# Patient Record
Sex: FEMALE | Race: WHITE | Employment: PART TIME
[De-identification: names, ages, dates, MRNs, and addresses within clinical notes are randomized per-mention and may not be internally consistent; named-entity substitution may affect disease eponyms.]

---

## 2016-11-10 LAB
AVERAGE GLUCOSE: NORMAL
HBA1C MFR BLD: 5.2 %

## 2017-02-01 ENCOUNTER — OFFICE VISIT (OUTPATIENT)
Dept: OBGYN | Facility: CLINIC | Age: 47
End: 2017-02-01

## 2017-02-01 VITALS
BODY MASS INDEX: 32.5 KG/M2 | HEIGHT: 64 IN | DIASTOLIC BLOOD PRESSURE: 76 MMHG | SYSTOLIC BLOOD PRESSURE: 128 MMHG | WEIGHT: 190.4 LBS

## 2017-02-01 DIAGNOSIS — N84.1 CERVICAL POLYP: Primary | ICD-10-CM

## 2017-02-01 PROCEDURE — 99214 OFFICE O/P EST MOD 30 MIN: CPT | Performed by: OBSTETRICS & GYNECOLOGY

## 2017-02-20 ENCOUNTER — APPOINTMENT (OUTPATIENT)
Dept: GENERAL RADIOLOGY | Age: 47
End: 2017-02-20
Payer: COMMERCIAL

## 2017-02-20 ENCOUNTER — HOSPITAL ENCOUNTER (EMERGENCY)
Age: 47
Discharge: HOME | End: 2017-02-20
Attending: EMERGENCY MEDICINE
Payer: COMMERCIAL

## 2017-02-20 VITALS
BODY MASS INDEX: 33.29 KG/M2 | RESPIRATION RATE: 12 BRPM | WEIGHT: 195 LBS | TEMPERATURE: 98 F | HEART RATE: 67 BPM | HEIGHT: 64 IN | SYSTOLIC BLOOD PRESSURE: 104 MMHG | DIASTOLIC BLOOD PRESSURE: 64 MMHG | OXYGEN SATURATION: 93 %

## 2017-02-20 DIAGNOSIS — R68.84 JAW PAIN: Primary | ICD-10-CM

## 2017-02-20 LAB
ABSOLUTE EOS #: 0.7 K/UL (ref 0–0.4)
ABSOLUTE LYMPH #: 3.5 K/UL (ref 1–4.8)
ABSOLUTE MONO #: 0.6 K/UL (ref 0.1–1.3)
ANION GAP SERPL CALCULATED.3IONS-SCNC: 15 MMOL/L (ref 9–17)
BASOPHILS # BLD: 1 % (ref 0–2)
BASOPHILS ABSOLUTE: 0.1 K/UL (ref 0–0.2)
BUN BLDV-MCNC: 13 MG/DL (ref 6–20)
BUN/CREAT BLD: ABNORMAL (ref 9–20)
CALCIUM SERPL-MCNC: 8.7 MG/DL (ref 8.6–10.4)
CHLORIDE BLD-SCNC: 101 MMOL/L (ref 98–107)
CO2: 24 MMOL/L (ref 20–31)
CREAT SERPL-MCNC: 0.52 MG/DL (ref 0.5–0.9)
DIFFERENTIAL TYPE: ABNORMAL
DIRECT EXAM: NORMAL
EOSINOPHILS RELATIVE PERCENT: 7 % (ref 0–4)
GFR AFRICAN AMERICAN: >60 ML/MIN
GFR NON-AFRICAN AMERICAN: >60 ML/MIN
GFR SERPL CREATININE-BSD FRML MDRD: ABNORMAL ML/MIN/{1.73_M2}
GFR SERPL CREATININE-BSD FRML MDRD: ABNORMAL ML/MIN/{1.73_M2}
GLUCOSE BLD-MCNC: 163 MG/DL (ref 70–99)
HCG QUALITATIVE: NEGATIVE
HCT VFR BLD CALC: 39.2 % (ref 36–46)
HEMOGLOBIN: 13.5 G/DL (ref 12–16)
INR BLD: 1
LYMPHOCYTES # BLD: 34 % (ref 24–44)
Lab: NORMAL
MCH RBC QN AUTO: 30.9 PG (ref 26–34)
MCHC RBC AUTO-ENTMCNC: 34.6 G/DL (ref 31–37)
MCV RBC AUTO: 89.4 FL (ref 80–100)
MONOCYTES # BLD: 6 % (ref 1–7)
MONONUCLEOSIS SCREEN: NEGATIVE
PDW BLD-RTO: 12.9 % (ref 11.5–14.9)
PLATELET # BLD: 163 K/UL (ref 150–450)
PLATELET ESTIMATE: ABNORMAL
PMV BLD AUTO: 10.7 FL (ref 6–12)
POTASSIUM SERPL-SCNC: 3.8 MMOL/L (ref 3.7–5.3)
PROTHROMBIN TIME: 10.3 SEC (ref 9.7–12)
RBC # BLD: 4.39 M/UL (ref 4–5.2)
RBC # BLD: ABNORMAL 10*6/UL
SEG NEUTROPHILS: 52 % (ref 36–66)
SEGMENTED NEUTROPHILS ABSOLUTE COUNT: 5.4 K/UL (ref 1.3–9.1)
SODIUM BLD-SCNC: 140 MMOL/L (ref 135–144)
SPECIMEN DESCRIPTION: NORMAL
SPECIMEN DESCRIPTION: NORMAL
STATUS: NORMAL
TOTAL CK: 89 U/L (ref 26–192)
TROPONIN INTERP: NORMAL
TROPONIN INTERP: NORMAL
TROPONIN T: <0.03 NG/ML
TROPONIN T: <0.03 NG/ML
WBC # BLD: 10.3 K/UL (ref 3.5–11)
WBC # BLD: ABNORMAL 10*3/UL

## 2017-02-20 PROCEDURE — 87804 INFLUENZA ASSAY W/OPTIC: CPT

## 2017-02-20 PROCEDURE — 6360000002 HC RX W HCPCS: Performed by: EMERGENCY MEDICINE

## 2017-02-20 PROCEDURE — 86308 HETEROPHILE ANTIBODY SCREEN: CPT

## 2017-02-20 PROCEDURE — 96375 TX/PRO/DX INJ NEW DRUG ADDON: CPT

## 2017-02-20 PROCEDURE — 2580000003 HC RX 258: Performed by: EMERGENCY MEDICINE

## 2017-02-20 PROCEDURE — 84703 CHORIONIC GONADOTROPIN ASSAY: CPT

## 2017-02-20 PROCEDURE — 36415 COLL VENOUS BLD VENIPUNCTURE: CPT

## 2017-02-20 PROCEDURE — 71010 XR CHEST PORTABLE: CPT

## 2017-02-20 PROCEDURE — 85610 PROTHROMBIN TIME: CPT

## 2017-02-20 PROCEDURE — 93005 ELECTROCARDIOGRAM TRACING: CPT

## 2017-02-20 PROCEDURE — 80048 BASIC METABOLIC PNL TOTAL CA: CPT

## 2017-02-20 PROCEDURE — 82550 ASSAY OF CK (CPK): CPT

## 2017-02-20 PROCEDURE — 99284 EMERGENCY DEPT VISIT MOD MDM: CPT

## 2017-02-20 PROCEDURE — 96374 THER/PROPH/DIAG INJ IV PUSH: CPT

## 2017-02-20 PROCEDURE — 84484 ASSAY OF TROPONIN QUANT: CPT

## 2017-02-20 PROCEDURE — 85025 COMPLETE CBC W/AUTO DIFF WBC: CPT

## 2017-02-20 RX ORDER — KETOROLAC TROMETHAMINE 30 MG/ML
30 INJECTION, SOLUTION INTRAMUSCULAR; INTRAVENOUS ONCE
Status: COMPLETED | OUTPATIENT
Start: 2017-02-20 | End: 2017-02-20

## 2017-02-20 RX ORDER — 0.9 % SODIUM CHLORIDE 0.9 %
1000 INTRAVENOUS SOLUTION INTRAVENOUS ONCE
Status: COMPLETED | OUTPATIENT
Start: 2017-02-20 | End: 2017-02-20

## 2017-02-20 RX ORDER — CYCLOBENZAPRINE HCL 10 MG
10 TABLET ORAL 3 TIMES DAILY PRN
Qty: 30 TABLET | Refills: 0 | Status: SHIPPED | OUTPATIENT
Start: 2017-02-20 | End: 2017-02-23

## 2017-02-20 RX ORDER — ONDANSETRON 2 MG/ML
4 INJECTION INTRAMUSCULAR; INTRAVENOUS ONCE
Status: COMPLETED | OUTPATIENT
Start: 2017-02-20 | End: 2017-02-20

## 2017-02-20 RX ORDER — ACETAMINOPHEN 325 MG/1
650 TABLET ORAL EVERY 6 HOURS PRN
Qty: 120 TABLET | Refills: 3 | Status: SHIPPED | OUTPATIENT
Start: 2017-02-20 | End: 2017-02-23

## 2017-02-20 RX ADMIN — KETOROLAC TROMETHAMINE 30 MG: 30 INJECTION, SOLUTION INTRAMUSCULAR at 05:02

## 2017-02-20 RX ADMIN — ONDANSETRON 4 MG: 2 INJECTION INTRAMUSCULAR; INTRAVENOUS at 02:51

## 2017-02-20 RX ADMIN — SODIUM CHLORIDE 1000 ML: 9 INJECTION, SOLUTION INTRAVENOUS at 02:51

## 2017-02-20 ASSESSMENT — ENCOUNTER SYMPTOMS
NAUSEA: 0
DIARRHEA: 0
EYE DISCHARGE: 0
RHINORRHEA: 0
COLOR CHANGE: 0
VOMITING: 0
COUGH: 0
EYE REDNESS: 0
ABDOMINAL PAIN: 0
SHORTNESS OF BREATH: 0
SORE THROAT: 0

## 2017-02-20 ASSESSMENT — PAIN DESCRIPTION - LOCATION: LOCATION: JAW;SHOULDER

## 2017-02-20 ASSESSMENT — PAIN DESCRIPTION - PAIN TYPE: TYPE: ACUTE PAIN

## 2017-02-20 ASSESSMENT — PAIN SCALES - GENERAL: PAINLEVEL_OUTOF10: 8

## 2017-02-20 ASSESSMENT — PAIN DESCRIPTION - ORIENTATION: ORIENTATION: LEFT;RIGHT

## 2017-02-22 LAB
AVERAGE GLUCOSE: NORMAL
EKG ATRIAL RATE: 77 BPM
EKG P AXIS: 35 DEGREES
EKG P-R INTERVAL: 142 MS
EKG Q-T INTERVAL: 404 MS
EKG QRS DURATION: 84 MS
EKG QTC CALCULATION (BAZETT): 457 MS
EKG R AXIS: 50 DEGREES
EKG T AXIS: 46 DEGREES
EKG VENTRICULAR RATE: 77 BPM
HBA1C MFR BLD: 5.3 %

## 2017-02-23 ENCOUNTER — OFFICE VISIT (OUTPATIENT)
Dept: OBGYN | Facility: CLINIC | Age: 47
End: 2017-02-23

## 2017-02-23 ENCOUNTER — PROCEDURE VISIT (OUTPATIENT)
Dept: OBGYN | Facility: CLINIC | Age: 47
End: 2017-02-23

## 2017-02-23 ENCOUNTER — HOSPITAL ENCOUNTER (OUTPATIENT)
Age: 47
Setting detail: SPECIMEN
Discharge: HOME | End: 2017-02-23
Payer: COMMERCIAL

## 2017-02-23 VITALS
SYSTOLIC BLOOD PRESSURE: 110 MMHG | WEIGHT: 192.2 LBS | BODY MASS INDEX: 32.81 KG/M2 | DIASTOLIC BLOOD PRESSURE: 60 MMHG | HEIGHT: 64 IN

## 2017-02-23 DIAGNOSIS — N84.1 CERVICAL POLYP: ICD-10-CM

## 2017-02-23 DIAGNOSIS — N84.1 CERVICAL POLYP: Primary | ICD-10-CM

## 2017-02-23 LAB
THYROXINE, FREE: 1.16 NG/DL (ref 0.93–1.7)
TSH SERPL DL<=0.05 MIU/L-ACNC: 3.27 MIU/L (ref 0.3–5)

## 2017-02-23 PROCEDURE — 76830 TRANSVAGINAL US NON-OB: CPT | Performed by: OBSTETRICS & GYNECOLOGY

## 2017-02-23 PROCEDURE — 99213 OFFICE O/P EST LOW 20 MIN: CPT | Performed by: OBSTETRICS & GYNECOLOGY

## 2017-02-23 PROCEDURE — 76856 US EXAM PELVIC COMPLETE: CPT | Performed by: OBSTETRICS & GYNECOLOGY

## 2017-03-15 ENCOUNTER — OFFICE VISIT (OUTPATIENT)
Dept: OBGYN CLINIC | Age: 47
End: 2017-03-15
Payer: COMMERCIAL

## 2017-03-15 ENCOUNTER — HOSPITAL ENCOUNTER (OUTPATIENT)
Age: 47
Setting detail: SPECIMEN
Discharge: HOME | End: 2017-03-15
Payer: COMMERCIAL

## 2017-03-15 VITALS
HEIGHT: 64 IN | SYSTOLIC BLOOD PRESSURE: 130 MMHG | DIASTOLIC BLOOD PRESSURE: 86 MMHG | WEIGHT: 190.8 LBS | BODY MASS INDEX: 32.58 KG/M2

## 2017-03-15 DIAGNOSIS — D25.9 FIBROID OF CERVIX: ICD-10-CM

## 2017-03-15 DIAGNOSIS — Z12.31 ENCOUNTER FOR SCREENING MAMMOGRAM FOR BREAST CANCER: ICD-10-CM

## 2017-03-15 DIAGNOSIS — Z01.419 VISIT FOR GYNECOLOGIC EXAMINATION: Primary | ICD-10-CM

## 2017-03-15 PROCEDURE — 99396 PREV VISIT EST AGE 40-64: CPT | Performed by: OBSTETRICS & GYNECOLOGY

## 2017-03-15 ASSESSMENT — ENCOUNTER SYMPTOMS
COUGH: 0
DIARRHEA: 0
SHORTNESS OF BREATH: 0
ABDOMINAL DISTENTION: 0
ABDOMINAL PAIN: 0
CONSTIPATION: 0
BACK PAIN: 0

## 2017-03-17 LAB — CYTOLOGY REPORT: NORMAL

## 2017-04-17 ENCOUNTER — HOSPITAL ENCOUNTER (OUTPATIENT)
Dept: GENERAL RADIOLOGY | Age: 47
Discharge: HOME | End: 2017-04-17
Payer: COMMERCIAL

## 2017-04-17 ENCOUNTER — HOSPITAL ENCOUNTER (OUTPATIENT)
Age: 47
Discharge: HOME | End: 2017-04-17
Payer: COMMERCIAL

## 2017-04-17 DIAGNOSIS — R52 PAIN: ICD-10-CM

## 2017-04-17 PROCEDURE — 71100 X-RAY EXAM RIBS UNI 2 VIEWS: CPT

## 2017-04-28 ENCOUNTER — HOSPITAL ENCOUNTER (OUTPATIENT)
Age: 47
Discharge: HOME | End: 2017-04-28
Payer: COMMERCIAL

## 2017-04-28 LAB
THYROXINE, FREE: 1.03 NG/DL (ref 0.93–1.7)
TSH SERPL DL<=0.05 MIU/L-ACNC: 2.61 MIU/L (ref 0.3–5)

## 2017-04-28 PROCEDURE — 84439 ASSAY OF FREE THYROXINE: CPT

## 2017-04-28 PROCEDURE — 84443 ASSAY THYROID STIM HORMONE: CPT

## 2017-04-28 PROCEDURE — 36415 COLL VENOUS BLD VENIPUNCTURE: CPT

## 2017-05-02 ENCOUNTER — HOSPITAL ENCOUNTER (OUTPATIENT)
Dept: WOMENS IMAGING | Age: 47
Discharge: HOME | End: 2017-05-02
Payer: COMMERCIAL

## 2017-05-02 PROCEDURE — 77063 BREAST TOMOSYNTHESIS BI: CPT

## 2017-05-03 ENCOUNTER — HOSPITAL ENCOUNTER (OUTPATIENT)
Age: 47
Discharge: HOME | End: 2017-05-03
Payer: COMMERCIAL

## 2017-05-03 LAB
ALBUMIN SERPL-MCNC: 4.2 G/DL (ref 3.5–5.2)
ALBUMIN/GLOBULIN RATIO: ABNORMAL (ref 1–2.5)
ALP BLD-CCNC: 64 U/L (ref 35–104)
ALT SERPL-CCNC: 9 U/L (ref 5–33)
ANION GAP SERPL CALCULATED.3IONS-SCNC: 15 MMOL/L (ref 9–17)
AST SERPL-CCNC: 15 U/L
BILIRUB SERPL-MCNC: 0.58 MG/DL (ref 0.3–1.2)
BUN BLDV-MCNC: 9 MG/DL (ref 6–20)
BUN/CREAT BLD: ABNORMAL (ref 9–20)
CALCIUM SERPL-MCNC: 9.4 MG/DL (ref 8.6–10.4)
CHLORIDE BLD-SCNC: 100 MMOL/L (ref 98–107)
CO2: 21 MMOL/L (ref 20–31)
CREAT SERPL-MCNC: 0.52 MG/DL (ref 0.5–0.9)
GFR AFRICAN AMERICAN: >60 ML/MIN
GFR NON-AFRICAN AMERICAN: >60 ML/MIN
GFR SERPL CREATININE-BSD FRML MDRD: ABNORMAL ML/MIN/{1.73_M2}
GFR SERPL CREATININE-BSD FRML MDRD: ABNORMAL ML/MIN/{1.73_M2}
GLUCOSE BLD-MCNC: 105 MG/DL (ref 70–99)
INSULIN COMMENT: NORMAL
INSULIN REFERENCE RANGE:: NORMAL
INSULIN: 10.4 MU/L
IRON SATURATION: 30 % (ref 20–55)
IRON: 89 UG/DL (ref 37–145)
MAGNESIUM: 2.2 MG/DL (ref 1.6–2.6)
POTASSIUM SERPL-SCNC: 4.5 MMOL/L (ref 3.7–5.3)
SODIUM BLD-SCNC: 136 MMOL/L (ref 135–144)
T3 FREE: 2.72 PG/ML (ref 2.02–4.43)
THYROXINE, FREE: 1.11 NG/DL (ref 0.93–1.7)
TOTAL IRON BINDING CAPACITY: 296 UG/DL (ref 250–450)
TOTAL PROTEIN: 7.3 G/DL (ref 6.4–8.3)
TSH SERPL DL<=0.05 MIU/L-ACNC: 4.23 MIU/L (ref 0.3–5)
UNSATURATED IRON BINDING CAPACITY: 207 UG/DL (ref 112–347)
VITAMIN D 25-HYDROXY: 19.8 NG/ML (ref 30–100)

## 2017-05-03 PROCEDURE — 80053 COMPREHEN METABOLIC PANEL: CPT

## 2017-05-03 PROCEDURE — 83550 IRON BINDING TEST: CPT

## 2017-05-03 PROCEDURE — 84481 FREE ASSAY (FT-3): CPT

## 2017-05-03 PROCEDURE — 84443 ASSAY THYROID STIM HORMONE: CPT

## 2017-05-03 PROCEDURE — 83540 ASSAY OF IRON: CPT

## 2017-05-03 PROCEDURE — 83735 ASSAY OF MAGNESIUM: CPT

## 2017-05-03 PROCEDURE — 84439 ASSAY OF FREE THYROXINE: CPT

## 2017-05-03 PROCEDURE — 82306 VITAMIN D 25 HYDROXY: CPT

## 2017-05-03 PROCEDURE — 83525 ASSAY OF INSULIN: CPT

## 2017-05-03 PROCEDURE — 36415 COLL VENOUS BLD VENIPUNCTURE: CPT

## 2017-05-09 ENCOUNTER — TELEPHONE (OUTPATIENT)
Dept: INFUSION THERAPY | Facility: MEDICAL CENTER | Age: 47
End: 2017-05-09

## 2017-05-09 DIAGNOSIS — E03.9 UNSPECIFIED HYPOTHYROIDISM: ICD-10-CM

## 2017-05-11 ENCOUNTER — OFFICE VISIT (OUTPATIENT)
Dept: FAMILY MEDICINE CLINIC | Age: 47
End: 2017-05-11
Payer: COMMERCIAL

## 2017-05-11 VITALS
SYSTOLIC BLOOD PRESSURE: 110 MMHG | DIASTOLIC BLOOD PRESSURE: 74 MMHG | HEART RATE: 84 BPM | WEIGHT: 193.8 LBS | HEIGHT: 64 IN | BODY MASS INDEX: 33.09 KG/M2

## 2017-05-11 DIAGNOSIS — F41.1 GAD (GENERALIZED ANXIETY DISORDER): ICD-10-CM

## 2017-05-11 DIAGNOSIS — L30.1 DYSHIDROSIS: ICD-10-CM

## 2017-05-11 DIAGNOSIS — K21.9 GASTROESOPHAGEAL REFLUX DISEASE WITHOUT ESOPHAGITIS: ICD-10-CM

## 2017-05-11 DIAGNOSIS — N84.1 CERVICAL POLYP: ICD-10-CM

## 2017-05-11 DIAGNOSIS — E55.9 VITAMIN D DEFICIENCY: ICD-10-CM

## 2017-05-11 DIAGNOSIS — E03.9 HYPOTHYROIDISM, UNSPECIFIED TYPE: Primary | ICD-10-CM

## 2017-05-11 DIAGNOSIS — R53.83 FATIGUE, UNSPECIFIED TYPE: ICD-10-CM

## 2017-05-11 PROCEDURE — 99203 OFFICE O/P NEW LOW 30 MIN: CPT | Performed by: FAMILY MEDICINE

## 2017-05-11 RX ORDER — CITALOPRAM 10 MG/1
30 TABLET ORAL DAILY
Qty: 30 TABLET | Refills: 3
Start: 2017-05-11 | End: 2019-03-13 | Stop reason: SDUPTHER

## 2017-05-11 ASSESSMENT — ENCOUNTER SYMPTOMS
CONSTIPATION: 0
CHEST TIGHTNESS: 0
EYE DISCHARGE: 0
EYE REDNESS: 0
PHOTOPHOBIA: 0
SORE THROAT: 0
ABDOMINAL PAIN: 0
VOICE CHANGE: 0
SINUS PRESSURE: 0
COUGH: 0
NAUSEA: 0
DIARRHEA: 0
COLOR CHANGE: 0
EYE PAIN: 0
VOMITING: 0
BLOOD IN STOOL: 0
RHINORRHEA: 0
BACK PAIN: 0
EYE ITCHING: 0
SHORTNESS OF BREATH: 0
FACIAL SWELLING: 0
ABDOMINAL DISTENTION: 0
WHEEZING: 0

## 2017-05-16 ENCOUNTER — HOSPITAL ENCOUNTER (OUTPATIENT)
Dept: INFUSION THERAPY | Facility: MEDICAL CENTER | Age: 47
Discharge: HOME | End: 2017-05-16
Payer: COMMERCIAL

## 2017-05-16 VITALS
SYSTOLIC BLOOD PRESSURE: 126 MMHG | DIASTOLIC BLOOD PRESSURE: 76 MMHG | TEMPERATURE: 98.3 F | RESPIRATION RATE: 18 BRPM | HEART RATE: 81 BPM

## 2017-05-16 DIAGNOSIS — E03.9 UNSPECIFIED HYPOTHYROIDISM: ICD-10-CM

## 2017-05-16 LAB
CORTISOL COLLECTION INFO: NORMAL
CORTISOL COLLECTION INFO: NORMAL
CORTISOL: 10.6 UG/DL
CORTISOL: 14.1 UG/DL

## 2017-05-16 PROCEDURE — 2580000003 HC RX 258: Performed by: INTERNAL MEDICINE

## 2017-05-16 PROCEDURE — 96374 THER/PROPH/DIAG INJ IV PUSH: CPT

## 2017-05-16 PROCEDURE — 6360000002 HC RX W HCPCS: Performed by: INTERNAL MEDICINE

## 2017-05-16 PROCEDURE — 36415 COLL VENOUS BLD VENIPUNCTURE: CPT

## 2017-05-16 PROCEDURE — 82533 TOTAL CORTISOL: CPT

## 2017-05-16 RX ORDER — SODIUM CHLORIDE 9 MG/ML
INJECTION, SOLUTION INTRAVENOUS CONTINUOUS
Status: DISCONTINUED | OUTPATIENT
Start: 2017-05-16 | End: 2017-05-17 | Stop reason: HOSPADM

## 2017-05-16 RX ADMIN — COSYNTROPIN 1 MCG: 0.25 INJECTION, POWDER, LYOPHILIZED, FOR SOLUTION INTRAMUSCULAR; INTRAVENOUS at 14:43

## 2017-05-16 RX ADMIN — SODIUM CHLORIDE: 9 INJECTION, SOLUTION INTRAVENOUS at 14:35

## 2017-05-16 ASSESSMENT — PAIN SCALES - GENERAL: PAINLEVEL_OUTOF10: 6

## 2017-05-16 ASSESSMENT — PAIN DESCRIPTION - DESCRIPTORS: DESCRIPTORS: SQUEEZING

## 2017-05-16 ASSESSMENT — PAIN DESCRIPTION - LOCATION: LOCATION: GENERALIZED

## 2017-05-16 ASSESSMENT — PAIN DESCRIPTION - PAIN TYPE: TYPE: CHRONIC PAIN

## 2017-05-16 ASSESSMENT — PAIN DESCRIPTION - ONSET: ONSET: ON-GOING

## 2017-05-16 ASSESSMENT — PAIN DESCRIPTION - FREQUENCY: FREQUENCY: INTERMITTENT

## 2017-05-16 NOTE — IP AVS SNAPSHOT
Patient Information     Patient Name ANNE Aguilar 1970         This is your updated medication list to keep with you all times      ASK your doctor about these medications     citalopram 10 MG tablet   Commonly known as:  CELEXA   Take 3 tablets by mouth daily       HYDROcodone-acetaminophen 5-325 MG per tablet   Commonly known as:  NORCO       levothyroxine 50 MCG tablet   Commonly known as:  SYNTHROID       LORazepam 0.5 MG tablet   Commonly known as:  ATIVAN       naproxen 500 MG tablet   Commonly known as:  NAPROSYN       ONE TOUCH ULTRA 2 W/DEVICE Kit       ONE TOUCH ULTRA TEST strip   Generic drug:  glucose blood VI test strips       PEPCID PO       vitamin D3 5000 UNITS Caps   Take 1 capsule by mouth daily       ZYRTEC PO

## 2017-05-16 NOTE — PROGRESS NOTES
Arrives very nervous for Cortrosyn injection via IV line. See mar. Lab paged to floor to draw 30 minutes and 60 minute serum cortisol levels. discharged to home with all belongings. Next rtc is prn.

## 2017-05-16 NOTE — IP AVS SNAPSHOT
After Visit Summary  (Discharge Instructions)    Medication List for Home    Based on the information you provided to us as well as any changes during this visit, the following is your updated medication list.  Compare this with your prescription bottles at home. If you have any questions or concerns, contact your primary care physician's office.              Daily Medication List (This medication list can be shared with any healthcare provider who is helping you manage your medications)      ASK your doctor about these medications if you have questions        Last Dose    Next Dose Due AM NOON PM NIGHT    citalopram 10 MG tablet   Commonly known as:  CELEXA   Take 3 tablets by mouth daily                                         HYDROcodone-acetaminophen 5-325 MG per tablet   Commonly known as:  NORCO                                         levothyroxine 50 MCG tablet   Commonly known as:  SYNTHROID                                         LORazepam 0.5 MG tablet   Commonly known as:  ATIVAN   Take 0.5 mg by mouth 2 times daily                                         naproxen 500 MG tablet   Commonly known as:  NAPROSYN   2 times daily (with meals)                                         ONE TOUCH ULTRA 2 W/DEVICE Kit   TEST 4 TIMES DAILY                                         ONE TOUCH ULTRA TEST strip   Generic drug:  glucose blood VI test strips   USE TO TEST 4 TIMES DAILY                                         PEPCID PO   Take by mouth                                         vitamin D3 5000 UNITS Caps   Take 1 capsule by mouth daily                                         ZYRTEC PO   Take 1 tablet by mouth daily                                                 Allergies as of 5/16/2017        Reactions    Percocet [Oxycodone-acetaminophen] Hives, Itching    Hives everywhere, including roof of mouth and tear duct openings    Tramadol Hcl Hives, Itching Having the same reaction to the tramadol as she does to percocet. Cortisone     Pt states she became very ill the day after cortisone injection in heel. Ibuprofen Hives    Ceclor [Cefaclor] Hives, Rash    Penicillins Hives, Rash      Immunizations as of 5/16/2017     Name Date Dose VIS Date Route    Influenza Vaccine, unspecified formulation 10/24/2016 -- -- --      Last Vitals          Most Recent Value    Temp  98.3 °F (36.8 °C)    Pulse  81    Resp  18    BP  126/76         After Visit Summary    This summary was created for you. Thank you for entrusting your care to us. The following information includes details about your hospital/visit stay along with steps you should take to help with your recovery once you leave the hospital.  In this packet, you will find information about the topics listed below:    · Instructions about your medications including a list of your home medications  · A summary of your hospital visit  · Follow-up appointments once you have left the hospital  · Your care plan at home      You may receive a survey regarding the care you received during your stay. Your input is valuable to us. We encourage you to complete and return your survey in the envelope provided. We hope you will choose us in the future for your healthcare needs. Patient Information     Patient Name Lorelei Bruner 1970      Care Provided at:     Name             [address] [facility]              Your Visit    Here you will find information about your visit, including the reason for your visit. Please take this sheet with you when you visit your doctor or other health care provider in the future. It will help determine the best possible medical care for you at that time. If you have any questions once you leave the hospital, please call the department phone number listed below.         Why you were here     Your primary diagnosis was:  Not on File Visit Information     Date & Time Department Dept. Phone    5/16/2017 Linnea Hurt 48 [phone]       Follow-up Appointments    Below is a list of your follow-up and future appointments. This may not be a complete list as you may have made appointments directly with providers that we are not aware of or your providers may have made some for you. Please call your providers to confirm appointments. It is important to keep your appointments. Please bring your current insurance card, photo ID, co-pay, and all medication bottles to your appointment. If self-pay, payment is expected at the time of service. Future Appointments     6/9/2017 10:30 AM     Appointment with Lizett Caruso MD at [facility] ([phone])   Please arrive 15 minutes prior to appointment, bring photo ID and insurance card. [address]       9/14/2017 2:00 PM     Appointment with Yeimy Raymond at [facility] ([phone])   [address] [place] [id number]       9/14/2017 2:45 PM     Appointment with Dominique Macdonald MD at [facility] ([phone])   Please arrive 15 minutes prior to appointment, bring photo ID and insurance card. Manuel Once You Return Home    This section includes instructions you will need to follow once you leave the hospital.  Your care team will discuss these with you, so you and those caring for you know how to best care for your health needs at home. This section may also include educational information about certain health topics that may be of help to you. Important information for a smoker       SMOKING: QUIT SMOKING. THIS IS THE MOST IMPORTANT ACTION YOU CAN TAKE TO IMPROVE YOUR CURRENT AND FUTURE HEALTH.      Call the [address] [place] [address] at [place] NOW ([phone]) Smoking harms nonsmokers. When nonsmokers are around people who smoke, they absorb nicotine, carbon monoxide, and other ingredients of tobacco smoke. DO NOT SMOKE AROUND CHILDREN     Children exposed to secondhand smoke are at an increased risk of:  Sudden Infant Death Syndrome (SIDS), acute respiratory infections, inflammation of the middle ear, and severe asthma. Over a longer time, it causes heart disease and lung cancer. There is no safe level of exposure to secondhand smoke. [employer] Signup     [employer] allows you to send messages to your doctor, view your test results, renew your prescriptions, schedule appointments, view visit notes, and more. How Do I Sign Up? 1. In your Internet browser, go to [email or web address]  2. Click on the Sign Up Now link in the Sign In box. You will see the New Member Sign Up page. 3. Enter your [employer] Access Code exactly as it appears below. You will not need to use this code after youve completed the sign-up process. If you do not sign up before the expiration date, you must request a new code. [employer] Access Code: [email or web address]  Expires: 6/3/2017  4:51 AM    4. Enter your Social Security Number ([id number]) and Date of Birth (mm/dd/yyyy) as indicated and click Submit. You will be taken to the next sign-up page. 5. Create a [employer] ID. This will be your [employer] login ID and cannot be changed, so think of one that is secure and easy to remember. 6. Create a [employer] password. You can change your password at any time. 7. Enter your Password Reset Question and Answer. This can be used at a later time if you forget your password. 8. Enter your e-mail address. You will receive e-mail notification when new information is available in [address] 9. Click Sign Up. You can now view your medical record.      Additional Information  If you have questions, please contact the physician practice where you receive care. Remember, MyChart is NOT to be used for urgent needs. For medical emergencies, dial 911. For questions regarding your MyChart account call [phone]. If you have a clinical question, please call your doctor's office. View your information online  ? Review your current list of  medications, immunization, and allergies. ? Review your future test results online . ? Review your discharge instructions provided by your caregivers at discharge    Certain functionality such as prescription refills, scheduling appointments or sending messages to your provider are not activated if your provider does not use [email or web address] in his/her office    For questions regarding your MyChart account call [phone]. If you have a clinical question, please call your doctor's office. The information on all pages of the After Visit Summary has been reviewed with me, the patient and/or responsible adult, by my health care provider(s). I had the opportunity to ask questions regarding this information. I understand I should dispose of my armband safely at home to protect my health information. A complete copy of the After Visit Summary has been given to me, the patient and/or responsible adult.            Patient Signature/Responsible Adult:____________________    Clinician Signature:_____________________    Date:_____________________    Time:_____________________

## 2017-06-07 ENCOUNTER — HOSPITAL ENCOUNTER (OUTPATIENT)
Age: 47
Discharge: HOME | End: 2017-06-07
Payer: COMMERCIAL

## 2017-06-07 LAB — POTASSIUM SERPL-SCNC: 4 MMOL/L (ref 3.7–5.3)

## 2017-06-07 PROCEDURE — 84132 ASSAY OF SERUM POTASSIUM: CPT

## 2017-06-07 PROCEDURE — 36415 COLL VENOUS BLD VENIPUNCTURE: CPT

## 2017-06-07 PROCEDURE — 82024 ASSAY OF ACTH: CPT

## 2017-06-08 ENCOUNTER — HOSPITAL ENCOUNTER (OUTPATIENT)
Dept: ULTRASOUND IMAGING | Age: 47
Discharge: HOME | End: 2017-06-08
Payer: COMMERCIAL

## 2017-06-08 DIAGNOSIS — E03.9 HYPOTHYROIDISM, UNSPECIFIED TYPE: ICD-10-CM

## 2017-06-08 PROCEDURE — 76536 US EXAM OF HEAD AND NECK: CPT

## 2017-06-09 ENCOUNTER — OFFICE VISIT (OUTPATIENT)
Dept: FAMILY MEDICINE CLINIC | Age: 47
End: 2017-06-09
Payer: COMMERCIAL

## 2017-06-09 VITALS
SYSTOLIC BLOOD PRESSURE: 112 MMHG | HEART RATE: 78 BPM | OXYGEN SATURATION: 98 % | DIASTOLIC BLOOD PRESSURE: 78 MMHG | WEIGHT: 193 LBS | HEIGHT: 64 IN | BODY MASS INDEX: 32.95 KG/M2

## 2017-06-09 DIAGNOSIS — R06.83 SNORING: Primary | ICD-10-CM

## 2017-06-09 DIAGNOSIS — G47.19 EXCESSIVE DAYTIME SLEEPINESS: ICD-10-CM

## 2017-06-09 DIAGNOSIS — N64.4 BREAST PAIN: ICD-10-CM

## 2017-06-09 LAB — ADRENOCORTICOTROPIC HORMONE: 19 PG/ML (ref 6–58)

## 2017-06-09 PROCEDURE — 99214 OFFICE O/P EST MOD 30 MIN: CPT | Performed by: FAMILY MEDICINE

## 2017-06-09 RX ORDER — LIDOCAINE 50 MG/G
1 PATCH TOPICAL DAILY
Qty: 4 PATCH | Refills: 0 | Status: SHIPPED | OUTPATIENT
Start: 2017-06-09 | End: 2017-07-06

## 2017-06-09 RX ORDER — DEXAMETHASONE 0.5 MG/1
0.25 TABLET ORAL DAILY
Refills: 0 | COMMUNITY
Start: 2017-06-07 | End: 2017-09-21

## 2017-06-09 ASSESSMENT — ENCOUNTER SYMPTOMS
SINUS PRESSURE: 0
SHORTNESS OF BREATH: 0
CHEST TIGHTNESS: 0
WHEEZING: 0
EYE ITCHING: 0
PHOTOPHOBIA: 0
COLOR CHANGE: 0
EYE REDNESS: 0
ABDOMINAL PAIN: 0
COUGH: 0
EYE PAIN: 0
SORE THROAT: 0
RHINORRHEA: 0
CONSTIPATION: 0
FACIAL SWELLING: 0
BLOOD IN STOOL: 0
VOICE CHANGE: 0
ABDOMINAL DISTENTION: 0
DIARRHEA: 0
NAUSEA: 0
EYE DISCHARGE: 0
BACK PAIN: 0
VOMITING: 0

## 2017-06-14 ENCOUNTER — TELEPHONE (OUTPATIENT)
Dept: FAMILY MEDICINE CLINIC | Age: 47
End: 2017-06-14

## 2017-07-03 ENCOUNTER — TELEPHONE (OUTPATIENT)
Dept: FAMILY MEDICINE CLINIC | Age: 47
End: 2017-07-03

## 2017-07-06 ENCOUNTER — OFFICE VISIT (OUTPATIENT)
Dept: OBGYN CLINIC | Age: 47
End: 2017-07-06
Payer: COMMERCIAL

## 2017-07-06 ENCOUNTER — PROCEDURE VISIT (OUTPATIENT)
Dept: OBGYN CLINIC | Age: 47
End: 2017-07-06
Payer: COMMERCIAL

## 2017-07-06 VITALS
WEIGHT: 194.6 LBS | DIASTOLIC BLOOD PRESSURE: 64 MMHG | HEIGHT: 64 IN | BODY MASS INDEX: 33.22 KG/M2 | SYSTOLIC BLOOD PRESSURE: 116 MMHG

## 2017-07-06 DIAGNOSIS — N92.6 IRREGULAR UTERINE BLEEDING: Primary | ICD-10-CM

## 2017-07-06 DIAGNOSIS — D25.9 FIBROID OF CERVIX: ICD-10-CM

## 2017-07-06 PROCEDURE — 99213 OFFICE O/P EST LOW 20 MIN: CPT | Performed by: OBSTETRICS & GYNECOLOGY

## 2017-07-06 PROCEDURE — 76830 TRANSVAGINAL US NON-OB: CPT | Performed by: OBSTETRICS & GYNECOLOGY

## 2017-07-06 PROCEDURE — 76856 US EXAM PELVIC COMPLETE: CPT | Performed by: OBSTETRICS & GYNECOLOGY

## 2017-07-06 RX ORDER — ESCITALOPRAM OXALATE 10 MG/1
TABLET ORAL
COMMUNITY
Start: 2017-06-08 | End: 2017-07-06 | Stop reason: ALTCHOICE

## 2017-07-06 RX ORDER — CHOLECALCIFEROL (VITAMIN D3) 125 MCG
CAPSULE ORAL
COMMUNITY
Start: 2017-05-11 | End: 2017-07-06 | Stop reason: SDUPTHER

## 2017-07-12 ENCOUNTER — HOSPITAL ENCOUNTER (OUTPATIENT)
Dept: PREADMISSION TESTING | Age: 47
Discharge: HOME | End: 2017-07-12
Payer: COMMERCIAL

## 2017-07-12 VITALS
RESPIRATION RATE: 18 BRPM | HEIGHT: 64 IN | HEART RATE: 88 BPM | SYSTOLIC BLOOD PRESSURE: 109 MMHG | OXYGEN SATURATION: 97 % | WEIGHT: 194 LBS | BODY MASS INDEX: 33.12 KG/M2 | DIASTOLIC BLOOD PRESSURE: 79 MMHG | TEMPERATURE: 98.1 F

## 2017-07-12 LAB
GLUCOSE BLD-MCNC: 86 MG/DL (ref 70–99)
HCT VFR BLD CALC: 40.4 % (ref 36–46)
HEMOGLOBIN: 13.7 G/DL (ref 12–16)
MCH RBC QN AUTO: 30.6 PG (ref 26–34)
MCHC RBC AUTO-ENTMCNC: 34 G/DL (ref 31–37)
MCV RBC AUTO: 89.9 FL (ref 80–100)
PDW BLD-RTO: 13.6 % (ref 12.5–15.4)
PLATELET # BLD: ABNORMAL K/UL (ref 140–450)
PMV BLD AUTO: ABNORMAL FL (ref 6–12)
RBC # BLD: 4.49 M/UL (ref 4–5.2)
WBC # BLD: 11.3 K/UL (ref 3.5–11)

## 2017-07-12 PROCEDURE — 85027 COMPLETE CBC AUTOMATED: CPT

## 2017-07-12 PROCEDURE — 36415 COLL VENOUS BLD VENIPUNCTURE: CPT

## 2017-07-12 PROCEDURE — 86850 RBC ANTIBODY SCREEN: CPT

## 2017-07-12 PROCEDURE — 86901 BLOOD TYPING SEROLOGIC RH(D): CPT

## 2017-07-12 PROCEDURE — 82947 ASSAY GLUCOSE BLOOD QUANT: CPT

## 2017-07-12 PROCEDURE — 86900 BLOOD TYPING SEROLOGIC ABO: CPT

## 2017-07-12 RX ORDER — SODIUM CHLORIDE, SODIUM LACTATE, POTASSIUM CHLORIDE, CALCIUM CHLORIDE 600; 310; 30; 20 MG/100ML; MG/100ML; MG/100ML; MG/100ML
1000 INJECTION, SOLUTION INTRAVENOUS CONTINUOUS
Status: CANCELLED | OUTPATIENT
Start: 2017-07-12

## 2017-07-18 ENCOUNTER — HOSPITAL ENCOUNTER (OUTPATIENT)
Age: 47
Setting detail: OUTPATIENT SURGERY
Discharge: HOME | End: 2017-07-18
Attending: OBSTETRICS & GYNECOLOGY | Admitting: OBSTETRICS & GYNECOLOGY
Payer: COMMERCIAL

## 2017-07-18 ENCOUNTER — ANESTHESIA (OUTPATIENT)
Dept: OPERATING ROOM | Age: 47
End: 2017-07-18
Payer: COMMERCIAL

## 2017-07-18 ENCOUNTER — ANESTHESIA EVENT (OUTPATIENT)
Dept: OPERATING ROOM | Age: 47
End: 2017-07-18
Payer: COMMERCIAL

## 2017-07-18 VITALS
TEMPERATURE: 97.7 F | HEIGHT: 64 IN | SYSTOLIC BLOOD PRESSURE: 128 MMHG | WEIGHT: 194.22 LBS | DIASTOLIC BLOOD PRESSURE: 70 MMHG | BODY MASS INDEX: 33.16 KG/M2 | OXYGEN SATURATION: 96 % | RESPIRATION RATE: 17 BRPM | HEART RATE: 78 BPM

## 2017-07-18 VITALS — OXYGEN SATURATION: 100 % | TEMPERATURE: 91.4 F | SYSTOLIC BLOOD PRESSURE: 95 MMHG | DIASTOLIC BLOOD PRESSURE: 63 MMHG

## 2017-07-18 PROBLEM — N93.9 ABNORMAL UTERINE BLEEDING: Status: ACTIVE | Noted: 2017-07-18

## 2017-07-18 LAB
ABO/RH: NORMAL
ANTIBODY SCREEN: NEGATIVE
ARM BAND NUMBER: NORMAL
EXPIRATION DATE: NORMAL
HCG, PREGNANCY URINE (POC): NEGATIVE

## 2017-07-18 PROCEDURE — 3600000004 HC SURGERY LEVEL 4 BASE: Performed by: OBSTETRICS & GYNECOLOGY

## 2017-07-18 PROCEDURE — 2580000003 HC RX 258: Performed by: ANESTHESIOLOGY

## 2017-07-18 PROCEDURE — 2580000003 HC RX 258: Performed by: OBSTETRICS & GYNECOLOGY

## 2017-07-18 PROCEDURE — 3700000001 HC ADD 15 MINUTES (ANESTHESIA): Performed by: OBSTETRICS & GYNECOLOGY

## 2017-07-18 PROCEDURE — 7100000010 HC PHASE II RECOVERY - FIRST 15 MIN: Performed by: OBSTETRICS & GYNECOLOGY

## 2017-07-18 PROCEDURE — 58558 HYSTEROSCOPY BIOPSY: CPT | Performed by: OBSTETRICS & GYNECOLOGY

## 2017-07-18 PROCEDURE — 3700000000 HC ANESTHESIA ATTENDED CARE: Performed by: OBSTETRICS & GYNECOLOGY

## 2017-07-18 PROCEDURE — 2500000003 HC RX 250 WO HCPCS: Performed by: SPECIALIST

## 2017-07-18 PROCEDURE — 7100000000 HC PACU RECOVERY - FIRST 15 MIN: Performed by: OBSTETRICS & GYNECOLOGY

## 2017-07-18 PROCEDURE — 7100000001 HC PACU RECOVERY - ADDTL 15 MIN: Performed by: OBSTETRICS & GYNECOLOGY

## 2017-07-18 PROCEDURE — 2720000010 HC SURG SUPPLY STERILE: Performed by: OBSTETRICS & GYNECOLOGY

## 2017-07-18 PROCEDURE — 6360000002 HC RX W HCPCS: Performed by: SPECIALIST

## 2017-07-18 PROCEDURE — 3600000014 HC SURGERY LEVEL 4 ADDTL 15MIN: Performed by: OBSTETRICS & GYNECOLOGY

## 2017-07-18 PROCEDURE — 84703 CHORIONIC GONADOTROPIN ASSAY: CPT

## 2017-07-18 PROCEDURE — 6360000002 HC RX W HCPCS: Performed by: ANESTHESIOLOGY

## 2017-07-18 PROCEDURE — 88305 TISSUE EXAM BY PATHOLOGIST: CPT

## 2017-07-18 RX ORDER — FENTANYL CITRATE 50 UG/ML
INJECTION, SOLUTION INTRAMUSCULAR; INTRAVENOUS PRN
Status: DISCONTINUED | OUTPATIENT
Start: 2017-07-18 | End: 2017-07-18 | Stop reason: SDUPTHER

## 2017-07-18 RX ORDER — MAGNESIUM HYDROXIDE 1200 MG/15ML
LIQUID ORAL CONTINUOUS PRN
Status: DISCONTINUED | OUTPATIENT
Start: 2017-07-18 | End: 2017-07-18 | Stop reason: HOSPADM

## 2017-07-18 RX ORDER — PROPOFOL 10 MG/ML
INJECTION, EMULSION INTRAVENOUS PRN
Status: DISCONTINUED | OUTPATIENT
Start: 2017-07-18 | End: 2017-07-18 | Stop reason: SDUPTHER

## 2017-07-18 RX ORDER — FENTANYL CITRATE 50 UG/ML
25 INJECTION, SOLUTION INTRAMUSCULAR; INTRAVENOUS EVERY 5 MIN PRN
Status: DISCONTINUED | OUTPATIENT
Start: 2017-07-18 | End: 2017-07-18 | Stop reason: HOSPADM

## 2017-07-18 RX ORDER — SODIUM CHLORIDE 0.9 % (FLUSH) 0.9 %
10 SYRINGE (ML) INJECTION EVERY 12 HOURS SCHEDULED
Status: DISCONTINUED | OUTPATIENT
Start: 2017-07-18 | End: 2017-07-18 | Stop reason: HOSPADM

## 2017-07-18 RX ORDER — ONDANSETRON 2 MG/ML
INJECTION INTRAMUSCULAR; INTRAVENOUS PRN
Status: DISCONTINUED | OUTPATIENT
Start: 2017-07-18 | End: 2017-07-18 | Stop reason: SDUPTHER

## 2017-07-18 RX ORDER — MEPERIDINE HYDROCHLORIDE 50 MG/ML
12.5 INJECTION INTRAMUSCULAR; INTRAVENOUS; SUBCUTANEOUS EVERY 5 MIN PRN
Status: DISCONTINUED | OUTPATIENT
Start: 2017-07-18 | End: 2017-07-18 | Stop reason: HOSPADM

## 2017-07-18 RX ORDER — HYDROCODONE BITARTRATE AND ACETAMINOPHEN 5; 325 MG/1; MG/1
1 TABLET ORAL EVERY 6 HOURS PRN
Qty: 10 TABLET | Refills: 0 | Status: SHIPPED | OUTPATIENT
Start: 2017-07-18 | End: 2017-07-25

## 2017-07-18 RX ORDER — SODIUM CHLORIDE, SODIUM LACTATE, POTASSIUM CHLORIDE, CALCIUM CHLORIDE 600; 310; 30; 20 MG/100ML; MG/100ML; MG/100ML; MG/100ML
1000 INJECTION, SOLUTION INTRAVENOUS CONTINUOUS
Status: DISCONTINUED | OUTPATIENT
Start: 2017-07-18 | End: 2017-07-18 | Stop reason: HOSPADM

## 2017-07-18 RX ORDER — MIDAZOLAM HYDROCHLORIDE 1 MG/ML
1 INJECTION INTRAMUSCULAR; INTRAVENOUS EVERY 10 MIN PRN
Status: DISCONTINUED | OUTPATIENT
Start: 2017-07-18 | End: 2017-07-18 | Stop reason: HOSPADM

## 2017-07-18 RX ORDER — LIDOCAINE HYDROCHLORIDE 10 MG/ML
INJECTION, SOLUTION EPIDURAL; INFILTRATION; INTRACAUDAL; PERINEURAL PRN
Status: DISCONTINUED | OUTPATIENT
Start: 2017-07-18 | End: 2017-07-18 | Stop reason: SDUPTHER

## 2017-07-18 RX ORDER — SODIUM CHLORIDE 0.9 % (FLUSH) 0.9 %
10 SYRINGE (ML) INJECTION PRN
Status: DISCONTINUED | OUTPATIENT
Start: 2017-07-18 | End: 2017-07-18 | Stop reason: HOSPADM

## 2017-07-18 RX ADMIN — FENTANYL CITRATE 25 MCG: 50 INJECTION, SOLUTION INTRAMUSCULAR; INTRAVENOUS at 09:30

## 2017-07-18 RX ADMIN — MIDAZOLAM HYDROCHLORIDE 1 MG: 1 INJECTION, SOLUTION INTRAMUSCULAR; INTRAVENOUS at 07:52

## 2017-07-18 RX ADMIN — PROPOFOL 200 MG: 10 INJECTION, EMULSION INTRAVENOUS at 08:03

## 2017-07-18 RX ADMIN — FENTANYL CITRATE 50 MCG: 50 INJECTION, SOLUTION INTRAMUSCULAR; INTRAVENOUS at 08:03

## 2017-07-18 RX ADMIN — LIDOCAINE HYDROCHLORIDE 50 MG: 10 INJECTION, SOLUTION EPIDURAL; INFILTRATION; INTRACAUDAL; PERINEURAL at 08:03

## 2017-07-18 RX ADMIN — SODIUM CHLORIDE, POTASSIUM CHLORIDE, SODIUM LACTATE AND CALCIUM CHLORIDE 1000 ML: 600; 310; 30; 20 INJECTION, SOLUTION INTRAVENOUS at 06:45

## 2017-07-18 RX ADMIN — FENTANYL CITRATE 25 MCG: 50 INJECTION, SOLUTION INTRAMUSCULAR; INTRAVENOUS at 09:17

## 2017-07-18 RX ADMIN — FENTANYL CITRATE 25 MCG: 50 INJECTION, SOLUTION INTRAMUSCULAR; INTRAVENOUS at 09:35

## 2017-07-18 RX ADMIN — ONDANSETRON 4 MG: 2 INJECTION, SOLUTION INTRAMUSCULAR; INTRAVENOUS at 08:38

## 2017-07-18 ASSESSMENT — PAIN SCALES - GENERAL
PAINLEVEL_OUTOF10: 2
PAINLEVEL_OUTOF10: 3
PAINLEVEL_OUTOF10: 3
PAINLEVEL_OUTOF10: 4
PAINLEVEL_OUTOF10: 3
PAINLEVEL_OUTOF10: 3
PAINLEVEL_OUTOF10: 6

## 2017-07-18 ASSESSMENT — PAIN - FUNCTIONAL ASSESSMENT: PAIN_FUNCTIONAL_ASSESSMENT: 0-10

## 2017-07-19 ENCOUNTER — TELEPHONE (OUTPATIENT)
Dept: OBGYN CLINIC | Age: 47
End: 2017-07-19

## 2017-07-19 LAB — SURGICAL PATHOLOGY REPORT: NORMAL

## 2017-07-26 DIAGNOSIS — G47.19 EXCESSIVE DAYTIME SLEEPINESS: ICD-10-CM

## 2017-07-26 DIAGNOSIS — R06.83 SNORING: ICD-10-CM

## 2017-07-27 ENCOUNTER — TELEPHONE (OUTPATIENT)
Dept: OBGYN CLINIC | Age: 47
End: 2017-07-27

## 2017-08-03 ENCOUNTER — OFFICE VISIT (OUTPATIENT)
Dept: OBGYN CLINIC | Age: 47
End: 2017-08-03
Payer: COMMERCIAL

## 2017-08-03 VITALS
DIASTOLIC BLOOD PRESSURE: 74 MMHG | WEIGHT: 199 LBS | HEIGHT: 64 IN | BODY MASS INDEX: 33.97 KG/M2 | SYSTOLIC BLOOD PRESSURE: 116 MMHG

## 2017-08-03 DIAGNOSIS — Z09 POSTOP CHECK: Primary | ICD-10-CM

## 2017-08-03 PROCEDURE — 99213 OFFICE O/P EST LOW 20 MIN: CPT | Performed by: OBSTETRICS & GYNECOLOGY

## 2017-08-23 ENCOUNTER — HOSPITAL ENCOUNTER (OUTPATIENT)
Age: 47
Discharge: HOME | End: 2017-08-23
Payer: COMMERCIAL

## 2017-08-23 LAB
T3 FREE: 2.67 PG/ML (ref 2.02–4.43)
THYROXINE, FREE: 0.88 NG/DL (ref 0.93–1.7)
TSH SERPL DL<=0.05 MIU/L-ACNC: 2.61 MIU/L (ref 0.3–5)
VITAMIN D 25-HYDROXY: 24.6 NG/ML (ref 30–100)

## 2017-08-23 PROCEDURE — 82306 VITAMIN D 25 HYDROXY: CPT

## 2017-08-23 PROCEDURE — 84439 ASSAY OF FREE THYROXINE: CPT

## 2017-08-23 PROCEDURE — 84443 ASSAY THYROID STIM HORMONE: CPT

## 2017-08-23 PROCEDURE — 36415 COLL VENOUS BLD VENIPUNCTURE: CPT

## 2017-08-23 PROCEDURE — 84481 FREE ASSAY (FT-3): CPT

## 2017-09-21 ENCOUNTER — OFFICE VISIT (OUTPATIENT)
Dept: FAMILY MEDICINE CLINIC | Age: 47
End: 2017-09-21
Payer: COMMERCIAL

## 2017-09-21 VITALS
HEIGHT: 64 IN | BODY MASS INDEX: 33.73 KG/M2 | OXYGEN SATURATION: 98 % | SYSTOLIC BLOOD PRESSURE: 118 MMHG | WEIGHT: 197.6 LBS | HEART RATE: 84 BPM | DIASTOLIC BLOOD PRESSURE: 76 MMHG

## 2017-09-21 DIAGNOSIS — R53.83 FATIGUE, UNSPECIFIED TYPE: Primary | ICD-10-CM

## 2017-09-21 DIAGNOSIS — E03.9 HYPOTHYROIDISM, UNSPECIFIED TYPE: ICD-10-CM

## 2017-09-21 PROCEDURE — 99213 OFFICE O/P EST LOW 20 MIN: CPT | Performed by: FAMILY MEDICINE

## 2017-09-21 RX ORDER — CETIRIZINE HYDROCHLORIDE, PSEUDOEPHEDRINE HYDROCHLORIDE 5; 120 MG/1; MG/1
1 TABLET, FILM COATED, EXTENDED RELEASE ORAL 2 TIMES DAILY
Qty: 60 TABLET | Refills: 1 | Status: SHIPPED | OUTPATIENT
Start: 2017-09-21 | End: 2017-10-11 | Stop reason: ALTCHOICE

## 2017-09-21 RX ORDER — LIOTHYRONINE SODIUM 5 UG/1
5 TABLET ORAL DAILY
COMMUNITY
End: 2017-09-21

## 2017-09-21 ASSESSMENT — ENCOUNTER SYMPTOMS
EYE PAIN: 0
CONSTIPATION: 0
VOMITING: 0
SHORTNESS OF BREATH: 0
ABDOMINAL DISTENTION: 0
BACK PAIN: 0
EYE ITCHING: 0
BLOOD IN STOOL: 0
EYE REDNESS: 0
WHEEZING: 0
ABDOMINAL PAIN: 0
SINUS PRESSURE: 0
VOICE CHANGE: 0
NAUSEA: 0
COLOR CHANGE: 0
SORE THROAT: 0
FACIAL SWELLING: 0
DIARRHEA: 0
CHEST TIGHTNESS: 0
COUGH: 0
RHINORRHEA: 0
EYE DISCHARGE: 0
PHOTOPHOBIA: 0

## 2017-09-21 ASSESSMENT — PATIENT HEALTH QUESTIONNAIRE - PHQ9
SUM OF ALL RESPONSES TO PHQ QUESTIONS 1-9: 0
1. LITTLE INTEREST OR PLEASURE IN DOING THINGS: 0
2. FEELING DOWN, DEPRESSED OR HOPELESS: 0
SUM OF ALL RESPONSES TO PHQ9 QUESTIONS 1 & 2: 0

## 2017-10-11 ENCOUNTER — HOSPITAL ENCOUNTER (OUTPATIENT)
Age: 47
Discharge: HOME | End: 2017-10-11
Payer: COMMERCIAL

## 2017-10-11 ENCOUNTER — OFFICE VISIT (OUTPATIENT)
Dept: FAMILY MEDICINE CLINIC | Age: 47
End: 2017-10-11
Payer: COMMERCIAL

## 2017-10-11 ENCOUNTER — HOSPITAL ENCOUNTER (OUTPATIENT)
Dept: GENERAL RADIOLOGY | Age: 47
Discharge: HOME | End: 2017-10-11
Payer: COMMERCIAL

## 2017-10-11 VITALS
BODY MASS INDEX: 34.11 KG/M2 | HEIGHT: 64 IN | DIASTOLIC BLOOD PRESSURE: 80 MMHG | OXYGEN SATURATION: 98 % | HEART RATE: 99 BPM | SYSTOLIC BLOOD PRESSURE: 124 MMHG | WEIGHT: 199.8 LBS

## 2017-10-11 DIAGNOSIS — K59.00 CONSTIPATION, UNSPECIFIED CONSTIPATION TYPE: Primary | ICD-10-CM

## 2017-10-11 DIAGNOSIS — K59.00 CONSTIPATION, UNSPECIFIED CONSTIPATION TYPE: ICD-10-CM

## 2017-10-11 PROCEDURE — 74000 XR ABDOMEN LIMITED (KUB): CPT

## 2017-10-11 PROCEDURE — 99213 OFFICE O/P EST LOW 20 MIN: CPT | Performed by: FAMILY MEDICINE

## 2017-10-11 ASSESSMENT — ENCOUNTER SYMPTOMS
SINUS PRESSURE: 0
BACK PAIN: 0
EYE ITCHING: 0
EYE DISCHARGE: 0
COLOR CHANGE: 0
CONSTIPATION: 1
ABDOMINAL DISTENTION: 1
SHORTNESS OF BREATH: 0
VOMITING: 0
CHEST TIGHTNESS: 0
BLOOD IN STOOL: 1
SORE THROAT: 0
PHOTOPHOBIA: 0
WHEEZING: 0
DIARRHEA: 0
COUGH: 0
VOICE CHANGE: 0
NAUSEA: 1
EYE REDNESS: 0
RHINORRHEA: 0
FACIAL SWELLING: 0
ABDOMINAL PAIN: 0
EYE PAIN: 0

## 2017-10-11 NOTE — PROGRESS NOTES
Subjective:      Patient ID: Otoniel Dennison is a 40 y.o. female. Visit Information    Have you changed or started any medications since your last visit including any over-the-counter medicines, vitamins, or herbal medicines? no   Are you having any side effects from any of your medications? -  no  Have you stopped taking any of your medications? Is so, why? -  no    Have you seen any other physician or provider since your last visit? No  Have you had any other diagnostic tests since your last visit? No  Have you been seen in the emergency room and/or had an admission to a hospital since we last saw you? No  Have you had your routine dental cleaning in the past 6 months? yes -     Have you activated your [employer] account? If not, what are your barriers?  No:      Patient Care Team:  Wilian Damon MD as PCP - General (Family Medicine)  Cachorro Lechuga DO as Consulting Physician (Obstetrics & Gynecology)    Medical History Review  Past Medical, Family, and Social History reviewed and  contribute to the patient presenting condition    Health Maintenance   Topic Date Due    DTaP/Tdap/Td vaccine (1 - Tdap) 12/03/1990    Pneumococcal med risk (1 of 1 - PPSV23) 12/03/1990    Flu vaccine (1) 09/01/2017    Breast cancer screen  05/02/2018    Lipid screen  02/21/2019    Cervical cancer screen  03/15/2022    HIV screen  Completed       HPI    Review of Systems    Objective:   Physical Exam    Assessment / Plan:

## 2017-10-11 NOTE — PROGRESS NOTES
Subjective:      Patient ID: Martha Sales is a 40 y.o. female. HPI  Here for evaluation of constipation which has been in the [place] 1-2 range. She has been moving her bowels daily but very small amounts and has to strain to the point of bleeding. There has been a lot of increased nausea and decreased appetite with this as well. She has not had any trouble passing gas. She has been taking miralax (double recommended dose) along with suppositories which has not helped. Review of Systems   Constitutional: Negative for activity change, appetite change, chills, diaphoresis, fatigue, fever and unexpected weight change. HENT: Negative for congestion, ear pain, facial swelling, hearing loss, postnasal drip, rhinorrhea, sinus pressure, sore throat and voice change. Eyes: Negative for photophobia, pain, discharge, redness, itching and visual disturbance. Respiratory: Negative for cough, chest tightness, shortness of breath and wheezing. Cardiovascular: Negative for chest pain and palpitations. Gastrointestinal: Positive for abdominal distention, blood in stool, constipation and nausea. Negative for abdominal pain, diarrhea and vomiting. Endocrine: Negative for cold intolerance and heat intolerance. Genitourinary: Negative for decreased urine volume, difficulty urinating, dysuria, flank pain, frequency, hematuria, pelvic pain, urgency, vaginal bleeding and vaginal discharge. Musculoskeletal: Negative for arthralgias, back pain, gait problem, joint swelling, myalgias, neck pain and neck stiffness. Skin: Negative for color change, pallor and rash. Allergic/Immunologic: Negative for environmental allergies and food allergies. Neurological: Negative for dizziness, tremors, seizures, syncope, facial asymmetry, speech difficulty, weakness, numbness and headaches. Psychiatric/Behavioral: Negative for agitation, behavioral problems, dysphoric mood and sleep disturbance.  The patient is not the left side. Skin: Skin is warm and dry. No lesion and no rash noted. She is not diaphoretic. No cyanosis. Nails show no clubbing. Psychiatric: She has a normal mood and affect. Her speech is normal and behavior is normal. Judgment and thought content normal. Cognition and memory are normal.       Assessment:          Plan:      1. Constipation, unspecified constipation type  I would like to get a KUB before we start her on treatment with Mag citrate and suppositories. Once we get her cleaned out   - XR ABDOMEN (KUB) (SINGLE AP VIEW); Future  - magnesium citrate solution;  Take 296 mLs by mouth once for 1 dose  Dispense: 296 mL; Refill: 0

## 2017-10-20 ENCOUNTER — TELEPHONE (OUTPATIENT)
Dept: FAMILY MEDICINE CLINIC | Age: 47
End: 2017-10-20

## 2017-10-23 ENCOUNTER — TELEPHONE (OUTPATIENT)
Dept: FAMILY MEDICINE CLINIC | Age: 47
End: 2017-10-23

## 2017-10-23 DIAGNOSIS — K59.09 CHRONIC CONSTIPATION: Primary | ICD-10-CM

## 2017-10-23 RX ORDER — PANTOPRAZOLE SODIUM 40 MG/1
40 TABLET, DELAYED RELEASE ORAL 2 TIMES DAILY
Qty: 30 TABLET | Refills: 1 | Status: SHIPPED | OUTPATIENT
Start: 2017-10-23 | End: 2018-04-01 | Stop reason: SDUPTHER

## 2017-10-23 NOTE — TELEPHONE ENCOUNTER
dexilant is not on her insurance formulary so I sent in an Rx for BID protonix to replace the dexilant. If she is not moving her bowels regularly after the magnesium citrate then she should start the linzess samples as well.

## 2017-11-06 ENCOUNTER — TELEPHONE (OUTPATIENT)
Dept: FAMILY MEDICINE CLINIC | Age: 47
End: 2017-11-06

## 2017-11-06 NOTE — TELEPHONE ENCOUNTER
The patient is calling stated she has called Dr. Joaquín Roblero office and no one is getting back with her to get schedule for a appointment. The patient want to see if our office can call Dr. Joaquín Roblero and speed the process up. I called Dr. Joaquín Roblero office and got the patient schedule Nov. 16,2017 2:30pm at the [place] location while I was on the phone with the patient. The patient agreed to take the appointment and was giving the office address and phone number just in case she got lost. I spoke with Ankush Bean from DR. Joaquín Roblero office who schedule the appointment with me over the phone. Ankush Bean stated she contact the patient three times and did not get any answer.

## 2017-11-08 ENCOUNTER — TELEPHONE (OUTPATIENT)
Dept: OBGYN CLINIC | Age: 47
End: 2017-11-08

## 2017-11-08 NOTE — TELEPHONE ENCOUNTER
HX of ablation 2012 then [facility]  hysteroscopy with removal of benign endometrial polyp 07/18/17 with Dr Yelitza Meadows . Now bleeding for the first time since her ablation. She states she started bleeding 2-3 days ago and at first it was old brown blood. Then last night it was bright red with clots the size of quarters she changed her pad twice in one hour and then the bleeding slowed and she is changing for comfort and due to clots. Yolanda Bending 2-3 days prior to bleeding starting she had severe pain in \" right ovary\" she rated it 20/10. Denies pain now. Bleeding precautions given . 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 Once again bleeding precautions given.      Please advise [phone]    FYI she states she also has been dealing with constipation x 2-3 weeks

## 2017-11-09 ENCOUNTER — OFFICE VISIT (OUTPATIENT)
Dept: OBGYN CLINIC | Age: 47
End: 2017-11-09
Payer: COMMERCIAL

## 2017-11-09 ENCOUNTER — HOSPITAL ENCOUNTER (OUTPATIENT)
Age: 47
Setting detail: SPECIMEN
Discharge: HOME | End: 2017-11-09
Payer: COMMERCIAL

## 2017-11-09 ENCOUNTER — PROCEDURE VISIT (OUTPATIENT)
Dept: OBGYN CLINIC | Age: 47
End: 2017-11-09
Payer: COMMERCIAL

## 2017-11-09 VITALS
WEIGHT: 201.6 LBS | SYSTOLIC BLOOD PRESSURE: 124 MMHG | HEIGHT: 64 IN | DIASTOLIC BLOOD PRESSURE: 90 MMHG | BODY MASS INDEX: 34.42 KG/M2

## 2017-11-09 DIAGNOSIS — N93.9 ABNORMAL UTERINE BLEEDING (AUB): Primary | ICD-10-CM

## 2017-11-09 DIAGNOSIS — N92.1 MENORRHAGIA WITH IRREGULAR CYCLE: ICD-10-CM

## 2017-11-09 DIAGNOSIS — R10.2 PELVIC PAIN IN FEMALE: ICD-10-CM

## 2017-11-09 DIAGNOSIS — N92.1 MENORRHAGIA WITH IRREGULAR CYCLE: Primary | ICD-10-CM

## 2017-11-09 LAB
ABSOLUTE EOS #: 0.45 K/UL (ref 0–0.44)
ABSOLUTE IMMATURE GRANULOCYTE: 0.08 K/UL (ref 0–0.3)
ABSOLUTE LYMPH #: 2.85 K/UL (ref 1.1–3.7)
ABSOLUTE MONO #: 0.72 K/UL (ref 0.1–1.2)
BASOPHILS # BLD: 1 % (ref 0–2)
BASOPHILS ABSOLUTE: 0.1 K/UL (ref 0–0.2)
DIFFERENTIAL TYPE: ABNORMAL
EOSINOPHILS RELATIVE PERCENT: 5 % (ref 1–4)
HCG QUALITATIVE: NEGATIVE
HCT VFR BLD CALC: 42.3 % (ref 36.3–47.1)
HEMOGLOBIN: 13.6 G/DL (ref 11.9–15.1)
IMMATURE GRANULOCYTES: 1 %
LYMPHOCYTES # BLD: 30 % (ref 24–43)
MCH RBC QN AUTO: 30 PG (ref 25.2–33.5)
MCHC RBC AUTO-ENTMCNC: 32.2 G/DL (ref 28.4–34.8)
MCV RBC AUTO: 93.2 FL (ref 82.6–102.9)
MONOCYTES # BLD: 8 % (ref 3–12)
PDW BLD-RTO: 12.4 % (ref 11.8–14.4)
PLATELET # BLD: 195 K/UL (ref 138–453)
PLATELET ESTIMATE: ABNORMAL
PMV BLD AUTO: 12.3 FL (ref 8.1–13.5)
RBC # BLD: 4.54 M/UL (ref 3.95–5.11)
RBC # BLD: ABNORMAL 10*6/UL
SEG NEUTROPHILS: 55 % (ref 36–65)
SEGMENTED NEUTROPHILS ABSOLUTE COUNT: 5.17 K/UL (ref 1.5–8.1)
WBC # BLD: 9.4 K/UL (ref 3.5–11.3)
WBC # BLD: ABNORMAL 10*3/UL

## 2017-11-09 PROCEDURE — 76830 TRANSVAGINAL US NON-OB: CPT | Performed by: OBSTETRICS & GYNECOLOGY

## 2017-11-09 PROCEDURE — 76856 US EXAM PELVIC COMPLETE: CPT | Performed by: OBSTETRICS & GYNECOLOGY

## 2017-11-09 PROCEDURE — 99213 OFFICE O/P EST LOW 20 MIN: CPT | Performed by: OBSTETRICS & GYNECOLOGY

## 2017-11-09 NOTE — PROGRESS NOTES
Patient is a 40 y.o. [id number]  seen with total face to face time of 15 minutes. More than 50% of this visit was on counseling and education regarding her   1. Menorrhagia with irregular cycle  CBC Auto Differential    hCG, Serum, Qualitative   2. Pelvic pain in female      and her options. She was also counseled on her preventative health maintenance recommendations and follow-up. Blood pressure (!) 124/90, height 5' 4\" (1.626 m), weight 201 lb 9.6 oz (91.4 kg), not currently breastfeeding.   Recent Results (from the past 8736 hour(s))   POC Glucose Fingerstick    Collection Time: 11/10/16  5:47 PM   Result Value Ref Range    POC Glucose 120 (H) 65 - 105 mg/dL   Basic Metabolic Prof    Collection Time: 11/10/16  6:35 PM   Result Value Ref Range    Glucose 118 (H) 70 - 99 mg/dL    BUN 12 6 - 20 mg/dL    CREATININE 0.52 0.50 - 0.90 mg/dL    Bun/Cre Ratio NOT REPORTED 9 - 20    Calcium 9.2 8.6 - 10.4 mg/dL    Sodium 138 135 - 144 mmol/L    Potassium 3.9 3.7 - 5.3 mmol/L    Chloride 101 98 - 107 mmol/L    CO2 23 20 - 31 mmol/L    Anion Gap 14 9 - 17 mmol/L    GFR Non-African American >60 >60 mL/min    GFR African American >60 >60 mL/min    GFR Comment          GFR Staging NOT REPORTED    Magnesium    Collection Time: 11/10/16  6:35 PM   Result Value Ref Range    Magnesium 2.0 1.6 - 2.6 mg/dL   CBC Auto Differential    Collection Time: 11/10/16  6:35 PM   Result Value Ref Range    WBC 8.8 3.5 - 11.0 k/uL    RBC 4.58 4.0 - 5.2 m/uL    Hemoglobin 14.2 12.0 - 16.0 g/dL    Hematocrit 41.8 36 - 46 %    MCV 91.2 80 - 100 fL    MCH 31.0 26 - 34 pg    MCHC 33.9 31 - 37 g/dL    RDW 13.1 11.5 - 14.9 %    Platelets 941 016 - 886 k/uL    MPV 10.1 6.0 - 12.0 fL    Differential Type NOT REPORTED     Seg Neutrophils 55 36 - 66 %    Lymphocytes 31 24 - 44 %    Monocytes 9 (H) 1 - 7 %    Eosinophils % 4 0 - 4 %    Basophils 1 0 - 2 %    Segs Absolute 4.90 1.3 - 9.1 k/uL    Absolute Lymph # 2.70 1.0 - 4.8 k/uL    Absolute Mono # 0.80 0.1 to confirm this result is available upon request.  Specimen will be    Direct Exam        saved in the laboratory for 7 days. Please call [phone] if PCR testing is    Direct Exam  indicated. Status FINAL 02/20/2017    Troponin    Collection Time: 02/20/17  5:02 AM   Result Value Ref Range    Troponin T <0.03 <0.03 ng/mL    Troponin Interp         Hemoglobin A1C    Collection Time: 02/22/17 12:00 AM   Result Value Ref Range    Hemoglobin A1C 5.3 %    AVERAGE GLUCOSE     T4, Free    Collection Time: 02/23/17  2:10 PM   Result Value Ref Range    Thyroxine, Free 1.16 0.93 - 1.70 ng/dL   TSH without Reflex    Collection Time: 02/23/17  2:10 PM   Result Value Ref Range    TSH 3.27 0.30 - 5.00 mIU/L   GYN Cytology    Collection Time: 03/15/17  9:46 AM   Result Value Ref Range    Cytology Report       (NOTE)  [id number]  [facility]  ANATOMIC PATHOLOGY  [address] [place] [address], [place] [address]. [facility], [address]  [phone]  Fax: [phone]  GYNECOLOGIC CYTOLOGY REPORT    Patient Name: David Whaley  MR#: [id number]  Specimen #[id number]  Source:  1: Cervical material, (ThinPrep vial, Imaging-assisted review)    Clinical History  No LMP date given  Z01.419 Routine gyn exam without abnormal findings  High risk HPV DNA testing is requested if the diagnosis is abnormal    INTERPRETATION    Cervical material, (ThinPrep vial, Imaging-assisted review):  Specimen Adequacy:      Satisfactory for evaluation.      - Endocervical/transformation zone component present. Descriptive Diagnosis:      Negative for intraepithelial lesion or malignancy. Reactive cellular changes associated with inflammation.         Cytotechnologist:   Hamilton Kowalski M.D.  **Electronically Signed Out**         [facility]/3/17/2017     T4, Free    Collection Time: 04/28/17 10:50 AM   Result Value Ref Range    Thyroxine, Free 1.03 0.93 - 1.70 ng/dL   TSH without Reflex    Collection Time: 04/28/17 10:50 AM   Result Value Ref Range    TSH 2.61 0.30 - 5.00 mIU/L   Comprehensive Metabolic Panel    Collection Time: 05/03/17  8:12 AM   Result Value Ref Range    Glucose 105 (H) 70 - 99 mg/dL    BUN 9 6 - 20 mg/dL    CREATININE 0.52 0.50 - 0.90 mg/dL    Bun/Cre Ratio NOT REPORTED 9 - 20    Calcium 9.4 8.6 - 10.4 mg/dL    Sodium 136 135 - 144 mmol/L    Potassium 4.5 3.7 - 5.3 mmol/L    Chloride 100 98 - 107 mmol/L    CO2 21 20 - 31 mmol/L    Anion Gap 15 9 - 17 mmol/L    Alkaline Phosphatase 64 35 - 104 U/L    ALT 9 5 - 33 U/L    AST 15 <32 U/L    Total Bilirubin 0.58 0.3 - 1.2 mg/dL    Total Protein 7.3 6.4 - 8.3 g/dL    Alb 4.2 3.5 - 5.2 g/dL    Albumin/Globulin Ratio NOT REPORTED 1.0 - 2.5    GFR Non-African American >60 >60 mL/min    GFR African American >60 >60 mL/min    GFR Comment          GFR Staging NOT REPORTED    Iron and TIBC    Collection Time: 05/03/17  8:12 AM   Result Value Ref Range    Iron 89 37 - 145 ug/dL    TIBC 296 250 - 450 ug/dL    Iron Saturation 30 20 - 55 %    UIBC 207 112 - 347 ug/dL   T3, Free    Collection Time: 05/03/17  8:12 AM   Result Value Ref Range    T3, Free 2.72 2.02 - 4.43 pg/mL   T4, Free    Collection Time: 05/03/17  8:12 AM   Result Value Ref Range    Thyroxine, Free 1.11 0.93 - 1.70 ng/dL   Insulin, total    Collection Time: 05/03/17  8:12 AM   Result Value Ref Range    Insulin Comment hide     Insulin 10.4 mU/L    Insulin Reference Range:         Magnesium    Collection Time: 05/03/17  8:12 AM   Result Value Ref Range    Magnesium 2.2 1.6 - 2.6 mg/dL   TSH without Reflex    Collection Time: 05/03/17  8:12 AM   Result Value Ref Range    TSH 4.23 0.30 - 5.00 mIU/L   Vitamin D 25 Hydroxy    Collection Time: 05/03/17  8:12 AM   Result Value Ref Range    Vit D, 25-Hydroxy 19.8 (L) 30.0 - 100.0 ng/mL   Cortisol Total    Collection Time: 05/16/17  3:19 PM   Result Value Ref Range    Cortisol 14.1 ug/dL    Cortisol Collection Info 30 MINUTES    Cortisol Total    Collection Time: 05/16/17  3:43 PM ENDOMETRIAL CURETTINGS  Operation Performed:  HYSTEROSCOPY WITH MYOSURE    Source of Specimen  1: ENDOMETRIAL CURETTINGS (A)    Gross Description  \"FIOR PAEZ, ENDOMETRIAL CURETTINGS\" Pink-red tissue fragments, 3.2 x  2.9 x 0.3 cm in aggregate. Entirely 1cs. tm      Microscopic Description  Micros copic examination performed. T3, Free    Collection Time: 08/23/17  8:57 AM   Result Value Ref Range    T3, Free 2.67 2.02 - 4.43 pg/mL   T4, Free    Collection Time: 08/23/17  8:57 AM   Result Value Ref Range    Thyroxine, Free 0.88 (L) 0.93 - 1.70 ng/dL   TSH without Reflex    Collection Time: 08/23/17  8:57 AM   Result Value Ref Range    TSH 2.61 0.30 - 5.00 mIU/L   Vitamin D 25 Hydroxy    Collection Time: 08/23/17  8:57 AM   Result Value Ref Range    Vit D, 25-Hydroxy 24.6 (L) 30.0 - 100.0 ng/mL       The patient reports a rather mixed story. She had pain in August when she lifted a lawnmower, but that pain was in her back. This past week, she had breast tenderness, and then starting Monday night, had 3 nights of heavy clots and bleeding with very little bleeding during the day. She started having right lower quadrant pain 3 days before the bleeding. The bleeding seems to be ending now. Ultrasound was fairly unremarkable, except the patient remarked on a lot of pain during the ultrasound exam.  On exam, she has a lot of pain in the right lower quadrant on abdominal exam, but on pelvic exam there is not much pain in that side to push on the inside. I see nothing in the cervix to account for the bleeding. She is due to see the GI doctor about bowel issues. IMP: I am suspicious that she is simply having her 1st period since having an ablation years ago, since she had breast tenderness before. However, Dr. Feliz Barraza did not do a tubal at the time of the ablation, so I will check a pregnancy test.  She has a history of clotting disorders, so I will check a CBC with platelet count.     PLAN: If these tests

## 2017-11-14 ENCOUNTER — TELEPHONE (OUTPATIENT)
Dept: OBGYN CLINIC | Age: 47
End: 2017-11-14

## 2017-11-16 ENCOUNTER — OFFICE VISIT (OUTPATIENT)
Dept: GASTROENTEROLOGY | Age: 47
End: 2017-11-16
Payer: COMMERCIAL

## 2017-11-16 VITALS
OXYGEN SATURATION: 99 % | BODY MASS INDEX: 33.99 KG/M2 | WEIGHT: 199.1 LBS | TEMPERATURE: 98 F | SYSTOLIC BLOOD PRESSURE: 123 MMHG | HEIGHT: 64 IN | DIASTOLIC BLOOD PRESSURE: 81 MMHG | HEART RATE: 86 BPM

## 2017-11-16 DIAGNOSIS — K21.9 GASTROESOPHAGEAL REFLUX DISEASE WITHOUT ESOPHAGITIS: ICD-10-CM

## 2017-11-16 DIAGNOSIS — K62.5 RECTAL BLEEDING: Primary | ICD-10-CM

## 2017-11-16 DIAGNOSIS — K59.00 CONSTIPATION, UNSPECIFIED CONSTIPATION TYPE: ICD-10-CM

## 2017-11-16 DIAGNOSIS — K57.30 DIVERTICULOSIS OF COLON: ICD-10-CM

## 2017-11-16 PROCEDURE — 99244 OFF/OP CNSLTJ NEW/EST MOD 40: CPT | Performed by: INTERNAL MEDICINE

## 2017-11-16 ASSESSMENT — ENCOUNTER SYMPTOMS
WHEEZING: 0
RECTAL PAIN: 1
FACIAL SWELLING: 0
NAUSEA: 0
VOMITING: 0
ABDOMINAL PAIN: 0
STRIDOR: 0
SINUS PAIN: 0
BLOOD IN STOOL: 1
ABDOMINAL DISTENTION: 0
SHORTNESS OF BREATH: 0
VOICE CHANGE: 0
RESPIRATORY NEGATIVE: 1
BACK PAIN: 1
DIARRHEA: 0
TROUBLE SWALLOWING: 1
CONSTIPATION: 1
COUGH: 0
RHINORRHEA: 0
SINUS PRESSURE: 1
ANAL BLEEDING: 1
SORE THROAT: 0

## 2017-11-16 NOTE — PROGRESS NOTES
Respiratory: Negative. Negative for cough, shortness of breath, wheezing and stridor. Cardiovascular: Positive for palpitations. Negative for chest pain and leg swelling. Gastrointestinal: Positive for anal bleeding, blood in stool, constipation and rectal pain. Negative for abdominal distention, abdominal pain, diarrhea, nausea and vomiting. Endocrine: Negative. Negative for polydipsia, polyphagia and polyuria. Genitourinary: Negative. Negative for difficulty urinating, dysuria, frequency, menstrual problem, vaginal bleeding and vaginal discharge. Musculoskeletal: Positive for arthralgias, back pain and neck pain. Negative for gait problem and neck stiffness. Skin: Negative. Allergic/Immunologic: Positive for environmental allergies and food allergies (pecans, walnuts and perch). Negative for immunocompromised state. Neurological: Positive for numbness (head) and headaches. Negative for dizziness, tremors, seizures, syncope, facial asymmetry, speech difficulty, weakness and light-headedness. Hematological: Negative for adenopathy. Bruises/bleeds easily. Psychiatric/Behavioral: Positive for sleep disturbance. The patient is nervous/anxious. Objective:   Physical Exam   Constitutional: She is oriented to person, place, and time. She appears well-developed and well-nourished. HENT:   Head: Normocephalic and atraumatic. No oral lesions   Eyes: Conjunctivae are normal. Pupils are equal, round, and reactive to light. No scleral icterus. Neck: Normal range of motion. Neck supple. No hepatojugular reflux and no JVD present. No tracheal deviation present. No thyromegaly present. Cardiovascular: Normal rate, regular rhythm, normal heart sounds and intact distal pulses. Pulmonary/Chest: Effort normal and breath sounds normal. No respiratory distress. She has no wheezes. She has no rales. Abdominal: Soft. Bowel sounds are normal. She exhibits no distension, no ascites and no mass. There is no hepatomegaly. There is no tenderness. There is no rebound. No hernia. Musculoskeletal: She exhibits no edema or tenderness. No joint swelling   Lymphadenopathy:     She has no cervical adenopathy. Neurological: She is alert and oriented to person, place, and time. No cranial nerve deficit. Skin: Skin is warm. No bruising, no ecchymosis and no rash noted. No erythema. Psychiatric: Thought content normal.   Nursing note and vitals reviewed. Assessment:        1. Rectal bleeding  COLONOSCOPY W/ OR W/O BIOPSY   2. Diverticulosis of colon     3. Constipation, unspecified constipation type     4. Gastroesophageal reflux disease without esophagitis  EGD           Plan:      Patient needs investigations regarding this symptoms. Need to evaluate rectosigmoid pathology. Also given that she has upper GI issues she may need EGD as well. Patient is explained regarding sitz baths. Also advised precautions to avoid constipation and medical management of constipation discussed, brochures given. Basing on the results of these investigations will decide further management. The Endoscopic procedure was explained to the patient in detail  The prep and NPO were explained  All the Risks, Benefits, and Alternatives were explained  Risk of Bleeding, Perforation and Cardio Respiratory risks were explained  her questions were answered  The procedure has been scheduled with the  in the office  Patient was asked to give us a call for any questions  The patient has verbalized understanding and agreement to this plan.

## 2017-11-21 ENCOUNTER — HOSPITAL ENCOUNTER (OUTPATIENT)
Age: 47
Setting detail: OUTPATIENT SURGERY
Discharge: HOME | End: 2017-11-21
Attending: INTERNAL MEDICINE | Admitting: INTERNAL MEDICINE
Payer: COMMERCIAL

## 2017-11-21 ENCOUNTER — ANESTHESIA (OUTPATIENT)
Dept: OPERATING ROOM | Age: 47
End: 2017-11-21
Payer: COMMERCIAL

## 2017-11-21 ENCOUNTER — ANESTHESIA EVENT (OUTPATIENT)
Dept: OPERATING ROOM | Age: 47
End: 2017-11-21
Payer: COMMERCIAL

## 2017-11-21 VITALS
HEIGHT: 64 IN | HEART RATE: 69 BPM | WEIGHT: 199 LBS | SYSTOLIC BLOOD PRESSURE: 124 MMHG | OXYGEN SATURATION: 95 % | TEMPERATURE: 96.8 F | BODY MASS INDEX: 33.97 KG/M2 | RESPIRATION RATE: 16 BRPM | DIASTOLIC BLOOD PRESSURE: 73 MMHG

## 2017-11-21 VITALS — DIASTOLIC BLOOD PRESSURE: 78 MMHG | OXYGEN SATURATION: 98 % | SYSTOLIC BLOOD PRESSURE: 119 MMHG

## 2017-11-21 PROCEDURE — 7100000000 HC PACU RECOVERY - FIRST 15 MIN: Performed by: INTERNAL MEDICINE

## 2017-11-21 PROCEDURE — 3700000000 HC ANESTHESIA ATTENDED CARE: Performed by: INTERNAL MEDICINE

## 2017-11-21 PROCEDURE — 6360000002 HC RX W HCPCS

## 2017-11-21 PROCEDURE — 88305 TISSUE EXAM BY PATHOLOGIST: CPT

## 2017-11-21 PROCEDURE — 3609027000 HC COLONOSCOPY: Performed by: INTERNAL MEDICINE

## 2017-11-21 PROCEDURE — 3609012400 HC EGD TRANSORAL BIOPSY SINGLE/MULTIPLE: Performed by: INTERNAL MEDICINE

## 2017-11-21 PROCEDURE — 7100000030 HC ASPR PHASE II RECOVERY - FIRST 15 MIN: Performed by: INTERNAL MEDICINE

## 2017-11-21 PROCEDURE — 3700000001 HC ADD 15 MINUTES (ANESTHESIA): Performed by: INTERNAL MEDICINE

## 2017-11-21 PROCEDURE — 2500000003 HC RX 250 WO HCPCS

## 2017-11-21 PROCEDURE — 7100000001 HC PACU RECOVERY - ADDTL 15 MIN: Performed by: INTERNAL MEDICINE

## 2017-11-21 PROCEDURE — 7100000031 HC ASPR PHASE II RECOVERY - ADDTL 15 MIN: Performed by: INTERNAL MEDICINE

## 2017-11-21 PROCEDURE — 2580000003 HC RX 258: Performed by: INTERNAL MEDICINE

## 2017-11-21 RX ORDER — DIPHENHYDRAMINE HYDROCHLORIDE 50 MG/ML
12.5 INJECTION INTRAMUSCULAR; INTRAVENOUS
Status: DISCONTINUED | OUTPATIENT
Start: 2017-11-21 | End: 2017-11-21 | Stop reason: HOSPADM

## 2017-11-21 RX ORDER — LABETALOL HYDROCHLORIDE 5 MG/ML
5 INJECTION, SOLUTION INTRAVENOUS EVERY 10 MIN PRN
Status: DISCONTINUED | OUTPATIENT
Start: 2017-11-21 | End: 2017-11-21 | Stop reason: HOSPADM

## 2017-11-21 RX ORDER — PROPOFOL 10 MG/ML
INJECTION, EMULSION INTRAVENOUS PRN
Status: DISCONTINUED | OUTPATIENT
Start: 2017-11-21 | End: 2017-11-21 | Stop reason: SDUPTHER

## 2017-11-21 RX ORDER — HYDROMORPHONE HCL 110MG/55ML
0.5 PATIENT CONTROLLED ANALGESIA SYRINGE INTRAVENOUS EVERY 5 MIN PRN
Status: DISCONTINUED | OUTPATIENT
Start: 2017-11-21 | End: 2017-11-21 | Stop reason: HOSPADM

## 2017-11-21 RX ORDER — MEPERIDINE HYDROCHLORIDE 50 MG/ML
12.5 INJECTION INTRAMUSCULAR; INTRAVENOUS; SUBCUTANEOUS EVERY 5 MIN PRN
Status: DISCONTINUED | OUTPATIENT
Start: 2017-11-21 | End: 2017-11-21 | Stop reason: HOSPADM

## 2017-11-21 RX ORDER — ONDANSETRON 2 MG/ML
4 INJECTION INTRAMUSCULAR; INTRAVENOUS
Status: DISCONTINUED | OUTPATIENT
Start: 2017-11-21 | End: 2017-11-21 | Stop reason: HOSPADM

## 2017-11-21 RX ORDER — MORPHINE SULFATE 10 MG/ML
2 INJECTION, SOLUTION INTRAMUSCULAR; INTRAVENOUS EVERY 5 MIN PRN
Status: DISCONTINUED | OUTPATIENT
Start: 2017-11-21 | End: 2017-11-21 | Stop reason: HOSPADM

## 2017-11-21 RX ORDER — SODIUM CHLORIDE 9 MG/ML
INJECTION, SOLUTION INTRAVENOUS CONTINUOUS
Status: DISCONTINUED | OUTPATIENT
Start: 2017-11-21 | End: 2017-11-21 | Stop reason: HOSPADM

## 2017-11-21 RX ORDER — LIDOCAINE HYDROCHLORIDE 10 MG/ML
INJECTION, SOLUTION INFILTRATION; PERINEURAL PRN
Status: DISCONTINUED | OUTPATIENT
Start: 2017-11-21 | End: 2017-11-21 | Stop reason: SDUPTHER

## 2017-11-21 RX ADMIN — PROPOFOL 20 MG: 10 INJECTION, EMULSION INTRAVENOUS at 08:13

## 2017-11-21 RX ADMIN — PROPOFOL 20 MG: 10 INJECTION, EMULSION INTRAVENOUS at 08:28

## 2017-11-21 RX ADMIN — PROPOFOL 120 MG: 10 INJECTION, EMULSION INTRAVENOUS at 08:09

## 2017-11-21 RX ADMIN — PROPOFOL 20 MG: 10 INJECTION, EMULSION INTRAVENOUS at 08:16

## 2017-11-21 RX ADMIN — PROPOFOL 20 MG: 10 INJECTION, EMULSION INTRAVENOUS at 08:12

## 2017-11-21 RX ADMIN — LIDOCAINE HYDROCHLORIDE 50 MG: 10 INJECTION, SOLUTION EPIDURAL; INFILTRATION; INTRACAUDAL; PERINEURAL at 08:09

## 2017-11-21 RX ADMIN — PROPOFOL 20 MG: 10 INJECTION, EMULSION INTRAVENOUS at 08:22

## 2017-11-21 RX ADMIN — PROPOFOL 20 MG: 10 INJECTION, EMULSION INTRAVENOUS at 08:19

## 2017-11-21 RX ADMIN — SODIUM CHLORIDE: 9 INJECTION, SOLUTION INTRAVENOUS at 07:13

## 2017-11-21 RX ADMIN — PROPOFOL 20 MG: 10 INJECTION, EMULSION INTRAVENOUS at 08:14

## 2017-11-21 RX ADMIN — PROPOFOL 20 MG: 10 INJECTION, EMULSION INTRAVENOUS at 08:26

## 2017-11-21 RX ADMIN — PROPOFOL 30 MG: 10 INJECTION, EMULSION INTRAVENOUS at 08:11

## 2017-11-21 ASSESSMENT — LIFESTYLE VARIABLES: SMOKING_STATUS: 0

## 2017-11-21 ASSESSMENT — ENCOUNTER SYMPTOMS
STRIDOR: 0
SHORTNESS OF BREATH: 0

## 2017-11-21 ASSESSMENT — PAIN SCALES - GENERAL
PAINLEVEL_OUTOF10: 0
PAINLEVEL_OUTOF10: 0

## 2017-11-21 ASSESSMENT — PAIN - FUNCTIONAL ASSESSMENT: PAIN_FUNCTIONAL_ASSESSMENT: 0-10

## 2017-11-21 NOTE — OP NOTE
ESOPHAGOGASTRODUODENOSCOPY   ( EGD )  DATE OF PROCEDURE: 11/21/2017     SURGEON: Era Hairston MD    ASSISTANT: None    PREOPERATIVE DIAGNOSIS: dysphagia and GERD     POSTOPERATIVE DIAGNOSIS: multiple small gastric polyps    OPERATION: Upper GI endoscopy with Biopsy    ANESTHESIA:  MAC    ESTIMATED BLOOD LOSS: None    COMPLICATIONS: None. SPECIMENS:  Was Obtained: gastric and esophagus    HISTORY: The patient is a 40y.o. year old female with history of above preop diagnosis. I recommended esophagogastroduodenoscopy with possible biopsy and I explained the risk, benefits, expected outcome, and alternatives to the procedure. Risks included but are not limited to bleeding, infection, respiratory distress, hypotension, and perforation of the esophagus, stomach, or duodenum. Patient understands and is in agreement. PROCEDURE: The patient was given IV conscious sedation. The patient's SPO2 remained above 90% throughout the procedure. Cetacaine spray given. Patient placed in left lateral position. Olympus  videogastroscope was inserted orally under vision into the esophagus without difficulty and advanced into the stomach then through the pylorus up to the second part of duodenum. Findings:    Retropharyngeal area was grossly normal appearing    Esophagus: normal, no peptic esophagitis, or brar's. No huge hiatal hernia. No signs of EoE. Random biopsies taken to r/o EoE  Or microscopic esophagitis    Stomach:    Fundus and Cardia Examined in Retroflexed View: normal    Body: abnormal: multiple small 2 to 3 mm ,bn looking polyps  Seen and biopsies taken    Antrum: normal    Duodenum:     Descending: normal    Bulb: normal    While withdrawing the scope the above findings were verified and the scope was removed. The patient tolerated the procedure well. Recommendations/Plan:   1. F/U Biopsies  2. F/U In Office as instructed  3.  Discussed with the family    Electronically signed by

## 2017-11-21 NOTE — ANESTHESIA PRE PROCEDURE
List:    Patient Active Problem List   Diagnosis Code    Dermatitis, contact L25.9    Anxiety and depression F41.8    Bilateral carpal tunnel syndrome G56.03    Tenderness of left calf M79.662    Posterior left knee pain M25.562    Cervical polyp N84.1    Fatigue R53.83    Hypothyroidism E03.9    Bilateral low back pain without sciatica M54.5    Left foot pain M79.672    Lumbar degenerative disc disease M51.36    Arthralgia of left hip M25.552    Midline low back pain with sciatica M54.40    Anxiety F41.9    Asthma J45.909    Cancer ([facility]) C80.1    Depression F32.9    GERD (gastroesophageal reflux disease) K21.9    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    Environmental allergies Z91.09    H/O thyroid cyst Z86.39    History of cervical dysplasia Z87.410    History of conization of cervix  Z98.890    History of low transverse  section Z98.891    Vision abnormalities H53.9    Family history of breast cancer Z80.3    Osteoarthritis M19.90    VASECTOMY in Male Partner Z28.80    History of endometrial ablation Z98.890    Pelvic pain in female R10.2    Hypothyroidism E03.9    Abnormal uterine bleeding N93.9    Diverticulosis of colon K57.30    Rectal bleeding K62.5    Constipation K59.00       Past Medical History:        Diagnosis Date    Adrenal insufficiency (HCC)     Anxiety     Asthma     Bleeding after intercourse     Cancer ([facility] [address]     CERVICAL    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     Headache     History of cervical dysplasia     had cone    History of conization of cervix     dysplastic cells    Hypothyroidism, unspecified 3/18/2016    Osteoarthritis     Pain     all over body    Sleep apnea     awaiting test results currently    Vision abnormalities     glasses/ far sighted       Past Surgical History:        Procedure Laterality Date    BONE CYST EXCISION Right     WRIST    CARPAL TUNNEL RELEASE Right 10-6-15    CARPAL TUNNEL RELEASE Left 11/3/15    CERVIX BIOPSY       SECTION, LOW TRANSVERSE      COLONOSCOPY      COLONOSCOPY  2009    diverticulosis, no other gross lesions    DILATION AND CURETTAGE OF UTERUS  2017    HYSTEROSCOPY WITH MYOSURE    DILATION AND CURETTAGE OF UTERUS N/A 2017    HYSTEROSCOPY AND D& C performed by [facility]al [facility]andes [facility] at [facility], COLON, DIAGNOSTIC      UGI    FRACTURE SURGERY Left     THUMB    GYNECOLOGIC CRYOSURGERY      conization    HERNIA REPAIR      with mesh    LIPOMA RESECTION Right     RING FINGER    OTHER SURGICAL HISTORY      VOCAL CORD SURG    THYROID LOBECTOMY Right 2016    Right thyroid lobectomy and isthmusectomy. Continuous monitoring of the recurrent laryngeal nerve using nerve integrity monitor. Frozen section.     TONSILLECTOMY      UPPER GASTROINTESTINAL ENDOSCOPY  2008    with BRAVO, gastric polyp-fundic gland polyp       Social History:    Social History   Substance Use Topics    Smoking status: Former Smoker    Smokeless tobacco: Never Used    Alcohol use No      Comment: recovering alcoholic                                Counseling given: Not Answered      Vital Signs (Current):   Vitals:    17 0644   BP: 114/72   Pulse: 94   Resp: 15   Temp: 98.1 °F (36.7 °C)   SpO2: 96%   Weight: 199 lb (90.3 kg)   Height: 5' 4\" (1.626 m)                                              BP Readings from Last 3 Encounters:   17 114/72   17 123/81   17 (!) 124/90       NPO Status: Time of last liquid consumption:                         Time of last solid consumption:                         Date of last liquid consumption: 17                        Date of last solid food consumption: 11/19/17    BMI:   Wt Readings from Last 3 Encounters:   11/21/17 199 lb (90.3 kg)   11/16/17 199 lb 1.6 oz (90.3 kg)   11/09/17 201 lb 9.6 oz (91.4 kg)     Body mass index is 34.16 kg/m². CBC:   Lab Results   Component Value Date    WBC 9.4 11/09/2017    RBC 4.54 11/09/2017    RBC 4.40 04/05/2012    HGB 13.6 11/09/2017    HCT 42.3 11/09/2017    MCV 93.2 11/09/2017    RDW 12.4 11/09/2017     11/09/2017     04/05/2012     LR, HCG ?    CMP:   Lab Results   Component Value Date     05/03/2017    K 4.0 06/07/2017     05/03/2017    CO2 21 05/03/2017    BUN 9 05/03/2017    CREATININE 0.52 05/03/2017    GFRAA >60 05/03/2017    LABGLOM >60 05/03/2017    GLUCOSE 86 07/12/2017    GLUCOSE 105 04/05/2012    PROT 7.3 05/03/2017    CALCIUM 9.4 05/03/2017    BILITOT 0.58 05/03/2017    ALKPHOS 64 05/03/2017    AST 15 05/03/2017    ALT 9 05/03/2017       POC Tests: No results for input(s): POCGLU, POCNA, POCK, POCCL, POCBUN, POCHEMO, POCHCT in the last 72 hours.     Coags:   Lab Results   Component Value Date    PROTIME 10.3 02/20/2017    INR 1.0 02/20/2017    APTT 32.2 08/26/2011       HCG (If Applicable):   Lab Results   Component Value Date    PREGTESTUR NEGATIVE 09/06/2011    HCG NEGATIVE 07/18/2017    HCGQUANT <1 04/20/2016        ABGs: No results found for: PHART, PO2ART, YAT3BER, KWW0TRX, BEART, D2TBVFJE     Type & Screen (If Applicable):  No results found for: LABABO, [address]    Anesthesia Evaluation  Patient summary reviewed no history of anesthetic complications:   Airway: Mallampati: II  TM distance: >3 FB   Neck ROM: full  Mouth opening: > = 3 FB Dental: normal exam         Pulmonary:normal exam    (+) sleep apnea: on CPAP,  asthma: allergic asthma,     (-) pneumonia, COPD, shortness of breath, recent URI, rhonchi, wheezes, rales, stridor and not a current smoker                           Cardiovascular:Negative CV ROS  Exercise tolerance: good (>4 METS),       (-) pacemaker,

## 2017-11-21 NOTE — H&P
HISTORY and [facility] [address]       NAME:  Roney Smith  MRN: [id number]   YOB: 1970   Date: 11/21/2017   Age: 40 y.o. Gender: female       COMPLAINT AND PRESENT HISTORY:     Anish Espinal Pretty is 40 y.o.,   female, having a EGD, colonoscopy. Pt C/O dysphagia, GERD, hematochezia, nausea, constipation. Pt has a BM every other day, straining, sees bright red blood with BM, increased in frequency. Pt state she is unable to take meds for GERD at correct dose d/t complication with others meds, taking med once daily, no improvement with symptoms. Pt states dysphagia with solid food, pt had vocal cord cyst removed, thyroidectomy, thought dysphagia would go away but never did. Pt had EGD in 2005  Patient denies any other GI symptoms. No vomiting. No diarrhea. No abdominal pains or cramping. No changes in the color, caliber or of the stools. No fever or chills. 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  Headache     History of cervical dysplasia 2000    had cone    History of conization of cervix 2000    dysplastic cells    Hypothyroidism, unspecified 3/18/2016    Osteoarthritis     Pain     all over body    Sleep apnea     awaiting test results currently    Vision abnormalities     glasses/ far sighted       Pt denies any history of Diabetes mellitus type 2, hypertension, stroke, heart disease, COPD, HLD, Seizures, Kidney Disease, Hepatitis, TB, or Substance abuse.     SURGICAL HISTORY       Past Surgical History:   Procedure Laterality Date    BONE CYST EXCISION Right     WRIST  CARPAL TUNNEL RELEASE Right 10-6-15    CARPAL TUNNEL RELEASE Left 11/3/15    CERVIX BIOPSY       SECTION, LOW TRANSVERSE      COLONOSCOPY      COLONOSCOPY  2009    diverticulosis, no other gross lesions    DILATION AND CURETTAGE OF UTERUS  2017    HYSTEROSCOPY WITH MYOSURE    DILATION AND CURETTAGE OF UTERUS N/A 2017    HYSTEROSCOPY AND D& C performed by Slade Correa MD at [facility], COLON, DIAGNOSTIC      UGI    FRACTURE SURGERY Left     THUMB    GYNECOLOGIC CRYOSURGERY      conization    HERNIA REPAIR      with mesh    LIPOMA RESECTION Right     RING FINGER    OTHER SURGICAL HISTORY      VOCAL CORD SURG    THYROID LOBECTOMY Right 2016    Right thyroid lobectomy and isthmusectomy. Continuous monitoring of the recurrent laryngeal nerve using nerve integrity monitor. Frozen section.     TONSILLECTOMY      UPPER GASTROINTESTINAL ENDOSCOPY  2008    with BRAVO, gastric polyp-fundic gland polyp       FAMILY HISTORY       Family History   Problem Relation Age of Onset    Alcohol Abuse Father     Other Father      murder    Diabetes Mother     Other Mother      thyroid    High Blood Pressure Mother     Lupus Sister     Drug Abuse Brother     Breast Cancer Maternal Grandmother      45s    Heart Surgery Maternal Grandmother     Heart Failure Maternal Grandmother     Hypertension Maternal Grandfather     Stroke Maternal Grandfather     Heart Attack Maternal Grandfather     Alcohol Abuse Maternal Grandfather     Diabetes Maternal Grandfather     Cancer Paternal Grandmother      lymphnoid    Heart Failure Paternal Grandfather     Heart Surgery Paternal Grandfather      x2       SOCIAL HISTORY       Social History     Social History    Marital status:      Spouse name: N/A    Number of children: N/A    Years of education: N/A     Social History Main Topics    Smoking status: Former Smoker    Smokeless

## 2017-11-21 NOTE — ANESTHESIA POSTPROCEDURE EVALUATION
POST- ANESTHESIA EVALUATION       Pt Name: Rob Becerra  MRN: [id number]  YOB: 1970  Date of evaluation: 11/21/2017  Time:  12:48 PM      /73   Pulse 69   Temp 96.8 °F (36 °C) (Oral)   Resp 16   Ht 5' 4\" (1.626 m)   Wt 199 lb (90.3 kg)   SpO2 95%   BMI 34.16 kg/m²      Consciousness Level  Awake  Cardiopulmonary Status  Stable  Pain Adequately Treated YES  Nausea / Vomiting  NO  Adequate Hydration  YES  Anesthesia Related Complications NONE      Electronically signed by Zion Hess MD on 11/21/2017 at 12:48 PM       Department of Anesthesiology  Postprocedure Note    Patient: Rob Becerra  MRN: [id number]  YOB: 1970  Date of evaluation: 11/21/2017  Time:  12:47 PM     Procedure Summary     Date:  11/21/17 Room / Location:  [address] [place] [address] Yasmani Veliz 08 / [address]    Anesthesia Start:  0803 Anesthesia Stop:  9220    Procedures:       EGD BIOPSY (N/A Esophagus)      COLONOSCOPY (N/A ) Diagnosis:  (GERD RECTAL BLEED     (PAT PER ANES ON ADMIT))    Surgeon:  Maury Washburn MD Responsible Provider:  Zion Hess MD    Anesthesia Type:  general, MAC ASA Status:  2          Anesthesia Type: general, MAC    Chivo Phase I: Chivo Score: 9    Chivo Phase II: Chivo Score: 10    Last vitals: Reviewed and per EMR flowsheets.        Anesthesia Post Evaluation

## 2017-11-21 NOTE — OP NOTE
COLONOSCOPY    DATE OF PROCEDURE: 11/21/2017    SURGEON: Richard Coleman MD    ASSISTANT: None    PREOPERATIVE DIAGNOSIS: change of bowel habits, severe constipation    POSTOPERATIVE DIAGNOSIS: small hemorrhoids, diverticulosis    OPERATION: Total colonoscopy     ANESTHESIA: MAC    ESTIMATED BLOOD LOSS: None    COMPLICATIONS: None     SPECIMENS:  Was Not Obtained    HISTORY: The patient is a 40y.o. year old female with history of above preop diagnosis. I recommended colonoscopy with possible biopsy or polypectomy and I explained the risk, benefits, expected outcome, and alternatives to the procedure. Risks included but are not limited to bleeding, infection, respiratory distress, hypotension, and perforation of the colon and possibility of missing a lesion. The patient understands and is in agreement. PROCEDURE: The patient was given IV conscious sedation. The patient's SPO2 remained above 90% throughout the procedure. Digital rectal exam was normal.  The colonoscope was inserted through the anus into the rectum and advanced under direct vision to the cecum without difficulty. Terminal ileum was examined for approximately 2 inches. The prep was good. yp  Findings:  Terminal ileum: normal    Cecum/Ascending colon: normal, also examined in retroflexed view    Transverse colon: normal    Descending/Sigmoid colon: normal, has diverticulosis and no stricture seen. No diverticulitis noted. Rectum/Anus: examined in normal and retroflexed positions and was normal, has small hemorrhoids    Withdrawal Time was (minutes): 10          The colon was decompressed and the scope was removed. The patient tolerated the procedure well without complications. Recommendations/Plan:   1. F/U Biopsies  2. F/U In Office as instructed  3. Discussed with the family  4. High fiber diet   5. Precautions to avoid constipation     Next colonoscopy: 10 years.     Electronically signed by Richard Coleman MD  on

## 2017-11-22 LAB — SURGICAL PATHOLOGY REPORT: NORMAL

## 2017-12-01 ENCOUNTER — TELEPHONE (OUTPATIENT)
Dept: GASTROENTEROLOGY | Age: 47
End: 2017-12-01

## 2017-12-05 ENCOUNTER — TELEPHONE (OUTPATIENT)
Dept: OBGYN CLINIC | Age: 47
End: 2017-12-05

## 2017-12-19 DIAGNOSIS — K21.9 GASTROESOPHAGEAL REFLUX DISEASE WITHOUT ESOPHAGITIS: ICD-10-CM

## 2017-12-19 DIAGNOSIS — K62.5 RECTAL BLEEDING: ICD-10-CM

## 2018-02-28 ENCOUNTER — HOSPITAL ENCOUNTER (OUTPATIENT)
Age: 48
Discharge: HOME | End: 2018-02-28
Payer: COMMERCIAL

## 2018-02-28 LAB
T3 FREE: 2.31 PG/ML (ref 2.02–4.43)
THYROXINE, FREE: 1.23 NG/DL (ref 0.93–1.7)
TSH SERPL DL<=0.05 MIU/L-ACNC: 3.78 MIU/L (ref 0.3–5)
VITAMIN D 25-HYDROXY: 24.3 NG/ML (ref 30–100)

## 2018-02-28 PROCEDURE — 84481 FREE ASSAY (FT-3): CPT

## 2018-02-28 PROCEDURE — 36415 COLL VENOUS BLD VENIPUNCTURE: CPT

## 2018-02-28 PROCEDURE — 82306 VITAMIN D 25 HYDROXY: CPT

## 2018-02-28 PROCEDURE — 84443 ASSAY THYROID STIM HORMONE: CPT

## 2018-02-28 PROCEDURE — 84439 ASSAY OF FREE THYROXINE: CPT

## 2018-04-02 RX ORDER — PANTOPRAZOLE SODIUM 40 MG/1
TABLET, DELAYED RELEASE ORAL
Qty: 60 TABLET | Refills: 5 | Status: SHIPPED | OUTPATIENT
Start: 2018-04-02 | End: 2018-11-17 | Stop reason: ALTCHOICE

## 2018-05-21 ENCOUNTER — OFFICE VISIT (OUTPATIENT)
Dept: FAMILY MEDICINE CLINIC | Age: 48
End: 2018-05-21
Payer: COMMERCIAL

## 2018-05-21 VITALS
WEIGHT: 192.4 LBS | HEIGHT: 64 IN | SYSTOLIC BLOOD PRESSURE: 122 MMHG | DIASTOLIC BLOOD PRESSURE: 78 MMHG | BODY MASS INDEX: 32.85 KG/M2 | HEART RATE: 85 BPM | OXYGEN SATURATION: 98 %

## 2018-05-21 DIAGNOSIS — J01.90 ACUTE NON-RECURRENT SINUSITIS, UNSPECIFIED LOCATION: Primary | ICD-10-CM

## 2018-05-21 DIAGNOSIS — H92.01 RIGHT EAR PAIN: ICD-10-CM

## 2018-05-21 PROCEDURE — 99213 OFFICE O/P EST LOW 20 MIN: CPT | Performed by: FAMILY MEDICINE

## 2018-05-21 RX ORDER — LEVOFLOXACIN 750 MG/1
750 TABLET ORAL DAILY
Qty: 7 TABLET | Refills: 0 | Status: SHIPPED | OUTPATIENT
Start: 2018-05-21 | End: 2018-05-28

## 2018-05-21 RX ORDER — PREDNISONE 20 MG/1
TABLET ORAL
Qty: 18 TABLET | Refills: 0 | Status: SHIPPED | OUTPATIENT
Start: 2018-05-21 | End: 2018-05-31

## 2018-05-21 ASSESSMENT — ENCOUNTER SYMPTOMS
FACIAL SWELLING: 0
EYE ITCHING: 0
EYE DISCHARGE: 0
SHORTNESS OF BREATH: 0
BACK PAIN: 0
BLOOD IN STOOL: 0
VOICE CHANGE: 0
SORE THROAT: 1
EYE REDNESS: 0
SINUS PRESSURE: 1
EYE PAIN: 0
COUGH: 1
COLOR CHANGE: 0
CONSTIPATION: 0
CHEST TIGHTNESS: 0
RHINORRHEA: 0
ABDOMINAL PAIN: 0
PHOTOPHOBIA: 0
ABDOMINAL DISTENTION: 0
NAUSEA: 0
DIARRHEA: 0
VOMITING: 0
WHEEZING: 0

## 2018-06-13 ENCOUNTER — HOSPITAL ENCOUNTER (OUTPATIENT)
Age: 48
Setting detail: SPECIMEN
Discharge: HOME | End: 2018-06-13
Payer: COMMERCIAL

## 2018-06-13 ENCOUNTER — PROCEDURE VISIT (OUTPATIENT)
Dept: FAMILY MEDICINE CLINIC | Age: 48
End: 2018-06-13
Payer: COMMERCIAL

## 2018-06-13 VITALS
SYSTOLIC BLOOD PRESSURE: 130 MMHG | HEART RATE: 86 BPM | DIASTOLIC BLOOD PRESSURE: 82 MMHG | WEIGHT: 193.2 LBS | OXYGEN SATURATION: 98 % | BODY MASS INDEX: 33.16 KG/M2

## 2018-06-13 DIAGNOSIS — G89.18 POST-OPERATIVE PAIN: ICD-10-CM

## 2018-06-13 DIAGNOSIS — D48.9 NEOPLASM OF UNCERTAIN BEHAVIOR: Primary | ICD-10-CM

## 2018-06-13 PROCEDURE — 11402 EXC TR-EXT B9+MARG 1.1-2 CM: CPT | Performed by: FAMILY MEDICINE

## 2018-06-13 RX ORDER — ACETAMINOPHEN AND CODEINE PHOSPHATE 300; 30 MG/1; MG/1
TABLET ORAL
Qty: 50 TABLET | Refills: 0 | Status: SHIPPED | OUTPATIENT
Start: 2018-06-13 | End: 2018-06-20

## 2018-06-14 LAB — DERMATOLOGY PATHOLOGY REPORT: NORMAL

## 2018-06-20 ENCOUNTER — OFFICE VISIT (OUTPATIENT)
Dept: FAMILY MEDICINE CLINIC | Age: 48
End: 2018-06-20

## 2018-06-20 VITALS
WEIGHT: 192.2 LBS | DIASTOLIC BLOOD PRESSURE: 78 MMHG | HEART RATE: 80 BPM | OXYGEN SATURATION: 98 % | SYSTOLIC BLOOD PRESSURE: 100 MMHG | BODY MASS INDEX: 32.99 KG/M2

## 2018-06-20 DIAGNOSIS — Z48.02 VISIT FOR SUTURE REMOVAL: Primary | ICD-10-CM

## 2018-08-24 ENCOUNTER — HOSPITAL ENCOUNTER (OUTPATIENT)
Dept: ULTRASOUND IMAGING | Age: 48
Discharge: HOME | End: 2018-08-26
Payer: COMMERCIAL

## 2018-08-24 ENCOUNTER — HOSPITAL ENCOUNTER (OUTPATIENT)
Age: 48
Discharge: HOME | End: 2018-08-24
Payer: COMMERCIAL

## 2018-08-24 DIAGNOSIS — E04.1 NONTOXIC UNINODULAR GOITER: ICD-10-CM

## 2018-08-24 LAB
THYROXINE, FREE: 0.94 NG/DL (ref 0.93–1.7)
TSH SERPL DL<=0.05 MIU/L-ACNC: 3.91 MIU/L (ref 0.3–5)
VITAMIN D 25-HYDROXY: 35.9 NG/ML (ref 30–100)

## 2018-08-24 PROCEDURE — 36415 COLL VENOUS BLD VENIPUNCTURE: CPT

## 2018-08-24 PROCEDURE — 84439 ASSAY OF FREE THYROXINE: CPT

## 2018-08-24 PROCEDURE — 84443 ASSAY THYROID STIM HORMONE: CPT

## 2018-08-24 PROCEDURE — 76536 US EXAM OF HEAD AND NECK: CPT

## 2018-08-24 PROCEDURE — 82306 VITAMIN D 25 HYDROXY: CPT

## 2018-08-28 ENCOUNTER — PATIENT MESSAGE (OUTPATIENT)
Dept: FAMILY MEDICINE CLINIC | Age: 48
End: 2018-08-28

## 2018-08-28 DIAGNOSIS — E27.40 ADRENAL INSUFFICIENCY (HCC): Primary | ICD-10-CM

## 2018-09-14 ENCOUNTER — TELEPHONE (OUTPATIENT)
Dept: FAMILY MEDICINE CLINIC | Age: 48
End: 2018-09-14

## 2018-09-14 NOTE — TELEPHONE ENCOUNTER
She just had some labs with Dr. Christian Santos so the only one that's not up to date is the lipid profile but there's no urgency to that one and it can wait and be combined with Dr. Shelli Montelongo next lab orders and I can give the order to her on Monday.

## 2018-09-17 ENCOUNTER — TELEPHONE (OUTPATIENT)
Dept: FAMILY MEDICINE CLINIC | Age: 48
End: 2018-09-17

## 2018-09-17 ENCOUNTER — OFFICE VISIT (OUTPATIENT)
Dept: FAMILY MEDICINE CLINIC | Age: 48
End: 2018-09-17
Payer: COMMERCIAL

## 2018-09-17 VITALS
WEIGHT: 200 LBS | HEART RATE: 75 BPM | OXYGEN SATURATION: 98 % | DIASTOLIC BLOOD PRESSURE: 80 MMHG | SYSTOLIC BLOOD PRESSURE: 120 MMHG | BODY MASS INDEX: 34.33 KG/M2

## 2018-09-17 DIAGNOSIS — E27.0 HYPERCORTISOLEMIA (HCC): ICD-10-CM

## 2018-09-17 DIAGNOSIS — G47.19 EXCESSIVE DAYTIME SLEEPINESS: Primary | ICD-10-CM

## 2018-09-17 PROCEDURE — 99213 OFFICE O/P EST LOW 20 MIN: CPT | Performed by: FAMILY MEDICINE

## 2018-09-17 ASSESSMENT — ENCOUNTER SYMPTOMS
PHOTOPHOBIA: 0
EYE DISCHARGE: 0
FACIAL SWELLING: 0
SHORTNESS OF BREATH: 0
SINUS PRESSURE: 0
EYE PAIN: 0
COUGH: 0
WHEEZING: 0
VOICE CHANGE: 0
EYE REDNESS: 0
COLOR CHANGE: 0
CHEST TIGHTNESS: 0
DIARRHEA: 0
SORE THROAT: 0
CONSTIPATION: 0
VOMITING: 0
EYE ITCHING: 0
BACK PAIN: 0
NAUSEA: 0
ABDOMINAL PAIN: 0
BLOOD IN STOOL: 0
RHINORRHEA: 0
ABDOMINAL DISTENTION: 0

## 2018-09-17 ASSESSMENT — PATIENT HEALTH QUESTIONNAIRE - PHQ9
SUM OF ALL RESPONSES TO PHQ9 QUESTIONS 1 & 2: 0
1. LITTLE INTEREST OR PLEASURE IN DOING THINGS: 0
SUM OF ALL RESPONSES TO PHQ QUESTIONS 1-9: 0
2. FEELING DOWN, DEPRESSED OR HOPELESS: 0
SUM OF ALL RESPONSES TO PHQ QUESTIONS 1-9: 0

## 2018-09-17 NOTE — PROGRESS NOTES
Subjective:      Patient ID: Trenton Delgado is a 56 y.o. female. HPI  Here for follow up of fatigue and has been increasingly achy and fatigued over the past few months. She has been feeling fatigued to the point that she has been unable to find motivation to do house work some days. This morning she had a fall down the steps and injured her elbow which had only minor injuries but was caused by some vertiginous feeling as she was on the steps. She has been having the vertiginous feeling for several weeks now. Review of Systems   Constitutional: Negative for activity change, appetite change, chills, diaphoresis, fatigue, fever and unexpected weight change. HENT: Negative for congestion, ear pain, facial swelling, hearing loss, postnasal drip, rhinorrhea, sinus pressure, sore throat and voice change. Eyes: Negative for photophobia, pain, discharge, redness, itching and visual disturbance. Respiratory: Negative for cough, chest tightness, shortness of breath and wheezing. Cardiovascular: Negative for chest pain and palpitations. Gastrointestinal: Negative for abdominal distention, abdominal pain, blood in stool, constipation, diarrhea, nausea and vomiting. Endocrine: Negative for cold intolerance and heat intolerance. Genitourinary: Negative for decreased urine volume, difficulty urinating, dysuria, flank pain, frequency, hematuria, pelvic pain, urgency, vaginal bleeding and vaginal discharge. Musculoskeletal: Negative for arthralgias, back pain, gait problem, joint swelling, myalgias, neck pain and neck stiffness. Skin: Negative for color change, pallor and rash. Allergic/Immunologic: Negative for environmental allergies and food allergies. Neurological: Negative for dizziness, tremors, seizures, syncope, facial asymmetry, speech difficulty, weakness, numbness and headaches.    Psychiatric/Behavioral: Negative for agitation, behavioral problems, dysphoric mood and sleep disturbance. The patient is not nervous/anxious and is not hyperactive. Objective:   Physical Exam   Constitutional: She is oriented to person, place, and time. She appears well-developed and well-nourished. She does not appear ill. No distress. HENT:   Head: Normocephalic and atraumatic. Right Ear: External ear normal.   Left Ear: External ear normal.   Nose: Nose normal. No mucosal edema or rhinorrhea. Mouth/Throat: Mucous membranes are normal. No oropharyngeal exudate, posterior oropharyngeal edema or posterior oropharyngeal erythema. Eyes: Pupils are equal, round, and reactive to light. EOM are normal. Right eye exhibits no discharge. Left eye exhibits no discharge. No scleral icterus. Neck: Trachea normal and normal range of motion. Neck supple. No JVD present. Carotid bruit is not present. No tracheal deviation present. No thyromegaly present. Cardiovascular: Normal rate, regular rhythm, S1 normal, S2 normal, normal heart sounds and normal pulses. Exam reveals no gallop, no S3, no S4, no distant heart sounds and no friction rub. No murmur heard. Pulmonary/Chest: No respiratory distress. She has no decreased breath sounds. She has no wheezes. She has no rhonchi. She has no rales. Abdominal: Soft. Normal appearance and bowel sounds are normal. There is no hepatosplenomegaly. There is no tenderness. There is no CVA tenderness. No hernia. Lymphadenopathy:     She has no cervical adenopathy. Right cervical: No superficial cervical adenopathy present. Left cervical: No superficial cervical adenopathy present. Neurological: She is alert and oriented to person, place, and time. She has normal strength. No cranial nerve deficit or sensory deficit. Coordination and gait normal.   Reflex Scores:       Brachioradialis reflexes are 2+ on the right side and 2+ on the left side. Patellar reflexes are 2+ on the right side and 2+ on the left side.        Achilles reflexes are 2+

## 2018-09-17 NOTE — TELEPHONE ENCOUNTER
[address] [phone]. Patient is aware they will call her to get scheduled. Patient states she will check with them and her ins to see if she is using the correct facility. I also gave her [phone] to call sleep center.

## 2018-09-24 ENCOUNTER — PATIENT MESSAGE (OUTPATIENT)
Dept: FAMILY MEDICINE CLINIC | Age: 48
End: 2018-09-24

## 2018-09-24 NOTE — TELEPHONE ENCOUNTER
Called over to '[facility] and had them re-fax over form for infusion that was needed. Will fax over signed forms as soon as the doctor signs off.

## 2018-09-26 PROBLEM — E27.40 ISOLATED CORTICOTROPIN DEFICIENCY (HCC): Status: ACTIVE | Noted: 2018-09-26

## 2018-09-27 ENCOUNTER — HOSPITAL ENCOUNTER (OUTPATIENT)
Dept: INFUSION THERAPY | Facility: MEDICAL CENTER | Age: 48
Discharge: HOME | End: 2018-09-27
Payer: COMMERCIAL

## 2018-09-27 ENCOUNTER — HOSPITAL ENCOUNTER (OUTPATIENT)
Facility: MEDICAL CENTER | Age: 48
Discharge: HOME | End: 2018-09-27
Payer: COMMERCIAL

## 2018-09-27 VITALS
RESPIRATION RATE: 18 BRPM | SYSTOLIC BLOOD PRESSURE: 111 MMHG | TEMPERATURE: 98.3 F | HEART RATE: 80 BPM | DIASTOLIC BLOOD PRESSURE: 71 MMHG

## 2018-09-27 DIAGNOSIS — E27.0 HYPERCORTISOLEMIA (HCC): ICD-10-CM

## 2018-09-27 DIAGNOSIS — E27.40 ISOLATED CORTICOTROPIN DEFICIENCY (HCC): ICD-10-CM

## 2018-09-27 DIAGNOSIS — G47.19 EXCESSIVE DAYTIME SLEEPINESS: ICD-10-CM

## 2018-09-27 LAB
CORTISOL COLLECTION INFO: NORMAL
CORTISOL: 11.9 UG/DL (ref 2.7–18.4)
CORTISOL: 16.9 UG/DL (ref 2.7–18.4)
CORTISOL: 8.1 UG/DL (ref 2.7–18.4)

## 2018-09-27 PROCEDURE — 36415 COLL VENOUS BLD VENIPUNCTURE: CPT

## 2018-09-27 PROCEDURE — 96375 TX/PRO/DX INJ NEW DRUG ADDON: CPT

## 2018-09-27 PROCEDURE — 6360000002 HC RX W HCPCS: Performed by: FAMILY MEDICINE

## 2018-09-27 PROCEDURE — 2580000003 HC RX 258: Performed by: FAMILY MEDICINE

## 2018-09-27 PROCEDURE — 82533 TOTAL CORTISOL: CPT

## 2018-09-27 PROCEDURE — 80299 QUANTITATIVE ASSAY DRUG: CPT

## 2018-09-27 PROCEDURE — 96374 THER/PROPH/DIAG INJ IV PUSH: CPT

## 2018-09-27 RX ORDER — SODIUM CHLORIDE 9 MG/ML
INJECTION, SOLUTION INTRAVENOUS CONTINUOUS
Status: DISCONTINUED | OUTPATIENT
Start: 2018-09-27 | End: 2018-09-28 | Stop reason: HOSPADM

## 2018-09-27 RX ADMIN — SODIUM CHLORIDE: 9 INJECTION, SOLUTION INTRAVENOUS at 09:15

## 2018-09-27 RX ADMIN — COSYNTROPIN 1 MCG: 0.25 INJECTION, POWDER, LYOPHILIZED, FOR SOLUTION INTRAMUSCULAR; INTRAVENOUS at 09:34

## 2018-09-27 ASSESSMENT — PAIN DESCRIPTION - LOCATION: LOCATION: GENERALIZED

## 2018-09-27 ASSESSMENT — PAIN SCALES - GENERAL: PAINLEVEL_OUTOF10: 5

## 2018-09-27 ASSESSMENT — PAIN DESCRIPTION - DESCRIPTORS: DESCRIPTORS: ACHING

## 2018-09-27 ASSESSMENT — PAIN DESCRIPTION - PAIN TYPE: TYPE: CHRONIC PAIN

## 2018-09-27 NOTE — PROGRESS NOTES
Patient arrives per ambulation for cortrosyn injection. Orders reviewed. Patient states, \"I had this test done last year and I did not have any problems with the test.  I have been to the lab for my first lab draw. \"  Cortrosyn 1 mcg IV given without difficulty. Cortisol levels draw per lab 30 and 60 minutes post cortrosyn IVP. Discharged per ambulation. RTC as directed.

## 2018-09-28 ENCOUNTER — PATIENT MESSAGE (OUTPATIENT)
Dept: FAMILY MEDICINE CLINIC | Age: 48
End: 2018-09-28

## 2018-09-28 NOTE — TELEPHONE ENCOUNTER
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

## 2018-10-01 LAB — DEXAMETHASONE: <50 NG/DL

## 2018-10-04 NOTE — TELEPHONE ENCOUNTER
History of low transverse  section     Vision abnormalities     Family history of breast cancer     Osteoarthritis     VASECTOMY in Male Partner     History of endometrial ablation     Pelvic pain in female     Hypothyroidism     Abnormal uterine bleeding     Diverticulosis of colon     Rectal bleeding     Constipation     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

## 2018-10-18 ENCOUNTER — OFFICE VISIT (OUTPATIENT)
Dept: FAMILY MEDICINE CLINIC | Age: 48
End: 2018-10-18
Payer: COMMERCIAL

## 2018-10-18 VITALS
BODY MASS INDEX: 35.5 KG/M2 | WEIGHT: 206.8 LBS | SYSTOLIC BLOOD PRESSURE: 122 MMHG | OXYGEN SATURATION: 98 % | HEART RATE: 88 BPM | DIASTOLIC BLOOD PRESSURE: 80 MMHG

## 2018-10-18 DIAGNOSIS — G47.19 EXCESSIVE DAYTIME SLEEPINESS: ICD-10-CM

## 2018-10-18 DIAGNOSIS — H69.83 DYSFUNCTION OF BOTH EUSTACHIAN TUBES: Primary | ICD-10-CM

## 2018-10-18 DIAGNOSIS — R73.01 IMPAIRED FASTING GLUCOSE: ICD-10-CM

## 2018-10-18 LAB — HBA1C MFR BLD: 5.4 %

## 2018-10-18 PROCEDURE — 99213 OFFICE O/P EST LOW 20 MIN: CPT | Performed by: FAMILY MEDICINE

## 2018-10-18 PROCEDURE — 83036 HEMOGLOBIN GLYCOSYLATED A1C: CPT | Performed by: FAMILY MEDICINE

## 2018-10-18 RX ORDER — LORATADINE AND PSEUDOEPHEDRINE 10; 240 MG/1; MG/1
1 TABLET, EXTENDED RELEASE ORAL DAILY
Qty: 30 TABLET | Refills: 3 | Status: SHIPPED | OUTPATIENT
Start: 2018-10-18 | End: 2019-02-26

## 2018-10-18 ASSESSMENT — ENCOUNTER SYMPTOMS
ABDOMINAL DISTENTION: 0
RHINORRHEA: 1
VOMITING: 0
DIARRHEA: 0
EYE PAIN: 0
ABDOMINAL PAIN: 0
WHEEZING: 0
COUGH: 0
NAUSEA: 0
COLOR CHANGE: 0
BACK PAIN: 0
CHEST TIGHTNESS: 0
FACIAL SWELLING: 0
EYE DISCHARGE: 0
SHORTNESS OF BREATH: 0
VOICE CHANGE: 0
SORE THROAT: 0
CONSTIPATION: 0
SINUS PRESSURE: 1
EYE REDNESS: 0
EYE ITCHING: 0
BLOOD IN STOOL: 0
PHOTOPHOBIA: 0

## 2018-10-18 NOTE — PROGRESS NOTES
Subjective:      Patient ID: Judith Streeter is a 56 y.o. female. Visit Information    Have you changed or started any medications since your last visit including any over-the-counter medicines, vitamins, or herbal medicines? no   Are you having any side effects from any of your medications? -  no  Have you stopped taking any of your medications? Is so, why? -  no    Have you seen any other physician or provider since your last visit? No  Have you had any other diagnostic tests since your last visit? No  Have you been seen in the emergency room and/or had an admission to a hospital since we last saw you? No  Have you had your routine dental cleaning in the past 6 months? yes -     Have you activated your [facility] account? If not, what are your barriers?  No:      Patient Care Team:  Samm Graves MD as PCP - General (Family Medicine)  Damian Reina DO as Consulting Physician (Obstetrics & Gynecology)    Medical History Review  Past Medical, Family, and Social History reviewed and  contribute to the patient presenting condition    Health Maintenance   Topic Date Due    DTaP/Tdap/Td vaccine (1 - Tdap) 12/03/1990    Pneumococcal med risk (1 of 1 - PPSV23) 12/03/1990    Breast cancer screen  05/02/2018    Flu vaccine (1) 09/01/2018    Lipid screen  02/21/2019    TSH testing  08/24/2019    Cervical cancer screen  08/23/2022    Colon cancer screen colonoscopy  11/21/2027    HIV screen  Completed       HPI    Review of Systems    Objective:   Physical Exam    Assessment / Plan:

## 2018-10-18 NOTE — PROGRESS NOTES
She does not appear ill. No distress. HENT:   Head: Normocephalic and atraumatic. Right Ear: External ear normal. Tympanic membrane is erythematous and bulging. Left Ear: External ear normal. Tympanic membrane is erythematous and bulging. Nose: Mucosal edema and rhinorrhea present. Mouth/Throat: Mucous membranes are normal. Posterior oropharyngeal edema present. No oropharyngeal exudate or posterior oropharyngeal erythema. Eyes: Pupils are equal, round, and reactive to light. EOM are normal. Right eye exhibits no discharge. Left eye exhibits no discharge. No scleral icterus. Neck: Trachea normal and normal range of motion. Neck supple. No JVD present. Carotid bruit is not present. No tracheal deviation present. No thyromegaly present. Cardiovascular: Normal rate, regular rhythm, S1 normal, S2 normal, normal heart sounds and normal pulses. Exam reveals no gallop, no S3, no S4, no distant heart sounds and no friction rub. No murmur heard. Pulmonary/Chest: No respiratory distress. She has no decreased breath sounds. She has no wheezes. She has no rhonchi. She has no rales. Abdominal: Soft. Normal appearance and bowel sounds are normal. There is no hepatosplenomegaly. There is no tenderness. There is no CVA tenderness. No hernia. Lymphadenopathy:     She has no cervical adenopathy. Right cervical: No superficial cervical adenopathy present. Left cervical: No superficial cervical adenopathy present. Neurological: She is alert and oriented to person, place, and time. She has normal strength. No cranial nerve deficit or sensory deficit. Coordination and gait normal.   Reflex Scores:       Brachioradialis reflexes are 2+ on the right side and 2+ on the left side. Patellar reflexes are 2+ on the right side and 2+ on the left side. Achilles reflexes are 2+ on the right side and 2+ on the left side. Skin: Skin is warm and dry. No lesion and no rash noted.  She is not diaphoretic. No cyanosis. Nails show no clubbing. Psychiatric: She has a normal mood and affect. Her speech is normal and behavior is normal. Judgment and thought content normal. Cognition and memory are normal.     Vitals:    10/18/18 1056   BP: 122/80   Pulse: 88   SpO2: 98%   Weight: 206 lb 12.8 oz (93.8 kg)         Assessment / Plan:   1. Dysfunction of both eustachian tubes  If there is not improvement with the claritin-D in the next 2 days then I will plan to add in a steroid for a brief burst.   - loratadine-pseudoephedrine (CLARITIN-D 24 HOUR)  MG per extended release tablet; Take 1 tablet by mouth daily  Dispense: 30 tablet; Refill: 3    2.  Impaired fasting glucose  She has been having sugars in the 170-180 range 3 hours post prandial but her A1c today was only 5.4 so I suggested a dietary approach to the control of blood sugars and risk reduction for future diabetes which was discussed with her in detail.   - POCT glycosylated hemoglobin (Hb A1C)

## 2018-11-13 DIAGNOSIS — Z12.39 SCREENING FOR BREAST CANCER: Primary | ICD-10-CM

## 2018-11-17 ENCOUNTER — HOSPITAL ENCOUNTER (EMERGENCY)
Age: 48
Discharge: HOME | End: 2018-11-18
Attending: EMERGENCY MEDICINE
Payer: COMMERCIAL

## 2018-11-17 ENCOUNTER — OFFICE VISIT (OUTPATIENT)
Dept: FAMILY MEDICINE CLINIC | Age: 48
End: 2018-11-17
Payer: COMMERCIAL

## 2018-11-17 ENCOUNTER — HOSPITAL ENCOUNTER (OUTPATIENT)
Age: 48
Setting detail: SPECIMEN
Discharge: HOME | End: 2018-11-17
Payer: COMMERCIAL

## 2018-11-17 ENCOUNTER — APPOINTMENT (OUTPATIENT)
Dept: GENERAL RADIOLOGY | Age: 48
End: 2018-11-17
Payer: COMMERCIAL

## 2018-11-17 VITALS
RESPIRATION RATE: 16 BRPM | OXYGEN SATURATION: 96 % | BODY MASS INDEX: 35.34 KG/M2 | DIASTOLIC BLOOD PRESSURE: 84 MMHG | HEIGHT: 64 IN | TEMPERATURE: 98.3 F | WEIGHT: 207 LBS | SYSTOLIC BLOOD PRESSURE: 119 MMHG | HEART RATE: 74 BPM

## 2018-11-17 DIAGNOSIS — R35.0 URINARY FREQUENCY: ICD-10-CM

## 2018-11-17 DIAGNOSIS — R39.89 SENSATION OF PRESSURE IN BLADDER AREA: ICD-10-CM

## 2018-11-17 DIAGNOSIS — R10.9 FLANK PAIN: Primary | ICD-10-CM

## 2018-11-17 DIAGNOSIS — R07.9 CHEST PAIN, UNSPECIFIED TYPE: Primary | ICD-10-CM

## 2018-11-17 DIAGNOSIS — N39.0 URINARY TRACT INFECTION WITHOUT HEMATURIA, SITE UNSPECIFIED: ICD-10-CM

## 2018-11-17 LAB
BILIRUBIN URINE: NEGATIVE
BILIRUBIN, POC: NEGATIVE
BLOOD URINE, POC: NEGATIVE
CLARITY, POC: CLEAR
COLOR, POC: YELLOW
COLOR: YELLOW
COMMENT UA: NORMAL
EKG ATRIAL RATE: 82 BPM
EKG P AXIS: 71 DEGREES
EKG P-R INTERVAL: 146 MS
EKG Q-T INTERVAL: 374 MS
EKG QRS DURATION: 72 MS
EKG QTC CALCULATION (BAZETT): 436 MS
EKG R AXIS: 55 DEGREES
EKG T AXIS: 54 DEGREES
EKG VENTRICULAR RATE: 82 BPM
GLUCOSE URINE, POC: NEGATIVE
GLUCOSE URINE: NEGATIVE
KETONES, POC: NEGATIVE
KETONES, URINE: NEGATIVE
LEUKOCYTE EST, POC: NEGATIVE
LEUKOCYTE ESTERASE, URINE: NEGATIVE
NITRITE, POC: NEGATIVE
NITRITE, URINE: NEGATIVE
PH UA: 6 (ref 5–8)
PH, POC: 6
PROTEIN UA: NEGATIVE
PROTEIN, POC: NEGATIVE
SPECIFIC GRAVITY UA: 1.01 (ref 1–1.03)
SPECIFIC GRAVITY, POC: 1.01
TURBIDITY: CLEAR
URINE HGB: NEGATIVE
UROBILINOGEN, POC: NORMAL
UROBILINOGEN, URINE: NORMAL

## 2018-11-17 PROCEDURE — 71046 X-RAY EXAM CHEST 2 VIEWS: CPT

## 2018-11-17 PROCEDURE — 93005 ELECTROCARDIOGRAM TRACING: CPT

## 2018-11-17 PROCEDURE — 99285 EMERGENCY DEPT VISIT HI MDM: CPT

## 2018-11-17 PROCEDURE — 36415 COLL VENOUS BLD VENIPUNCTURE: CPT

## 2018-11-17 PROCEDURE — 81002 URINALYSIS NONAUTO W/O SCOPE: CPT | Performed by: INTERNAL MEDICINE

## 2018-11-17 PROCEDURE — 84484 ASSAY OF TROPONIN QUANT: CPT

## 2018-11-17 PROCEDURE — 85025 COMPLETE CBC W/AUTO DIFF WBC: CPT

## 2018-11-17 PROCEDURE — 80053 COMPREHEN METABOLIC PANEL: CPT

## 2018-11-17 PROCEDURE — 99213 OFFICE O/P EST LOW 20 MIN: CPT | Performed by: INTERNAL MEDICINE

## 2018-11-17 PROCEDURE — 96374 THER/PROPH/DIAG INJ IV PUSH: CPT

## 2018-11-17 RX ORDER — KETOROLAC TROMETHAMINE 30 MG/ML
15 INJECTION, SOLUTION INTRAMUSCULAR; INTRAVENOUS ONCE
Status: COMPLETED | OUTPATIENT
Start: 2018-11-17 | End: 2018-11-18

## 2018-11-17 RX ORDER — CITALOPRAM 20 MG/1
TABLET ORAL
COMMUNITY
Start: 2018-11-09 | End: 2018-11-17 | Stop reason: SDUPTHER

## 2018-11-17 RX ORDER — CIPROFLOXACIN 250 MG/1
250 TABLET, FILM COATED ORAL 2 TIMES DAILY
Qty: 6 TABLET | Refills: 0 | Status: SHIPPED | OUTPATIENT
Start: 2018-11-17 | End: 2018-11-20

## 2018-11-17 ASSESSMENT — ENCOUNTER SYMPTOMS
COUGH: 0
SORE THROAT: 1
SHORTNESS OF BREATH: 0

## 2018-11-17 ASSESSMENT — PAIN SCALES - GENERAL: PAINLEVEL_OUTOF10: 6

## 2018-11-17 NOTE — PROGRESS NOTES
Subjective:      Patient ID: Danielle Toscano is a 56 y.o. female. HPI  Urinary frequency and bilateral flank discomfort for 2 weeks. Also bilateral ear discomfort ant ST for 2 days. Review of Systems   HENT: Positive for ear pain and sore throat. Respiratory: Negative for cough and shortness of breath. Cardiovascular: Negative for chest pain. Genitourinary: Positive for flank pain and frequency. Negative for dysuria. Objective:   Physical Exam   Constitutional: No distress. HENT:   Head: Normocephalic and atraumatic. Right Ear: External ear normal.   Left Ear: External ear normal.   Mouth/Throat: Oropharynx is clear and moist. No oropharyngeal exudate. TMs clear   Eyes: Pupils are equal, round, and reactive to light. Conjunctivae and EOM are normal. No scleral icterus. Neck: No tracheal deviation present. Cardiovascular: Normal rate, regular rhythm and normal heart sounds. Exam reveals no gallop and no friction rub. No murmur heard. Pulmonary/Chest: Effort normal and breath sounds normal. No stridor. No respiratory distress. She has no wheezes. She has no rales. Musculoskeletal: She exhibits no edema or tenderness (no CVA tenderness). Neurological: She is alert. No cranial nerve deficit. She exhibits normal muscle tone. Coordination normal.   Skin: No rash noted. She is not diaphoretic. No erythema. Psychiatric: She has a normal mood and affect. Her behavior is normal.   Vitals reviewed. Assessment / Plan:      Diagnosis Orders   1. Flank pain  POCT Urinalysis no Micro   2. Sensation of pressure in bladder area  POCT Urinalysis no Micro    Urinalysis Reflex to Culture   3. Urinary frequency     4.  Urinary tract infection without hematuria, site unspecified  ciprofloxacin (CIPRO) 250 MG tablet     Orders Placed This Encounter   Medications    ciprofloxacin (CIPRO) 250 MG tablet     Sig: Take 1 tablet by mouth 2 times daily for 3 days     Dispense:  6 tablet     Refill:  0 Return for : Follow up with PCP.

## 2018-11-18 VITALS
HEIGHT: 64 IN | OXYGEN SATURATION: 95 % | RESPIRATION RATE: 16 BRPM | DIASTOLIC BLOOD PRESSURE: 68 MMHG | TEMPERATURE: 98.2 F | WEIGHT: 205 LBS | BODY MASS INDEX: 35 KG/M2 | HEART RATE: 74 BPM | SYSTOLIC BLOOD PRESSURE: 109 MMHG

## 2018-11-18 LAB
ABSOLUTE EOS #: 0.4 K/UL (ref 0–0.4)
ABSOLUTE IMMATURE GRANULOCYTE: ABNORMAL K/UL (ref 0–0.3)
ABSOLUTE LYMPH #: 2.7 K/UL (ref 1–4.8)
ABSOLUTE MONO #: 0.6 K/UL (ref 0.1–1.3)
ALBUMIN SERPL-MCNC: 4.3 G/DL (ref 3.5–5.2)
ALBUMIN/GLOBULIN RATIO: ABNORMAL (ref 1–2.5)
ALP BLD-CCNC: 67 U/L (ref 35–104)
ALT SERPL-CCNC: 9 U/L (ref 5–33)
ANION GAP SERPL CALCULATED.3IONS-SCNC: 12 MMOL/L (ref 9–17)
AST SERPL-CCNC: 13 U/L
BASOPHILS # BLD: 1 % (ref 0–2)
BASOPHILS ABSOLUTE: 0.1 K/UL (ref 0–0.2)
BILIRUB SERPL-MCNC: 0.21 MG/DL (ref 0.3–1.2)
BUN BLDV-MCNC: 13 MG/DL (ref 6–20)
BUN/CREAT BLD: ABNORMAL (ref 9–20)
CALCIUM SERPL-MCNC: 9.4 MG/DL (ref 8.6–10.4)
CHLORIDE BLD-SCNC: 101 MMOL/L (ref 98–107)
CO2: 26 MMOL/L (ref 20–31)
CREAT SERPL-MCNC: 0.78 MG/DL (ref 0.5–0.9)
DIFFERENTIAL TYPE: ABNORMAL
EOSINOPHILS RELATIVE PERCENT: 5 % (ref 0–4)
GFR AFRICAN AMERICAN: >60 ML/MIN
GFR NON-AFRICAN AMERICAN: >60 ML/MIN
GFR SERPL CREATININE-BSD FRML MDRD: ABNORMAL ML/MIN/{1.73_M2}
GFR SERPL CREATININE-BSD FRML MDRD: ABNORMAL ML/MIN/{1.73_M2}
GLUCOSE BLD-MCNC: 109 MG/DL (ref 70–99)
HCT VFR BLD CALC: 39.3 % (ref 36–46)
HEMOGLOBIN: 13.3 G/DL (ref 12–16)
IMMATURE GRANULOCYTES: ABNORMAL %
LYMPHOCYTES # BLD: 33 % (ref 24–44)
MCH RBC QN AUTO: 30.4 PG (ref 26–34)
MCHC RBC AUTO-ENTMCNC: 33.9 G/DL (ref 31–37)
MCV RBC AUTO: 89.7 FL (ref 80–100)
MONOCYTES # BLD: 8 % (ref 1–7)
NRBC AUTOMATED: ABNORMAL PER 100 WBC
PDW BLD-RTO: 13 % (ref 11.5–14.9)
PLATELET # BLD: 170 K/UL (ref 150–450)
PLATELET ESTIMATE: ABNORMAL
PMV BLD AUTO: 11 FL (ref 6–12)
POTASSIUM SERPL-SCNC: 3.6 MMOL/L (ref 3.7–5.3)
RBC # BLD: 4.38 M/UL (ref 4–5.2)
RBC # BLD: ABNORMAL 10*6/UL
SEG NEUTROPHILS: 53 % (ref 36–66)
SEGMENTED NEUTROPHILS ABSOLUTE COUNT: 4.4 K/UL (ref 1.3–9.1)
SODIUM BLD-SCNC: 139 MMOL/L (ref 135–144)
TOTAL PROTEIN: 7.1 G/DL (ref 6.4–8.3)
TROPONIN INTERP: NORMAL
TROPONIN INTERP: NORMAL
TROPONIN T: <0.03 NG/ML
TROPONIN T: <0.03 NG/ML
WBC # BLD: 8.2 K/UL (ref 3.5–11)
WBC # BLD: ABNORMAL 10*3/UL

## 2018-11-18 PROCEDURE — 6360000002 HC RX W HCPCS: Performed by: EMERGENCY MEDICINE

## 2018-11-18 PROCEDURE — 36415 COLL VENOUS BLD VENIPUNCTURE: CPT

## 2018-11-18 PROCEDURE — 84484 ASSAY OF TROPONIN QUANT: CPT

## 2018-11-18 RX ADMIN — KETOROLAC TROMETHAMINE 15 MG: 30 INJECTION, SOLUTION INTRAMUSCULAR; INTRAVENOUS at 00:01

## 2018-11-18 ASSESSMENT — HEART SCORE: ECG: 1

## 2018-11-18 ASSESSMENT — PAIN SCALES - GENERAL: PAINLEVEL_OUTOF10: 5

## 2018-11-19 ENCOUNTER — TELEPHONE (OUTPATIENT)
Dept: FAMILY MEDICINE CLINIC | Age: 48
End: 2018-11-19

## 2018-11-20 ASSESSMENT — ENCOUNTER SYMPTOMS
RHINORRHEA: 0
NAUSEA: 0
SHORTNESS OF BREATH: 1
ABDOMINAL PAIN: 0
EYE REDNESS: 0
BACK PAIN: 0
DIARRHEA: 0
COUGH: 0
VOMITING: 0
SORE THROAT: 0
EYE PAIN: 0

## 2018-11-27 DIAGNOSIS — I20.8 ANGINA AT REST (HCC): Primary | ICD-10-CM

## 2018-11-29 ENCOUNTER — PATIENT MESSAGE (OUTPATIENT)
Dept: FAMILY MEDICINE CLINIC | Age: 48
End: 2018-11-29

## 2018-11-29 RX ORDER — NAPROXEN 500 MG/1
TABLET ORAL
Qty: 67 TABLET | Refills: 0 | Status: SHIPPED | OUTPATIENT
Start: 2018-11-29 | End: 2018-12-29 | Stop reason: SDUPTHER

## 2018-12-04 ENCOUNTER — HOSPITAL ENCOUNTER (OUTPATIENT)
Dept: NUCLEAR MEDICINE | Age: 48
Discharge: HOME | End: 2018-12-06
Payer: COMMERCIAL

## 2018-12-04 ENCOUNTER — HOSPITAL ENCOUNTER (OUTPATIENT)
Dept: NON INVASIVE DIAGNOSTICS | Age: 48
Discharge: HOME | End: 2018-12-04
Payer: COMMERCIAL

## 2018-12-04 VITALS
WEIGHT: 205 LBS | SYSTOLIC BLOOD PRESSURE: 143 MMHG | DIASTOLIC BLOOD PRESSURE: 82 MMHG | BODY MASS INDEX: 35.19 KG/M2 | HEART RATE: 84 BPM

## 2018-12-04 DIAGNOSIS — I20.8 ANGINA AT REST (HCC): ICD-10-CM

## 2018-12-04 LAB
LV EF: 65 %
LVEF MODALITY: NORMAL

## 2018-12-04 PROCEDURE — 3430000000 HC RX DIAGNOSTIC RADIOPHARMACEUTICAL: Performed by: FAMILY MEDICINE

## 2018-12-04 PROCEDURE — 93017 CV STRESS TEST TRACING ONLY: CPT

## 2018-12-04 PROCEDURE — A9500 TC99M SESTAMIBI: HCPCS | Performed by: FAMILY MEDICINE

## 2018-12-04 PROCEDURE — 78452 HT MUSCLE IMAGE SPECT MULT: CPT

## 2018-12-04 PROCEDURE — 2580000003 HC RX 258: Performed by: FAMILY MEDICINE

## 2018-12-04 PROCEDURE — 6360000002 HC RX W HCPCS: Performed by: FAMILY MEDICINE

## 2018-12-04 RX ORDER — METOPROLOL TARTRATE 5 MG/5ML
2.5 INJECTION INTRAVENOUS PRN
Status: DISCONTINUED | OUTPATIENT
Start: 2018-12-04 | End: 2018-12-04

## 2018-12-04 RX ORDER — SODIUM CHLORIDE 0.9 % (FLUSH) 0.9 %
10 SYRINGE (ML) INJECTION PRN
Status: DISCONTINUED | OUTPATIENT
Start: 2018-12-04 | End: 2018-12-04

## 2018-12-04 RX ORDER — SODIUM CHLORIDE 0.9 % (FLUSH) 0.9 %
10 SYRINGE (ML) INJECTION 2 TIMES DAILY
Status: DISCONTINUED | OUTPATIENT
Start: 2018-12-04 | End: 2018-12-07 | Stop reason: HOSPADM

## 2018-12-04 RX ORDER — NITROGLYCERIN 0.4 MG/1
0.4 TABLET SUBLINGUAL EVERY 5 MIN PRN
Status: DISCONTINUED | OUTPATIENT
Start: 2018-12-04 | End: 2018-12-04

## 2018-12-04 RX ADMIN — SODIUM CHLORIDE, PRESERVATIVE FREE 10 ML: 5 INJECTION INTRAVENOUS at 08:40

## 2018-12-04 RX ADMIN — TETRAKIS(2-METHOXYISOBUTYLISOCYANIDE)COPPER(I) TETRAFLUOROBORATE 13.6 MILLICURIE: 1 INJECTION, POWDER, LYOPHILIZED, FOR SOLUTION INTRAVENOUS at 08:40

## 2018-12-04 RX ADMIN — SODIUM CHLORIDE, PRESERVATIVE FREE 10 ML: 5 INJECTION INTRAVENOUS at 10:27

## 2018-12-04 RX ADMIN — TETRAKIS(2-METHOXYISOBUTYLISOCYANIDE)COPPER(I) TETRAFLUOROBORATE 36.8 MILLICURIE: 1 INJECTION, POWDER, LYOPHILIZED, FOR SOLUTION INTRAVENOUS at 10:27

## 2018-12-04 RX ADMIN — REGADENOSON 0.4 MG: 0.08 INJECTION, SOLUTION INTRAVENOUS at 10:25

## 2018-12-04 RX ADMIN — Medication 10 ML: at 10:12

## 2018-12-12 ENCOUNTER — HOSPITAL ENCOUNTER (OUTPATIENT)
Dept: WOMENS IMAGING | Age: 48
Discharge: HOME | End: 2018-12-14
Payer: COMMERCIAL

## 2018-12-12 DIAGNOSIS — Z12.39 SCREENING FOR BREAST CANCER: ICD-10-CM

## 2018-12-12 PROCEDURE — 77063 BREAST TOMOSYNTHESIS BI: CPT

## 2018-12-31 RX ORDER — NAPROXEN 500 MG/1
TABLET ORAL
Qty: 67 TABLET | Refills: 0 | Status: SHIPPED | OUTPATIENT
Start: 2018-12-31 | End: 2019-02-03 | Stop reason: SDUPTHER

## 2019-02-04 RX ORDER — NAPROXEN 500 MG/1
TABLET ORAL
Qty: 180 TABLET | Refills: 1 | Status: SHIPPED | OUTPATIENT
Start: 2019-02-04 | End: 2019-07-29 | Stop reason: SDUPTHER

## 2019-02-26 ENCOUNTER — OFFICE VISIT (OUTPATIENT)
Dept: FAMILY MEDICINE CLINIC | Age: 49
End: 2019-02-26
Payer: COMMERCIAL

## 2019-02-26 ENCOUNTER — HOSPITAL ENCOUNTER (OUTPATIENT)
Age: 49
Setting detail: SPECIMEN
Discharge: HOME | End: 2019-02-26
Payer: COMMERCIAL

## 2019-02-26 VITALS
HEART RATE: 100 BPM | BODY MASS INDEX: 36.12 KG/M2 | OXYGEN SATURATION: 98 % | WEIGHT: 210.4 LBS | DIASTOLIC BLOOD PRESSURE: 80 MMHG | SYSTOLIC BLOOD PRESSURE: 120 MMHG

## 2019-02-26 DIAGNOSIS — K21.9 GASTROESOPHAGEAL REFLUX DISEASE WITHOUT ESOPHAGITIS: ICD-10-CM

## 2019-02-26 DIAGNOSIS — G47.19 EXCESSIVE DAYTIME SLEEPINESS: Primary | ICD-10-CM

## 2019-02-26 DIAGNOSIS — R06.00 DYSPNEA, UNSPECIFIED TYPE: ICD-10-CM

## 2019-02-26 DIAGNOSIS — R53.83 FATIGUE, UNSPECIFIED TYPE: ICD-10-CM

## 2019-02-26 DIAGNOSIS — E03.9 HYPOTHYROIDISM, UNSPECIFIED TYPE: ICD-10-CM

## 2019-02-26 DIAGNOSIS — F41.9 ANXIETY: ICD-10-CM

## 2019-02-26 DIAGNOSIS — J30.89 NON-SEASONAL ALLERGIC RHINITIS, UNSPECIFIED TRIGGER: ICD-10-CM

## 2019-02-26 DIAGNOSIS — R06.83 SNORING: ICD-10-CM

## 2019-02-26 DIAGNOSIS — Z23 IMMUNIZATION DUE: ICD-10-CM

## 2019-02-26 LAB — TSH SERPL DL<=0.05 MIU/L-ACNC: 3.84 MIU/L (ref 0.3–5)

## 2019-02-26 PROCEDURE — 96372 THER/PROPH/DIAG INJ SC/IM: CPT | Performed by: FAMILY MEDICINE

## 2019-02-26 PROCEDURE — 99214 OFFICE O/P EST MOD 30 MIN: CPT | Performed by: FAMILY MEDICINE

## 2019-02-26 RX ORDER — OMEPRAZOLE 40 MG/1
40 CAPSULE, DELAYED RELEASE ORAL
Qty: 90 CAPSULE | Refills: 1 | Status: SHIPPED | OUTPATIENT
Start: 2019-02-26 | End: 2019-09-01 | Stop reason: SDUPTHER

## 2019-02-26 RX ORDER — TRIAMCINOLONE ACETONIDE 40 MG/ML
80 INJECTION, SUSPENSION INTRA-ARTICULAR; INTRAMUSCULAR ONCE
Status: COMPLETED | OUTPATIENT
Start: 2019-02-26 | End: 2019-02-26

## 2019-02-26 RX ORDER — LORAZEPAM 0.5 MG/1
0.5 TABLET ORAL EVERY 8 HOURS PRN
Qty: 90 TABLET | Refills: 2 | Status: SHIPPED | OUTPATIENT
Start: 2019-02-26 | End: 2019-03-28

## 2019-02-26 RX ADMIN — TRIAMCINOLONE ACETONIDE 80 MG: 40 INJECTION, SUSPENSION INTRA-ARTICULAR; INTRAMUSCULAR at 14:18

## 2019-02-26 ASSESSMENT — ENCOUNTER SYMPTOMS
DIARRHEA: 0
RHINORRHEA: 0
ABDOMINAL DISTENTION: 0
COUGH: 0
SORE THROAT: 0
VOICE CHANGE: 0
PHOTOPHOBIA: 0
SHORTNESS OF BREATH: 1
CONSTIPATION: 0
EYE REDNESS: 0
ABDOMINAL PAIN: 0
EYE ITCHING: 0
EYE DISCHARGE: 0
BLOOD IN STOOL: 0
NAUSEA: 0
CHEST TIGHTNESS: 0
BACK PAIN: 0
WHEEZING: 1
SINUS PRESSURE: 0
FACIAL SWELLING: 0
VOMITING: 0
EYE PAIN: 0
COLOR CHANGE: 0

## 2019-03-07 ENCOUNTER — OFFICE VISIT (OUTPATIENT)
Dept: FAMILY MEDICINE CLINIC | Age: 49
End: 2019-03-07
Payer: COMMERCIAL

## 2019-03-07 VITALS
HEIGHT: 64 IN | BODY MASS INDEX: 35.58 KG/M2 | HEART RATE: 73 BPM | RESPIRATION RATE: 16 BRPM | TEMPERATURE: 98.1 F | SYSTOLIC BLOOD PRESSURE: 128 MMHG | DIASTOLIC BLOOD PRESSURE: 84 MMHG | WEIGHT: 208.4 LBS | OXYGEN SATURATION: 98 %

## 2019-03-07 DIAGNOSIS — H66.90 ACUTE OTITIS MEDIA, UNSPECIFIED OTITIS MEDIA TYPE: ICD-10-CM

## 2019-03-07 DIAGNOSIS — J02.9 SORE THROAT: ICD-10-CM

## 2019-03-07 DIAGNOSIS — J01.90 ACUTE SINUSITIS, RECURRENCE NOT SPECIFIED, UNSPECIFIED LOCATION: Primary | ICD-10-CM

## 2019-03-07 LAB
INFLUENZA A ANTIBODY: NORMAL
INFLUENZA B ANTIBODY: NORMAL
S PYO AG THROAT QL: NORMAL

## 2019-03-07 PROCEDURE — 87880 STREP A ASSAY W/OPTIC: CPT | Performed by: FAMILY MEDICINE

## 2019-03-07 PROCEDURE — 99213 OFFICE O/P EST LOW 20 MIN: CPT | Performed by: FAMILY MEDICINE

## 2019-03-07 PROCEDURE — 87804 INFLUENZA ASSAY W/OPTIC: CPT | Performed by: FAMILY MEDICINE

## 2019-03-07 RX ORDER — AZITHROMYCIN 250 MG/1
TABLET, FILM COATED ORAL
Qty: 1 PACKET | Refills: 0 | Status: SHIPPED | OUTPATIENT
Start: 2019-03-07 | End: 2019-03-13

## 2019-03-07 ASSESSMENT — ENCOUNTER SYMPTOMS
SINUS PAIN: 1
WHEEZING: 0
COUGH: 1
RHINORRHEA: 1
HOARSE VOICE: 1
BACK PAIN: 0
ABDOMINAL PAIN: 0
SHORTNESS OF BREATH: 1
SINUS PRESSURE: 1
DIARRHEA: 0
CHEST TIGHTNESS: 0
SINUS COMPLAINT: 1
VOMITING: 0
EYES NEGATIVE: 1
NAUSEA: 0
SORE THROAT: 1

## 2019-03-08 DIAGNOSIS — G47.19 EXCESSIVE DAYTIME SLEEPINESS: Primary | ICD-10-CM

## 2019-03-12 ENCOUNTER — PATIENT MESSAGE (OUTPATIENT)
Dept: FAMILY MEDICINE CLINIC | Age: 49
End: 2019-03-12

## 2019-03-13 RX ORDER — CITALOPRAM 10 MG/1
30 TABLET ORAL DAILY
Qty: 90 TABLET | Refills: 3 | Status: SHIPPED | OUTPATIENT
Start: 2019-03-13 | End: 2019-03-15

## 2019-03-13 RX ORDER — DOXYCYCLINE HYCLATE 100 MG
100 TABLET ORAL 2 TIMES DAILY
Qty: 20 TABLET | Refills: 0 | Status: SHIPPED | OUTPATIENT
Start: 2019-03-13 | End: 2019-03-23

## 2019-03-14 ENCOUNTER — TELEPHONE (OUTPATIENT)
Dept: FAMILY MEDICINE CLINIC | Age: 49
End: 2019-03-14

## 2019-03-15 ENCOUNTER — PATIENT MESSAGE (OUTPATIENT)
Dept: FAMILY MEDICINE CLINIC | Age: 49
End: 2019-03-15

## 2019-03-15 RX ORDER — CITALOPRAM 20 MG/1
30 TABLET ORAL DAILY
Qty: 135 TABLET | Refills: 3 | Status: SHIPPED | OUTPATIENT
Start: 2019-03-15 | End: 2019-03-26 | Stop reason: SDUPTHER

## 2019-03-26 ENCOUNTER — OFFICE VISIT (OUTPATIENT)
Dept: FAMILY MEDICINE CLINIC | Age: 49
End: 2019-03-26
Payer: COMMERCIAL

## 2019-03-26 VITALS — DIASTOLIC BLOOD PRESSURE: 88 MMHG | HEIGHT: 64 IN | SYSTOLIC BLOOD PRESSURE: 130 MMHG | BODY MASS INDEX: 35.75 KG/M2

## 2019-03-26 DIAGNOSIS — K29.70 GASTRITIS WITHOUT BLEEDING, UNSPECIFIED CHRONICITY, UNSPECIFIED GASTRITIS TYPE: ICD-10-CM

## 2019-03-26 DIAGNOSIS — F39 MOOD DISORDER (HCC): Primary | ICD-10-CM

## 2019-03-26 DIAGNOSIS — G47.19 EXCESSIVE DAYTIME SLEEPINESS: ICD-10-CM

## 2019-03-26 PROCEDURE — 99213 OFFICE O/P EST LOW 20 MIN: CPT | Performed by: FAMILY MEDICINE

## 2019-03-26 RX ORDER — CITALOPRAM 20 MG/1
30 TABLET ORAL DAILY
Qty: 135 TABLET | Refills: 3 | Status: SHIPPED | OUTPATIENT
Start: 2019-03-26 | End: 2019-06-03 | Stop reason: SDUPTHER

## 2019-03-26 RX ORDER — CITALOPRAM 20 MG/1
30 TABLET ORAL DAILY
Qty: 135 TABLET | Refills: 3 | Status: SHIPPED | OUTPATIENT
Start: 2019-03-26 | End: 2019-03-26 | Stop reason: SDUPTHER

## 2019-03-26 ASSESSMENT — ENCOUNTER SYMPTOMS
RESPIRATORY NEGATIVE: 1
GASTROINTESTINAL NEGATIVE: 1

## 2019-04-10 ENCOUNTER — HOSPITAL ENCOUNTER (OUTPATIENT)
Dept: SLEEP CENTER | Age: 49
Discharge: HOME | End: 2019-04-12
Payer: COMMERCIAL

## 2019-04-10 PROCEDURE — G0399 HOME SLEEP TEST/TYPE 3 PORTA: HCPCS

## 2019-04-25 LAB — STATUS: NORMAL

## 2019-04-25 RX ORDER — LEVOTHYROXINE SODIUM 0.05 MG/1
50 TABLET ORAL DAILY
Qty: 90 TABLET | Refills: 3 | Status: SHIPPED | OUTPATIENT
Start: 2019-04-25 | End: 2019-06-03 | Stop reason: SDUPTHER

## 2019-04-25 NOTE — TELEPHONE ENCOUNTER
Next Visit Date:  No future appointments.     Health Maintenance   Topic Date Due    Pneumococcal 0-64 years Vaccine (1 of 1 - PPSV23) 1977    Lipid screen  2019    DTaP/Tdap/Td vaccine (1 - Tdap) 2019 (Originally 12/3/1990)    Flu vaccine (Season Ended) 2019 (Originally 2019)    Breast cancer screen  2019    TSH testing  2020    Diabetes screen  10/18/2021    Cervical cancer screen  2022    Colon cancer screen colonoscopy  2027    HIV screen  Completed       Hemoglobin A1C (%)   Date Value   10/18/2018 5.4   2017 5.3   11/10/2016 5.2             ( goal A1C is < 7)   No results found for: LABMICR  LDL Cholesterol (mg/dL)   Date Value   2014 120 (H)   2013 103 (H)       (goal LDL is <100)   AST (U/L)   Date Value   2018 13     ALT (U/L)   Date Value   2018 9     BUN (mg/dL)   Date Value   2018 13     BP Readings from Last 3 Encounters:   19 130/88   19 128/84   19 120/80          (goal 120/80)    All Future Testing planned in [facility]  Lab Frequency Next Occurrence               Patient Active Problem List:     Dermatitis, contact     Anxiety and depression     Bilateral carpal tunnel syndrome     Tenderness of left calf     Posterior left knee pain     Cervical polyp     Fatigue     Hypothyroidism     Bilateral low back pain without sciatica     Left foot pain     Lumbar degenerative disc disease     Arthralgia of left hip     Midline low back pain with sciatica     Anxiety     Asthma     Cancer ([address]     Depression     GERD (gastroesophageal reflux disease)     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

## 2019-05-21 ENCOUNTER — HOSPITAL ENCOUNTER (EMERGENCY)
Age: 49
Discharge: HOME | End: 2019-05-21
Attending: EMERGENCY MEDICINE
Payer: COMMERCIAL

## 2019-05-21 ENCOUNTER — APPOINTMENT (OUTPATIENT)
Dept: GENERAL RADIOLOGY | Age: 49
End: 2019-05-21
Payer: COMMERCIAL

## 2019-05-21 VITALS
HEIGHT: 64 IN | HEART RATE: 84 BPM | BODY MASS INDEX: 35.85 KG/M2 | SYSTOLIC BLOOD PRESSURE: 111 MMHG | DIASTOLIC BLOOD PRESSURE: 69 MMHG | RESPIRATION RATE: 15 BRPM | OXYGEN SATURATION: 93 % | TEMPERATURE: 98.2 F | WEIGHT: 210 LBS

## 2019-05-21 DIAGNOSIS — R51.9 NONINTRACTABLE EPISODIC HEADACHE, UNSPECIFIED HEADACHE TYPE: ICD-10-CM

## 2019-05-21 DIAGNOSIS — R07.9 CHEST PAIN, UNSPECIFIED TYPE: Primary | ICD-10-CM

## 2019-05-21 DIAGNOSIS — R53.83 OTHER FATIGUE: ICD-10-CM

## 2019-05-21 DIAGNOSIS — R53.1 GENERALIZED WEAKNESS: ICD-10-CM

## 2019-05-21 LAB
-: ABNORMAL
ABSOLUTE EOS #: 0.3 K/UL (ref 0–0.4)
ABSOLUTE IMMATURE GRANULOCYTE: NORMAL K/UL (ref 0–0.3)
ABSOLUTE LYMPH #: 2.5 K/UL (ref 1–4.8)
ABSOLUTE MONO #: 0.7 K/UL (ref 0.1–1.3)
ALBUMIN SERPL-MCNC: 4.4 G/DL (ref 3.5–5.2)
ALBUMIN/GLOBULIN RATIO: ABNORMAL (ref 1–2.5)
ALP BLD-CCNC: 76 U/L (ref 35–104)
ALT SERPL-CCNC: 12 U/L (ref 5–33)
AMORPHOUS: ABNORMAL
ANION GAP SERPL CALCULATED.3IONS-SCNC: 12 MMOL/L (ref 9–17)
AST SERPL-CCNC: 13 U/L
BACTERIA: ABNORMAL
BASOPHILS # BLD: 1 % (ref 0–2)
BASOPHILS ABSOLUTE: 0.1 K/UL (ref 0–0.2)
BILIRUB SERPL-MCNC: 0.24 MG/DL (ref 0.3–1.2)
BILIRUBIN URINE: NEGATIVE
BUN BLDV-MCNC: 11 MG/DL (ref 6–20)
BUN/CREAT BLD: ABNORMAL (ref 9–20)
CALCIUM SERPL-MCNC: 8.9 MG/DL (ref 8.6–10.4)
CASTS UA: ABNORMAL /LPF
CHLORIDE BLD-SCNC: 103 MMOL/L (ref 98–107)
CO2: 24 MMOL/L (ref 20–31)
COLOR: YELLOW
COMMENT UA: ABNORMAL
CREAT SERPL-MCNC: 0.53 MG/DL (ref 0.5–0.9)
CRYSTALS, UA: ABNORMAL /HPF
D-DIMER QUANTITATIVE: <0.27 MG/L FEU (ref 0–0.59)
DIFFERENTIAL TYPE: NORMAL
EOSINOPHILS RELATIVE PERCENT: 3 % (ref 0–4)
EPITHELIAL CELLS UA: ABNORMAL /HPF
GFR AFRICAN AMERICAN: >60 ML/MIN
GFR NON-AFRICAN AMERICAN: >60 ML/MIN
GFR SERPL CREATININE-BSD FRML MDRD: ABNORMAL ML/MIN/{1.73_M2}
GFR SERPL CREATININE-BSD FRML MDRD: ABNORMAL ML/MIN/{1.73_M2}
GLUCOSE BLD-MCNC: 125 MG/DL (ref 70–99)
GLUCOSE URINE: NEGATIVE
HCT VFR BLD CALC: 41 % (ref 36–46)
HEMOGLOBIN: 13.7 G/DL (ref 12–16)
IMMATURE GRANULOCYTES: NORMAL %
KETONES, URINE: NEGATIVE
LEUKOCYTE ESTERASE, URINE: NEGATIVE
LYMPHOCYTES # BLD: 28 % (ref 24–44)
MAGNESIUM: 2.1 MG/DL (ref 1.6–2.6)
MCH RBC QN AUTO: 30.7 PG (ref 26–34)
MCHC RBC AUTO-ENTMCNC: 33.4 G/DL (ref 31–37)
MCV RBC AUTO: 92 FL (ref 80–100)
MONOCYTES # BLD: 7 % (ref 1–7)
MUCUS: ABNORMAL
NITRITE, URINE: NEGATIVE
NRBC AUTOMATED: NORMAL PER 100 WBC
OTHER OBSERVATIONS UA: ABNORMAL
PDW BLD-RTO: 13.6 % (ref 11.5–14.9)
PH UA: 5 (ref 5–8)
PLATELET # BLD: 193 K/UL (ref 150–450)
PLATELET ESTIMATE: NORMAL
PMV BLD AUTO: 9.4 FL (ref 6–12)
POTASSIUM SERPL-SCNC: 3.9 MMOL/L (ref 3.7–5.3)
PROTEIN UA: NEGATIVE
RBC # BLD: 4.45 M/UL (ref 4–5.2)
RBC # BLD: NORMAL 10*6/UL
RBC UA: ABNORMAL /HPF
RENAL EPITHELIAL, UA: ABNORMAL /HPF
SEG NEUTROPHILS: 61 % (ref 36–66)
SEGMENTED NEUTROPHILS ABSOLUTE COUNT: 5.6 K/UL (ref 1.3–9.1)
SODIUM BLD-SCNC: 139 MMOL/L (ref 135–144)
SPECIFIC GRAVITY UA: 1.01 (ref 1–1.03)
TOTAL PROTEIN: 7.4 G/DL (ref 6.4–8.3)
TRICHOMONAS: ABNORMAL
TROPONIN INTERP: NORMAL
TROPONIN INTERP: NORMAL
TROPONIN T: NORMAL NG/ML
TROPONIN T: NORMAL NG/ML
TROPONIN, HIGH SENSITIVITY: <6 NG/L (ref 0–14)
TROPONIN, HIGH SENSITIVITY: <6 NG/L (ref 0–14)
TSH SERPL DL<=0.05 MIU/L-ACNC: 3.3 MIU/L (ref 0.3–5)
TURBIDITY: CLEAR
URINE HGB: NEGATIVE
UROBILINOGEN, URINE: NORMAL
WBC # BLD: 9.2 K/UL (ref 3.5–11)
WBC # BLD: NORMAL 10*3/UL
WBC UA: ABNORMAL /HPF
YEAST: ABNORMAL

## 2019-05-21 PROCEDURE — 6360000002 HC RX W HCPCS: Performed by: STUDENT IN AN ORGANIZED HEALTH CARE EDUCATION/TRAINING PROGRAM

## 2019-05-21 PROCEDURE — 71046 X-RAY EXAM CHEST 2 VIEWS: CPT

## 2019-05-21 PROCEDURE — 81001 URINALYSIS AUTO W/SCOPE: CPT

## 2019-05-21 PROCEDURE — 93005 ELECTROCARDIOGRAM TRACING: CPT

## 2019-05-21 PROCEDURE — 85025 COMPLETE CBC W/AUTO DIFF WBC: CPT

## 2019-05-21 PROCEDURE — 83735 ASSAY OF MAGNESIUM: CPT

## 2019-05-21 PROCEDURE — 85379 FIBRIN DEGRADATION QUANT: CPT

## 2019-05-21 PROCEDURE — 80053 COMPREHEN METABOLIC PANEL: CPT

## 2019-05-21 PROCEDURE — 99285 EMERGENCY DEPT VISIT HI MDM: CPT

## 2019-05-21 PROCEDURE — 6370000000 HC RX 637 (ALT 250 FOR IP): Performed by: STUDENT IN AN ORGANIZED HEALTH CARE EDUCATION/TRAINING PROGRAM

## 2019-05-21 PROCEDURE — 84443 ASSAY THYROID STIM HORMONE: CPT

## 2019-05-21 PROCEDURE — 36415 COLL VENOUS BLD VENIPUNCTURE: CPT

## 2019-05-21 PROCEDURE — 84484 ASSAY OF TROPONIN QUANT: CPT

## 2019-05-21 PROCEDURE — 96374 THER/PROPH/DIAG INJ IV PUSH: CPT

## 2019-05-21 PROCEDURE — 73030 X-RAY EXAM OF SHOULDER: CPT

## 2019-05-21 RX ORDER — ACETAMINOPHEN 500 MG
1000 TABLET ORAL ONCE
Status: DISCONTINUED | OUTPATIENT
Start: 2019-05-21 | End: 2019-05-21

## 2019-05-21 RX ORDER — LORAZEPAM 2 MG/ML
1 INJECTION INTRAMUSCULAR ONCE
Status: COMPLETED | OUTPATIENT
Start: 2019-05-21 | End: 2019-05-21

## 2019-05-21 RX ORDER — HYDROCODONE BITARTRATE AND ACETAMINOPHEN 5; 325 MG/1; MG/1
1 TABLET ORAL ONCE
Status: COMPLETED | OUTPATIENT
Start: 2019-05-21 | End: 2019-05-21

## 2019-05-21 RX ADMIN — LORAZEPAM 1 MG: 2 INJECTION INTRAMUSCULAR; INTRAVENOUS at 16:13

## 2019-05-21 RX ADMIN — HYDROCODONE BITARTRATE AND ACETAMINOPHEN 1 TABLET: 5; 325 TABLET ORAL at 17:05

## 2019-05-21 ASSESSMENT — ENCOUNTER SYMPTOMS
VOMITING: 0
ABDOMINAL PAIN: 0
DIARRHEA: 0
NAUSEA: 1
SORE THROAT: 0
COUGH: 0
SHORTNESS OF BREATH: 1

## 2019-05-21 ASSESSMENT — PAIN SCALES - GENERAL
PAINLEVEL_OUTOF10: 10
PAINLEVEL_OUTOF10: 5

## 2019-05-21 ASSESSMENT — PAIN DESCRIPTION - PAIN TYPE: TYPE: ACUTE PAIN

## 2019-05-21 ASSESSMENT — PAIN DESCRIPTION - LOCATION: LOCATION: CHEST

## 2019-05-21 ASSESSMENT — PAIN DESCRIPTION - FREQUENCY: FREQUENCY: CONTINUOUS

## 2019-05-21 NOTE — ED PROVIDER NOTES
[address] ED  Emergency Department Encounter  EmergencyMedicine Resident     Pt Name:Lorelei Morales  MRN: [id number]  [place] 1970  Date of evaluation: 19  PCP:  Rick Chavez MD    [address] [place] [address]       Chief Complaint   Patient presents with    Chest Pain    Shortness of Breath       HISTORY OF PRESENT ILLNESS  (Location/Symptom, Timing/Onset, Context/Setting, Quality, Duration, Modifying Factors, Severity.)      Amy C Vincente Olszewski is a 53 y.o. female who presents with chest pain and shortness of breath. Patient reports she developed sharp left upper chest pain with \"shooting pain\" radiating down her left arm and across her chest about an hour prior to arrival while she was at rest.  Nothing has made it better or worse. While driving to the emergency department, she developed associated shortness of breath. She reports she has chronic lightheadedness and nausea; no change in these symptoms along with her chest pain. No diaphoresis. Patient also reports general malaise in the past 2-3 days, with vague symptoms of \"feeling foggy\" and generalized weakness. She also reports daily migraine headaches for the past few weeks, sometimes associated with eye twitching and facial numbness. No other new symptoms; no fevers/chills, URI symptoms, cough, abdominal pain or vomiting, or urinary symptoms, diarrhea or constipation, blood in her stool, or any other symptoms. Patient has a history of anxiety with no recent changes in her medications. She also has a history of hypothyroidism. She reports she is currently being worked up for possible lupus. She denies any history of PE/DVT and denies any exogenous source of estrogen, recent surgery, recent long distance travel.     PAST MEDICAL / SURGICAL / SOCIAL / FAMILY HISTORY      has a past medical history of Adrenal insufficiency ([address] Anxiety, Asthma, Bleeding after intercourse, Cancer ([facility] [address] Delta storage pool disease [facility]), Depression, Diverticulosis of colon, Environmental allergies, Family history of breast cancer, GERD (gastroesophageal reflux disease), H/O thyroid cyst, Headache, History of cervical dysplasia, History of conization of cervix, Hypothyroidism, unspecified, Osteoarthritis, Pain, Sleep apnea, and Vision abnormalities. has a past surgical history that includes bone cyst excision (Right); Tonsillectomy; Endometrial ablation; Cervix biopsy; lipoma resection (Right); other surgical history; Carpal tunnel release (Right, 10-6-15); Carpal tunnel release (Left, 11/3/15);  section, low transverse; Colonoscopy; Gynecologic cryosurgery (); Thyroid lobectomy (Right, 2016); hernia repair; Endoscopy, colon, diagnostic; Dilation and curettage of uterus (N/A, 2017); Upper gastrointestinal endoscopy (2008); Colonoscopy (2009); fracture surgery (Left); pr egd transoral biopsy single/multiple (N/A, 2017); pr colon ca scrn not hi rsk ind (N/A, 2017); Colonoscopy (2017); and Upper gastrointestinal endoscopy (2017).     Social History     Socioeconomic History    Marital status:      Spouse name: Not on file    Number of children: Not on file    Years of education: Not on file    Highest education level: Not on file   Occupational History    Not on file   Social Needs    Financial resource strain: Not on file    Food insecurity:     Worry: Not on file     Inability: Not on file    Transportation needs:     Medical: Not on file     Non-medical: Not on file   Tobacco Use    Smoking status: Former Smoker    Smokeless tobacco: Never Used   Substance and Sexual Activity    Alcohol use: No     Alcohol/week: 0.0 oz     Comment: recovering alcoholic    Drug use: No    Sexual activity: Yes     Partners: Male     Birth control/protection: Other-see comments     Comment:  had vasectomy   Lifestyle    Physical activity:     Days per week: Not on file     Minutes per session: tablet take 1 tablet by mouth three times a day for 10 days then twice a day THEREAFTER 2/4/19   Alejandro Hamilton MD   Cholecalciferol (VITAMIN D3 PO) Take by mouth    Historical Provider, MD   Cetirizine HCl (ZYRTEC PO) Take 10 mg by mouth daily     Historical Provider, MD       REVIEW OF SYSTEMS    (2-9 systems for level 4, 10 or more for level 5)      Review of Systems   Constitutional: Positive for fatigue. Negative for chills and fever. HENT: Negative for congestion and sore throat. Eyes: Negative for visual disturbance. Respiratory: Positive for shortness of breath. Negative for cough. Cardiovascular: Positive for chest pain. Gastrointestinal: Positive for nausea. Negative for abdominal pain, diarrhea and vomiting. Genitourinary: Negative for dysuria, flank pain and hematuria. Musculoskeletal: Negative for arthralgias, gait problem, neck pain and neck stiffness. Skin: Negative for rash and wound. Neurological: Positive for weakness (generalized), light-headedness and headaches. Negative for syncope and numbness. PHYSICAL EXAM   (up to 7 for level 4, 8 or more for level 5)      INITIAL VITALS:   /69   Pulse 84   Temp 98.2 °F (36.8 °C) (Oral)   Resp 15   Ht 5' 4\" (1.626 m)   Wt 210 lb (95.3 kg)   SpO2 93%   BMI 36.05 kg/m²     Physical Exam   Gen. Appearance: anxious-appearing, tearful female patient in no acute distress  HEENT: head atraumatic, normocephalic. Pupils equal and reactive to light. Oropharynx clear and moist.  Neck: Supple, normal range of motion. No lymphadenopathy. Pulmonary: Lungs clear to auscultation bilaterally. Equal air entry right left. Cardiovascular:  Heart sounds normal, no murmurs. Peripheral pulses strong, regular, equal.   Abdomen: Soft, nontender, nondistended. Bowel sounds normal. No palpable masses. Skin: Warm, dry, well perfused  Neurology: GCS 15.   Oriented to person place and time.    -Cranial nerve exam:                        CN III, IV, VI: EOM normal                        CN V: facial sensation normal                        CN VII: normal facial expressions - symmetrical and normal eyebrow raise, eye closure, smile                        CN IX, X: uvula midline, symmetrical palate motion                        CN XI: normal symmetrical shoulder raise                        CN XII: normal tongue movement; no deviation  -Peripheral nerve exam: Normal tone and power in all 4 extremities. No sensory deficits.       DIFFERENTIAL  DIAGNOSIS     PLAN (LABS / IMAGING / EKG):  Orders Placed This Encounter   Procedures    XR CHEST STANDARD (2 VW)    XR SHOULDER LEFT (MIN 2 VIEWS)    CBC Auto Differential    Comprehensive Metabolic Panel    D-Dimer, Quantitative    TSH w/reflex to FT4    Magnesium    Urinalysis with Microscopic    Troponin    Ice to affected area    EKG 12 Lead       MEDICATIONS ORDERED:  Orders Placed This Encounter   Medications    LORazepam (ATIVAN) injection 1 mg    DISCONTD: acetaminophen (TYLENOL) tablet 1,000 mg    HYDROcodone-acetaminophen (NORCO) 5-325 MG per tablet 1 tablet       DDX: Emergent: ACS/NSTEMI/STEMI/angina, arrhythmia, trauma, aortic dissection,  PE, PNA, pneumothroax, esophageal rupture, tamponade, Cocaine use  Nonemergent: pneumonia, pericarditis, GERD, MSK, Endocarditis, anxiety     DIAGNOSTIC RESULTS / EMERGENCY DEPARTMENT COURSE / MDM     LABS:  Results for orders placed or performed during the hospital encounter of 05/21/19   CBC Auto Differential   Result Value Ref Range    WBC 9.2 3.5 - 11.0 k/uL    RBC 4.45 4.0 - 5.2 m/uL    Hemoglobin 13.7 12.0 - 16.0 g/dL    Hematocrit 41.0 36 - 46 %    MCV 92.0 80 - 100 fL    MCH 30.7 26 - 34 pg    MCHC 33.4 31 - 37 g/dL    RDW 13.6 11.5 - 14.9 %    Platelets 638 743 - 235 k/uL    MPV 9.4 6.0 - 12.0 fL    NRBC Automated NOT REPORTED per 100 WBC    Differential Type NOT REPORTED     Seg Neutrophils 61 36 - 66 %    Lymphocytes 28 24 - 44 %    Monocytes 7 1 - 7 %    Eosinophils % 3 0 - 4 %    Basophils 1 0 - 2 %    Immature Granulocytes NOT REPORTED 0 %    Segs Absolute 5.60 1.3 - 9.1 k/uL    Absolute Lymph # 2.50 1.0 - 4.8 k/uL    Absolute Mono # 0.70 0.1 - 1.3 k/uL    Absolute Eos # 0.30 0.0 - 0.4 k/uL    Basophils # 0.10 0.0 - 0.2 k/uL    Absolute Immature Granulocyte NOT REPORTED 0.00 - 0.30 k/uL    WBC Morphology NOT REPORTED     RBC Morphology NOT REPORTED     Platelet Estimate NOT REPORTED    Comprehensive Metabolic Panel   Result Value Ref Range    Glucose 125 (H) 70 - 99 mg/dL    BUN 11 6 - 20 mg/dL    CREATININE 0.53 0.50 - 0.90 mg/dL    Bun/Cre Ratio NOT REPORTED 9 - 20    Calcium 8.9 8.6 - 10.4 mg/dL    Sodium 139 135 - 144 mmol/L    Potassium 3.9 3.7 - 5.3 mmol/L    Chloride 103 98 - 107 mmol/L    CO2 24 20 - 31 mmol/L    Anion Gap 12 9 - 17 mmol/L    Alkaline Phosphatase 76 35 - 104 U/L    ALT 12 5 - 33 U/L    AST 13 <32 U/L    Total Bilirubin 0.24 (L) 0.3 - 1.2 mg/dL    Total Protein 7.4 6.4 - 8.3 g/dL    Alb 4.4 3.5 - 5.2 g/dL    Albumin/Globulin Ratio NOT REPORTED 1.0 - 2.5    GFR Non-African American >60 >60 mL/min    GFR African American >60 >60 mL/min    GFR Comment          GFR Staging NOT REPORTED    D-Dimer, Quantitative   Result Value Ref Range    D-Dimer, Quant <0.27 0.00 - 0.59 mg/L FEU   TSH w/reflex to FT4   Result Value Ref Range    TSH 3.30 0.30 - 5.00 mIU/L   Magnesium   Result Value Ref Range    Magnesium 2.1 1.6 - 2.6 mg/dL   Urinalysis with Microscopic   Result Value Ref Range    Color, UA YELLOW YELLOW    Turbidity UA CLEAR CLEAR    Glucose, Ur NEGATIVE NEGATIVE    Bilirubin Urine NEGATIVE NEGATIVE    Ketones, Urine NEGATIVE NEGATIVE    Specific [place], UA 1.013 1.000 - 1.030    Urine Hgb NEGATIVE NEGATIVE    pH, UA 5.0 5.0 - 8.0    Protein, UA NEGATIVE NEGATIVE    Urobilinogen, Urine Normal Normal    Nitrite, Urine NEGATIVE NEGATIVE    Leukocyte Esterase, Urine NEGATIVE NEGATIVE    Urinalysis Comments NOT REPORTED     - WBC, UA 2 TO 5 /HPF    RBC, UA 0 TO 2 /HPF    Casts UA NOT REPORTED /LPF    Crystals UA NOT REPORTED None /HPF    Epithelial Cells UA 2 TO 5 /HPF    Renal Epithelial, Urine NOT REPORTED 0 /HPF    Bacteria, UA FEW (A) None    Mucus, UA NOT REPORTED None    Trichomonas, UA NOT REPORTED None    Amorphous, UA NOT REPORTED None    Other Observations UA NOT REPORTED NOT REQ. Yeast, UA NOT REPORTED None   Troponin   Result Value Ref Range    Troponin, High Sensitivity <6 0 - 14 ng/L    Troponin T NOT REPORTED <0.03 ng/mL    Troponin Interp NOT REPORTED    Troponin   Result Value Ref Range    Troponin, High Sensitivity <6 0 - 14 ng/L    Troponin T NOT REPORTED <0.03 ng/mL    Troponin Interp NOT REPORTED        IMPRESSION: 53year old patient presents with chest pain, shortness of breath, recent fatigue. Patient is afebrile, hemodynamically stable, and in no acute distress. She appears anxious and is tearful, but is appropriately interactive and cooperative with interview and examination. Normal respiratory effort, lungs clear bilaterally, heart sounds normal, abdomen benign, thorough neurological examination unremarkable. No \"ripping\" or \"tearing\" nature of patient's pain and no radiation of pain to her back, and no physical examination findings to suggest aortic pathology. No infectious symptoms or findings on examination to suggest infectious etiology of patient's symptoms. Possible musculoskeletal etiology, anxiety, less likely ACS however will of course evaluate for this. Low pretest probability for pulmonary embolism; will obtain d-dimer. Plan for CBC, CMP, TSH, troponins, d-dimer, EKG, chest x-ray. Urinalysis. Ativan. Reassess. RADIOLOGY:  XR SHOULDER LEFT (MIN 2 VIEWS) (Final result)   Result time 05/21/19 18:05:50   Final result by Rm Sotomayor MD (05/21/19 18:05:50)                Impression:    No acute osseous abnormality.             Narrative:    EXAMINATION:  3 XRAY VIEWS OF THE LEFT

## 2019-05-22 DIAGNOSIS — R91.1 INCIDENTAL LUNG NODULE, GREATER THAN OR EQUAL TO 8MM: Primary | ICD-10-CM

## 2019-05-23 ENCOUNTER — PATIENT MESSAGE (OUTPATIENT)
Dept: FAMILY MEDICINE CLINIC | Age: 49
End: 2019-05-23

## 2019-05-23 ENCOUNTER — OFFICE VISIT (OUTPATIENT)
Dept: FAMILY MEDICINE CLINIC | Age: 49
End: 2019-05-23
Payer: COMMERCIAL

## 2019-05-23 VITALS
WEIGHT: 215.2 LBS | HEART RATE: 110 BPM | DIASTOLIC BLOOD PRESSURE: 74 MMHG | BODY MASS INDEX: 36.94 KG/M2 | SYSTOLIC BLOOD PRESSURE: 116 MMHG | OXYGEN SATURATION: 98 %

## 2019-05-23 DIAGNOSIS — M54.12 CERVICAL RADICULAR PAIN: Primary | ICD-10-CM

## 2019-05-23 DIAGNOSIS — F43.0 ACUTE STRESS REACTION: ICD-10-CM

## 2019-05-23 PROCEDURE — 99213 OFFICE O/P EST LOW 20 MIN: CPT | Performed by: FAMILY MEDICINE

## 2019-05-23 ASSESSMENT — ENCOUNTER SYMPTOMS
ABDOMINAL DISTENTION: 0
SHORTNESS OF BREATH: 1
COLOR CHANGE: 0
WHEEZING: 0
BLOOD IN STOOL: 0
VOICE CHANGE: 0
NAUSEA: 0
FACIAL SWELLING: 0
EYE PAIN: 0
ABDOMINAL PAIN: 0
VOMITING: 0
SINUS PRESSURE: 0
EYE ITCHING: 0
PHOTOPHOBIA: 0
EYE DISCHARGE: 0
BACK PAIN: 0
DIARRHEA: 0
EYE REDNESS: 0
SORE THROAT: 0
COUGH: 0
CHEST TIGHTNESS: 0
RHINORRHEA: 0
CONSTIPATION: 0

## 2019-05-23 NOTE — PROGRESS NOTES
Subjective:      Patient ID: Chris Holloway is a 53 y.o. female. Visit Information    Have you changed or started any medications since your last visit including any over-the-counter medicines, vitamins, or herbal medicines? no   Are you having any side effects from any of your medications? -  no  Have you stopped taking any of your medications? Is so, why? -  no    Have you seen any other physician or provider since your last visit? No  Have you had any other diagnostic tests since your last visit? No  Have you been seen in the emergency room and/or had an admission to a hospital since we last saw you? No  Have you had your routine dental cleaning in the past 6 months? yes -     Have you activated your [employer] account? If not, what are your barriers?  Yes     Patient Care Team:  Keturah Odom MD as PCP - General (Family Medicine)  Gela Guevara DO as Consulting Physician (Obstetrics & Gynecology)    Medical History Review  Past Medical, Family, and Social History reviewed and  contribute to the patient presenting condition    Health Maintenance   Topic Date Due    Pneumococcal 0-64 years Vaccine (1 of 1 - PPSV23) 12/03/1977    Lipid screen  02/21/2019    DTaP/Tdap/Td vaccine (1 - Tdap) 12/01/2019 (Originally 12/3/1990)    Flu vaccine (Season Ended) 12/01/2019 (Originally 9/1/2019)    Breast cancer screen  12/12/2019    TSH testing  05/21/2020    Diabetes screen  10/18/2021    Cervical cancer screen  08/23/2022    Colon cancer screen colonoscopy  11/21/2027    HIV screen  Completed       HPI    Review of Systems    Objective:   Physical Exam    Assessment / Plan:
hepatosplenomegaly. There is no tenderness. There is no CVA tenderness. No hernia. Musculoskeletal:        Back:    Lymphadenopathy:     She has no cervical adenopathy. Right cervical: No superficial cervical adenopathy present. Left cervical: No superficial cervical adenopathy present. Neurological: She is alert and oriented to person, place, and time. She has normal strength. No cranial nerve deficit or sensory deficit. Coordination and gait normal.   Reflex Scores:       Brachioradialis reflexes are 2+ on the right side and 2+ on the left side. Patellar reflexes are 2+ on the right side and 2+ on the left side. Achilles reflexes are 2+ on the right side and 2+ on the left side. Skin: Skin is warm and dry. No lesion and no rash noted. She is not diaphoretic. No cyanosis. Nails show no clubbing. Psychiatric: She has a normal mood and affect. Her speech is normal and behavior is normal. Judgment and thought content normal. Cognition and memory are normal.       Assessment & Plan:   1. Cervical radicular pain   started gralise 900mg daily with dinner, will start on neck exercises. 2. Acute stress reaction  Recommended that in addition to the gabapentin she think about a short weekend vacation away from her normal routine and stress.

## 2019-05-24 NOTE — TELEPHONE ENCOUNTER
From: Simon Hodge  To:  Tc Kim MD  Sent: 5/23/2019 6:10 PM EDT  Subject: Non-Urgent Medical Question    My face and lips are twitching to a lot along with my left eye lid

## 2019-05-30 LAB
EKG ATRIAL RATE: 89 BPM
EKG P AXIS: 76 DEGREES
EKG P-R INTERVAL: 146 MS
EKG Q-T INTERVAL: 362 MS
EKG QRS DURATION: 84 MS
EKG QTC CALCULATION (BAZETT): 440 MS
EKG R AXIS: 64 DEGREES
EKG T AXIS: 60 DEGREES
EKG VENTRICULAR RATE: 89 BPM

## 2019-05-31 ENCOUNTER — HOSPITAL ENCOUNTER (OUTPATIENT)
Dept: GENERAL RADIOLOGY | Age: 49
Discharge: HOME | End: 2019-06-02
Payer: COMMERCIAL

## 2019-05-31 ENCOUNTER — HOSPITAL ENCOUNTER (OUTPATIENT)
Age: 49
Discharge: HOME | End: 2019-06-02
Payer: COMMERCIAL

## 2019-05-31 DIAGNOSIS — R91.1 INCIDENTAL LUNG NODULE, GREATER THAN OR EQUAL TO 8MM: ICD-10-CM

## 2019-05-31 PROCEDURE — 71046 X-RAY EXAM CHEST 2 VIEWS: CPT

## 2019-05-31 NOTE — TELEPHONE ENCOUNTER
Next Visit Date:  Future Appointments   Date Time Provider Timmy Jc   6/3/2019  3:00 PM MD KIM Rahman Nery [facility]   6/11/2019 10:20 AM Haylee Whitfield  [address] [facility]   Topic Date Due    Pneumococcal 0-64 years Vaccine (1 of 1 - PPSV23) 12/03/1977    Lipid screen  02/21/2019    DTaP/Tdap/Td vaccine (1 - Tdap) 12/01/2019 (Originally 12/3/1990)    Flu vaccine (Season Ended) 12/01/2019 (Originally 9/1/2019)    Breast cancer screen  12/12/2019    TSH testing  05/21/2020    Diabetes screen  10/18/2021    Cervical cancer screen  08/23/2022    Colon cancer screen colonoscopy  11/21/2027    HIV screen  Completed       Hemoglobin A1C (%)   Date Value   10/18/2018 5.4   02/22/2017 5.3   11/10/2016 5.2             ( goal A1C is < 7)   No results found for: LABMICR  LDL Cholesterol (mg/dL)   Date Value   02/21/2014 120 (H)   04/30/2013 103 (H)       (goal LDL is <100)   AST (U/L)   Date Value   05/21/2019 13     ALT (U/L)   Date Value   05/21/2019 12     BUN (mg/dL)   Date Value   05/21/2019 11     BP Readings from Last 3 Encounters:   05/23/19 116/74   05/21/19 111/69   03/26/19 130/88          (goal 120/80)    All Future Testing planned in [facility]  Lab Frequency Next Occurrence               Patient Active Problem List:     Dermatitis, contact     Anxiety and depression     Bilateral carpal tunnel syndrome     Tenderness of left calf     Posterior left knee pain     Cervical polyp     Fatigue     Hypothyroidism     Bilateral low back pain without sciatica     Left foot pain     Lumbar degenerative disc disease     Arthralgia of left hip     Midline low back pain with sciatica     Anxiety     Asthma     Cancer ([address]     Depression     GERD (gastroesophageal reflux disease)     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

## 2019-06-03 RX ORDER — CITALOPRAM 20 MG/1
30 TABLET ORAL DAILY
Qty: 135 TABLET | Refills: 3 | Status: SHIPPED | OUTPATIENT
Start: 2019-06-03 | End: 2019-06-11 | Stop reason: SDUPTHER

## 2019-06-03 RX ORDER — LEVOTHYROXINE SODIUM 0.05 MG/1
50 TABLET ORAL DAILY
Qty: 90 TABLET | Refills: 3 | Status: SHIPPED | OUTPATIENT
Start: 2019-06-03 | End: 2020-09-29 | Stop reason: SDUPTHER

## 2019-06-03 NOTE — TELEPHONE ENCOUNTER
Next Visit Date:  Future Appointments   Date Time Provider Timmy Jc   6/3/2019  3:00 PM Jessica Wilson MD [facility] [address] [facility]   6/11/2019 10:20 AM Jessica Wilson  [address] [facility]   Topic Date Due    Pneumococcal 0-64 years Vaccine (1 of 1 - PPSV23) 12/03/1977    Lipid screen  02/21/2019    DTaP/Tdap/Td vaccine (1 - Tdap) 12/01/2019 (Originally 12/3/1990)    Flu vaccine (Season Ended) 12/01/2019 (Originally 9/1/2019)    Breast cancer screen  12/12/2019    TSH testing  05/21/2020    Diabetes screen  10/18/2021    Cervical cancer screen  08/23/2022    Colon cancer screen colonoscopy  11/21/2027    HIV screen  Completed       Hemoglobin A1C (%)   Date Value   10/18/2018 5.4   02/22/2017 5.3   11/10/2016 5.2             ( goal A1C is < 7)   No results found for: LABMICR  LDL Cholesterol (mg/dL)   Date Value   02/21/2014 120 (H)   04/30/2013 103 (H)       (goal LDL is <100)   AST (U/L)   Date Value   05/21/2019 13     ALT (U/L)   Date Value   05/21/2019 12     BUN (mg/dL)   Date Value   05/21/2019 11     BP Readings from Last 3 Encounters:   05/23/19 116/74   05/21/19 111/69   03/26/19 130/88          (goal 120/80)    All Future Testing planned in [facility]  Lab Frequency Next Occurrence               Patient Active Problem List:     Dermatitis, contact     Anxiety and depression     Bilateral carpal tunnel syndrome     Tenderness of left calf     Posterior left knee pain     Cervical polyp     Fatigue     Hypothyroidism     Bilateral low back pain without sciatica     Left foot pain     Lumbar degenerative disc disease     Arthralgia of left hip     Midline low back pain with sciatica     Anxiety     Asthma     Cancer ([address]     Depression     GERD (gastroesophageal reflux disease)     Delta storage pool disease ([facility]     Environmental allergies     H/O thyroid cyst     History of cervical dysplasia     History of conization of cervix 2000     History of low transverse  section     Vision abnormalities     Family history of breast cancer     Osteoarthritis     VASECTOMY in Male Partner     History of endometrial ablation     Pelvic pain in female     Hypothyroidism     Abnormal uterine bleeding     Diverticulosis of colon     Rectal bleeding     Constipation     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

## 2019-06-06 ENCOUNTER — TELEPHONE (OUTPATIENT)
Dept: FAMILY MEDICINE CLINIC | Age: 49
End: 2019-06-06

## 2019-06-06 RX ORDER — CITALOPRAM 20 MG/1
20 TABLET ORAL DAILY
Qty: 90 TABLET | Refills: 3 | Status: SHIPPED | OUTPATIENT
Start: 2019-06-06 | End: 2019-11-07 | Stop reason: CLARIF

## 2019-06-06 RX ORDER — CITALOPRAM 10 MG/1
10 TABLET ORAL DAILY
Qty: 90 TABLET | Refills: 3 | Status: SHIPPED | OUTPATIENT
Start: 2019-06-06 | End: 2019-11-07 | Stop reason: CLARIF

## 2019-06-06 NOTE — TELEPHONE ENCOUNTER
The cash price for 135 pills at [facility] is under $11 for a 90 day supply with [employer] card. How much are they charging her that they can't run it for 90 pills and cash pay for the additional 45 rather than having two separate copays?

## 2019-06-06 NOTE — TELEPHONE ENCOUNTER
Fifi Cheng from [facility] pharmacist called stating that Celexa is not covered by insurance at 1.5mg, they will need two prescriptions one stating 20mg daily and one 10mg daily. Please advise.

## 2019-06-11 ENCOUNTER — OFFICE VISIT (OUTPATIENT)
Dept: FAMILY MEDICINE CLINIC | Age: 49
End: 2019-06-11
Payer: COMMERCIAL

## 2019-06-11 VITALS
DIASTOLIC BLOOD PRESSURE: 76 MMHG | OXYGEN SATURATION: 97 % | HEART RATE: 97 BPM | WEIGHT: 219 LBS | BODY MASS INDEX: 37.59 KG/M2 | SYSTOLIC BLOOD PRESSURE: 114 MMHG

## 2019-06-11 DIAGNOSIS — M54.12 CERVICAL RADICULITIS: Primary | ICD-10-CM

## 2019-06-11 PROCEDURE — 99213 OFFICE O/P EST LOW 20 MIN: CPT | Performed by: FAMILY MEDICINE

## 2019-06-11 RX ORDER — GABAPENTIN 300 MG/1
CAPSULE ORAL
Qty: 120 CAPSULE | Refills: 5 | Status: SHIPPED | OUTPATIENT
Start: 2019-06-11 | End: 2019-08-16

## 2019-06-11 ASSESSMENT — ENCOUNTER SYMPTOMS
BLOOD IN STOOL: 0
CHEST TIGHTNESS: 0
ABDOMINAL DISTENTION: 0
VOICE CHANGE: 0
WHEEZING: 0
SINUS PRESSURE: 0
PHOTOPHOBIA: 0
COLOR CHANGE: 0
EYE PAIN: 0
COUGH: 0
BACK PAIN: 0
ABDOMINAL PAIN: 0
RHINORRHEA: 0
DIARRHEA: 0
CONSTIPATION: 0
EYE REDNESS: 0
VOMITING: 0
SHORTNESS OF BREATH: 0
EYE DISCHARGE: 0
NAUSEA: 0
EYE ITCHING: 0
SORE THROAT: 0
FACIAL SWELLING: 0

## 2019-06-11 NOTE — PROGRESS NOTES
Subjective:      Patient ID: Cristobal Gibbs is a 53 y.o. female. Visit Information    Have you changed or started any medications since your last visit including any over-the-counter medicines, vitamins, or herbal medicines? no   Are you having any side effects from any of your medications? -  no  Have you stopped taking any of your medications? Is so, why? -  no    Have you seen any other physician or provider since your last visit? No  Have you had any other diagnostic tests since your last visit? No  Have you been seen in the emergency room and/or had an admission to a hospital since we last saw you? No  Have you had your routine dental cleaning in the past 6 months? yes -     Have you activated your [employer] account? If not, what are your barriers?  Yes     Patient Care Team:  Ann Maurer MD as PCP - General (Family Medicine)  Ann Maurer MD as PCP - [facility] Provider  Tika Pratt DO as Consulting Physician (Obstetrics & Gynecology)    Medical History Review  Past Medical, Family, and Social History reviewed and  contribute to the patient presenting condition    Health Maintenance   Topic Date Due    Pneumococcal 0-64 years Vaccine (1 of 1 - PPSV23) 12/03/1977    Lipid screen  02/21/2019    DTaP/Tdap/Td vaccine (1 - Tdap) 12/01/2019 (Originally 12/3/1990)    Flu vaccine (Season Ended) 12/01/2019 (Originally 9/1/2019)    Breast cancer screen  12/12/2019    TSH testing  05/21/2020    Diabetes screen  10/18/2021    Cervical cancer screen  08/23/2022    Colon cancer screen colonoscopy  11/21/2027    HIV screen  Completed       HPI    Review of Systems    Objective:   Physical Exam    Assessment / Plan:

## 2019-06-11 NOTE — PROGRESS NOTES
iKrk Lancaster is a 53 y.o. female who presents todayfor her medical conditions/complaints as noted below. Kirk Lancaster is here today c/oDiscuss Medications      HPI:      HPI    Here for follow up for cervical radicular pain which had been responsive to the Gralise but had some trouble with it being covered and had some trouble with converting it to the regular gabapentin. She has been sleeping better but still has some trouble with word finding and some mood related issues with memory. The regular gabapentin does not seem to work as well as the Gralise in terms of efficacy but also is more sedating. Subjective:   Review of Systems   Constitutional: Negative for activity change, appetite change, chills, diaphoresis, fatigue, fever and unexpected weight change. HENT: Negative for congestion, ear pain, facial swelling, hearing loss, postnasal drip, rhinorrhea, sinus pressure, sore throat and voice change. Eyes: Negative for photophobia, pain, discharge, redness, itching and visual disturbance. Respiratory: Negative for cough, chest tightness, shortness of breath and wheezing. Cardiovascular: Negative for chest pain and palpitations. Gastrointestinal: Negative for abdominal distention, abdominal pain, blood in stool, constipation, diarrhea, nausea and vomiting. Endocrine: Negative for cold intolerance and heat intolerance. Genitourinary: Negative for decreased urine volume, difficulty urinating, dysuria, flank pain, frequency, hematuria, pelvic pain, urgency, vaginal bleeding and vaginal discharge. Musculoskeletal: Negative for arthralgias, back pain, gait problem, joint swelling, myalgias, neck pain and neck stiffness. Skin: Negative for color change, pallor and rash. Allergic/Immunologic: Negative for environmental allergies and food allergies.    Neurological: Negative for dizziness, tremors, seizures, syncope, facial asymmetry, speech difficulty, weakness, numbness and headaches. There is improvement in the neuropathic symptoms in the arms. Psychiatric/Behavioral: Negative for agitation, behavioral problems, dysphoric mood and sleep disturbance. The patient is not nervous/anxious and is not hyperactive. Objective:    /76   Pulse 97   Wt 219 lb (99.3 kg)   SpO2 97%   BMI 37.59 kg/m²     Physical Exam   Constitutional: She is oriented to person, place, and time. She appears well-developed and well-nourished. She does not appear ill. No distress. HENT:   Head: Normocephalic and atraumatic. Right Ear: External ear normal.   Left Ear: External ear normal.   Nose: Nose normal. No mucosal edema or rhinorrhea. Mouth/Throat: Mucous membranes are normal. No oropharyngeal exudate, posterior oropharyngeal edema or posterior oropharyngeal erythema. Eyes: Pupils are equal, round, and reactive to light. EOM are normal. Right eye exhibits no discharge. Left eye exhibits no discharge. No scleral icterus. Neck: Trachea normal and normal range of motion. Neck supple. No JVD present. Carotid bruit is not present. No tracheal deviation present. No thyromegaly present. Cardiovascular: Normal rate, regular rhythm, S1 normal, S2 normal, normal heart sounds and normal pulses. Exam reveals no gallop, no S3, no S4, no distant heart sounds and no friction rub. No murmur heard. Pulmonary/Chest: No respiratory distress. She has no decreased breath sounds. She has no wheezes. She has no rhonchi. She has no rales. Abdominal: Soft. Normal appearance and bowel sounds are normal. There is no hepatosplenomegaly. There is no tenderness. There is no CVA tenderness. No hernia. Lymphadenopathy:     She has no cervical adenopathy. Right cervical: No superficial cervical adenopathy present. Left cervical: No superficial cervical adenopathy present. Neurological: She is alert and oriented to person, place, and time. She has normal strength.  No cranial nerve deficit or sensory deficit. Coordination and gait normal.   Reflex Scores:       Brachioradialis reflexes are 2+ on the right side and 2+ on the left side. Patellar reflexes are 2+ on the right side and 2+ on the left side. Achilles reflexes are 2+ on the right side and 2+ on the left side. Skin: Skin is warm and dry. No lesion and no rash noted. She is not diaphoretic. No cyanosis. Nails show no clubbing. Psychiatric: She has a normal mood and affect. Her speech is normal and behavior is normal. Judgment and thought content normal. Cognition and memory are normal.       Assessment & Plan:     1. Cervical radiculitis  Has been doing well with the gabapentin and has been seeing some lability in her mood but that sounds like it may be due the dosing because she's been taking it once a day instead of TID.

## 2019-07-30 RX ORDER — NAPROXEN 500 MG/1
TABLET ORAL
Qty: 180 TABLET | Refills: 1 | Status: SHIPPED | OUTPATIENT
Start: 2019-07-30 | End: 2019-11-07 | Stop reason: CLARIF

## 2019-08-16 ENCOUNTER — HOSPITAL ENCOUNTER (OUTPATIENT)
Age: 49
Setting detail: SPECIMEN
Discharge: HOME | End: 2019-08-16
Payer: COMMERCIAL

## 2019-08-16 ENCOUNTER — OFFICE VISIT (OUTPATIENT)
Dept: FAMILY MEDICINE CLINIC | Age: 49
End: 2019-08-16
Payer: COMMERCIAL

## 2019-08-16 VITALS
DIASTOLIC BLOOD PRESSURE: 80 MMHG | SYSTOLIC BLOOD PRESSURE: 120 MMHG | OXYGEN SATURATION: 98 % | WEIGHT: 222.2 LBS | BODY MASS INDEX: 38.14 KG/M2 | HEART RATE: 77 BPM

## 2019-08-16 DIAGNOSIS — F43.22 ACUTE ADJUSTMENT DISORDER WITH ANXIETY: Primary | ICD-10-CM

## 2019-08-16 DIAGNOSIS — M13.0 POLYARTHROPATHY: ICD-10-CM

## 2019-08-16 LAB
C-REACTIVE PROTEIN: 4.3 MG/L (ref 0–5)
RHEUMATOID FACTOR: <10 IU/ML
URIC ACID: 6.2 MG/DL (ref 2.4–5.7)

## 2019-08-16 PROCEDURE — 99213 OFFICE O/P EST LOW 20 MIN: CPT | Performed by: FAMILY MEDICINE

## 2019-08-16 RX ORDER — LORAZEPAM 1 MG/1
.5-1 TABLET ORAL 3 TIMES DAILY PRN
Qty: 90 TABLET | Refills: 0 | Status: SHIPPED | OUTPATIENT
Start: 2019-08-16 | End: 2019-12-27 | Stop reason: SDUPTHER

## 2019-08-16 RX ORDER — GABAPENTIN 100 MG/1
CAPSULE ORAL
Qty: 9 CAPSULE | Refills: 0 | Status: SHIPPED | OUTPATIENT
Start: 2019-08-16 | End: 2019-08-23

## 2019-08-16 ASSESSMENT — ENCOUNTER SYMPTOMS: RESPIRATORY NEGATIVE: 1

## 2019-08-19 LAB
ANA REFERENCE RANGE:: ABNORMAL
ANTI DNA DOUBLE STRANDED: 248 IU/ML
ANTI JO-1 IGG: 12 U/ML
ANTI RNP AB: 74 U/ML
ANTI SSA: 65 U/ML
ANTI SSB: 22 U/ML
ANTI-CENTROMERE: 12 U/ML
ANTI-NUCLEAR ANTIBODY (ANA): POSITIVE
ANTI-SCLERODERMA: 43 U/ML
ANTI-SMITH: 26 U/ML
CCP IGG ANTIBODIES: <1.5 U/ML
HISTONE ANTIBODY: 20 U/ML

## 2019-08-23 ENCOUNTER — OFFICE VISIT (OUTPATIENT)
Dept: FAMILY MEDICINE CLINIC | Age: 49
End: 2019-08-23
Payer: COMMERCIAL

## 2019-08-23 VITALS
WEIGHT: 216 LBS | RESPIRATION RATE: 16 BRPM | DIASTOLIC BLOOD PRESSURE: 82 MMHG | SYSTOLIC BLOOD PRESSURE: 118 MMHG | HEART RATE: 62 BPM | BODY MASS INDEX: 37.08 KG/M2 | OXYGEN SATURATION: 99 %

## 2019-08-23 DIAGNOSIS — M32.9 LUPUS (HCC): Primary | ICD-10-CM

## 2019-08-23 DIAGNOSIS — F32.9 REACTIVE DEPRESSION: ICD-10-CM

## 2019-08-23 PROCEDURE — 99213 OFFICE O/P EST LOW 20 MIN: CPT | Performed by: FAMILY MEDICINE

## 2019-08-23 ASSESSMENT — ENCOUNTER SYMPTOMS
SHORTNESS OF BREATH: 0
SINUS PRESSURE: 0
RHINORRHEA: 0
EYE ITCHING: 0
BACK PAIN: 0
EYE REDNESS: 0
FACIAL SWELLING: 0
SORE THROAT: 0
COLOR CHANGE: 0
DIARRHEA: 0
EYE PAIN: 0
VOICE CHANGE: 0
BLOOD IN STOOL: 0
PHOTOPHOBIA: 0
WHEEZING: 0
ABDOMINAL PAIN: 0
ABDOMINAL DISTENTION: 0
COUGH: 0
VOMITING: 0
CHEST TIGHTNESS: 0
EYE DISCHARGE: 0
NAUSEA: 0
CONSTIPATION: 0

## 2019-08-23 NOTE — PROGRESS NOTES
hyperactive. Objective:    /82   Pulse 62   Resp 16   Wt 216 lb (98 kg)   SpO2 99%   BMI 37.08 kg/m²     Physical Exam   Constitutional: She is oriented to person, place, and time. She appears well-developed and well-nourished. She does not appear ill. No distress. HENT:   Head: Normocephalic and atraumatic. Right Ear: External ear normal.   Left Ear: External ear normal.   Nose: Nose normal. No mucosal edema or rhinorrhea. Mouth/Throat: Mucous membranes are normal. No oropharyngeal exudate, posterior oropharyngeal edema or posterior oropharyngeal erythema. Eyes: Pupils are equal, round, and reactive to light. EOM are normal. Right eye exhibits no discharge. Left eye exhibits no discharge. No scleral icterus. Neck: Trachea normal and normal range of motion. Neck supple. No JVD present. Carotid bruit is not present. No tracheal deviation present. No thyromegaly present. Cardiovascular: Normal rate, regular rhythm, S1 normal, S2 normal, normal heart sounds and normal pulses. Exam reveals no gallop, no S3, no S4, no distant heart sounds and no friction rub. No murmur heard. Pulmonary/Chest: No respiratory distress. She has no decreased breath sounds. She has no wheezes. She has no rhonchi. She has no rales. Abdominal: Soft. Normal appearance and bowel sounds are normal. There is no hepatosplenomegaly. There is no tenderness. There is no CVA tenderness. No hernia. Lymphadenopathy:     She has no cervical adenopathy. Right cervical: No superficial cervical adenopathy present. Left cervical: No superficial cervical adenopathy present. Neurological: She is alert and oriented to person, place, and time. She has normal strength. No cranial nerve deficit or sensory deficit. Coordination and gait normal.   Reflex Scores:       Brachioradialis reflexes are 2+ on the right side and 2+ on the left side. Patellar reflexes are 2+ on the right side and 2+ on the left side.

## 2019-08-29 ENCOUNTER — TELEPHONE (OUTPATIENT)
Dept: FAMILY MEDICINE CLINIC | Age: 49
End: 2019-08-29

## 2019-08-29 DIAGNOSIS — M32.9 LUPUS (HCC): Primary | ICD-10-CM

## 2019-09-01 DIAGNOSIS — K21.9 GASTROESOPHAGEAL REFLUX DISEASE WITHOUT ESOPHAGITIS: ICD-10-CM

## 2019-09-03 RX ORDER — OMEPRAZOLE 40 MG/1
CAPSULE, DELAYED RELEASE ORAL
Qty: 90 CAPSULE | Refills: 1 | Status: SHIPPED | OUTPATIENT
Start: 2019-09-03 | End: 2020-08-26

## 2019-09-03 NOTE — TELEPHONE ENCOUNTER
section     Vision abnormalities     Family history of breast cancer     Osteoarthritis     VASECTOMY in Male Partner     History of endometrial ablation     Pelvic pain in female     Hypothyroidism     Abnormal uterine bleeding     Diverticulosis of colon     Rectal bleeding     Constipation     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

## 2019-09-11 ENCOUNTER — OFFICE VISIT (OUTPATIENT)
Dept: BEHAVIORAL/MENTAL HEALTH CLINIC | Age: 49
End: 2019-09-11
Payer: COMMERCIAL

## 2019-09-11 DIAGNOSIS — F34.1 PERSISTENT DEPRESSIVE DISORDER: Primary | ICD-10-CM

## 2019-09-11 DIAGNOSIS — F41.0 GENERALIZED ANXIETY DISORDER WITH PANIC ATTACKS: ICD-10-CM

## 2019-09-11 DIAGNOSIS — F41.1 GENERALIZED ANXIETY DISORDER WITH PANIC ATTACKS: ICD-10-CM

## 2019-09-11 PROCEDURE — 90791 PSYCH DIAGNOSTIC EVALUATION: CPT | Performed by: PSYCHOLOGIST

## 2019-09-11 NOTE — PROGRESS NOTES
she does recall Dr. Lydia Perez trying her on Cymbalta, but this caused an increase in pain and did not help with mood. She stated that Dr. Lydia Perez also started her on Ativan, which she has remained on and continues to help her manage anxiety. She reported that in 2013 or 2014 she experienced a severe panic attack while in the hospital helping a family member get treatment, so it was suggested that she go to the ER of that hospital.  She stated that she told the ER staff that she needed help for a panic attack and that she was experiencing extreme fears about dying, including the fear that she would \"go crazy and jump out of a car. \"  She reported that she tried to explain that she was not going to harm herself only that she was afraid she would die, but that her panic attack was so severe, she had difficulty explaining herself well. She stated that she was involuntarily hospitalized and was held until she was finally able to see a psychiatrist 25 hours after she was admitted. She stated that she was released shortly after the psychiatrist evaluated her. She reported that after being discharged, she was connected with a different psychiatrist who started her on Celexa after she told him that her mom had responded well to this medication. She saw him for about a year. She stated that the Celexa was very effective and she was able to stop taking Ativan for a \"long time\" after starting Celexa. She reported that she is currently back to taking Ativan along with the Celexa. She denied any history of other psychiatric hospitalizations. She also denied any history of suicidal ideation or attempts. In regards to psychotherapy, she reported that she did individual as well as couples therapy with a therapist in Dr. Lydia Perez office. She stated that she also briefly worked with a therapist in the office of the other psychiatrist she saw after being hospitalized.           PSYCHOSOCIAL HISTORY  Current living situation: Pt lives with her  and 2 teenage children in a home she and her  own. She reported that she has been  for 17 years, but they have been together since 2000. They have their 2 teengaged children, a 16yo daughter and 11yo son. Pt also has 2 adult children, a 25yo son and 17yo daughter, from a previous relationship she was in for about 8 years, but they never . 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 She reported that she held a part-time job as a  for [employer] that she held for about 7 months for extra money until the summer of 2018. 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 She reported that her \"passion\" is doing nursing in [address] [place] [address], but that the work is often too physically demanding given her chronic health issues. Support system:  Pt reported that her mother is her greatest source of support. She stated that she also has a best friend who struggles with mental health issues herself and that they try to be very supportive to each other. Episcopalian/Spirituality:   Pt reported that she was raised Jainism, but is no longer practicing. She stated that she considers herself a very spiritual person. She reported that she listens to the music and talks/speeches of Pentecostalism feng, Sharaalene , which she stated are a sources of inspiration and great comfort and support. DRUG AND ALCOHOL CURRENT USE/HISTORY  TOBACCO:  She reports that she has quit smoking.  She has never used smokeless tobacco.  ALCOHOL:  She reports that she does not drink depressive disorder    Generalized anxiety disorder with panic attacks          INTERVENTION  Discussed various factors related to the development and maintenance of  depression and anxiety (including biological, cognitive, behavioral, and environmental factors), Discussed self-care (sleep, nutrition, rewarding activities, social support, exercise), Established rapport, Supportive techniques and Emphasized self-care as important for managing overall health. Also clarified role of [facility] in primary care and discussed potentially referring to mental health provider who can see the pt more regularly. PLAN  1)  Pt will inquire about switching to a PCP who is closer to her home in [place], [place]. She was informed that in addition to [facility], [address] [facility] is also within 5 miles of her home. 2)  Pt will return for a follow-up appointment with the [facility] on 10/2/19 at [address] [place] [address].  At this appointment, we will further discuss continuing with the [facility] versus being referred to a mental health provider who can see her more regularly. INTERACTIVE COMPLEXITY  Is interactive complexity present?   No  Reason:  N/A  Additional Supporting Information:  N/A       Electronically signed by Severiano Splinter, PhD on 9/11/19 at 9:13 AM

## 2019-10-02 ENCOUNTER — OFFICE VISIT (OUTPATIENT)
Dept: BEHAVIORAL/MENTAL HEALTH CLINIC | Age: 49
End: 2019-10-02
Payer: COMMERCIAL

## 2019-10-02 DIAGNOSIS — F41.1 GENERALIZED ANXIETY DISORDER WITH PANIC ATTACKS: ICD-10-CM

## 2019-10-02 DIAGNOSIS — F41.0 GENERALIZED ANXIETY DISORDER WITH PANIC ATTACKS: ICD-10-CM

## 2019-10-02 DIAGNOSIS — F34.1 PERSISTENT DEPRESSIVE DISORDER: Primary | ICD-10-CM

## 2019-10-02 PROCEDURE — 90837 PSYTX W PT 60 MINUTES: CPT | Performed by: PSYCHOLOGIST

## 2019-10-09 ENCOUNTER — OFFICE VISIT (OUTPATIENT)
Dept: BEHAVIORAL/MENTAL HEALTH CLINIC | Age: 49
End: 2019-10-09
Payer: COMMERCIAL

## 2019-10-09 DIAGNOSIS — F41.0 GENERALIZED ANXIETY DISORDER WITH PANIC ATTACKS: ICD-10-CM

## 2019-10-09 DIAGNOSIS — F41.1 GENERALIZED ANXIETY DISORDER WITH PANIC ATTACKS: ICD-10-CM

## 2019-10-09 DIAGNOSIS — F34.1 PERSISTENT DEPRESSIVE DISORDER: Primary | ICD-10-CM

## 2019-10-09 PROCEDURE — 90837 PSYTX W PT 60 MINUTES: CPT | Performed by: PSYCHOLOGIST

## 2019-10-23 NOTE — LETTER
----- Message from Lulu Mitchell sent at 9/19/2017  7:15 AM CDT -----  Contact: Pt  Myla ,daughter, #[phone], needs to know if the pt ever had her insulin level checked.   Called and notified pt and got her scheduled.    Called and notified pts daughter that an insulin level has not been checked. Pts daughter stated that she see an herbalist and has been talking to her about what is going on and thinks this possibly could be contributing to her problems and recommends she get in checked. They would like to see if you can place orders for her insulin level to be checked?    Can do it, but needs to be fasting. Nothing to eat and only water in the am of test for 8 hours prior to testing. Labs ordered   [facility]  [address] [facility] [address]  [address] [facility] [address]  Phone: [phone]  Fax: [phone]    Richard Coleman MD        November 16, 2017     Patient: Salvador Asher   YOB: 1970   Date of Visit: 11/16/2017       To Whom it May Concern:    Tenisha Farah was seen in my clinic on 11/16/2017. She may return to work as scheduled. If you have any questions or concerns, please don't hesitate to call.     Sincerely,         Richard Coleman MD no

## 2019-11-07 ENCOUNTER — HOSPITAL ENCOUNTER (OUTPATIENT)
Age: 49
Setting detail: SPECIMEN
Discharge: HOME | End: 2019-11-07
Payer: COMMERCIAL

## 2019-11-12 LAB — CYTOLOGY REPORT: NORMAL

## 2019-12-13 ENCOUNTER — HOSPITAL ENCOUNTER (EMERGENCY)
Age: 49
Discharge: HOME | End: 2019-12-13
Attending: EMERGENCY MEDICINE
Payer: COMMERCIAL

## 2019-12-13 VITALS
TEMPERATURE: 98.1 F | BODY MASS INDEX: 37.56 KG/M2 | WEIGHT: 220 LBS | HEART RATE: 88 BPM | OXYGEN SATURATION: 97 % | DIASTOLIC BLOOD PRESSURE: 67 MMHG | RESPIRATION RATE: 16 BRPM | SYSTOLIC BLOOD PRESSURE: 107 MMHG | HEIGHT: 64 IN

## 2019-12-13 DIAGNOSIS — H53.8 BLURRED VISION: ICD-10-CM

## 2019-12-13 DIAGNOSIS — R51.9 ACUTE NONINTRACTABLE HEADACHE, UNSPECIFIED HEADACHE TYPE: Primary | ICD-10-CM

## 2019-12-13 LAB
CHP ED QC CHECK: YES
GLUCOSE BLD-MCNC: 94 MG/DL
GLUCOSE BLD-MCNC: 94 MG/DL (ref 65–105)

## 2019-12-13 PROCEDURE — 82947 ASSAY GLUCOSE BLOOD QUANT: CPT

## 2019-12-13 PROCEDURE — 99283 EMERGENCY DEPT VISIT LOW MDM: CPT

## 2019-12-13 ASSESSMENT — ENCOUNTER SYMPTOMS
BLURRED VISION: 1
BACK PAIN: 0
SHORTNESS OF BREATH: 0
GASTROINTESTINAL NEGATIVE: 1
COUGH: 0
ABDOMINAL PAIN: 0
RESPIRATORY NEGATIVE: 1

## 2019-12-13 ASSESSMENT — VISUAL ACUITY
OD: 20/20
OS: 20/20
OU: 20/20

## 2020-01-25 ENCOUNTER — HOSPITAL ENCOUNTER (OUTPATIENT)
Age: 50
Setting detail: SPECIMEN
Discharge: HOME | End: 2020-01-25
Payer: COMMERCIAL

## 2020-07-02 ENCOUNTER — OFFICE VISIT (OUTPATIENT)
Dept: INTERNAL MEDICINE CLINIC | Age: 50
End: 2020-07-02
Payer: COMMERCIAL

## 2020-07-02 VITALS
HEART RATE: 89 BPM | SYSTOLIC BLOOD PRESSURE: 110 MMHG | OXYGEN SATURATION: 98 % | BODY MASS INDEX: 35.17 KG/M2 | HEIGHT: 64 IN | DIASTOLIC BLOOD PRESSURE: 80 MMHG | WEIGHT: 206 LBS | TEMPERATURE: 97.5 F

## 2020-07-02 PROBLEM — M32.8 OTHER FORMS OF SYSTEMIC LUPUS ERYTHEMATOSUS (HCC): Status: ACTIVE | Noted: 2020-07-02

## 2020-07-02 PROBLEM — F41.0 PANIC ATTACKS: Status: ACTIVE | Noted: 2020-07-02

## 2020-07-02 PROCEDURE — 99204 OFFICE O/P NEW MOD 45 MIN: CPT | Performed by: INTERNAL MEDICINE

## 2020-07-02 RX ORDER — CELECOXIB 100 MG/1
100 CAPSULE ORAL 2 TIMES DAILY
COMMUNITY
Start: 2020-04-30 | End: 2020-08-26

## 2020-07-02 RX ORDER — LORAZEPAM 0.5 MG/1
0.5 TABLET ORAL DAILY PRN
COMMUNITY
Start: 2018-04-24 | End: 2021-10-01

## 2020-07-02 RX ORDER — TOFACITINIB 11 MG/1
TABLET, FILM COATED, EXTENDED RELEASE ORAL
COMMUNITY
End: 2020-08-26

## 2020-07-02 RX ORDER — VENLAFAXINE HYDROCHLORIDE 75 MG/1
CAPSULE, EXTENDED RELEASE ORAL
Qty: 90 CAPSULE | Refills: 3 | Status: SHIPPED | OUTPATIENT
Start: 2020-07-02 | End: 2020-09-29 | Stop reason: SDUPTHER

## 2020-07-02 NOTE — PROGRESS NOTES
SUBJECTIVE:  Xavi Shane is a 51 y.o. female who  comes for complaints of   Chief Complaint   Patient presents with    New Patient     Establish care    Health Maintenance     pneumonia, Dtap    Discuss Medications     Effexor         Specialities pt is following with:  Rheumatology- SLE, on Xeljanz, lyrica   Endo-  Thyromegaly, multiple cysts, s/p tatiana-thyroidectomy, pt fell off follow up    Chronic conditions being monitored:  SLE  Following with Dr Fawn Harrison       Hypothyroid  On synthroid 50mcg daily  Lab Results   Component Value Date    TSH 3.30 05/21/2019   Denies any significant weight changes/skin/hair changes/palpitations/GI issues/changes in temp perception. Anxiety/depression  Patient has been on effexor 75mg daily for anxiety/depression for ~1yrs. Doing well on this. Took celexa before, but feels it wasn't working   Denies any suicidal or homicidal ideation. Symptoms well controlled. Would like to continue on current dose of medication. Takes ativan as needed  0.5mg once or twice a month, for panic attacks    GERD  On prilosec 40mg lc  EGD 2017 with dr Izabel Mcmillan   Not on regular meds  Has been controlled many years.      Concerns today:  Needs effexor refill    Alcohol/smoking- no alcohol, quit 30yr ago, smoked only for few months    REVIEW OF SYSTEMS (except Subjective (HPI))    CONSTITUTIONAL: No weight loss, malaise or fevers  HEENT: Negative for frequent or significant headaches, No changes in hearing or vision, no nose bleeds or other nasal problems  NECK: Negative for lumps, goiter, pain and significant neck swelling  RESPIRATORY: Negative for cough, hemoptysis, wheezing, or shortness of breath  CARDIOVASCULAR: Negative for chest pain, leg swelling,or palpitations  GI: No nausea, vomiting, or diarrhea or Constipation  : No history of dysuria, frequency or incontinence, or hematuria  MUSCULOSKELETAL: Negative for joint pain or swelling  SKIN: Negative for lesions, rash, and itching  PSYCH: Negative for sleep disturbance, mood disorder and recent psychosocial stressors  NEURO: No history of headaches, syncope, paralysis, seizures or tremors    ACTIVE PROBLEM LIST  Patient Active Problem List   Diagnosis    Dermatitis, contact    Anxiety and depression    Bilateral carpal tunnel syndrome    Tenderness of left calf    Posterior left knee pain    Cervical polyp    Fatigue    Hypothyroidism    Bilateral low back pain without sciatica    Left foot pain    Lumbar degenerative disc disease    Arthralgia of left hip    Midline low back pain with sciatica    Asthma    [facility] [address]    GERD (gastroesophageal reflux disease)    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[facility]    Environmental allergies    H/O thyroid cyst    History of cervical dysplasia    History of conization of cervix     History of low transverse  section    Vision abnormalities    Family history of breast cancer    Osteoarthritis    VASECTOMY in Male Partner    History of endometrial ablation    Pelvic pain in female    Hypothyroidism    Abnormal uterine bleeding    Diverticulosis of colon    Rectal bleeding    Constipation    Isolated corticotropin deficiency (HCC)    Persistent depressive disorder    Generalized anxiety disorder with panic attacks         PAST MEDICAL HISTORY:    Past Medical History:   Diagnosis Date    Adrenal insufficiency (HCC)     Asthma     Bleeding after intercourse     Cancer ([facility] [address]     CERVICAL    Delta storage pool disease [facility])     Diverticulosis of colon     Environmental allergies     Family history of breast cancer     MGM in her 45s    GERD (gastroesophageal reflux disease)     H/O thyroid cyst     mild hypothyroidism.     Headache     History of cervical dysplasia     had cone    History of conization of cervix     dysplastic cells    Hypothyroidism, unspecified 3/18/2016    Lupus (HCC)     Osteoarthritis     Pain     all over body    Sleep apnea     awaiting test results currently    Vision abnormalities     glasses/ far sighted       PAST SURGICAL HISTORY:    Past Surgical History:   Procedure Laterality Date    BONE CYST EXCISION Right     WRIST    CARPAL TUNNEL RELEASE Right 10-6-15    CARPAL TUNNEL RELEASE Left 11/3/15    CERVIX BIOPSY       SECTION, LOW TRANSVERSE      COLONOSCOPY      COLONOSCOPY  2009    diverticulosis, no other gross lesions    COLONOSCOPY  2017    10 yr recall, small hemorrhoids, diverticulosis    DILATION AND CURETTAGE OF UTERUS N/A 2017    HYSTEROSCOPY AND D& C, [facility], COLON, DIAGNOSTIC      UGI    FRACTURE SURGERY Left     THUMB    GYNECOLOGIC CRYOSURGERY      conization   [address]      with mesh    LIPOMA RESECTION Right     RING FINGER    OTHER SURGICAL HISTORY      VOCAL CORD SURG    AR COLON CA SCRN NOT  [address] IND N/A 2017    COLONOSCOPY performed by Daniele Ng MD at [address] [place] [address] EGD TRANSORAL BIOPSY SINGLE/MULTIPLE N/A 2017    EGD BIOPSY performed by [facility] MD at [facility] Right 2016    Right thyroid lobectomy and isthmusectomy. Continuous monitoring of the recurrent laryngeal nerve using nerve integrity monitor. Frozen section.     TONSILLECTOMY      UPPER GASTROINTESTINAL ENDOSCOPY  2008    with BRAVO, gastric polyp-fundic gland polyp    UPPER GASTROINTESTINAL ENDOSCOPY  2017    multiple small gastric polyps-fundic gland polyps       FAMILY HISTORY:    Family History   Problem Relation Age of Onset    Alcohol Abuse Father     Other Father         murder    Diabetes Mother     Other Mother         thyroid    High Blood Pressure Mother     Lupus Sister     Drug Abuse Brother     Breast Cancer Maternal Grandmother         45s    Heart Surgery Maternal Grandmother     Heart Failure Maternal Grandmother     Hypertension Maternal Grandfather     Stroke Maternal Grandfather     Heart Attack Maternal Grandfather     Alcohol Abuse Maternal Grandfather     Diabetes Maternal Grandfather     Cancer Paternal Grandmother         lymphnoid    Heart Failure Paternal Grandfather     Heart Surgery Paternal Grandfather         x2        SOCIAL HISTORY:    Social History     Socioeconomic History    Marital status:      Spouse name: Not on file    Number of children: Not on file    Years of education: Not on file    Highest education level: Not on file   Occupational History    Not on file   Social Needs    Financial resource strain: Not on file    Food insecurity     Worry: Not on file     Inability: Not on file   [employer] needs     Medical: Not on file     Non-medical: Not on file   Tobacco Use    Smoking status: Former Smoker    Smokeless tobacco: Never Used   Substance and Sexual Activity    Alcohol use: No     Alcohol/week: 0.0 standard drinks     Comment: recovering alcoholic    Drug use: No    Sexual activity: Yes     Partners: Male     Birth control/protection: Other-see comments     Comment:  had vasectomy   Lifestyle    Physical activity     Days per week: Not on file     Minutes per session: Not on file    Stress: Not on file   Relationships    Social connections     Talks on phone: Not on file     Gets together: Not on file     Attends Voodoo service: Not on file     Active member of club or organization: Not on file     Attends meetings of clubs or organizations: Not on file     Relationship status: Not on file    Intimate partner violence     Fear of current or ex partner: Not on file     Emotionally abused: Not on file     Physically abused: Not on file     Forced sexual activity: Not on file   Other Topics Concern    Not on file   Social History Narrative    Not on file       CURRENT MEDICATIONS:  Current Outpatient Medications   Medication and all orders for this visit:    Generalized anxiety disorder with panic attacks  -     venlafaxine (EFFEXOR XR) 75 MG extended release capsule; 1 capsule po daily    Screening for hyperlipidemia  -     Lipid, Fasting; Future    Other systemic lupus erythematosus with other organ involvement (HCC)    Postoperative hypothyroidism  -     TSH;  Future    Panic attacks    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

## 2020-07-08 ENCOUNTER — HOSPITAL ENCOUNTER (OUTPATIENT)
Age: 50
Setting detail: SPECIMEN
Discharge: HOME | End: 2020-07-08
Payer: COMMERCIAL

## 2020-07-08 LAB
CHOLESTEROL, FASTING: 243 MG/DL
CHOLESTEROL/HDL RATIO: 4.9
HDLC SERPL-MCNC: 50 MG/DL
LDL CHOLESTEROL: 164 MG/DL (ref 0–130)
TRIGLYCERIDE, FASTING: 143 MG/DL
TSH SERPL DL<=0.05 MIU/L-ACNC: 4.71 MIU/L (ref 0.3–5)
VLDLC SERPL CALC-MCNC: ABNORMAL MG/DL (ref 1–30)

## 2020-07-09 ENCOUNTER — TELEPHONE (OUTPATIENT)
Dept: INTERNAL MEDICINE CLINIC | Age: 50
End: 2020-07-09

## 2020-07-09 NOTE — TELEPHONE ENCOUNTER
Please inform patient her thyroid level was normal.   Her cholesterol levels are high and I recommend dietary and lifestyle modification at the moment, as mentioned below. We will monitor her next year. Advised 30 mins cardiovascular exercises every day. DASH diet 1500 to 2000 mg of Sodium a day/less than 1 tsp salt for day. 5 serving vegetables a day, 3 to 4 serving fruit a day, low fat dairy products. Whole grain food. 20 g fiber a day  Recommended no refined sugar, low refined starch, healthy oil intake (olive oil), healthy protein (fish) along the lines of the Mediterranean diet.     Rigo Young MD

## 2020-08-24 ENCOUNTER — HOSPITAL ENCOUNTER (EMERGENCY)
Age: 50
Discharge: HOME | End: 2020-08-24
Attending: EMERGENCY MEDICINE
Payer: COMMERCIAL

## 2020-08-24 VITALS
HEART RATE: 79 BPM | WEIGHT: 210 LBS | TEMPERATURE: 98.1 F | SYSTOLIC BLOOD PRESSURE: 145 MMHG | RESPIRATION RATE: 14 BRPM | BODY MASS INDEX: 35.85 KG/M2 | DIASTOLIC BLOOD PRESSURE: 86 MMHG | OXYGEN SATURATION: 99 % | HEIGHT: 64 IN

## 2020-08-24 PROCEDURE — 96374 THER/PROPH/DIAG INJ IV PUSH: CPT

## 2020-08-24 PROCEDURE — 96375 TX/PRO/DX INJ NEW DRUG ADDON: CPT

## 2020-08-24 PROCEDURE — 96361 HYDRATE IV INFUSION ADD-ON: CPT

## 2020-08-24 PROCEDURE — 99284 EMERGENCY DEPT VISIT MOD MDM: CPT

## 2020-08-24 PROCEDURE — 2580000003 HC RX 258: Performed by: EMERGENCY MEDICINE

## 2020-08-24 PROCEDURE — 6360000002 HC RX W HCPCS: Performed by: EMERGENCY MEDICINE

## 2020-08-24 RX ORDER — DEXAMETHASONE SODIUM PHOSPHATE 10 MG/ML
10 INJECTION INTRAMUSCULAR; INTRAVENOUS ONCE
Status: COMPLETED | OUTPATIENT
Start: 2020-08-24 | End: 2020-08-24

## 2020-08-24 RX ORDER — 0.9 % SODIUM CHLORIDE 0.9 %
1000 INTRAVENOUS SOLUTION INTRAVENOUS ONCE
Status: COMPLETED | OUTPATIENT
Start: 2020-08-24 | End: 2020-08-24

## 2020-08-24 RX ORDER — PROCHLORPERAZINE EDISYLATE 5 MG/ML
10 INJECTION INTRAMUSCULAR; INTRAVENOUS ONCE
Status: COMPLETED | OUTPATIENT
Start: 2020-08-24 | End: 2020-08-24

## 2020-08-24 RX ORDER — DIPHENHYDRAMINE HYDROCHLORIDE 50 MG/ML
50 INJECTION INTRAMUSCULAR; INTRAVENOUS ONCE
Status: COMPLETED | OUTPATIENT
Start: 2020-08-24 | End: 2020-08-24

## 2020-08-24 RX ORDER — KETOROLAC TROMETHAMINE 30 MG/ML
30 INJECTION, SOLUTION INTRAMUSCULAR; INTRAVENOUS ONCE
Status: COMPLETED | OUTPATIENT
Start: 2020-08-24 | End: 2020-08-24

## 2020-08-24 RX ADMIN — DEXAMETHASONE SODIUM PHOSPHATE 10 MG: 10 INJECTION INTRAMUSCULAR; INTRAVENOUS at 20:54

## 2020-08-24 RX ADMIN — DIPHENHYDRAMINE HYDROCHLORIDE 50 MG: 50 INJECTION INTRAMUSCULAR; INTRAVENOUS at 20:54

## 2020-08-24 RX ADMIN — PROCHLORPERAZINE EDISYLATE 10 MG: 5 INJECTION INTRAMUSCULAR; INTRAVENOUS at 20:54

## 2020-08-24 RX ADMIN — KETOROLAC TROMETHAMINE 30 MG: 30 INJECTION, SOLUTION INTRAMUSCULAR at 20:54

## 2020-08-24 RX ADMIN — SODIUM CHLORIDE 1000 ML: 9 INJECTION, SOLUTION INTRAVENOUS at 20:54

## 2020-08-24 ASSESSMENT — ENCOUNTER SYMPTOMS
COUGH: 0
DIARRHEA: 0
VOMITING: 0
SHORTNESS OF BREATH: 1
BACK PAIN: 0
ABDOMINAL PAIN: 0

## 2020-08-24 ASSESSMENT — PAIN SCALES - GENERAL
PAINLEVEL_OUTOF10: 10
PAINLEVEL_OUTOF10: 10
PAINLEVEL_OUTOF10: 0

## 2020-08-24 ASSESSMENT — PAIN DESCRIPTION - PROGRESSION: CLINICAL_PROGRESSION: RESOLVED

## 2020-08-24 NOTE — ED TRIAGE NOTES
Mode of arrival (squad #, walk in, police, etc) : walk in        Chief complaint(s): chest pain, SOB, nausea, neck pain, headache        Arrival Note (brief scenario, treatment PTA, etc). : Pt states she has had a headache all day that will not go away. Pt also reports chest pain and SOB. Pt reports neck and back pain as well. Pt states she has rheumatoid arthritis, lupus, and fibromyalgia and isn't sure if this is a flare up or not. Pt is tearful and anxious in triage. C= \"Have you ever felt that you should Cut down on your drinking? \"  No  A= \"Have people Annoyed you by criticizing your drinking? \"  No  G= \"Have you ever felt bad or Guilty about your drinking? \"  No  E= \"Have you ever had a drink as an Eye-opener first thing in the morning to steady your nerves or to help a hangover? \"  No      Deferred []      Reason for deferring: N/A    *If yes to two or more: probable alcohol abuse. *  3

## 2020-08-25 ENCOUNTER — CARE COORDINATION (OUTPATIENT)
Dept: CARE COORDINATION | Age: 50
End: 2020-08-25

## 2020-08-25 NOTE — ED PROVIDER NOTES
EMERGENCY DEPARTMENT ENCOUNTER    Pt Name: Luis Whittaker  MRN: [id number]  [place] 1970  Date of evaluation: 8/24/20  CHIEF COMPLAINT       Chief Complaint   Patient presents with    Chest Pain    Shortness of Breath    Headache    Nausea     HISTORY OF PRESENT ILLNESS   HPI     This is a 44-year-old female with a history of reported lupus, rheumatoid, fibromyalgia history of headaches who comes in today with a headache the patient states that she woke up with this headache she states that it makes her feel nauseous she has not eaten anything today but she has not had any vomiting. Patient states that the headache is all over she states that she feels jolts like electricity through her brain. She states that she is having pain from the top of her head to the bottom of her feet she feels like she might be having a flareup of either her lupus, rheumatoid, or fibromyalgia. Patient states that the last time she saw her rheumatologist was last week. When asked about chest pain and shortness of breath she states always but no new or worsening chest pain or shortness of breath she denies any abdominal pain. She denies any fevers chills cough congestion. No trauma or loss of consciousness. REVIEW OF SYSTEMS     Review of Systems   Constitutional: Negative for fever. HENT: Negative for congestion. Respiratory: Positive for shortness of breath. Negative for cough. Cardiovascular: Positive for chest pain. Gastrointestinal: Negative for abdominal pain, diarrhea and vomiting. Genitourinary: Negative for dysuria. Musculoskeletal: Negative for back pain. Skin: Negative for rash. Neurological: Positive for headaches. All other systems reviewed and are negative.     PASTMEDICAL HISTORY     Past Medical History:   Diagnosis Date    Adrenal insufficiency ([address]     Asthma     Bleeding after intercourse     Cancer ([facility] [address]     CERVICAL    Delta storage pool disease [facility])     Diverticulosis of colon     Environmental allergies     Family history of breast cancer     MGM in her 45s    GERD (gastroesophageal reflux disease)     H/O thyroid cyst     mild hypothyroidism.  Headache     History of cervical dysplasia     had cone    History of conization of cervix     dysplastic cells    Hypothyroidism, unspecified 3/18/2016    Lupus ([address]     Osteoarthritis     Pain     all over body    Sleep apnea     awaiting test results currently    Vision abnormalities     glasses/ far sighted     SURGICAL HISTORY       Past Surgical History:   Procedure Laterality Date    BONE CYST EXCISION Right     WRIST    CARPAL TUNNEL RELEASE Right 10-6-15    CARPAL TUNNEL RELEASE Left 11/3/15    CERVIX BIOPSY       SECTION, LOW TRANSVERSE      COLONOSCOPY      COLONOSCOPY  2009    diverticulosis, no other gross lesions    COLONOSCOPY  2017    10 yr recall, small hemorrhoids, diverticulosis    DILATION AND CURETTAGE OF UTERUS N/A 2017    HYSTEROSCOPY AND D& C, [facility]osure [facility], COLON, DIAGNOSTIC      UGI    FRACTURE SURGERY Left     THUMB    GYNECOLOGIC CRYOSURGERY      conization   [address]      with mesh    LIPOMA RESECTION Right     RING FINGER    OTHER SURGICAL HISTORY      VOCAL CORD SURG    CO COLON CA SCRN NOT  [address] IND N/A 2017    COLONOSCOPY performed by Hallie Underwood MD at [address] [place] [address] EGD TRANSORAL BIOPSY SINGLE/MULTIPLE N/A 2017    EGD BIOPSY [facility] at [facility] 2016    Right thyroid lobectomy and isthmusectomy. Continuous monitoring of the recurrent laryngeal nerve using nerve integrity monitor. Frozen section.     TONSILLECTOMY      UPPER GASTROINTESTINAL ENDOSCOPY  2008    with BRAVO, gastric polyp-fundic gland polyp    UPPER GASTROINTESTINAL ENDOSCOPY  2017    multiple small gastric polyps-fundic gland polyps     CURRENT MEDICATIONS       Previous Medications    CELECOXIB (CELEBREX) 100 MG CAPSULE    Take 100 mg by mouth 2 times daily    CETIRIZINE HCL (ZYRTEC PO)    Take 10 mg by mouth daily     CHOLECALCIFEROL (VITAMIN D3 PO)    Take by mouth    FLUTICASONE (VERAMYST) 27.5 MCG/SPRAY NASAL SPRAY    2 sprays by Nasal route    FOLIC ACID (FOLVITE) 1 MG TABLET        GUAIFENESIN (MUCINEX) 600 MG EXTENDED RELEASE TABLET    Take 600 mg by mouth    LEVOTHYROXINE (SYNTHROID) 50 MCG TABLET    Take 1 tablet by mouth daily    LORAZEPAM (ATIVAN) 0.5 MG TABLET    0.5 mg daily as needed. OMEPRAZOLE (PRILOSEC) 40 MG DELAYED RELEASE CAPSULE    take 1 capsule by mouth every morning BEFORE BREAKFAST    PREGABALIN (LYRICA) 75 MG CAPSULE    take 1 capsule by mouth twice a day    TOFACITINIB CITRATE ER (XELJANZ XR) 11 MG TB24    Take by mouth    VENLAFAXINE (EFFEXOR XR) 75 MG EXTENDED RELEASE CAPSULE    1 capsule po daily     ALLERGIES     is allergic to other; percocet [oxycodone-acetaminophen]; tramadol hcl; azithromycin; cortisone; ibuprofen; ceclor [cefaclor]; and penicillins. FAMILY HISTORY     She indicated that her mother is alive. She indicated that her father is . She indicated that her sister is alive. She indicated that her brother is alive. She indicated that her maternal grandmother is . She indicated that her maternal grandfather is . She indicated that her paternal grandmother is . She indicated that her paternal grandfather is .      SOCIAL HISTORY       Social History     Tobacco Use    Smoking status: Former Smoker    Smokeless tobacco: Never Used   Substance Use Topics    Alcohol use: No     Alcohol/week: 0.0 standard drinks     Comment: recovering alcoholic    Drug use: No     PHYSICAL EXAM     INITIAL VITALS: BP (!) 192/105   Pulse 95   Temp 98.1 °F (36.7 °C) (Oral)   Resp 16   Ht 5' 4\" (1.626 m)   Wt 210 lb (95.3 kg)   SpO2 97% BMI 36.05 kg/m²    Physical Exam  Vitals signs and nursing note reviewed. Constitutional:       Appearance: She is well-developed. Comments: In a dark room in the fetal position   HENT:      Head: Normocephalic and atraumatic. Eyes:      Extraocular Movements: Extraocular movements intact. Conjunctiva/sclera: Conjunctivae normal.      Pupils: Pupils are equal, round, and reactive to light. Comments: No photophobia elicited on examination   Neck:      Musculoskeletal: Neck supple. Comments: No nuchal rigidity  Cardiovascular:      Rate and Rhythm: Normal rate and regular rhythm. Pulses: Normal pulses. Heart sounds: No murmur. No friction rub. Pulmonary:      Effort: Pulmonary effort is normal. No respiratory distress. Breath sounds: Normal breath sounds. No wheezing or rhonchi. Abdominal:      General: There is no distension. Palpations: Abdomen is soft. Tenderness: There is no abdominal tenderness. There is no guarding or rebound. Skin:     General: Skin is warm and dry. Capillary Refill: Capillary refill takes less than 2 seconds. Neurological:      Mental Status: She is alert. Comments: No drift bilaterally, intact finger-to-nose       EMERGENCY DEPARTMENTCOURSE:     Differential diagnosis includes intracranial bleed, meningitis, exacerbation of chronic headaches the patient states that she has a history of headaches it feels similar to her previous headaches she denies any infectious symptoms she is afebrile she has no nuchal rigidity do not believe that she has meningitis she has a normal neurologic exam do not believe that she has an intracranial bleed at this time. She is hypertensive but we will treat her pain and reassess. We will give her a headache cocktail and reassess.   In addition, she is complaining of always chest pain and shortness of breath this is not new or worsening she has a normal EKG I do not believe that this requires

## 2020-08-25 NOTE — CARE COORDINATION
Patient was called to follow up with most recent ER visit. There was no answer. A message was left on voicemail to have patient call back regarding ER visit. Office number given [phone].

## 2020-08-26 ENCOUNTER — HOSPITAL ENCOUNTER (OUTPATIENT)
Dept: GENERAL RADIOLOGY | Age: 50
Discharge: HOME | End: 2020-08-28
Payer: COMMERCIAL

## 2020-08-26 ENCOUNTER — HOSPITAL ENCOUNTER (OUTPATIENT)
Age: 50
Discharge: HOME | End: 2020-08-28
Payer: COMMERCIAL

## 2020-08-26 ENCOUNTER — OFFICE VISIT (OUTPATIENT)
Dept: INTERNAL MEDICINE CLINIC | Age: 50
End: 2020-08-26
Payer: COMMERCIAL

## 2020-08-26 VITALS
SYSTOLIC BLOOD PRESSURE: 138 MMHG | OXYGEN SATURATION: 98 % | BODY MASS INDEX: 36.19 KG/M2 | DIASTOLIC BLOOD PRESSURE: 84 MMHG | TEMPERATURE: 98.1 F | HEIGHT: 64 IN | WEIGHT: 212 LBS | HEART RATE: 104 BPM | RESPIRATION RATE: 20 BRPM

## 2020-08-26 PROCEDURE — 72110 X-RAY EXAM L-2 SPINE 4/>VWS: CPT

## 2020-08-26 PROCEDURE — 99214 OFFICE O/P EST MOD 30 MIN: CPT | Performed by: INTERNAL MEDICINE

## 2020-08-26 RX ORDER — OMEPRAZOLE/SODIUM BICARBONATE 20MG-1.1G
CAPSULE ORAL PRN
COMMUNITY
End: 2021-10-01 | Stop reason: CLARIF

## 2020-08-26 RX ORDER — CYCLOBENZAPRINE HCL 5 MG
5 TABLET ORAL 3 TIMES DAILY PRN
Qty: 30 TABLET | Refills: 0 | Status: SHIPPED | OUTPATIENT
Start: 2020-08-26 | End: 2020-09-05

## 2020-08-26 ASSESSMENT — PATIENT HEALTH QUESTIONNAIRE - PHQ9
SUM OF ALL RESPONSES TO PHQ QUESTIONS 1-9: 2
SUM OF ALL RESPONSES TO PHQ9 QUESTIONS 1 & 2: 2
1. LITTLE INTEREST OR PLEASURE IN DOING THINGS: 1
SUM OF ALL RESPONSES TO PHQ QUESTIONS 1-9: 2
2. FEELING DOWN, DEPRESSED OR HOPELESS: 1

## 2020-08-26 NOTE — CARE COORDINATION
2nd attempt to reach patient. There was no answer. A message was left to have patient call back. Office number left. [phone].

## 2020-08-26 NOTE — PROGRESS NOTES
Visit Information    Have you changed or started any medications since your last visit including any over-the-counter medicines, vitamins, or herbal medicines? no   Are you having any side effects from any of your medications? -  no  Have you stopped taking any of your medications? Is so, why? -  no    Have you seen any other physician or provider since your last visit? No  Have you had any other diagnostic tests since your last visit? Yes - Records Obtained  Have you been seen in the emergency room and/or had an admission to a hospital since we last saw you? Yes - Records Obtained  Have you had your routine dental cleaning in the past 6 months? no    Have you activated your evocatal account? If not, what are your barriers? Yes     Patient Care Team:  Hilton Keenan MD as PCP - General (Internal Medicine)  Hilton Keenan MD as PCP - [facility] Provider  Cathy Maurer DO as Consulting Physician (Obstetrics & Gynecology)    Medical History Review  Past Medical, Family, and Social History reviewed and does not contribute to the patient presenting condition    Health Maintenance   Topic Date Due    Pneumococcal 0-64 years Vaccine (1 of 1 - PPSV23) 12/03/1977    DTaP/Tdap/Td vaccine (1 - Tdap) 12/03/1990    Flu vaccine (1) 09/01/2020    TSH testing  07/08/2021    Cervical cancer screen  11/07/2024    Lipid screen  07/08/2025    Colon cancer screen colonoscopy  11/21/2027    HIV screen  Completed    Hepatitis A vaccine  Aged Out    Hepatitis B vaccine  Aged Out    Hib vaccine  Aged Out    Meningococcal (ACWY) vaccine  Aged Out       SUBJECTIVE:  Radha Izaguirre is a 51 y.o. female patient who  comes for complaints of   Chief Complaint   Patient presents with    Headache      went to ER for zinging on lt side of head     Rash     with burning feeling red areas       Was seen in ER 2 days ago  Headache  Duration- 3 days, improved since ER visit.    Bifrontal headache  Severity- severe, today 5/10  Context-was on xeljanz for 3mth, needed to stop due to dark stool, xeljanz restarted 3weeks ago, took it for 2 weeks, had headaches all through, stopped xeljanz with resolution of headache about 2 wk ago, but this episode came on suddenly. Pt still off xeljanz  Has RA, lupus, fibromyalgia  Associated signs and symptoms-  2 days ago, pt was stressed, had argument with family  Tension, and pain upper neck, back  Feels nauseous, positive for photo and phonophobia  Modifying factors- headaches stopped when she stopped xeljanz. Pt had previously been on naproxen, but was advised to not take it with Chelsea Sherman , she had dark stool. Has not tried naproxen since  Improved with benadryl    Reports ear pain, some cough, feels like sinusitis per patient    Pt on effexor for anxiety and depression  On lyrica for fibromyalgia- was off lyrica for ~5days , resumed yesterday    REVIEW OF SYSTEMS (except Subjective (HPI))  GENERAL: No fevers / chills  RESPIRATORY: Negative for wheezing or shortness of breath  CARDIOVASCULAR: Negative for chest pain or palpitations. 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     Headache     History of cervical dysplasia 2000    had cone    History of conization of cervix 2000    dysplastic cells    Hypothyroidism, unspecified 3/18/2016    Lupus ([facility] [facility]     Osteoarthritis     Pain     all over body    Sleep apnea     awaiting test results currently    Vision abnormalities     glasses/ far sighted       SOCIAL HISTORY:  Social History     Socioeconomic History    Marital status:      Spouse name: Not on file    Number of children: Not on file    Years of education: Not on file    Highest education level: Not on file   Occupational History    Not on file   Social Needs    Financial resource strain: Not on file    Food insecurity     Worry: Not on file     Inability: Not on file    Transportation needs     Medical: Not on file     Non-medical: Not on file   Tobacco Use    Smoking status: Former Smoker    Smokeless tobacco: Never Used   Substance and Sexual Activity    Alcohol use: No     Alcohol/week: 0.0 standard drinks     Comment: recovering alcoholic    Drug use: No    Sexual activity: Yes     Partners: Male     Birth control/protection: Other-see comments     Comment:  had vasectomy   Lifestyle    Physical activity     Days per week: Not on file     Minutes per session: Not on file    Stress: Not on file   Relationships    Social connections     Talks on phone: Not on file     Gets together: Not on file     Attends Buddhism service: Not on file     Active member of club or organization: Not on file     Attends meetings of clubs or organizations: Not on file     Relationship status: Not on file    Intimate partner violence     Fear of current or ex partner: Not on file     Emotionally abused: Not on file     Physically abused: Not on file     Forced sexual activity: Not on file   Other Topics Concern    Not on file   Social History Narrative    Not on file           CURRENT MEDICATIONS:  Current Outpatient Medications   Medication Sig Dispense Refill    Omeprazole-Sodium Bicarbonate (ZEGERID)  MG CAPS Take by mouth      LORazepam (ATIVAN) 0.5 MG tablet 0.5 mg daily as needed.       venlafaxine (EFFEXOR XR) 75 MG extended release capsule 1 capsule po daily 90 capsule 3    pregabalin (LYRICA) 75 MG capsule take 1 capsule by mouth twice a day  0    levothyroxine (SYNTHROID) 50 MCG tablet Take 1 tablet by mouth daily 90 tablet 3    Cholecalciferol (VITAMIN D3 PO) Take by mouth      Cetirizine HCl (ZYRTEC PO) Take 10 mg by mouth daily        No current facility-administered medications for this visit. OBJECTIVE:  Vitals:    08/26/20 1456   BP: 138/84   Pulse: 104   Resp: 20   Temp: 98.1 °F (36.7 °C)   SpO2: 98%     Body mass index is 36.39 kg/m². Focal exam:  No c-spine tenderness  Tender over b/l trapezius on side of neck, more on the right  No sinus tenderness  Ear exam normal, normal TM b/l  TMJ tender and clicking b/l    Redness of skin over neck and arms- chronic and intermittent per patient. General exam (except above):  General appearance - well appearing, alert, in no acute distress  Head - Atraumatic, normocephalic  Eyes - EOMI, no jaundice or pallor  Lungs - Lungs clear to auscultation. No wheezing, rhonchi, rales  Heart - RRR without murmur, gallop, or rubs. No ectopy  Abdomen - Abdomen soft, non-tender. Bowel sounds normal. No masses, organomegaly  Extremities -No significant edema, or skin discoloration. Good capillary refill. Neuro - Pt Alert, awake and oriented x 3. No gross focal neurological deficits    ASSESSMENT AND PLAN (MEDICAL DECISION MAKING):     Lorelei was seen today for headache and rash. Diagnoses and all orders for this visit:    Acute non intractable tension-type headache  -     cyclobenzaprine (FLEXERIL) 5 MG tablet; Take 1 tablet by mouth 3 times daily as needed for Muscle spasms    Anxiety and depression  Comments:  increased family stress  discussed effexor dose- will review in 4 wk    TMJPDS (temporomandibular joint pain dysfunction syndrome)  -     cyclobenzaprine (FLEXERIL) 5 MG tablet;  Take 1 tablet by mouth 3 times daily as needed for Muscle spasms         Follow up in: Roger Saleh MD

## 2020-08-27 LAB
EKG ATRIAL RATE: 99 BPM
EKG P AXIS: 53 DEGREES
EKG P-R INTERVAL: 154 MS
EKG Q-T INTERVAL: 352 MS
EKG QRS DURATION: 74 MS
EKG QTC CALCULATION (BAZETT): 451 MS
EKG R AXIS: 36 DEGREES
EKG T AXIS: 45 DEGREES
EKG VENTRICULAR RATE: 99 BPM

## 2020-09-29 ENCOUNTER — TELEMEDICINE (OUTPATIENT)
Dept: INTERNAL MEDICINE CLINIC | Age: 50
End: 2020-09-29
Payer: COMMERCIAL

## 2020-09-29 ENCOUNTER — HOSPITAL ENCOUNTER (OUTPATIENT)
Facility: CLINIC | Age: 50
Discharge: HOME | End: 2020-10-01
Payer: COMMERCIAL

## 2020-09-29 ENCOUNTER — HOSPITAL ENCOUNTER (OUTPATIENT)
Dept: GENERAL RADIOLOGY | Facility: CLINIC | Age: 50
Discharge: HOME | End: 2020-10-01
Payer: COMMERCIAL

## 2020-09-29 PROCEDURE — 72040 X-RAY EXAM NECK SPINE 2-3 VW: CPT

## 2020-09-29 PROCEDURE — 99213 OFFICE O/P EST LOW 20 MIN: CPT | Performed by: INTERNAL MEDICINE

## 2020-09-29 RX ORDER — VENLAFAXINE HYDROCHLORIDE 75 MG/1
CAPSULE, EXTENDED RELEASE ORAL
Qty: 90 CAPSULE | Refills: 3 | Status: SHIPPED | OUTPATIENT
Start: 2020-09-29 | End: 2020-10-08

## 2020-09-29 RX ORDER — LEVOTHYROXINE SODIUM 0.05 MG/1
50 TABLET ORAL DAILY
Qty: 90 TABLET | Refills: 3 | Status: SHIPPED | OUTPATIENT
Start: 2020-09-29 | End: 2021-09-03

## 2020-09-29 NOTE — PROGRESS NOTES
Visit Information    Have you changed or started any medications since your last visit including any over-the-counter medicines, vitamins, or herbal medicines? no   Are you having any side effects from any of your medications? -  no  Have you stopped taking any of your medications? Is so, why? -  no    Have you seen any other physician or provider since your last visit? No  Have you had any other diagnostic tests since your last visit? Yes - Records Obtained  Have you been seen in the emergency room and/or had an admission to a hospital since we last saw you? No  Have you had your routine dental cleaning in the past 6 months? no    Have you activated your Br[employer] account? If not, what are your barriers?  Yes     Patient Care Team:  Jennifer Soto MD as PCP - General (Internal Medicine)  Jennifer Soto MD as PCP - [facility] Provider  Enriqueta Martinez DO as Consulting Physician (Obstetrics & Gynecology)    Medical History Review  Past Medical, Family, and Social History reviewed and does not contribute to the patient presenting condition    Health Maintenance   Topic Date Due    Statin Therapy  1971    Pneumococcal 0-64 years Vaccine (1 of 1 - PPSV23) 12/03/1977    DTaP/Tdap/Td vaccine (1 - Tdap) 12/03/1990    Flu vaccine (1) 09/01/2020    TSH testing  07/08/2021    Cervical cancer screen  11/07/2024    Lipid screen  07/08/2025    Colon cancer screen colonoscopy  11/21/2027    HIV screen  Completed    Hepatitis A vaccine  Aged Out    Hepatitis B vaccine  Aged Out    Hib vaccine  Aged Out    Meningococcal (ACWY) vaccine  Aged Out     SUBJECTIVE:  Lear Phalen is a 51 y.o. female patient who  comes for complaints of   Chief Complaint   Patient presents with    Follow-up       Neck pain  Was seen in office  8/26, noted to have trapezius tenderness and TMJ tenderness B/L  Treated with Flexeril 5 mg 3 times daily  Here for follow-up today  Duration- few months  Severity-7/10, constt  Context- has SLE and Rheumatoid - on xeljanz, from Rheumatology  Associated signs and symptoms-    On Effexor for anxiety and depression, Lyrica for fibromyalgia  Flexeril did not help  Also tried tizanidine from Dr Ronald Tenorio- no help; kept her awake all night    Reports increased stress at hoem  Does not want to increase dose of effexor    Hypothyroid  Lab Results   Component Value Date    TSH 4.71 07/08/2020         REVIEW OF SYSTEMS (except Subjective (HPI))  GENERAL: No fevers / chills  RESPIRATORY: Negative for cough, wheezing or shortness of breath  CARDIOVASCULAR: Negative for chest pain or palpitations. 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     Headache     History of cervical dysplasia 2000    had cone    History of conization of cervix 2000    dysplastic cells    Hypothyroidism, unspecified 3/18/2016    Lupus ([facility] [address]     Osteoarthritis     Pain     all over body    Sleep apnea     awaiting test results currently    Vision abnormalities     glasses/ far sighted       SOCIAL HISTORY:  Social History     Socioeconomic History    Marital status:      Spouse name: Not on file    Number of children: Not on file    Years of education: Not on file    Highest education level: Not on file   Occupational History    Not on file   Social Needs    Financial resource strain: Not on file    Food insecurity     Worry: Not on file     Inability: Not on file    Transportation needs     Medical: Not on file     Non-medical: Not on file   Tobacco Use    Smoking status: Former Smoker    distress   No significant exanthematous lesions or discoloration noted on facial skin   Mentation and cognition normal  No facial asymmetry  Alert, oriented to time, place and person. ASSESSMENT AND PLAN (MEDICAL DECISION MAKING):   Lorelei was seen today for follow-up. Diagnoses and all orders for this visit:    Neck pain  Comments:  c/w tizanidine  Orders:  -     XR CERVICAL SPINE (2-3 VIEWS); Future    Generalized anxiety disorder with panic attacks  Comments:  stable  Orders:  -     venlafaxine (EFFEXOR XR) 75 MG extended release capsule; 1 capsule po daily    Anxiety and depression    Postoperative hypothyroidism  -     levothyroxine (SYNTHROID) 50 MCG tablet; Take 1 tablet by mouth daily    Mouth ulcers  Comments:  recommend vit B supplement OTC  oragel         Physical therapy in reserve, pt not keen on going just yet. Follow up in: nick Ovalle is a 51 y.o. female being evaluated by a Virtual Visit (video visit) encounter to address concerns as mentioned above. A caregiver was present when appropriate. Due to this being a TeleHealth encounter ([facility] health emergency), evaluation of the following organ systems was limited: Vitals/Constitutional/EENT/Resp/CV/GI//MS/Neuro/Skin/Heme-Lymph-Imm. Pursuant to the emergency declaration under the [address] [place] [address], [address] [place] [address] authority and the [employer] and [employer] Act, this Virtual Visit was conducted with patient's (and/or legal guardian's) consent, to reduce the patient's risk of exposure to COVID-19 and provide necessary medical care. The patient (and/or legal guardian) has also been advised to contact this office for worsening conditions or problems, and seek emergency medical treatment and/or call 911 if deemed necessary.      Patient identification was verified at the start of the visit: Yes    Total time spent for this encounter: Not billed by time    Services were provided through a video synchronous discussion virtually to substitute for in-person clinic visit. Patient and provider were located at their individual homes. --Elizabeth Shaikh MD on 9/29/2020 at 2:21 PM    An electronic signature was used to authenticate this note.     Elizabeth Shaikh MD

## 2020-10-08 ENCOUNTER — OFFICE VISIT (OUTPATIENT)
Dept: INTERNAL MEDICINE CLINIC | Age: 50
End: 2020-10-08
Payer: COMMERCIAL

## 2020-10-08 VITALS
TEMPERATURE: 98.1 F | DIASTOLIC BLOOD PRESSURE: 84 MMHG | SYSTOLIC BLOOD PRESSURE: 128 MMHG | HEIGHT: 64 IN | OXYGEN SATURATION: 95 % | BODY MASS INDEX: 37.22 KG/M2 | WEIGHT: 218 LBS | HEART RATE: 93 BPM

## 2020-10-08 PROCEDURE — 99214 OFFICE O/P EST MOD 30 MIN: CPT | Performed by: INTERNAL MEDICINE

## 2020-10-08 RX ORDER — CITALOPRAM 20 MG/1
20 TABLET ORAL DAILY
Qty: 30 TABLET | Refills: 3 | Status: SHIPPED | OUTPATIENT
Start: 2020-10-08 | End: 2020-10-30 | Stop reason: ALTCHOICE

## 2020-10-08 ASSESSMENT — ENCOUNTER SYMPTOMS
WHEEZING: 0
COUGH: 0
EYE PAIN: 0
DIARRHEA: 0
SHORTNESS OF BREATH: 0
TROUBLE SWALLOWING: 0
COLOR CHANGE: 0
ABDOMINAL DISTENTION: 0
EYE DISCHARGE: 0
BLOOD IN STOOL: 0

## 2020-10-08 NOTE — PROGRESS NOTES
Visit Information    Have you changed or started any medications since your last visit including any over-the-counter medicines, vitamins, or herbal medicines? no   Are you having any side effects from any of your medications? -  no  Have you stopped taking any of your medications? Is so, why? -  no    Have you seen any other physician or provider since your last visit? No  Have you had any other diagnostic tests since your last visit? No  Have you been seen in the emergency room and/or had an admission to a hospital since we last saw you? No  Have you had your routine dental cleaning in the past 6 months? no    Have you activated your [employer] account? If not, what are your barriers?  Yes     Patient Care Team:  Israel Faith MD as PCP - General (Internal Medicine)  Israel Faith MD as PCP - [facility] Provider  Claribel Abarca DO as Consulting Physician (Obstetrics & Gynecology)    Medical History Review  Past Medical, Family, and Social History reviewed and does not contribute to the patient presenting condition    Health Maintenance   Topic Date Due    Pneumococcal 0-64 years Vaccine (1 of 1 - PPSV23) 12/03/1977    DTaP/Tdap/Td vaccine (1 - Tdap) 12/03/1990    Flu vaccine (1) 09/01/2020    TSH testing  07/08/2021    Cervical cancer screen  11/07/2024    Lipid screen  07/08/2025    Colon cancer screen colonoscopy  11/21/2027    HIV screen  Completed    Hepatitis A vaccine  Aged Out    Hepatitis B vaccine  Aged Out    Hib vaccine  Aged Out    Meningococcal (ACWY) vaccine  Aged Out

## 2020-10-08 NOTE — PROGRESS NOTES
[address] [address]  Dept: [phone]  Dept Fax: [phone]    Monserrat Starks is a 51 y.o. female who presents today for her medical conditions/complaintsas noted below. Monserrat Starks is c/o of   Chief Complaint   Patient presents with    Hypertension         HPI:     multipel complaints  Chest pain  aytpical  worseiwth exertion  Non smoker  Hx of lupus  No plerutic element        Hemoglobin A1C (%)   Date Value   10/18/2018 5.4   2017 5.3   11/10/2016 5.2             ( goal A1Cis < 7)   No results found for: LABMICR  LDL Cholesterol (mg/dL)   Date Value   2020 164 (H)   2014 120 (H)   2013 103 (H)       (goal LDL is <100)   AST (U/L)   Date Value   2019 13     ALT (U/L)   Date Value   2019 12     BUN (mg/dL)   Date Value   2019 11     BP Readings from Last 3 Encounters:   10/08/20 128/84   20 138/84   20 (!) 145/86          (goal 120/80)    Past Medical History:   Diagnosis Date    Adrenal insufficiency (HCC)     Asthma     Bleeding after intercourse     Cancer (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     Headache     History of cervical dysplasia     had cone    History of conization of cervix     dysplastic cells    Hypothyroidism, unspecified 3/18/2016    Lupus ([facility] [address]     Osteoarthritis     Pain     all over body    Sleep apnea     awaiting test results currently    Vision abnormalities     glasses/ far sighted      Past Surgical History:   Procedure Laterality Date    BONE CYST EXCISION Right     WRIST    CARPAL TUNNEL RELEASE Right 10-6-15    CARPAL TUNNEL RELEASE Left 11/3/15    CERVIX BIOPSY       SECTION, LOW TRANSVERSE      COLONOSCOPY  COLONOSCOPY  07/24/2009    diverticulosis, no other gross lesions    COLONOSCOPY  11/21/2017    10 yr recall, small hemorrhoids, diverticulosis    DILATION AND CURETTAGE OF UTERUS N/A 7/18/2017    HYSTEROSCOPY AND D& C, myosure performed by Zoe Porter [facility] at [facility] 1, COLON, DIAGNOSTIC      UGI    FRACTURE SURGERY Left     THUMB    GYNECOLOGIC CRYOSURGERY  2000    conization   Cherene Scriver      with mesh    LIPOMA RESECTION Right     RING FINGER    OTHER SURGICAL HISTORY      VOCAL CORD SURG    LA COLON CA SCRN NOT  [address] IND N/A 11/21/2017    COLONOSCOPY performed by Sneha Tinsley MD at [address] [place] [address] EGD TRANSORAL BIOPSY SINGLE/MULTIPLE N/A 11/21/2017    EGD BIOPSY performed by Sneha Tinsley MD at [facility] 08/09/2016    Right thyroid lobectomy and isthmusectomy. Continuous monitoring of the recurrent laryngeal nerve using nerve integrity monitor. Frozen section.     TONSILLECTOMY      UPPER GASTROINTESTINAL ENDOSCOPY  12/12/2008    with BRAVO, gastric polyp-fundic gland polyp    UPPER GASTROINTESTINAL ENDOSCOPY  11/21/2017    multiple small gastric polyps-fundic gland polyps       Family History   Problem Relation Age of Onset    Alcohol Abuse Father     Other Father         murder    Diabetes Mother     Other Mother         thyroid    High Blood Pressure Mother     Lupus Sister     Drug Abuse Brother     Breast Cancer Maternal Grandmother         45s    Heart Surgery Maternal Grandmother     Heart Failure Maternal Grandmother     Hypertension Maternal Grandfather     Stroke Maternal Grandfather     Heart Attack Maternal Grandfather     Alcohol Abuse Maternal Grandfather     Diabetes Maternal Grandfather     Cancer Paternal Grandmother         lymphnoid    Heart Failure Paternal Grandfather     Heart Surgery Paternal Grandfather         x2       Social History     Tobacco Use    Smoking status: HENT: Negative for ear discharge and trouble swallowing. Eyes: Negative for pain and discharge. Respiratory: Negative for cough, shortness of breath and wheezing. Cardiovascular: Positive for chest pain. Negative for palpitations. Gastrointestinal: Negative for abdominal distention, blood in stool and diarrhea. Endocrine: Negative for polydipsia and polyphagia. Genitourinary: Negative for difficulty urinating and frequency. Musculoskeletal: Positive for arthralgias. Negative for gait problem, myalgias and neck pain. Skin: Negative for color change and rash. Allergic/Immunologic: Negative for environmental allergies and food allergies. Neurological: Negative for dizziness and headaches. Hematological: Negative for adenopathy. Does not bruise/bleed easily. Psychiatric/Behavioral: Negative for behavioral problems and sleep disturbance. Objective:     Physical Exam  Constitutional:       Appearance: She is well-developed. She is not diaphoretic. HENT:      Head: Normocephalic and atraumatic. Eyes:      General:         Right eye: No discharge. Left eye: No discharge. Extraocular Movements:      Right eye: Normal extraocular motion. Left eye: Normal extraocular motion. Conjunctiva/sclera: Conjunctivae normal.      Right eye: Right conjunctiva is not injected. Left eye: Left conjunctiva is not injected. Neck:      Musculoskeletal: Normal range of motion and neck supple. No edema or erythema. Thyroid: No thyroid mass or thyromegaly. Vascular: No JVD. Cardiovascular:      Rate and Rhythm: Normal rate and regular rhythm. Heart sounds: No murmur. No friction rub. Pulmonary:      Effort: Pulmonary effort is normal. No tachypnea, bradypnea, accessory muscle usage or respiratory distress. Breath sounds: Normal breath sounds. No wheezing or rales. Abdominal:      General: Bowel sounds are normal. There is no distension.       Palpations: Abdomen is soft. Tenderness: There is no abdominal tenderness. There is no rebound. Musculoskeletal: Normal range of motion. General: No tenderness. Lymphadenopathy:      Head:      Right side of head: No submental or submandibular adenopathy. Left side of head: No submental or submandibular adenopathy. Cervical: No cervical adenopathy. Skin:     General: Skin is warm. Coloration: Skin is not pale. Findings: No bruising, ecchymosis or rash. Neurological:      Mental Status: She is alert and oriented to person, place, and time. Cranial Nerves: No cranial nerve deficit. Sensory: No sensory deficit. Motor: No atrophy or abnormal muscle tone. Coordination: Coordination normal.   Psychiatric:         Mood and Affect: Mood is not anxious. Affect is not angry. Speech: Speech is not slurred. Behavior: Behavior normal. Behavior is not aggressive. Thought Content: Thought content does not include homicidal ideation. Cognition and Memory: Memory is not impaired. /84   Pulse 93   Temp 98.1 °F (36.7 °C)   Ht 5' 4\" (1.626 m)   Wt 218 lb (98.9 kg)   SpO2 95%   BMI 37.42 kg/m²     Assessment:       Diagnosis Orders   1. Postoperative hypothyroidism     2. History of cervical dysplasia     3. Fatigue, unspecified type     4. 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 Other systemic lupus erythematosus with other organ involvement ([address]  NM MYOCARDIAL SPECT REST EXERCISE OR RX   7. Anxiety and depression     8. Chest pain, unspecified type               Plan:      Return in about 1 month (around 11/8/2020).     Orders Placed This Encounter   Procedures    NM MYOCARDIAL SPECT REST EXERCISE OR RX     Standing Status:   Future     Standing Expiration Date:   10/8/2021     Order Specific Question:   Reason for Exam?     Answer:   Chest pain     Order Specific Question: Procedure Type     Answer:   Rx     Order Specific Question:   Reason for exam:     Answer:   chest pain     Orders Placed This Encounter   Medications    citalopram (CELEXA) 20 MG tablet     Sig: Take 1 tablet by mouth daily     Dispense:  30 tablet     Refill:  3    multipel complaints  Chest pain  aytpical  worseiwth exertion  Non smoker  Hx of lupus  No plerutic element  Low suspicion for cad  Will do stress test to rule out     Patient given educational materials - see patient instructions. Discussed use, benefit, and side effects of prescribed medications. All patientquestions answered. Pt voiced understanding. Reviewed health maintenance. Instructedto continue current medications, diet and exercise. Patient agreed with treatmentplan. Follow up as directed.      Electronically signed by Valerie Adamson MD on 10/8/2020 at 3:16 PM

## 2020-10-25 ENCOUNTER — APPOINTMENT (OUTPATIENT)
Dept: CT IMAGING | Age: 50
End: 2020-10-25
Payer: COMMERCIAL

## 2020-10-25 ENCOUNTER — HOSPITAL ENCOUNTER (OUTPATIENT)
Age: 50
Setting detail: OBSERVATION
Discharge: HOME | End: 2020-10-27
Attending: STUDENT IN AN ORGANIZED HEALTH CARE EDUCATION/TRAINING PROGRAM | Admitting: INTERNAL MEDICINE
Payer: COMMERCIAL

## 2020-10-25 PROBLEM — R29.90 STROKE-LIKE SYMPTOMS: Status: ACTIVE | Noted: 2020-10-25

## 2020-10-25 LAB
GFR NON-AFRICAN AMERICAN: >60 ML/MIN
GFR SERPL CREATININE-BSD FRML MDRD: >60 ML/MIN
GFR SERPL CREATININE-BSD FRML MDRD: NORMAL ML/MIN/{1.73_M2}
POC CREATININE: 0.59 MG/DL (ref 0.51–1.19)

## 2020-10-25 PROCEDURE — 70496 CT ANGIOGRAPHY HEAD: CPT

## 2020-10-25 PROCEDURE — 85025 COMPLETE CBC W/AUTO DIFF WBC: CPT

## 2020-10-25 PROCEDURE — 2580000003 HC RX 258: Performed by: STUDENT IN AN ORGANIZED HEALTH CARE EDUCATION/TRAINING PROGRAM

## 2020-10-25 PROCEDURE — G0425 INPT/ED TELECONSULT30: HCPCS | Performed by: PSYCHIATRY & NEUROLOGY

## 2020-10-25 PROCEDURE — 82947 ASSAY GLUCOSE BLOOD QUANT: CPT

## 2020-10-25 PROCEDURE — 82550 ASSAY OF CK (CPK): CPT

## 2020-10-25 PROCEDURE — 36415 COLL VENOUS BLD VENIPUNCTURE: CPT

## 2020-10-25 PROCEDURE — 93005 ELECTROCARDIOGRAM TRACING: CPT | Performed by: STUDENT IN AN ORGANIZED HEALTH CARE EDUCATION/TRAINING PROGRAM

## 2020-10-25 PROCEDURE — 82553 CREATINE MB FRACTION: CPT

## 2020-10-25 PROCEDURE — G0378 HOSPITAL OBSERVATION PER HR: HCPCS

## 2020-10-25 PROCEDURE — 82565 ASSAY OF CREATININE: CPT

## 2020-10-25 PROCEDURE — 83874 ASSAY OF MYOGLOBIN: CPT

## 2020-10-25 PROCEDURE — 70450 CT HEAD/BRAIN W/O DYE: CPT

## 2020-10-25 PROCEDURE — 85730 THROMBOPLASTIN TIME PARTIAL: CPT

## 2020-10-25 PROCEDURE — 99285 EMERGENCY DEPT VISIT HI MDM: CPT

## 2020-10-25 PROCEDURE — 6360000004 HC RX CONTRAST MEDICATION: Performed by: STUDENT IN AN ORGANIZED HEALTH CARE EDUCATION/TRAINING PROGRAM

## 2020-10-25 PROCEDURE — 6370000000 HC RX 637 (ALT 250 FOR IP): Performed by: STUDENT IN AN ORGANIZED HEALTH CARE EDUCATION/TRAINING PROGRAM

## 2020-10-25 PROCEDURE — 85610 PROTHROMBIN TIME: CPT

## 2020-10-25 PROCEDURE — 84484 ASSAY OF TROPONIN QUANT: CPT

## 2020-10-25 PROCEDURE — 80048 BASIC METABOLIC PNL TOTAL CA: CPT

## 2020-10-25 RX ORDER — ASPIRIN 81 MG/1
324 TABLET, CHEWABLE ORAL ONCE
Status: COMPLETED | OUTPATIENT
Start: 2020-10-25 | End: 2020-10-25

## 2020-10-25 RX ORDER — CLOPIDOGREL BISULFATE 75 MG/1
300 TABLET ORAL ONCE
Status: COMPLETED | OUTPATIENT
Start: 2020-10-25 | End: 2020-10-25

## 2020-10-25 RX ORDER — 0.9 % SODIUM CHLORIDE 0.9 %
80 INTRAVENOUS SOLUTION INTRAVENOUS ONCE
Status: COMPLETED | OUTPATIENT
Start: 2020-10-25 | End: 2020-10-25

## 2020-10-25 RX ORDER — SODIUM CHLORIDE 0.9 % (FLUSH) 0.9 %
10 SYRINGE (ML) INJECTION PRN
Status: DISCONTINUED | OUTPATIENT
Start: 2020-10-25 | End: 2020-10-27 | Stop reason: HOSPADM

## 2020-10-25 RX ADMIN — SODIUM CHLORIDE 80 ML: 9 INJECTION, SOLUTION INTRAVENOUS at 21:25

## 2020-10-25 RX ADMIN — CLOPIDOGREL BISULFATE 300 MG: 75 TABLET ORAL at 22:12

## 2020-10-25 RX ADMIN — ASPIRIN 324 MG: 81 TABLET, CHEWABLE ORAL at 22:11

## 2020-10-25 RX ADMIN — IOPAMIDOL 75 ML: 755 INJECTION, SOLUTION INTRAVENOUS at 21:25

## 2020-10-25 RX ADMIN — Medication 10 ML: at 21:25

## 2020-10-25 ASSESSMENT — ENCOUNTER SYMPTOMS
VOMITING: 0
SHORTNESS OF BREATH: 0
ABDOMINAL PAIN: 0
SORE THROAT: 0
COUGH: 0
NAUSEA: 0

## 2020-10-25 ASSESSMENT — PAIN DESCRIPTION - PAIN TYPE: TYPE: ACUTE PAIN

## 2020-10-25 ASSESSMENT — PAIN DESCRIPTION - LOCATION: LOCATION: SHOULDER

## 2020-10-25 ASSESSMENT — PAIN DESCRIPTION - FREQUENCY: FREQUENCY: INTERMITTENT

## 2020-10-25 ASSESSMENT — PAIN DESCRIPTION - ORIENTATION: ORIENTATION: LEFT

## 2020-10-25 ASSESSMENT — PAIN DESCRIPTION - DESCRIPTORS: DESCRIPTORS: SHARP

## 2020-10-25 ASSESSMENT — PAIN SCALES - GENERAL: PAINLEVEL_OUTOF10: 6

## 2020-10-26 ENCOUNTER — APPOINTMENT (OUTPATIENT)
Dept: MRI IMAGING | Age: 50
End: 2020-10-26
Payer: COMMERCIAL

## 2020-10-26 LAB
% CKMB: 2 % (ref 0–3)
ABSOLUTE EOS #: 0.2 K/UL (ref 0–0.4)
ABSOLUTE EOS #: 0.3 K/UL (ref 0–0.4)
ABSOLUTE IMMATURE GRANULOCYTE: ABNORMAL K/UL (ref 0–0.3)
ABSOLUTE IMMATURE GRANULOCYTE: ABNORMAL K/UL (ref 0–0.3)
ABSOLUTE LYMPH #: 2.3 K/UL (ref 1–4.8)
ABSOLUTE LYMPH #: 2.7 K/UL (ref 1–4.8)
ABSOLUTE MONO #: 0.5 K/UL (ref 0.1–1.3)
ABSOLUTE MONO #: 0.8 K/UL (ref 0.1–1.3)
ANION GAP SERPL CALCULATED.3IONS-SCNC: 10 MMOL/L (ref 9–17)
ANION GAP SERPL CALCULATED.3IONS-SCNC: 12 MMOL/L (ref 9–17)
BASOPHILS # BLD: 1 % (ref 0–2)
BASOPHILS # BLD: 1 % (ref 0–2)
BASOPHILS ABSOLUTE: 0.1 K/UL (ref 0–0.2)
BASOPHILS ABSOLUTE: 0.1 K/UL (ref 0–0.2)
BUN BLDV-MCNC: 8 MG/DL (ref 6–20)
BUN BLDV-MCNC: 8 MG/DL (ref 6–20)
BUN/CREAT BLD: ABNORMAL (ref 9–20)
BUN/CREAT BLD: ABNORMAL (ref 9–20)
CALCIUM SERPL-MCNC: 9 MG/DL (ref 8.6–10.4)
CALCIUM SERPL-MCNC: 9.1 MG/DL (ref 8.6–10.4)
CHLORIDE BLD-SCNC: 101 MMOL/L (ref 98–107)
CHLORIDE BLD-SCNC: 103 MMOL/L (ref 98–107)
CHOLESTEROL/HDL RATIO: 6.2
CHOLESTEROL: 222 MG/DL
CK MB: 1.8 NG/ML
CKMB INTERPRETATION: ABNORMAL
CO2: 24 MMOL/L (ref 20–31)
CO2: 26 MMOL/L (ref 20–31)
CREAT SERPL-MCNC: 0.53 MG/DL (ref 0.5–0.9)
CREAT SERPL-MCNC: 0.61 MG/DL (ref 0.5–0.9)
DIFFERENTIAL TYPE: ABNORMAL
DIFFERENTIAL TYPE: ABNORMAL
EKG ATRIAL RATE: 83 BPM
EKG P AXIS: 60 DEGREES
EKG P-R INTERVAL: 158 MS
EKG Q-T INTERVAL: 388 MS
EKG QRS DURATION: 80 MS
EKG QTC CALCULATION (BAZETT): 455 MS
EKG R AXIS: 31 DEGREES
EKG T AXIS: 35 DEGREES
EKG VENTRICULAR RATE: 83 BPM
EOSINOPHILS RELATIVE PERCENT: 3 % (ref 0–4)
EOSINOPHILS RELATIVE PERCENT: 3 % (ref 0–4)
GFR AFRICAN AMERICAN: >60 ML/MIN
GFR AFRICAN AMERICAN: >60 ML/MIN
GFR NON-AFRICAN AMERICAN: >60 ML/MIN
GFR NON-AFRICAN AMERICAN: >60 ML/MIN
GFR SERPL CREATININE-BSD FRML MDRD: ABNORMAL ML/MIN/{1.73_M2}
GLUCOSE BLD-MCNC: 104 MG/DL (ref 65–105)
GLUCOSE BLD-MCNC: 107 MG/DL (ref 70–99)
GLUCOSE BLD-MCNC: 110 MG/DL (ref 70–99)
HCT VFR BLD CALC: 38.1 % (ref 36–46)
HCT VFR BLD CALC: 39.3 % (ref 36–46)
HDLC SERPL-MCNC: 36 MG/DL
HEMOGLOBIN: 13.2 G/DL (ref 12–16)
HEMOGLOBIN: 13.3 G/DL (ref 12–16)
IMMATURE GRANULOCYTES: ABNORMAL %
IMMATURE GRANULOCYTES: ABNORMAL %
INR BLD: 0.9
LDL CHOLESTEROL: 138 MG/DL (ref 0–130)
LV EF: 63 %
LVEF MODALITY: NORMAL
LYMPHOCYTES # BLD: 31 % (ref 24–44)
LYMPHOCYTES # BLD: 33 % (ref 24–44)
MCH RBC QN AUTO: 30.9 PG (ref 26–34)
MCH RBC QN AUTO: 32 PG (ref 26–34)
MCHC RBC AUTO-ENTMCNC: 33.5 G/DL (ref 31–37)
MCHC RBC AUTO-ENTMCNC: 34.9 G/DL (ref 31–37)
MCV RBC AUTO: 91.6 FL (ref 80–100)
MCV RBC AUTO: 92.2 FL (ref 80–100)
MONOCYTES # BLD: 8 % (ref 1–7)
MONOCYTES # BLD: 9 % (ref 1–7)
MYOGLOBIN: 23 NG/ML (ref 25–58)
NRBC AUTOMATED: ABNORMAL PER 100 WBC
NRBC AUTOMATED: ABNORMAL PER 100 WBC
PARTIAL THROMBOPLASTIN TIME: 36.7 SEC (ref 24–36)
PDW BLD-RTO: 13 % (ref 11.5–14.9)
PDW BLD-RTO: 13.2 % (ref 11.5–14.9)
PLATELET # BLD: 203 K/UL (ref 150–450)
PLATELET # BLD: 212 K/UL (ref 150–450)
PLATELET ESTIMATE: ABNORMAL
PLATELET ESTIMATE: ABNORMAL
PMV BLD AUTO: 9.3 FL (ref 6–12)
PMV BLD AUTO: 9.6 FL (ref 6–12)
POTASSIUM SERPL-SCNC: 3.9 MMOL/L (ref 3.7–5.3)
POTASSIUM SERPL-SCNC: 4 MMOL/L (ref 3.7–5.3)
PROTHROMBIN TIME: 11.8 SEC (ref 11.8–14.6)
RBC # BLD: 4.15 M/UL (ref 4–5.2)
RBC # BLD: 4.26 M/UL (ref 4–5.2)
RBC # BLD: ABNORMAL 10*6/UL
RBC # BLD: ABNORMAL 10*6/UL
SEG NEUTROPHILS: 55 % (ref 36–66)
SEG NEUTROPHILS: 56 % (ref 36–66)
SEGMENTED NEUTROPHILS ABSOLUTE COUNT: 3.9 K/UL (ref 1.3–9.1)
SEGMENTED NEUTROPHILS ABSOLUTE COUNT: 4.9 K/UL (ref 1.3–9.1)
SODIUM BLD-SCNC: 137 MMOL/L (ref 135–144)
SODIUM BLD-SCNC: 139 MMOL/L (ref 135–144)
TOTAL CK: 92 U/L (ref 26–192)
TRIGL SERPL-MCNC: 240 MG/DL
TROPONIN INTERP: ABNORMAL
TROPONIN T: ABNORMAL NG/ML
TROPONIN, HIGH SENSITIVITY: <6 NG/L (ref 0–14)
VLDLC SERPL CALC-MCNC: ABNORMAL MG/DL (ref 1–30)
WBC # BLD: 7 K/UL (ref 3.5–11)
WBC # BLD: 8.7 K/UL (ref 3.5–11)
WBC # BLD: ABNORMAL 10*3/UL
WBC # BLD: ABNORMAL 10*3/UL

## 2020-10-26 PROCEDURE — 85025 COMPLETE CBC W/AUTO DIFF WBC: CPT

## 2020-10-26 PROCEDURE — 2580000003 HC RX 258: Performed by: INTERNAL MEDICINE

## 2020-10-26 PROCEDURE — 80061 LIPID PANEL: CPT

## 2020-10-26 PROCEDURE — G0378 HOSPITAL OBSERVATION PER HR: HCPCS

## 2020-10-26 PROCEDURE — 99219 PR INITIAL OBSERVATION CARE/DAY 50 MINUTES: CPT | Performed by: PSYCHIATRY & NEUROLOGY

## 2020-10-26 PROCEDURE — 6370000000 HC RX 637 (ALT 250 FOR IP): Performed by: INTERNAL MEDICINE

## 2020-10-26 PROCEDURE — 36415 COLL VENOUS BLD VENIPUNCTURE: CPT

## 2020-10-26 PROCEDURE — 93010 ELECTROCARDIOGRAM REPORT: CPT | Performed by: INTERNAL MEDICINE

## 2020-10-26 PROCEDURE — 93306 TTE W/DOPPLER COMPLETE: CPT

## 2020-10-26 PROCEDURE — 6370000000 HC RX 637 (ALT 250 FOR IP): Performed by: PSYCHIATRY & NEUROLOGY

## 2020-10-26 PROCEDURE — 6370000000 HC RX 637 (ALT 250 FOR IP): Performed by: NURSE PRACTITIONER

## 2020-10-26 PROCEDURE — 70551 MRI BRAIN STEM W/O DYE: CPT

## 2020-10-26 PROCEDURE — 80048 BASIC METABOLIC PNL TOTAL CA: CPT

## 2020-10-26 PROCEDURE — 99220 PR INITIAL OBSERVATION CARE/DAY 70 MINUTES: CPT | Performed by: INTERNAL MEDICINE

## 2020-10-26 RX ORDER — SODIUM CHLORIDE 0.9 % (FLUSH) 0.9 %
10 SYRINGE (ML) INJECTION PRN
Status: DISCONTINUED | OUTPATIENT
Start: 2020-10-26 | End: 2020-10-27 | Stop reason: HOSPADM

## 2020-10-26 RX ORDER — LORAZEPAM 0.5 MG/1
0.5 TABLET ORAL DAILY PRN
Status: DISCONTINUED | OUTPATIENT
Start: 2020-10-26 | End: 2020-10-27 | Stop reason: HOSPADM

## 2020-10-26 RX ORDER — SODIUM CHLORIDE 0.9 % (FLUSH) 0.9 %
10 SYRINGE (ML) INJECTION EVERY 12 HOURS SCHEDULED
Status: DISCONTINUED | OUTPATIENT
Start: 2020-10-26 | End: 2020-10-27 | Stop reason: HOSPADM

## 2020-10-26 RX ORDER — CLOPIDOGREL BISULFATE 75 MG/1
75 TABLET ORAL DAILY
Status: DISCONTINUED | OUTPATIENT
Start: 2020-10-26 | End: 2020-10-26

## 2020-10-26 RX ORDER — CETIRIZINE HYDROCHLORIDE 10 MG/1
10 TABLET ORAL NIGHTLY
Status: DISCONTINUED | OUTPATIENT
Start: 2020-10-26 | End: 2020-10-27 | Stop reason: HOSPADM

## 2020-10-26 RX ORDER — OMEPRAZOLE/SODIUM BICARBONATE 20MG-1.1G
40 CAPSULE ORAL DAILY
Status: DISCONTINUED | OUTPATIENT
Start: 2020-10-26 | End: 2020-10-26

## 2020-10-26 RX ORDER — ACETAMINOPHEN 325 MG/1
650 TABLET ORAL EVERY 4 HOURS PRN
Status: DISCONTINUED | OUTPATIENT
Start: 2020-10-26 | End: 2020-10-27 | Stop reason: HOSPADM

## 2020-10-26 RX ORDER — ASPIRIN 81 MG/1
81 TABLET ORAL DAILY
Status: DISCONTINUED | OUTPATIENT
Start: 2020-10-26 | End: 2020-10-27 | Stop reason: HOSPADM

## 2020-10-26 RX ORDER — LEVOTHYROXINE SODIUM 0.05 MG/1
50 TABLET ORAL DAILY
Status: DISCONTINUED | OUTPATIENT
Start: 2020-10-26 | End: 2020-10-27 | Stop reason: HOSPADM

## 2020-10-26 RX ORDER — LORAZEPAM 1 MG/1
1 TABLET ORAL ONCE
Status: COMPLETED | OUTPATIENT
Start: 2020-10-26 | End: 2020-10-26

## 2020-10-26 RX ORDER — VITAMIN B COMPLEX
1000 TABLET ORAL DAILY
Status: DISCONTINUED | OUTPATIENT
Start: 2020-10-26 | End: 2020-10-26

## 2020-10-26 RX ORDER — NAPROXEN 500 MG/1
500 TABLET ORAL 2 TIMES DAILY WITH MEALS
Status: DISCONTINUED | OUTPATIENT
Start: 2020-10-26 | End: 2020-10-27 | Stop reason: HOSPADM

## 2020-10-26 RX ORDER — CITALOPRAM 20 MG/1
20 TABLET ORAL DAILY
Status: DISCONTINUED | OUTPATIENT
Start: 2020-10-26 | End: 2020-10-27 | Stop reason: HOSPADM

## 2020-10-26 RX ORDER — PREGABALIN 75 MG/1
75 CAPSULE ORAL 2 TIMES DAILY
Status: DISCONTINUED | OUTPATIENT
Start: 2020-10-26 | End: 2020-10-27 | Stop reason: HOSPADM

## 2020-10-26 RX ORDER — ATORVASTATIN CALCIUM 20 MG/1
20 TABLET, FILM COATED ORAL NIGHTLY
Status: DISCONTINUED | OUTPATIENT
Start: 2020-10-26 | End: 2020-10-27 | Stop reason: HOSPADM

## 2020-10-26 RX ADMIN — PREGABALIN 75 MG: 75 CAPSULE ORAL at 01:56

## 2020-10-26 RX ADMIN — Medication 10 ML: at 22:59

## 2020-10-26 RX ADMIN — PREGABALIN 75 MG: 75 CAPSULE ORAL at 07:59

## 2020-10-26 RX ADMIN — LEVOTHYROXINE SODIUM 50 MCG: 0.05 TABLET ORAL at 07:59

## 2020-10-26 RX ADMIN — ASPIRIN 81 MG: 81 TABLET, COATED ORAL at 12:47

## 2020-10-26 RX ADMIN — ATORVASTATIN CALCIUM 20 MG: 20 TABLET, FILM COATED ORAL at 22:59

## 2020-10-26 RX ADMIN — CITALOPRAM HYDROBROMIDE 20 MG: 20 TABLET ORAL at 07:59

## 2020-10-26 RX ADMIN — LORAZEPAM 2 MG: 1 TABLET ORAL at 19:50

## 2020-10-26 RX ADMIN — Medication 10 ML: at 07:59

## 2020-10-26 RX ADMIN — CETIRIZINE HYDROCHLORIDE 10 MG: 10 TABLET, FILM COATED ORAL at 22:59

## 2020-10-26 RX ADMIN — LORAZEPAM 1 MG: 1 TABLET ORAL at 12:47

## 2020-10-26 RX ADMIN — PREGABALIN 75 MG: 75 CAPSULE ORAL at 22:59

## 2020-10-26 ASSESSMENT — VISUAL ACUITY: OU: 1

## 2020-10-26 ASSESSMENT — ENCOUNTER SYMPTOMS
VOMITING: 0
NAUSEA: 0
SHORTNESS OF BREATH: 0
ABDOMINAL PAIN: 0

## 2020-10-26 ASSESSMENT — PAIN SCALES - GENERAL: PAINLEVEL_OUTOF10: 5

## 2020-10-26 NOTE — PROGRESS NOTES
Pt has MRI Brain without ordered. Brought pt to department for MRI,  Pt was pre-medicated prior to exam.  Explained exam and attempted MRI. During first series, pt began screaming she wanted out. Scan stopped and went to pt. Pt was having panic attack, reassured and comforted pt. Pt stated she could not tolerate MRI exam.  Pt removed from MRI room and taken back to her room. Called pt's nurse, Lorelei and explained what happened.

## 2020-10-26 NOTE — ED NOTES
Pt states she is only having numbness on her lips on the left side at this time.       Josafat Leach RN  10/25/20 3639

## 2020-10-26 NOTE — ED NOTES
Admission Dx: numbness (left side)    Pts Chief Complaints on Arrival: numbness on left side    ADL's - Self-care    Pending Diagnostics:  MRI for the morning    Residence PTA: single story    Special Considerations/Circumstances:  N/A    Vitals: Current vital signs:  /86   Pulse 87   Temp 97.9 °F (36.6 °C) (Oral)   Resp 17   Ht 5' 4\" (1.626 m)   Wt 215 lb (97.5 kg)   SpO2 97%   BMI 36.90 kg/m²                MEWS Score: [id number] [facility], RN  10/25/20 9299

## 2020-10-26 NOTE — H&P
[address]     HISTORY AND PHYSICAL EXAMINATION            Date:   10/26/2020  Patientname:  Nancy Potter  Date of admission:  10/25/2020  8:41 PM  MRN:   [id number]  Account:  [de-identified]  YOB: 1970  PCP:    Brianna Salgado MD  Room:   9766/8044-05  Code Status:    Full Code    CHIEF COMPLAINT     Chief Complaint   Patient presents with    Numbness     left face, left arm, left leg       HISTORY OF PRESENT ILLNESS  (Character, Onset, Location, Duration,  Exacerbating/RelievingFactors, Radiation,   Associated Symptoms, Severity )      The patient is a 51 y.o.  female, with a history of anxiety, ankle discharge fall disease, hypothyroidism, lupus, and fibromyalgia, presented to the emergency department last night with left facial numbness tingling, left arm numbness tingling, and headache. Rates her headache pain 6/10. No visual disturbance, chest pain, nausea or vomiting. Patient states she was not sure if she was having a panic attack or having a stroke. She is former smoker. HPI   1) Location/Symptom-left facial numbness and tingling and left arm numbness and tingling  2) Timing/Onset: Sudden onset at 8 PM last night  3) Context/Setting: While she was driving her daughter home  4) Quality: Improved  5) Duration: Intermittent  6) Modifying Factors: Improved after taking Ativan. No aggravating factors. 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     PAST SURGICAL HISTORY    Patient  has a past surgical history that includes bone cyst excision (Right); Tonsillectomy; Endometrial ablation; Cervix biopsy; lipoma resection (Right); other surgical history; Carpal tunnel release (Right, 10-6-15); Carpal tunnel release (Left, 11/3/15);  section, low transverse; Colonoscopy; Gynecologic cryosurgery (); Thyroid lobectomy (Right, 2016); hernia repair; Endoscopy, colon, diagnostic; Dilation and curettage of uterus (N/A, 2017); Upper gastrointestinal endoscopy (2008); Colonoscopy (2009); fracture surgery (Left); pr egd transoral biopsy single/multiple (N/A, 2017); pr colon ca scrn not hi rsk ind (N/A, 2017); Colonoscopy (2017); and Upper gastrointestinal endoscopy (2017). FAMILY HISTORY    Patient family history includes Alcohol Abuse in her father and maternal grandfather; Breast Cancer in her maternal grandmother; Cancer in her paternal grandmother; Diabetes in her maternal grandfather and mother; Drug Abuse in her brother; Heart Attack in her maternal grandfather; Heart Failure in her maternal grandmother and paternal grandfather; Heart Surgery in her maternal grandmother and paternal grandfather; High Blood Pressure in her mother; Hypertension in her maternal grandfather; Lupus in her sister; Other in her father and mother; Stroke in her maternal grandfather. SOCIAL HISTORY    Patient  reports that she has quit smoking. She has never used smokeless tobacco. She reports that she does not drink alcohol or use drugs. HOME MEDICATIONS        Prior to Admission medications    Medication Sig Start Date End Date Taking?  Authorizing Provider   citalopram (CELEXA) 20 MG tablet Take 1 tablet by mouth daily 10/8/20  Yes Sayda Love MD   levothyroxine (SYNTHROID) 50 MCG tablet Take 1 tablet by mouth daily 20 Yes Giovana Ivy MD   Omeprazole-Sodium Bicarbonate (Brandy Dys)  MG CAPS Take by mouth   Yes Historical Provider, MD supple. Cardiovascular:      Rate and Rhythm: Normal rate and regular rhythm. Pulses: Normal pulses. Heart sounds: Normal heart sounds, S1 normal and S2 normal.   Pulmonary:      Effort: Pulmonary effort is normal.      Breath sounds: Normal breath sounds and air entry. Abdominal:      General: Bowel sounds are normal.      Palpations: Abdomen is soft. Tenderness: There is no abdominal tenderness. Musculoskeletal: Normal range of motion. Skin:     General: Skin is warm and dry. Capillary Refill: Capillary refill takes less than 2 seconds. Neurological:      General: No focal deficit present. Mental Status: She is alert and oriented to person, place, and time. GCS: GCS eye subscore is 4. GCS verbal subscore is 5. GCS motor subscore is 6. Cranial Nerves: Cranial nerves are intact. Sensory: Sensation is intact. Motor: Motor function is intact. Comments: Patient is alert oriented x3. No cranial nerve deficit. Patient exhibits 5 out of 5 equal strength in upper and lower extremities. No decreased sensation. Visual fields intact. Patient does complain of tingling in her upper lip during assessment. Psychiatric:         Attention and Perception: Attention normal.         Mood and Affect: Mood is anxious. Speech: Speech normal.         Behavior: Behavior normal.         Thought Content:  Thought content normal.         Cognition and Memory: Cognition and memory normal.         Judgment: Judgment normal.       DIAGNOSTICS      EKG: (as documented in ED note):  Rhythm: normal sinus   Rate: normal  Axis: normal  Ectopy: none  Conduction: normal  ST Segments: no acute change  T Waves: no acute change  Q Waves: none     Clinical Impression: no acute changes and normal EKG    Labs:  CBC:   Recent Labs     10/25/20  2115 10/26/20  0534   WBC 8.7 7.0   HGB 13.3 13.2    203     BMP:    Recent Labs     10/25/20  2109 10/25/20  2115 10/26/20  0534   NA ORDERING SYSTEM PROVIDED HISTORY: L facial numbness TECHNOLOGIST PROVIDED HISTORY: L facial numbness Is the patient pregnant?->No Reason for Exam: stroke alert, left sided facial numbness Acuity: Acute Type of Exam: Initial FINDINGS: BRAIN/VENTRICLES: There is no acute intracranial hemorrhage, mass effect or midline shift. No abnormal extra-axial fluid collection. The gray-white differentiation is maintained without evidence of an acute infarct. There is no evidence of hydrocephalus. ORBITS: The visualized portion of the orbits demonstrate no acute abnormality. SINUSES: The visualized paranasal sinuses and mastoid air cells demonstrate no acute abnormality. SOFT TISSUES/SKULL:  No acute abnormality of the visualized skull or soft tissues. No acute intracranial abnormality. Cta Head Neck W Contrast    Result Date: 10/25/2020  EXAMINATION: CTA OF THE HEAD AND NECK WITH CONTRAST 10/25/2020 9:22 pm: TECHNIQUE: CTA of the head and neck was performed with the administration of intravenous contrast. Multiplanar reformatted images are provided for review. MIP images are provided for review. Stenosis of the internal carotid arteries measured using NASCET criteria. Dose modulation, iterative reconstruction, and/or weight based adjustment of the mA/kV was utilized to reduce the radiation dose to as low as reasonably achievable. COMPARISON: None. HISTORY: ORDERING SYSTEM PROVIDED HISTORY: L sided facial numbness TECHNOLOGIST PROVIDED HISTORY: L sided facial numbness Reason for Exam: previous stroke alert, left sided numbness Acuity: Acute Type of Exam: Initial FINDINGS: CTA NECK: AORTIC ARCH/ARCH VESSELS: No dissection or arterial injury. No significant stenosis of the brachiocephalic or subclavian arteries. CAROTID ARTERIES: No dissection, arterial injury, or hemodynamically significant stenosis by NASCET criteria.   There is medial deviation of the right internal carotid artery into the retropharyngeal space, a benign entity that can accentuate prevertebral soft tissues on lateral radiographs. VERTEBRAL ARTERIES: No dissection, arterial injury, or significant stenosis. SOFT TISSUES: The lung apices are clear. No cervical or superior mediastinal lymphadenopathy. The larynx and pharynx are unremarkable. No acute abnormality of the salivary and thyroid glands. BONES: No acute osseous abnormality. CTA HEAD: ANTERIOR CIRCULATION: No significant stenosis of the intracranial internal carotid, anterior cerebral, or middle cerebral arteries. No aneurysm. POSTERIOR CIRCULATION: No significant stenosis of the vertebral, basilar, or posterior cerebral arteries. No aneurysm. OTHER: No dural venous sinus thrombosis on this non-dedicated study. BRAIN: No mass effect or midline shift. No extra-axial fluid collection. The gray-white differentiation is maintained. No acute arterial abnormality or hemodynamically significant arterial stenosis in the head or neck. 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 *    Plan: Stroke-Like Symptoms  -325 mg of aspirin and 300 mg of Plavix given in the ED.  -Continue aspirin  -Neurology consult  -CTA head and neck shows no acute abnormality. -MRI brain today  -Ativan for anxiety  -Check lipid panel today        Consultations:     IP CONSULT TO STROKE TEAM  IP CONSULT TO FAMILY MEDICINE  IP CONSULT TO SPIRITUAL SERVICES  IP CONSULT TO NEUROLOGY      STEPHIE Contreras CNP   10/26/2020  10:47 AM    Kalyani Engle [address] [place] [address].    Phone [phone]

## 2020-10-26 NOTE — ED NOTES
Mode of arrival (squad #, walk in, police, etc) : [place] complaint(s): Hot flash, numbness to left side        Arrival Note (brief scenario, treatment PTA, etc). : Pt presented to the ED c/o hot flash, and left sided numbness. Pt states she was driving when she felt a shooting pain start in her left hand that radiates to her left arm and face and radiated down to her left leg. Pt states after the shooting pain, she felt the same area go numb and became hot. Pt states the warmth spread to her suprapubic area, making her have the sensation like she wanted to urinate. Pt states she has a hx of anxiety. Pt denies cough, fever, chills. Pt denies shortness of breath. Pt denies N/V/D.        C= \"Have you ever felt that you should Cut down on your drinking? \"  No  A= \"Have people Annoyed you by criticizing your drinking? \"  No  G= \"Have you ever felt bad or Guilty about your drinking? \"  No  E= \"Have you ever had a drink as an Eye-opener first thing in the morning to steady your nerves or to help a hangover? \"  No      Deferred []      Reason for deferring: N/A    *If yes to two or more: probable alcohol abuse. Gonzaol Robles RN  10/25/20 2056

## 2020-10-26 NOTE — PLAN OF CARE
Problem: Pain:  Goal: Pain level will decrease  Description: Pain level will decrease  Outcome: Ongoing     Problem: Safety:  Goal: Free from accidental physical injury  Description: Free from accidental physical injury  Outcome: Ongoing     Problem: Skin Integrity:  Goal: Skin integrity will stabilize  Description: Skin integrity will stabilize  Outcome: Ongoing     Problem: Discharge Planning:  Goal: Patients continuum of care needs are met  Description: Patients continuum of care needs are met  Outcome: Ongoing

## 2020-10-26 NOTE — ED NOTES
MRI screening completed and sent to CT and one placed on chart.      Cheryl Rondon RN  10/25/20 [id number]

## 2020-10-26 NOTE — PLAN OF CARE
Problem: Pain:  Goal: Pain level will decrease  Description: Pain level will decrease  Outcome: Ongoing  Goal: Control of acute pain  Description: Control of acute pain  Outcome: Ongoing  Goal: Control of chronic pain  Description: Control of chronic pain  Outcome: Ongoing  Goal: Patient's pain/discomfort is manageable  Description: Patient's pain/discomfort is manageable  Outcome: Ongoing     Problem: Safety:  Goal: Free from accidental physical injury  Description: Free from accidental physical injury  Outcome: Ongoing  Goal: Free from intentional harm  Description: Free from intentional harm  Outcome: Ongoing     Problem: Daily Care:  Goal: Daily care needs are met  Description: Daily care needs are met  Outcome: Ongoing     Problem: Skin Integrity:  Goal: Skin integrity will stabilize  Description: Skin integrity will stabilize  Outcome: Ongoing     Problem: Discharge Planning:  Goal: Patients continuum of care needs are met  Description: Patients continuum of care needs are met  Outcome: Ongoing

## 2020-10-26 NOTE — PROGRESS NOTES
Patient arrived to unit via wheelchair. Bed in lowest position, wheels locked and call light within reach. Patient oriented to room.

## 2020-10-26 NOTE — PROGRESS NOTES
Reached Dr. Shaheed Ortega via [facility] message for hem/onc consult regarding [employer] Pooling Deficiency-OK for antiplatelets? Message marked Read at 17:52. Dr. Shaheed Ortega aware of consult.

## 2020-10-26 NOTE — PROGRESS NOTES
Reached Dr. Mario Haynes via [facility] for neuro consult regarding left-sided weakness. Dr. Mario Haynes aware of consult.

## 2020-10-26 NOTE — ED NOTES
Report given to Desi Osuna from [place]. Report method by phone   The following was reviewed with receiving RN:   Current vital signs:  BP (!) 109/55   Pulse 79   Temp 97.9 °F (36.6 °C) (Oral)   Resp 17   Ht 5' 4\" (1.626 m)   Wt 215 lb (97.5 kg)   SpO2 98%   BMI 36.90 kg/m²                MEWS Score: 1     Any medication or safety alerts were reviewed. Any pending diagnostics and notifications were also reviewed, as well as any safety concerns or issues, abnormal labs, abnormal imaging, and abnormal assessment findings. Questions were answered.             Adam Russell RN  10/25/20 2870
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
## 2020-10-26 NOTE — PROGRESS NOTES
by mouth      Cetirizine HCl (ZYRTEC PO) Take 10 mg by mouth daily        Allergies: Pam Garcia is allergic to other; percocet [oxycodone-acetaminophen]; tramadol hcl; azithromycin; cortisone; ibuprofen; ceclor [cefaclor]; and penicillins. 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     Headache     History of cervical dysplasia     had cone    History of conization of cervix     dysplastic cells    Hypothyroidism, unspecified 3/18/2016    Lupus ([facility]     Osteoarthritis     Pain     all over body    Sleep apnea     awaiting test results currently    Vision abnormalities     glasses/ far sighted       Past Surgical History:   Procedure Laterality Date    BONE CYST EXCISION Right     WRIST    CARPAL TUNNEL RELEASE Right 10-6-15    CARPAL TUNNEL RELEASE Left 11/3/15    CERVIX BIOPSY       SECTION, LOW TRANSVERSE      COLONOSCOPY      COLONOSCOPY  2009    diverticulosis, no other gross lesions    COLONOSCOPY  2017    10 yr recall, small hemorrhoids, diverticulosis    DILATION AND CURETTAGE OF UTERUS N/A 2017    HYSTEROSCOPY AND D& C, myosure performed by Kenji Fraire MD at [facility] 1, COLON, DIAGNOSTIC      UGI    FRACTURE SURGERY Left     THUMB    GYNECOLOGIC CRYOSURGERY      conization   [address] [facility]      with mesh    LIPOMA RESECTION Right     RING FINGER    OTHER SURGICAL HISTORY      VOCAL CORD SURG    IA COLON CA SCRN NOT  [address] IND N/A 2017    COLONOSCOPY performed by Troy Lal MD at [address] [place] [address] EGD TRANSORAL BIOPSY SINGLE/MULTIPLE N/A 2017 hemoptysis and sputum   Cardiovascular Negative for orthopnea, claudication and PND   Gastrointestinal Negative for abdominal pain, diarrhea, blood in stool   Musculoskeletal Negative for joint pain, negative for myalgia   Skin Negative for rash or itching   Endo/heme/allergies Negative for polydipsia, environmental allergy   Psychiatric Negative for suicidal ideation. Patient is anxious           Objective:   /73   Pulse 87   Temp 98.4 °F (36.9 °C) (Oral)   Resp 16   Ht 5' 4\" (1.626 m)   Wt 215 lb (97.5 kg)   SpO2 97%   BMI 36.90 kg/m²     Blood pressure range: Systolic (51EWA), GZP:008 , Min:102 , THEE:032   ; Diastolic (68SLP), RNO:52, Min:55, Max:86      Continuous infusions:     ON EXAMINATION:  GENERAL  Appears comfortable and in no distress   HEENT  NC/ AT   NECK  Supple and no bruits heard   Cardiovascular  S1, S2 heard; radial pulse intact   MENTAL STATUS:  Alert, oriented, intact memory, no confusion, normal speech, normal language, no hallucination or delusion   CRANIAL NERVES: II     -       Pupils reactive b/l., Fundus exam: intact venous pulsations;  Visual fields intact to confrontation  III,IV,VI -  EOMs full, no afferent defect, no                      ARNOLD, no ptosis  V     -     Normal facial sensation  VII    -     Normal facial symmetry  VIII   -     Intact hearing  IX,X -     Symmetrical palate  XI    -     Symmetrical shoulder shrug  XII   -     Midline tongue, no atrophy    MOTOR FUNCTION:  Normal bulk, normal tone, normal power;  no involuntary movements, no tremor   SENSORY FUNCTION:  Normal touch, normal pin, normal vibration, normal proprioception   CEREBELLAR FUNCTION:  Intact fine motor control over upper limbs   REFLEX FUNCTION:  Symmetric, no perverted reflex, no Babinski sign   STATION and GAIT  Not tested         Data:    Lab Results:     Lab Results   Component Value Date    CHOL 222 (H) 10/26/2020    LDLCHOLESTEROL 138 (H) 10/26/2020    HDL 36 (L) 10/26/2020    TRIG 240 (H) 10/26/2020    ALT 12 05/21/2019    AST 13 05/21/2019    TSH 4.71 07/08/2020    INR 0.9 10/25/2020    LABA1C 5.4 10/18/2018     Hematology:  Recent Labs     10/25/20  2115 10/26/20  0534   WBC 8.7 7.0   HGB 13.3 13.2   HCT 38.1 39.3    203   INR 0.9  --      Chemistry:  Recent Labs     10/25/20  2109 10/25/20  2115 10/26/20  0534   NA  --  137 139   K  --  3.9 4.0   CL  --  101 103   CO2  --  24 26   GLUCOSE  --  110* 107*   BUN  --  8 8   CREATININE 0.59 0.61 0.53   CALCIUM  --  9.1 9.0     Recent Labs     10/25/20  2115 10/26/20  0534   CHOL  --  222*   HDL  --  36*   LDLCHOLESTEROL  --  138*   CHOLHDLRATIO  --  6.2*   TRIG  --  240*   VLDL  --  NOT REPORTED*   CKTOTAL 92  --    CKMB 1.8  --        No results found for: PHENYTOIN, PHENYTOIN, VALPROATE, CBMZ        Diagnostic data reviewed:  CT head 10/25/2020: No acute intracranial abnormalities. CTA head and neck 10/25/2020: No LVO or critical stenosis. MRI brain: Pending          Impression and Plan: Ms. Monserrat Starks is a 51 y.o. female with   Acute onset of subjective left-sided numbness without any objective deficits; question right thalamic infarction; patient is a claustrophobic; will get MRI under sedation. Continue aspirin 81 mg daily, atorvastatin 20 mg nightly. Comorbid lupus, platelet disorder, mild bleeding disorder has had hematology consultation in the past.   Care plan is discussed with patient and her nurse at bedside. Will follow with you. Thank you for consultation.          Haydee Nieto MD 10/26/2020

## 2020-10-26 NOTE — CARE COORDINATION
CASE MANAGEMENT NOTE:    Admission Date:  10/25/2020 Lavinia Vidal is a 51 y.o.  female    Admitted for : Stroke-like symptoms [R29.90]  Stroke-like symptoms [R29.90]    Met with:  Patient    PCP:  Hitesh                                Insurance:  Erin      Current Residence/ Living Arrangements:  independently at home             Current Services PTA:  No    Is patient agreeable to VNS: No    Freedom of choice provided:  NA    List of [address] provided: NA    VNS chosen:  No    DME:  none    Home Oxygen: No    Nebulizer: No    CPAP/BIPAP: No    Supplier: N/A    Potential Assistance Needed: No    SNF needed: No    Freedom of choice and list provided: NA    Pharmacy:  [facility] on [address]       Does Patient want to use MEDS to BEDS? No    Is patient currently receiving oral anticoagulation therapy? No    Is the Patient an BI [facility] with Readmission Risk Score greater than 14%? No  If yes, pt needs a follow up appointment made within 7 days. Family Members/Caregivers that pt would like involved in their care:    Yes    If yes, list name here:  Spouse Chip    Transportation Provider:  Family             Is patient in Isolation/One on One/Altered Mental Status? No  If yes, skip next question. If no, would they like an I-Pad to  use? No  If yes, call [phone]. Discharge Plan:  10/26/20 - Erin - Patient is from home with spouse. Has no DME's, denies need for VNS, Has hx of Lupus. Plan is to return home with no needs. To have MRI Pilar Bautista and neuro consult today. Will follow . //pf                 Electronically signed by: Cade Florian RN on 10/26/2020 at 1:13 PM

## 2020-10-26 NOTE — ED NOTES
Pt back from CT on monitor with RN, without incident. Pt was able to self transfer from cot to table.       Delmis Reyna RN  10/25/20 1241

## 2020-10-26 NOTE — VIRTUAL HEALTH
Consults  Patient Location:  [address] [place] [address] ED      [facility] Stroke and Vascular Neurology Consult for  Roni Stroke Alert through [address] @ 21:09  10/25/2020 9:22 PM  Pt Name: Pam Garcia  MRN: [id number]  YOB: 1970  Date of evaluation: 10/25/2020  Primary Care Physician: Buddy Saul MD  Reason for Evaluation: Stroke Evaluation with Discussion with Ed or primary team with Telemedicine and stroke evaluation with Review of imaging and labs    Pam Garcia is a 51 y.o. female with medical history of lupus, platelet disorder, headaches and generalized anxiety disorder who presents with acute left sided numbness. The patient was driving in her car at approximately 2000 when she felt a \"shock\" go through her body and then developed left sided numbness. She went home and took an ativan and the symptoms subsided. Patient went to the ED for evaluation. There, a head CT was performed which showed no acute processes. Allergies  is allergic to other; percocet [oxycodone-acetaminophen]; tramadol hcl; azithromycin; cortisone; ibuprofen; ceclor [cefaclor]; and penicillins. Medications  Prior to Admission medications    Medication Sig Start Date End Date Taking? Authorizing Provider   citalopram (CELEXA) 20 MG tablet Take 1 tablet by mouth daily 10/8/20  Yes Marco Rasmussen MD   levothyroxine (SYNTHROID) 50 MCG tablet Take 1 tablet by mouth daily 9/29/20 12/28/20 Yes Buddy Saul MD   Omeprazole-Sodium Bicarbonate (ZEGERID)  MG CAPS Take by mouth   Yes Historical Provider, MD   LORazepam (ATIVAN) 0.5 MG tablet 0.5 mg daily as needed.  4/24/18  Yes Historical Provider, MD   pregabalin (LYRICA) 75 MG capsule take 1 capsule by mouth twice a day 10/10/19  Yes Historical Provider, MD   Cholecalciferol (VITAMIN D3 PO) Take by mouth   Yes Historical Provider, MD   Cetirizine HCl (ZYRTEC PO) Take 10 mg by mouth daily    Yes Historical Provider, MD    Scheduled Meds:  Continuous Infusions:  PRN Meds:.  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   Social History  Social History     Socioeconomic History    Marital status:      Spouse name: Not on file    Number of children: Not on file    Years of education: Not on file    Highest education level: Not on file   Occupational History    Not on file   Social Needs    Financial resource strain: Not on file    Food insecurity     Worry: Not on file     Inability: Not on file    Transportation needs     Medical: Not on file     Non-medical: Not on file   Tobacco Use    Smoking status: Former Smoker    Smokeless tobacco: Never Used   Substance and Sexual Activity    Alcohol use: No     Alcohol/week: 0.0 standard drinks     Comment: recovering alcoholic    Drug use: No    Sexual activity: Yes     Partners: Male     Birth control/protection: Other-see comments     Comment:  had vasectomy   Lifestyle    Physical activity     Days per week: Not on file     Minutes per session: Not on file    Stress: Not on file   Relationships    Social connections     Talks on phone: Not on file     Gets together: Not on file     Attends Shinto service: Not on file     Active member of club or organization: Not on file     Attends meetings of clubs or organizations: Not on file     Relationship status: Not on file    Intimate partner violence     Fear of current or ex partner: Not on file     Emotionally abused: Not on file     Physically abused: Not on file Forced sexual activity: Not on file   Other Topics Concern    Not on file   Social History Narrative    Not on file     Family History      Problem Relation Age of Onset    Alcohol Abuse Father     Other Father         murder    Diabetes Mother     Other Mother         thyroid    High Blood Pressure Mother     Lupus Sister     Drug Abuse Brother     Breast Cancer Maternal Grandmother         45s    Heart Surgery Maternal Grandmother     Heart Failure Maternal Grandmother     Hypertension Maternal Grandfather     Stroke Maternal Grandfather     Heart Attack Maternal Grandfather     Alcohol Abuse Maternal Grandfather     Diabetes Maternal Grandfather     Cancer Paternal Grandmother         lymphnoid    Heart Failure Paternal Grandfather     Heart Surgery Paternal Grandfather         x2       OBJECTIVE  /79   Pulse 93   Temp 97.9 °F (36.6 °C) (Oral)   Resp 16   Ht 5' 4\" (1.626 m)   Wt 215 lb (97.5 kg)   SpO2 97%   BMI 36.90 kg/m²        Pre-Morbid mRS: 0    Imaging:  Images were personally reviewed in pacs including:  CT brain without contrast: No acute processes  CTA imaging: No LVO or critical stenosis    Assessment    Differential DDx:  1. TIA  2. Generalized anxiety      Recommendations:  1. NIH 1  2. Recommend Inpatient Neurology Consult for further assessment and evaluation   3. Patient is not a candidate for TPA due to low NIHSS and resolving symptoms  4. Place in observation for evaluation by neurology team  5. Load aspirin 325 and Plavix 300  6. MRI brain without contrast          Discussed with ED Physician    At least 30 min of Telemedicine and time in conversation directly with ED staff and physician for the patient who is in imminent and life threatening deterioration without further treatment and evaluation. This Virtual Visit was conducted with patient's (and/or legal guardian's) consent, to provide telestroke consultation and necessary medical care.   Time spent examining patient, reviewing the images personally, reviewing the chart, perform high complexity decision making and speaking with the nursing staff regarding recommendations      Darnell Thompson MD   Stroke, Neurocritical Care And/or [facility] Stroke [facility]   Electronically signed 10/25/2020 at 9:22 PM      Provider Location ([facility]):   [address]    This virtual visit was conducted via interactive/real-time audio/video.

## 2020-10-27 VITALS
HEART RATE: 79 BPM | OXYGEN SATURATION: 96 % | BODY MASS INDEX: 37.64 KG/M2 | HEIGHT: 64 IN | TEMPERATURE: 97.9 F | SYSTOLIC BLOOD PRESSURE: 137 MMHG | DIASTOLIC BLOOD PRESSURE: 79 MMHG | RESPIRATION RATE: 16 BRPM | WEIGHT: 220.46 LBS

## 2020-10-27 LAB
ABSOLUTE EOS #: 0.3 K/UL (ref 0–0.4)
ABSOLUTE IMMATURE GRANULOCYTE: ABNORMAL K/UL (ref 0–0.3)
ABSOLUTE LYMPH #: 2.3 K/UL (ref 1–4.8)
ABSOLUTE MONO #: 0.7 K/UL (ref 0.1–1.3)
ANION GAP SERPL CALCULATED.3IONS-SCNC: 10 MMOL/L (ref 9–17)
BASOPHILS # BLD: 1 % (ref 0–2)
BASOPHILS ABSOLUTE: 0.1 K/UL (ref 0–0.2)
BUN BLDV-MCNC: 14 MG/DL (ref 6–20)
BUN/CREAT BLD: ABNORMAL (ref 9–20)
CALCIUM SERPL-MCNC: 9.1 MG/DL (ref 8.6–10.4)
CHLORIDE BLD-SCNC: 102 MMOL/L (ref 98–107)
CO2: 26 MMOL/L (ref 20–31)
CREAT SERPL-MCNC: 0.65 MG/DL (ref 0.5–0.9)
DIFFERENTIAL TYPE: ABNORMAL
EOSINOPHILS RELATIVE PERCENT: 5 % (ref 0–4)
GFR AFRICAN AMERICAN: >60 ML/MIN
GFR NON-AFRICAN AMERICAN: >60 ML/MIN
GFR SERPL CREATININE-BSD FRML MDRD: ABNORMAL ML/MIN/{1.73_M2}
GFR SERPL CREATININE-BSD FRML MDRD: ABNORMAL ML/MIN/{1.73_M2}
GLUCOSE BLD-MCNC: 117 MG/DL (ref 70–99)
HCT VFR BLD CALC: 38.3 % (ref 36–46)
HEMOGLOBIN: 13 G/DL (ref 12–16)
IMMATURE GRANULOCYTES: ABNORMAL %
LYMPHOCYTES # BLD: 34 % (ref 24–44)
MCH RBC QN AUTO: 31.4 PG (ref 26–34)
MCHC RBC AUTO-ENTMCNC: 33.8 G/DL (ref 31–37)
MCV RBC AUTO: 92.8 FL (ref 80–100)
MONOCYTES # BLD: 10 % (ref 1–7)
NRBC AUTOMATED: ABNORMAL PER 100 WBC
PDW BLD-RTO: 13.3 % (ref 11.5–14.9)
PLATELET # BLD: 189 K/UL (ref 150–450)
PLATELET ESTIMATE: ABNORMAL
PMV BLD AUTO: 9.9 FL (ref 6–12)
POTASSIUM SERPL-SCNC: 4.1 MMOL/L (ref 3.7–5.3)
RBC # BLD: 4.13 M/UL (ref 4–5.2)
RBC # BLD: ABNORMAL 10*6/UL
SEG NEUTROPHILS: 50 % (ref 36–66)
SEGMENTED NEUTROPHILS ABSOLUTE COUNT: 3.4 K/UL (ref 1.3–9.1)
SODIUM BLD-SCNC: 138 MMOL/L (ref 135–144)
WBC # BLD: 6.8 K/UL (ref 3.5–11)
WBC # BLD: ABNORMAL 10*3/UL

## 2020-10-27 PROCEDURE — 6370000000 HC RX 637 (ALT 250 FOR IP): Performed by: INTERNAL MEDICINE

## 2020-10-27 PROCEDURE — 6360000002 HC RX W HCPCS: Performed by: INTERNAL MEDICINE

## 2020-10-27 PROCEDURE — 6370000000 HC RX 637 (ALT 250 FOR IP): Performed by: NURSE PRACTITIONER

## 2020-10-27 PROCEDURE — 2580000003 HC RX 258: Performed by: INTERNAL MEDICINE

## 2020-10-27 PROCEDURE — 85025 COMPLETE CBC W/AUTO DIFF WBC: CPT

## 2020-10-27 PROCEDURE — 99217 PR OBSERVATION CARE DISCHARGE MANAGEMENT: CPT | Performed by: INTERNAL MEDICINE

## 2020-10-27 PROCEDURE — 96372 THER/PROPH/DIAG INJ SC/IM: CPT

## 2020-10-27 PROCEDURE — G0378 HOSPITAL OBSERVATION PER HR: HCPCS

## 2020-10-27 PROCEDURE — 80048 BASIC METABOLIC PNL TOTAL CA: CPT

## 2020-10-27 PROCEDURE — 36415 COLL VENOUS BLD VENIPUNCTURE: CPT

## 2020-10-27 PROCEDURE — 99204 OFFICE O/P NEW MOD 45 MIN: CPT | Performed by: INTERNAL MEDICINE

## 2020-10-27 PROCEDURE — 99225 PR SBSQ OBSERVATION CARE/DAY 25 MINUTES: CPT | Performed by: PSYCHIATRY & NEUROLOGY

## 2020-10-27 RX ORDER — NAPROXEN 500 MG/1
500 TABLET ORAL 2 TIMES DAILY WITH MEALS
Qty: 60 TABLET | Refills: 3 | Status: SHIPPED | OUTPATIENT
Start: 2020-10-27 | End: 2021-01-08 | Stop reason: SDUPTHER

## 2020-10-27 RX ORDER — ASPIRIN 81 MG/1
81 TABLET ORAL DAILY
Qty: 30 TABLET | Refills: 3 | Status: SHIPPED | OUTPATIENT
Start: 2020-10-28 | End: 2021-05-27 | Stop reason: ALTCHOICE

## 2020-10-27 RX ORDER — ATORVASTATIN CALCIUM 20 MG/1
20 TABLET, FILM COATED ORAL NIGHTLY
Qty: 30 TABLET | Refills: 3 | Status: SHIPPED | OUTPATIENT
Start: 2020-10-27 | End: 2021-03-01 | Stop reason: SDUPTHER

## 2020-10-27 RX ADMIN — ASPIRIN 81 MG: 81 TABLET, COATED ORAL at 07:59

## 2020-10-27 RX ADMIN — ENOXAPARIN SODIUM 40 MG: 40 INJECTION SUBCUTANEOUS at 07:59

## 2020-10-27 RX ADMIN — LEVOTHYROXINE SODIUM 50 MCG: 0.05 TABLET ORAL at 07:59

## 2020-10-27 RX ADMIN — Medication 10 ML: at 07:58

## 2020-10-27 RX ADMIN — CITALOPRAM HYDROBROMIDE 20 MG: 20 TABLET ORAL at 07:59

## 2020-10-27 RX ADMIN — NAPROXEN 500 MG: 500 TABLET ORAL at 07:59

## 2020-10-27 RX ADMIN — PREGABALIN 75 MG: 75 CAPSULE ORAL at 07:59

## 2020-10-27 ASSESSMENT — PAIN DESCRIPTION - PAIN TYPE: TYPE: CHRONIC PAIN

## 2020-10-27 ASSESSMENT — PAIN SCALES - GENERAL
PAINLEVEL_OUTOF10: 5
PAINLEVEL_OUTOF10: 3

## 2020-10-27 ASSESSMENT — PAIN DESCRIPTION - LOCATION: LOCATION: OTHER (COMMENT)

## 2020-10-27 NOTE — CONSULTS
History:   has a past surgical history that includes bone cyst excision (Right); Tonsillectomy; Endometrial ablation; Cervix biopsy; lipoma resection (Right); other surgical history; Carpal tunnel release (Right, 10-6-15); Carpal tunnel release (Left, 11/3/15);  section, low transverse; Colonoscopy; Gynecologic cryosurgery (); Thyroid lobectomy (Right, 2016); hernia repair; Endoscopy, colon, diagnostic; Dilation and curettage of uterus (N/A, 2017); Upper gastrointestinal endoscopy (2008); Colonoscopy (2009); fracture surgery (Left); pr egd transoral biopsy single/multiple (N/A, 2017); pr colon ca scrn not hi rsk ind (N/A, 2017); Colonoscopy (2017); and Upper gastrointestinal endoscopy (2017). Medications:    Prior to Admission medications    Medication Sig Start Date End Date Taking? Authorizing Provider   citalopram (CELEXA) 20 MG tablet Take 1 tablet by mouth daily 10/8/20  Yes Cris Knox MD   levothyroxine (SYNTHROID) 50 MCG tablet Take 1 tablet by mouth daily 20 Yes Leona Helms MD   Omeprazole-Sodium Bicarbonate (ZEGERID)  MG CAPS Take by mouth   Yes Historical Provider, MD   LORazepam (ATIVAN) 0.5 MG tablet 0.5 mg daily as needed.  18  Yes Historical Provider, MD   pregabalin (LYRICA) 75 MG capsule take 1 capsule by mouth twice a day 10/10/19  Yes Historical Provider, MD   Cholecalciferol (VITAMIN D3 PO) Take by mouth   Yes Historical Provider, MD   Cetirizine HCl (ZYRTEC PO) Take 10 mg by mouth daily    Yes Historical Provider, MD     Current Facility-Administered Medications   Medication Dose Route Frequency Provider Last Rate Last Dose    sodium chloride flush 0.9 % injection 10 mL  10 mL Intravenous 2 times per day Leona Helms MD   10 mL at 10/27/20 0758    sodium chloride flush 0.9 % injection 10 mL  10 mL Intravenous PRN Leona Helms MD        acetaminophen (TYLENOL) tablet 650 mg  650 mg Oral Q4H PRN Israel Faith MD        citalopram (CELEXA) tablet 20 mg  20 mg Oral Daily Israel Faith MD   20 mg at 10/27/20 0759    levothyroxine (SYNTHROID) tablet 50 mcg  50 mcg Oral Daily Israel Faith MD   50 mcg at 10/27/20 0759    LORazepam (ATIVAN) tablet 0.5 mg  0.5 mg Oral Daily PRN Israel Faith MD        pregabalin (LYRICA) capsule 75 mg  75 mg Oral BID Israel Faith MD   75 mg at 10/27/20 0759    cetirizine (ZYRTEC) tablet 10 mg  10 mg Oral Nightly STEPHIE Piper - CNP   10 mg at 10/26/20 2259    aspirin EC tablet 81 mg  81 mg Oral Daily STEPHIE Piper - CNP   81 mg at 10/27/20 0759    atorvastatin (LIPITOR) tablet 20 mg  20 mg Oral Nightly Sis Smith MD   20 mg at 10/26/20 2259    enoxaparin (LOVENOX) injection 40 mg  40 mg Subcutaneous Daily Sis Smith MD   40 mg at 10/27/20 0759    naproxen (NAPROSYN) tablet 500 mg  500 mg Oral BID WC Sis Smith MD   500 mg at 10/27/20 0759    perflutren lipid microspheres (DEFINITY) injection 2.2 mg  2 mL Intravenous ONCE PRN Carlos Enrique Wright MD        sodium chloride flush 0.9 % injection 10 mL  10 mL Intravenous PRN Israel Faith MD   10 mL at 10/25/20 2125       Allergies: Other; Percocet [oxycodone-acetaminophen]; Tramadol hcl; Azithromycin; Cortisone; Ibuprofen; Ceclor [cefaclor]; and Penicillins    Social History:   reports that she has quit smoking. She has never used smokeless tobacco. She reports that she does not drink alcohol or use drugs.      Family History: family history includes Alcohol Abuse in her father and maternal grandfather; Breast Cancer in her maternal grandmother; Cancer in her paternal grandmother; Diabetes in her maternal grandfather and mother; Drug Abuse in her brother; Heart Attack in her maternal grandfather; Heart Failure in her maternal grandmother and paternal grandfather; Heart Surgery in her maternal grandmother and paternal grandfather; High Blood Pressure in her mother; Hypertension in her maternal grandfather; Lupus in her sister; Other in her father and mother; Stroke in her maternal grandfather. REVIEW OF SYSTEMS:    Constitutional: No fever or chills. No night sweats, no weight loss   Eyes: No eye discharge, double vision, or eye pain   HEENT: negative for sore mouth, sore throat, hoarseness and voice change   Respiratory: negative for cough , sputum, dyspnea, wheezing, hemoptysis, chest pain   Cardiovascular: negative for chest pain, dyspnea, palpitations, orthopnea, PND   Gastrointestinal: negative for nausea, vomiting, diarrhea, constipation, abdominal pain, Dysphagia, hematemesis and hematochezia   Genitourinary: negative for frequency, dysuria, nocturia, urinary incontinence, and hematuria   Integument: negative for rash, skin lesions, bruises.    Hematologic/Lymphatic: negative for easy bruising, bleeding, lymphadenopathy, or petechiae   Endocrine: negative for heat or cold intolerance,weight changes, change in bowel habits and hair loss   Musculoskeletal: negative for myalgias, arthralgias, pain, joint swelling,and bone pain   Neurological: negative for headaches, dizziness, seizures, weakness, numbness    PHYSICAL EXAM:      /79   Pulse 79   Temp 97.9 °F (36.6 °C) (Oral)   Resp 16   Ht 5' 4\" (1.626 m)   Wt 220 lb 7.4 oz (100 kg)   SpO2 96%   BMI 37.84 kg/m²    Temp (24hrs), Av.2 °F (36.8 °C), Min:97.9 °F (36.6 °C), Max:98.4 °F (36.9 °C)    General appearance - well appearing, no in pain or distress   Mental status - alert and cooperative   Eyes - pupils equal and reactive, extraocular eye movements intact   Ears - bilateral TM's and external ear canals normal   Mouth - mucous membranes moist, pharynx normal without lesions   Neck - supple, no significant adenopathy   Lymphatics - no palpable lymphadenopathy, no hepatosplenomegaly   Chest - clear to auscultation, no wheezes, rales or rhonchi, symmetric air entry   Heart - normal rate, regular rhythm, normal S1, S2, no insufficiency     RECOMMENDATIONS:  1. I reviewed the laboratory data, imaging studies, diagnosis, prognosis and treatment options with patient  2. I discussed with primary care team as well as neurologist about the care plan  3. Given that she has tolerated naproxen without any complications and also she had multiple surgeries in the past without any significant bleeding, okay to use aspirin as recommended by neurology  4. However she will need close follow-up with neurology as well as hematology as outpatient  5. I would recommend follow-up with hematology DR Sinan Billy in 3 weeks  6. Okay for discharge from hematology standpoint      Discussed with patient and Nurse. Thank you for asking us to see this patient. Luis Enrique De Leon MD  Hematologist/Medical Oncologist    Cell: [phone]      This note is created with the assistance of a speech recognition program.  While intending to generate a document that actually reflects the content of the visit, the document can still have some errors including those of syntax and sound a like substitutions which may escape proof reading. It such instances, actual meaning can be extrapolated by contextual diversion.

## 2020-10-27 NOTE — PROGRESS NOTES
Pt given discharge instructions. Understands. Follow up appointments understands. IV removed. No complications.

## 2020-10-27 NOTE — PROGRESS NOTES
NEUROLOGY INPATIENT PROGRESS NOTE    10/27/2020         Ck Shen is a  51 y.o. female admitted on 10/25/2020 with  Stroke-like symptoms [R29.90]  Stroke-like symptoms [R29.90]  Chart reviewed. Discussed with caregivers. No acute issues overnight. Patient stated that she is started on ASA and doing ok; no bleeding issues. Her left sided numbness resolved. She wants to go home. Briefly, this is a  51 y. o.right handed  female with hx of Lupus, platelet disorder, headaches and generalized anxiety disorder was admitted on 10/25/2020 with acute onset of left-sided numbness. Patient was driving her car and approximately 8 PM ; she felt \"shock\" go through her body and then developed left-sided numbness. She went home and took Ativan and the symptoms subsided. Patient went to ED for evaluation. CT head was negative for acute intracranial abnormalities. Stroke team evaluated the patient. Patient was initiated on aspirin 81 mg daily, atorvastatin 20 mg nightly. Patient stated that she still has ongoing left-sided numbness but denies any associated weakness. Denies headaches, dizziness and vision changes. Denies speech and swallowing difficulties. Patient stated that she had platelet disorder but she has been able to take naproxen well without any difficulties. She was seen by hematologist in the past and she could not recall the name and she was told \"mild bleeding disorder\". No current facility-administered medications on file prior to encounter. Current Outpatient Medications on File Prior to Encounter   Medication Sig Dispense Refill    citalopram (CELEXA) 20 MG tablet Take 1 tablet by mouth daily 30 tablet 3    levothyroxine (SYNTHROID) 50 MCG tablet Take 1 tablet by mouth daily 90 tablet 3    Omeprazole-Sodium Bicarbonate (ZEGERID)  MG CAPS Take by mouth      LORazepam (ATIVAN) 0.5 MG tablet 0.5 mg daily as needed.       pregabalin (LYRICA) 75 MG capsule take 1 capsule by mouth twice a day  0    Cholecalciferol (VITAMIN D3 PO) Take by mouth      Cetirizine HCl (ZYRTEC PO) Take 10 mg by mouth daily        Allergies: Bishop Quintero is allergic to other; percocet [oxycodone-acetaminophen]; tramadol hcl; azithromycin; cortisone; ibuprofen; ceclor [cefaclor]; and penicillins. 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)     Diverticulosis of colon     Environmental allergies     Family history of breast cancer     MGM in her 45s    GERD (gastroesophageal reflux disease)     H/O thyroid cyst     mild hypothyroidism.     Headache     History of cervical dysplasia     had cone    History of conization of cervix     dysplastic cells    Hypothyroidism, unspecified 3/18/2016    Lupus ([facility]     Osteoarthritis     Pain     all over body    Sleep apnea     awaiting test results currently    Vision abnormalities     glasses/ far sighted       Past Surgical History:   Procedure Laterality Date    BONE CYST EXCISION Right     WRIST    CARPAL TUNNEL RELEASE Right 10-6-15    CARPAL TUNNEL RELEASE Left 11/3/15    CERVIX BIOPSY       SECTION, LOW TRANSVERSE      COLONOSCOPY      COLONOSCOPY  2009    diverticulosis, no other gross lesions    COLONOSCOPY  2017    10 yr recall, small hemorrhoids, diverticulosis    DILATION AND CURETTAGE OF UTERUS N/A 2017    HYSTEROSCOPY AND D& C, myosure performed by Zoe Porter MD at [facility] 1, COLON, DIAGNOSTIC      UGI    FRACTURE SURGERY Left     THUMB    GYNECOLOGIC CRYOSURGERY      conization   [address]      with mesh    LIPOMA RESECTION Right     RING FINGER    OTHER SURGICAL HISTORY      VOCAL CORD SURG    ND COLON CA SCRN NOT  [address] IND N/A 2017    COLONOSCOPY performed by Sneha Tinsley MD at [facility] EGD TRANSORAL BIOPSY SINGLE/MULTIPLE N/A 11/21/2017    EGD BIOPSY performed by Yariel Florez MD at [facility] 08/09/2016    Right thyroid lobectomy and isthmusectomy. Continuous monitoring of the recurrent laryngeal nerve using nerve integrity monitor. Frozen section.  TONSILLECTOMY      UPPER GASTROINTESTINAL ENDOSCOPY  12/12/2008    with BRAVO, gastric polyp-fundic gland polyp    UPPER GASTROINTESTINAL ENDOSCOPY  11/21/2017    multiple small gastric polyps-fundic gland polyps     Social History: Kari Marti  reports that she has quit smoking. She has never used smokeless tobacco. She reports that she does not drink alcohol or use drugs.     Family History   Problem Relation Age of Onset    Alcohol Abuse Father     Other Father         murder    Diabetes Mother     Other Mother         thyroid    High Blood Pressure Mother     Lupus Sister     Drug Abuse Brother     Breast Cancer Maternal Grandmother         45s    Heart Surgery Maternal Grandmother     Heart Failure Maternal Grandmother     Hypertension Maternal Grandfather     Stroke Maternal Grandfather     Heart Attack Maternal Grandfather     Alcohol Abuse Maternal Grandfather     Diabetes Maternal Grandfather     Cancer Paternal Grandmother         lymphnoid    Heart Failure Paternal Grandfather     Heart Surgery Paternal Grandfather         x2       Current Medications:     sodium chloride flush  10 mL Intravenous 2 times per day    citalopram  20 mg Oral Daily    levothyroxine  50 mcg Oral Daily    pregabalin  75 mg Oral BID    cetirizine  10 mg Oral Nightly    aspirin  81 mg Oral Daily    atorvastatin  20 mg Oral Nightly    enoxaparin  40 mg Subcutaneous Daily    naproxen  500 mg Oral BID WC     PRN Meds include: sodium chloride flush, acetaminophen, LORazepam, perflutren lipid microspheres, sodium chloride flush    ROS:   Constitutional Negative for fever and chills   HEENT Negative for ear discharge, ear pain, nosebleed   Eyes Negative for photophobia, pain and discharge   Respiratory Negative for hemoptysis and sputum   Cardiovascular Negative for orthopnea, claudication and PND   Gastrointestinal Negative for abdominal pain, diarrhea, blood in stool   Musculoskeletal Negative for joint pain, negative for myalgia   Skin Negative for rash or itching   Endo/heme/allergies Negative for polydipsia, environmental allergy   Psychiatric Negative for suicidal ideation. Patient is anxious           Objective:   /79   Pulse 79   Temp 97.9 °F (36.6 °C) (Oral)   Resp 16   Ht 5' 4\" (1.626 m)   Wt 220 lb 7.4 oz (100 kg)   SpO2 96%   BMI 37.84 kg/m²     Blood pressure range: Systolic (83IPF), LMF:711 , Min:115 , WLP:805   ; Diastolic (98MKI), BZF:48, Min:73, Max:83      Continuous infusions:     ON EXAMINATION:  GENERAL  Appears comfortable and in no distress   HEENT  NC/ AT   NECK  Supple and no bruits heard   Cardiovascular  S1, S2 heard; radial pulse intact   MENTAL STATUS:  Alert, oriented, intact memory, no confusion, normal speech, normal language, no hallucination or delusion   CRANIAL NERVES: II     -       Pupils reactive b/l., Fundus exam: intact venous pulsations;  Visual fields intact to confrontation  III,IV,VI -  EOMs full, no afferent defect, no                      ARNOLD, no ptosis  V     -     Normal facial sensation  VII    -     Normal facial symmetry  VIII   -     Intact hearing  IX,X -     Symmetrical palate  XI    -     Symmetrical shoulder shrug  XII   -     Midline tongue, no atrophy    MOTOR FUNCTION:  Normal bulk, normal tone, normal power;  no involuntary movements, no tremor   SENSORY FUNCTION:  Normal touch, normal pin, normal vibration, normal proprioception   CEREBELLAR FUNCTION:  Intact fine motor control over upper limbs   REFLEX FUNCTION:  Symmetric, no perverted reflex, no Babinski sign   STATION and GAIT  Not tested         Data:    Lab Results: Lab Results   Component Value Date    CHOL 222 (H) 10/26/2020    LDLCHOLESTEROL 138 (H) 10/26/2020    HDL 36 (L) 10/26/2020    TRIG 240 (H) 10/26/2020    ALT 12 05/21/2019    AST 13 05/21/2019    TSH 4.71 07/08/2020    INR 0.9 10/25/2020    LABA1C 5.4 10/18/2018     Hematology:  Recent Labs     10/25/20  2115 10/26/20  0534 10/27/20  0527   WBC 8.7 7.0 6.8   HGB 13.3 13.2 13.0   HCT 38.1 39.3 38.3    203 189   INR 0.9  --   --      Chemistry:  Recent Labs     10/25/20  2115 10/26/20  0534 10/27/20  0527    139 138   K 3.9 4.0 4.1    103 102   CO2 24 26 26   GLUCOSE 110* 107* 117*   BUN 8 8 14   CREATININE 0.61 0.53 0.65   CALCIUM 9.1 9.0 9.1     Recent Labs     10/25/20  2115 10/26/20  0534   CHOL  --  222*   HDL  --  36*   LDLCHOLESTEROL  --  138*   CHOLHDLRATIO  --  6.2*   TRIG  --  240*   VLDL  --  NOT REPORTED*   CKTOTAL 92  --    CKMB 1.8  --        No results found for: PHENYTOIN, PHENYTOIN, VALPROATE, CBMZ        Diagnostic data reviewed:  CT head 10/25/2020: No acute intracranial abnormalities. CTA head and neck 10/25/2020: No LVO or critical stenosis. Echo: Left ventricle is normal in size and wall thickness. Global left ventricular systolic function is normal. EF 60-65   %. No obvious wall motion abnormality seen. Both atria are normal in size. Negative bubble study, with and without Valsalva maneuver, no suggestion of  inter-atrial shunt. MRI brain 10/26/2020[de-identified] No acute infarct, no mass-effect or midline shift. No evidence of bleed. No white matter disease. Impression and Plan: Ms. Sharmaine Stallworth is a 51 y.o. female with   Acute onset of subjective left-sided numbness without any objective deficits; question right thalamic ischemic event;   MRI brain is unrevealing. Negative for demyelinating lesions. Continue aspirin 81 mg daily, atorvastatin 20 mg nightly.   Comorbid lupus, platelet disorder, mild bleeding disorder has had hematology consultation in the past.   Discharge plan in progress. Care plan is discussed with patient. Follow up in neurology clinic in 4-6 weeks. Will follow with you while she is here.           Franci Osborn MD 10/27/2020 11:56 AM

## 2020-10-30 ENCOUNTER — OFFICE VISIT (OUTPATIENT)
Dept: INTERNAL MEDICINE CLINIC | Age: 50
End: 2020-10-30
Payer: COMMERCIAL

## 2020-10-30 VITALS
HEART RATE: 97 BPM | OXYGEN SATURATION: 97 % | SYSTOLIC BLOOD PRESSURE: 136 MMHG | TEMPERATURE: 96 F | WEIGHT: 217 LBS | DIASTOLIC BLOOD PRESSURE: 70 MMHG | BODY MASS INDEX: 37.25 KG/M2

## 2020-10-30 PROCEDURE — 1111F DSCHRG MED/CURRENT MED MERGE: CPT | Performed by: INTERNAL MEDICINE

## 2020-10-30 PROCEDURE — 99214 OFFICE O/P EST MOD 30 MIN: CPT | Performed by: INTERNAL MEDICINE

## 2020-10-30 RX ORDER — CITALOPRAM 10 MG/1
10 TABLET ORAL DAILY
Qty: 30 TABLET | Refills: 3 | Status: SHIPPED | OUTPATIENT
Start: 2020-10-30 | End: 2021-02-18

## 2020-10-30 RX ORDER — CITALOPRAM 20 MG/1
20 TABLET ORAL DAILY
Qty: 30 TABLET | Refills: 3 | Status: SHIPPED | OUTPATIENT
Start: 2020-10-30 | End: 2021-03-01 | Stop reason: SDUPTHER

## 2020-10-30 ASSESSMENT — PATIENT HEALTH QUESTIONNAIRE - PHQ9
SUM OF ALL RESPONSES TO PHQ QUESTIONS 1-9: 2
SUM OF ALL RESPONSES TO PHQ9 QUESTIONS 1 & 2: 2
1. LITTLE INTEREST OR PLEASURE IN DOING THINGS: 1
2. FEELING DOWN, DEPRESSED OR HOPELESS: 1

## 2020-10-30 NOTE — PROGRESS NOTES
Visit Information    Have you changed or started any medications since your last visit including any over-the-counter medicines, vitamins, or herbal medicines? no   Are you having any side effects from any of your medications? -  no  Have you stopped taking any of your medications? Is so, why? -  no    Have you seen any other physician or provider since your last visit? No  Have you had any other diagnostic tests since your last visit? Yes - Records Obtained  Have you been seen in the emergency room and/or had an admission to a hospital since we last saw you? Yes - Records Obtained  Have you had your routine dental cleaning in the past 6 months? no    Have you activated your [employer] account? If not, what are your barriers? Yes     Patient Care Team:  Josephine Dos Santos MD as PCP - General (Internal Medicine)  Josephine Dos Santos MD as PCP - [facility]  Manny Ivy DO as Consulting Physician (Obstetrics & Gynecology)    Medical History Review  Past Medical, Family, and Social History reviewed and does not contribute to the patient presenting condition    Health Maintenance   Topic Date Due    Pneumococcal 0-64 years Vaccine (1 of 1 - PPSV23) 12/03/1977    DTaP/Tdap/Td vaccine (1 - Tdap) 12/03/1990    Flu vaccine (1) 09/01/2020    TSH testing  07/08/2021    Diabetes screen  10/18/2021    Lipid screen  10/26/2021    Cervical cancer screen  11/07/2024    Colon cancer screen colonoscopy  11/21/2027    HIV screen  Completed    Hepatitis A vaccine  Aged Out    Hepatitis B vaccine  Aged Out    Hib vaccine  Aged Out    Meningococcal (ACWY) vaccine  Aged Out      Post-Discharge Transitional Care Management Services or Hospital Follow Up      Lorelei Wright   YOB: 1970    Date of Office Visit:  10/30/2020  Date of Hospital Admission: 10/25/20  Date of Hospital Discharge: 10/27/20  Risk of hospital readmission (high >=14%.  Medium >=10%) :No data recorded    Care management risk score Rising risk (score 2-5) and Complex Care (Scores >=6): 5     Non face to face  following discharge, date last encounter closed (first attempt may have been earlier): *No documented post hospital discharge outreach found in the last 14 days    Call initiated 2 business days of discharge: *No response recorded in the last 14 days    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[address]    Environmental allergies    H/O thyroid cyst    History of cervical dysplasia    History of conization of cervix     History of low transverse  section    Vision abnormalities    Family history of breast cancer    Osteoarthritis    VASECTOMY in Male Partner    History of endometrial ablation    Pelvic pain in female    Hypothyroidism    Abnormal uterine bleeding    Diverticulosis of colon    Rectal bleeding    Constipation    Isolated corticotropin deficiency (HCC)    Persistent depressive disorder    Generalized anxiety disorder with panic attacks    Other forms of systemic lupus erythematosus (HCC)    Panic attacks    Stroke-like symptoms    Numbness       Allergies   Allergen Reactions    Other      Perch - twice had violent vomiting, diarrhea,severe dizziness and nausea    Percocet [Oxycodone-Acetaminophen] Hives and Itching     Hives everywhere, including roof of mouth and tear duct openings    Tramadol Hcl Hives and Itching     hives    Azithromycin      Palpitations.  Cortisone      Pt states she became very ill the day after cortisone injection in heel. Pt. States pills by mouth not a problem    Ibuprofen Hives    Ceclor [Cefaclor] Hives and Rash    Penicillins Hives and Rash       Medications listed as ordered at the time of discharge from hospital   Lorelei Lyon   Home Medication Instructions LILI:    Printed on:10/30/20 1600   Medication Information                      aspirin 81 MG EC tablet  Take 1 tablet by mouth daily             atorvastatin (LIPITOR) 20 MG tablet  Take 1 tablet by mouth nightly             Cetirizine HCl (ZYRTEC PO)  Take 10 mg by mouth daily              Cholecalciferol (VITAMIN D3 PO)  Take by mouth             citalopram (CELEXA) 20 MG tablet  Take 1 tablet by mouth daily             levothyroxine (SYNTHROID) 50 MCG tablet  Take 1 tablet by mouth daily             LORazepam (ATIVAN) 0.5 MG tablet  0.5 mg daily as needed. naproxen (NAPROSYN) 500 MG tablet  Take 1 tablet by mouth 2 times daily (with meals)             Omeprazole-Sodium Bicarbonate (ZEGERID)  MG CAPS  Take by mouth             pregabalin (LYRICA) 75 MG capsule  take 1 capsule by mouth twice a day                   Medications marked \"taking\" at this time  Outpatient Medications Marked as Taking for the 10/30/20 encounter (Office Visit) with Arnulfo Zaidi MD   Medication Sig Dispense Refill    naproxen (NAPROSYN) 500 MG tablet Take 1 tablet by mouth 2 times daily (with meals) 60 tablet 3    aspirin 81 MG EC tablet Take 1 tablet by mouth daily 30 tablet 3    atorvastatin (LIPITOR) 20 MG tablet Take 1 tablet by mouth nightly 30 tablet 3    citalopram (CELEXA) 20 MG tablet Take 1 tablet by mouth daily 30 tablet 3    levothyroxine (SYNTHROID) 50 MCG tablet Take 1 tablet by mouth daily 90 tablet 3    Omeprazole-Sodium Bicarbonate (ZEGERID)  MG CAPS Take by mouth      LORazepam (ATIVAN) 0.5 MG tablet 0.5 mg daily as needed.       pregabalin (LYRICA) 75 MG capsule take 1 capsule by mouth twice a day  0    Cholecalciferol (VITAMIN D3 PO) Take by mouth      Cetirizine HCl (ZYRTEC PO) Take 10 mg by mouth daily           Medications patient taking as of now reconciled against medications ordered at time of hospital discharge: Yes    Chief Complaint   Patient presents with    Follow[facility] from [facility] Medication Check     celexa    Leg Pain     shoots down leg,     Headache     eye, lip, left side of the head       History of Present illness - Follow up of Hospital diagnosis(es):   Principal Problem:    Stroke-like symptoms- shooting pain left side of head to toe  Active Problems:    Delta storage pool disease [facility])    Generalized anxiety disorder with panic attacks    Inpatient course: Discharge summary reviewed- see chart. Pt reports BP was high at presentation: >200  MRI was unremarkable  Was seen by neuro    Has yet to make neuro appt    Interval history/Current status:   No simliar gifty since  Today feels some pain in left lower leg    Denies any shortness of breath or cough  Denies chest pain or palpitations  Denies abdominal pain, diarrhea vomiting  Denies dysuria/frequency      Vitals:    10/30/20 1540   BP: 136/70   Site: Left Upper Arm   Pulse: 97   Temp: 96 °F (35.6 °C)   SpO2: 97%   Weight: 217 lb (98.4 kg)     Body mass index is 37.25 kg/m². Wt Readings from Last 3 Encounters:   10/30/20 217 lb (98.4 kg)   10/27/20 220 lb 7.4 oz (100 kg)   10/08/20 218 lb (98.9 kg)     BP Readings from Last 3 Encounters:   10/30/20 136/70   10/27/20 137/79   10/08/20 128/84        Physical Exam:  General appearance:  alert, cooperative and no distress  Eyes: Anicteric sclera. Pupils are equally round and reactive to light. Extraocular movements are intact.   Lungs:  clear to auscultation bilaterally, normal effort  Heart:  regular rate and rhythm, no murmur  Abdomen:  soft, nontender, nondistended, normal bowel sounds, no masses,   Extremities:  no edema, redness, tenderness in the calves  Skin:  no gross lesions, rashes, induration  Neuro:  Alert, oriented X 3, Gait normal. Non-focal.      Assessment/Plan:    Lorelei was seen today for follow-up from hospital, medication check, leg pain and headache. Diagnoses and all orders for this visit:    Anxiety and depression  Comments:  stopped effexor 75mg dialy on 10/8  switched to celexa 20  pt woudld like to increase to 30    Orders:  -     citalopram (CELEXA) 10 MG tablet; Take 1 tablet by mouth daily With the celexa 20mg tab to make 30mg dialy  -     citalopram (CELEXA) 20 MG tablet;  Take 1 tablet by mouth daily With the celexa 10mg tab to make 30mg dialy    Stroke-like symptoms  Comments:  discharged 10/27  needs to make neuro appt           Medical Decision Making: moderate complexity

## 2020-10-31 NOTE — CONSULTS
NEUROLOGY INPATIENT CONSULT NOTE    10/26/2020         Ck Shen is a  51 y.o. female admitted on 10/25/2020 with  Stroke-like symptoms [R29.90]  Stroke-like symptoms [R29.90]      History is obtained mostly from the patient and the medical record and from the caregivers. Chart is reviewed and patient is examined. Briefly, this is a  51 y. o.right handed  female with hx of Lupus, platelet disorder, headaches and generalized anxiety disorder was admitted on 10/25/2020 with acute onset of left-sided numbness. Patient was driving her car and approximately 8 PM ; she felt \"shock\" go through her body and then developed left-sided numbness. She went home and took Ativan and the symptoms subsided. Patient went to ED for evaluation. CT head was negative for acute intracranial abnormalities. Stroke team evaluated the patient. Patient was initiated on aspirin 81 mg daily, atorvastatin 20 mg nightly. Patient stated that she still has ongoing left-sided numbness but denies any associated weakness. Denies headaches, dizziness and vision changes. Denies speech and swallowing difficulties. Patient stated that she had platelet disorder but she has been able to take naproxen well without any difficulties. She was seen by hematologist in the past and she could not recall the name and she was told \"mild bleeding disorder\". No current facility-administered medications on file prior to encounter. Current Outpatient Medications on File Prior to Encounter   Medication Sig Dispense Refill    citalopram (CELEXA) 20 MG tablet Take 1 tablet by mouth daily 30 tablet 3    levothyroxine (SYNTHROID) 50 MCG tablet Take 1 tablet by mouth daily 90 tablet 3    Omeprazole-Sodium Bicarbonate (ZEGERID)  MG CAPS Take by mouth      LORazepam (ATIVAN) 0.5 MG tablet 0.5 mg daily as needed.       pregabalin (LYRICA) 75 MG capsule take 1 capsule by mouth twice a day  0    Cholecalciferol (VITAMIN D3 PO) Take by mouth      Cetirizine HCl (ZYRTEC PO) Take 10 mg by mouth daily        Allergies: Kari Marti is allergic to other; percocet [oxycodone-acetaminophen]; tramadol hcl; azithromycin; cortisone; ibuprofen; ceclor [cefaclor]; and penicillins. 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     Headache     History of cervical dysplasia     had cone    History of conization of cervix     dysplastic cells    Hypothyroidism, unspecified 3/18/2016    Lupus ([facility] [address]     Osteoarthritis     Pain     all over body    Sleep apnea     awaiting test results currently    Vision abnormalities     glasses/ far sighted       Past Surgical History:   Procedure Laterality Date    BONE CYST EXCISION Right     WRIST    CARPAL TUNNEL RELEASE Right 10-6-15    CARPAL TUNNEL RELEASE Left 11/3/15    CERVIX BIOPSY       SECTION, LOW TRANSVERSE      COLONOSCOPY      COLONOSCOPY  2009    diverticulosis, no other gross lesions    COLONOSCOPY  2017    10 yr recall, small hemorrhoids, diverticulosis    DILATION AND CURETTAGE OF UTERUS N/A 2017    HYSTEROSCOPY AND D& C, myosure performed by Ash Duran MD at [facility], COLON, DIAGNOSTIC      UGI    FRACTURE SURGERY Left     THUMB    GYNECOLOGIC CRYOSURGERY      conization   [address]      with mesh    LIPOMA RESECTION Right     RING FINGER    OTHER SURGICAL HISTORY      VOCAL CORD SURG    OR COLON CA SCRN NOT  [address] [place] [address] IND N/A 2017    COLONOSCOPY performed by Yariel Florez MD at [address] [place] [address] EGD TRANSORAL BIOPSY SINGLE/MULTIPLE N/A 2017 hemoptysis and sputum   Cardiovascular Negative for orthopnea, claudication and PND   Gastrointestinal Negative for abdominal pain, diarrhea, blood in stool   Musculoskeletal Negative for joint pain, negative for myalgia   Skin Negative for rash or itching   Endo/heme/allergies Negative for polydipsia, environmental allergy   Psychiatric Negative for suicidal ideation. Patient is anxious           Objective:   /73   Pulse 87   Temp 98.4 °F (36.9 °C) (Oral)   Resp 16   Ht 5' 4\" (1.626 m)   Wt 215 lb (97.5 kg)   SpO2 97%   BMI 36.90 kg/m²     Blood pressure range: Systolic (65BUA), TZT:442 , Min:102 , UVC:235   ; Diastolic (25PYD), VNU:37, Min:55, Max:86      Continuous infusions:     ON EXAMINATION:  GENERAL  Appears comfortable and in no distress   HEENT  NC/ AT   NECK  Supple and no bruits heard   Cardiovascular  S1, S2 heard; radial pulse intact   MENTAL STATUS:  Alert, oriented, intact memory, no confusion, normal speech, normal language, no hallucination or delusion   CRANIAL NERVES: II     -       Pupils reactive b/l., Fundus exam: intact venous pulsations;  Visual fields intact to confrontation  III,IV,VI -  EOMs full, no afferent defect, no                      ARNOLD, no ptosis  V     -     Normal facial sensation  VII    -     Normal facial symmetry  VIII   -     Intact hearing  IX,X -     Symmetrical palate  XI    -     Symmetrical shoulder shrug  XII   -     Midline tongue, no atrophy    MOTOR FUNCTION:  Normal bulk, normal tone, normal power;  no involuntary movements, no tremor   SENSORY FUNCTION:  Normal touch, normal pin, normal vibration, normal proprioception   CEREBELLAR FUNCTION:  Intact fine motor control over upper limbs   REFLEX FUNCTION:  Symmetric, no perverted reflex, no Babinski sign   STATION and GAIT  Not tested         Data:    Lab Results:     Lab Results   Component Value Date    CHOL 222 (H) 10/26/2020    LDLCHOLESTEROL 138 (H) 10/26/2020    HDL 36 (L) 10/26/2020    TRIG 240 (H) 10/26/2020    ALT 12 05/21/2019    AST 13 05/21/2019    TSH 4.71 07/08/2020    INR 0.9 10/25/2020    LABA1C 5.4 10/18/2018     Hematology:  Recent Labs     10/25/20  2115 10/26/20  0534   WBC 8.7 7.0   HGB 13.3 13.2   HCT 38.1 39.3    203   INR 0.9  --      Chemistry:  Recent Labs     10/25/20  2109 10/25/20  2115 10/26/20  0534   NA  --  137 139   K  --  3.9 4.0   CL  --  101 103   CO2  --  24 26   GLUCOSE  --  110* 107*   BUN  --  8 8   CREATININE 0.59 0.61 0.53   CALCIUM  --  9.1 9.0     Recent Labs     10/25/20  2115 10/26/20  0534   CHOL  --  222*   HDL  --  36*   LDLCHOLESTEROL  --  138*   CHOLHDLRATIO  --  6.2*   TRIG  --  240*   VLDL  --  NOT REPORTED*   CKTOTAL 92  --    CKMB 1.8  --        No results found for: PHENYTOIN, PHENYTOIN, VALPROATE, CBMZ        Diagnostic data reviewed:  CT head 10/25/2020: No acute intracranial abnormalities. CTA head and neck 10/25/2020: No LVO or critical stenosis. MRI brain: Pending          Impression and Plan: Ms. Guido Husain is a 51 y.o. female with   Acute onset of subjective left-sided numbness without any objective deficits; question right thalamic infarction; patient is a claustrophobic; will get MRI under sedation. Continue aspirin 81 mg daily, atorvastatin 20 mg nightly. Comorbid lupus, platelet disorder, mild bleeding disorder has had hematology consultation in the past.   Care plan is discussed with patient and her nurse at bedside. Will follow with you. Thank you for consultation.          Latricia Arthur MD 10/26/2020

## 2020-11-04 NOTE — DISCHARGE SUMMARY
COMPARISON: 07/02/2020 HISTORY: ORDERING SYSTEM PROVIDED HISTORY: L facial numbness TECHNOLOGIST PROVIDED HISTORY: L facial numbness Is the patient pregnant?->No Reason for Exam: stroke alert, left sided facial numbness Acuity: Acute Type of Exam: Initial FINDINGS: BRAIN/VENTRICLES: There is no acute intracranial hemorrhage, mass effect or midline shift. No abnormal extra-axial fluid collection. The gray-white differentiation is maintained without evidence of an acute infarct. There is no evidence of hydrocephalus. ORBITS: The visualized portion of the orbits demonstrate no acute abnormality. SINUSES: The visualized paranasal sinuses and mastoid air cells demonstrate no acute abnormality. SOFT TISSUES/SKULL:  No acute abnormality of the visualized skull or soft tissues. No acute intracranial abnormality. Cta Head Neck W Contrast    Result Date: 10/25/2020  EXAMINATION: CTA OF THE HEAD AND NECK WITH CONTRAST 10/25/2020 9:22 pm: TECHNIQUE: CTA of the head and neck was performed with the administration of intravenous contrast. Multiplanar reformatted images are provided for review. MIP images are provided for review. Stenosis of the internal carotid arteries measured using NASCET criteria. Dose modulation, iterative reconstruction, and/or weight based adjustment of the mA/kV was utilized to reduce the radiation dose to as low as reasonably achievable. COMPARISON: None. HISTORY: ORDERING SYSTEM PROVIDED HISTORY: L sided facial numbness TECHNOLOGIST PROVIDED HISTORY: L sided facial numbness Reason for Exam: previous stroke alert, left sided numbness Acuity: Acute Type of Exam: Initial FINDINGS: CTA NECK: AORTIC ARCH/ARCH VESSELS: No dissection or arterial injury. No significant stenosis of the brachiocephalic or subclavian arteries. CAROTID ARTERIES: No dissection, arterial injury, or hemodynamically significant stenosis by NASCET criteria.   There is medial deviation of the right internal carotid artery into the retropharyngeal space, a benign entity that can accentuate prevertebral soft tissues on lateral radiographs. VERTEBRAL ARTERIES: No dissection, arterial injury, or significant stenosis. SOFT TISSUES: The lung apices are clear. No cervical or superior mediastinal lymphadenopathy. The larynx and pharynx are unremarkable. No acute abnormality of the salivary and thyroid glands. BONES: No acute osseous abnormality. CTA HEAD: ANTERIOR CIRCULATION: No significant stenosis of the intracranial internal carotid, anterior cerebral, or middle cerebral arteries. No aneurysm. POSTERIOR CIRCULATION: No significant stenosis of the vertebral, basilar, or posterior cerebral arteries. No aneurysm. OTHER: No dural venous sinus thrombosis on this non-dedicated study. BRAIN: No mass effect or midline shift. No extra-axial fluid collection. The gray-white differentiation is maintained. No acute arterial abnormality or hemodynamically significant arterial stenosis in the head or neck. Mri Brain Wo Contrast    Result Date: 10/26/2020  EXAMINATION: MRI OF THE BRAIN WITHOUT CONTRAST  10/26/2020 8:55 pm TECHNIQUE: Multiplanar multisequence MRI of the brain was performed without the administration of intravenous contrast. COMPARISON: None. HISTORY: ORDERING SYSTEM PROVIDED HISTORY: left-sided numbness TECHNOLOGIST PROVIDED HISTORY: left-sided numbness Is the patient pregnant?->No Reason for Exam: left side numbness FINDINGS: INTRACRANIAL STRUCTURES/VENTRICLES: There is no acute infarct. No mass effect or midline shift. No evidence of an acute intracranial hemorrhage. The ventricles and sulci are normal in size and configuration. The sellar/suprasellar regions appear unremarkable. The normal signal voids within the major intracranial vessels appear maintained. ORBITS: The visualized portion of the orbits demonstrate no acute abnormality. SINUSES: The visualized paranasal sinuses and mastoid air cells are well aerated. [address] [facility]    Phone:  [phone]   · aspirin 81 MG EC tablet  · atorvastatin 20 MG tablet  · naproxen 500 MG tablet         Time Spent on discharge is  35 mins in patient examination, evaluation, counseling as well as medication reconciliation, prescriptions for required medications, discharge plan and follow up. Electronically signed by   Sis Smith MD  11/3/2020  7:01 PM      Thank you Dr. Israel Faith MD for the opportunity to be involved in this patient's care.

## 2020-11-10 ENCOUNTER — TELEMEDICINE (OUTPATIENT)
Dept: INTERNAL MEDICINE CLINIC | Age: 50
End: 2020-11-10
Payer: COMMERCIAL

## 2020-11-10 PROCEDURE — 99213 OFFICE O/P EST LOW 20 MIN: CPT | Performed by: PHYSICIAN ASSISTANT

## 2020-11-10 ASSESSMENT — ENCOUNTER SYMPTOMS
EYE PAIN: 0
SORE THROAT: 0
VOMITING: 0
WHEEZING: 0
SHORTNESS OF BREATH: 1
TROUBLE SWALLOWING: 0
COLOR CHANGE: 0
EYE REDNESS: 0
CHEST TIGHTNESS: 0
COUGH: 1
VOICE CHANGE: 0
NAUSEA: 0
SINUS PRESSURE: 0
ABDOMINAL PAIN: 0

## 2020-11-10 NOTE — PROGRESS NOTES
Visit Information    Have you changed or started any medications since your last visit including any over-the-counter medicines, vitamins, or herbal medicines? no   Are you having any side effects from any of your medications? -  no  Have you stopped taking any of your medications? Is so, why? -  no    Have you seen any other physician or provider since your last visit? No  Have you had any other diagnostic tests since your last visit? No  Have you been seen in the emergency room and/or had an admission to a hospital since we last saw you? No  Have you had your routine dental cleaning in the past 6 months? no    Have you activated your [employer] account? If not, what are your barriers?  Yes     Patient Care Team:  Lydia Blanco MD as PCP - General (Internal Medicine)  Lydia Blanco MD as PCP - [facility]  Brittnee Ford DO as Consulting Physician (Obstetrics & Gynecology)    Medical History Review  Past Medical, Family, and Social History reviewed and does not contribute to the patient presenting condition    Health Maintenance   Topic Date Due    Pneumococcal 0-64 years Vaccine (1 of 1 - PPSV23) 12/03/1977    DTaP/Tdap/Td vaccine (1 - Tdap) 12/03/1990    Flu vaccine (1) 10/30/2021 (Originally 9/1/2020)    TSH testing  07/08/2021    Diabetes screen  10/18/2021    Lipid screen  10/26/2021    Cervical cancer screen  11/07/2024    Colon cancer screen colonoscopy  11/21/2027    HIV screen  Completed    Hepatitis A vaccine  Aged Out    Hepatitis B vaccine  Aged Out    Hib vaccine  Aged Out    Meningococcal (ACWY) vaccine  Aged Out

## 2020-11-10 NOTE — PATIENT INSTRUCTIONS
with COVID-19, it is still recommended that people sick with COVID-19 limit contact with animals until more information is known about the virus. ; When possible, have another member of your household care for your animals while you are sick. If you are sick with COVID-19, avoid contact with your pet, including petting, snuggling, being kissed or licked, and sharing food. If you must care for your pet or be around animals while you are sick, wash your hands before and after you interact with pets and wear a facemask. See COVID-19 and Animals for more information. Other considerations   The ill person should eat/be fed in their room if possible. Non-disposable  items used should be handled with gloves and washed with hot water or in a . Clean hands after handling used  items.  If possible, dedicate a lined trash can for the ill person. Use gloves when removing garbage bags, handling, and disposing of trash. Wash hands after handling or disposing of trash.  Consider consulting with your local health department about trash disposal guidance if available. Information for Household Members and Caregivers of Someone who is Sick   Call ahead before visiting your doctor   Call ahead: If you have a medical appointment, call the healthcare provider and tell them that you have or may have COVID-19. This will help the healthcare provider's office take steps to keep other people from getting infected or exposed. Wear a facemask if you are sick   ; If you are sick: You should wear a facemask when you are around other people (e.g., sharing a room or vehicle) or pets and before you enter a healthcare provider's office.    ; If you are caring for others: If the person who is sick is not able to wear a facemask (for example, because it causes trouble breathing), then people who live with the person who is sick should not stay in the same room with them, or they should wear a facemask include counters, tabletops, doorknobs, bathroom fixtures, toilets, phones, keyboards, tablets, and bedside tables.  ; Disinfect areas with bodily fluids: Also, clean any surfaces that may have blood, stool, or body fluids on them.   ; Household : Use a household cleaning spray or wipe, according to the label instructions. Labels contain instructions for safe and effective use of the cleaning product including precautions you should take when applying the product, such as wearing gloves and making sure you have good ventilation during use of the product. Monitor your symptoms   Seek medical attention: Seek prompt medical attention if your illness is worsening     (e.g., difficulty breathing).   ; Call your doctor: Before seeking care, call your healthcare provider and tell them that you have, or are being evaluated for, COVID-19.   ; Wear a facemask when sick: Put on a facemask before you enter the facility. These steps will help the healthcare provider's office to keep other people in the office or waiting room from getting infected or exposed. ; Alert health department: Ask your healthcare provider to call the local or state health department. Persons who are placed under active monitoring or facilitated self-monitoring should follow instructions provided by their local health department or occupational health professionals, as appropriate.  ; Call 911 if you have a medical emergency: If you have a medical emergency and need to call 911, notify the dispatch personnel that you have, or are being evaluated for COVID-19. If possible, put on a facemask before emergency medical services arrive.

## 2020-11-10 NOTE — PROGRESS NOTES
[address]  [place] [id number]  Dept: [phone]  Dept Fax: [phone]    Virtual Visit Progress Note  Date of patient's visit: 11/10/2020  Patient's Name:  Sunil Barraza YOB: 1970            Patient Care Team:  Gianluca Dempsey MD as PCP - General (Internal Medicine)  Gianluca Dempsey MD as PCP - [address] [place] [address] [facility]  Harpreet Nuñez DO as Consulting Physician (Obstetrics & Gynecology)    REASON FOR VISIT:  Same day visit    HISTORY OF PRESENT ILLNESS:      Chief Complaint   Patient presents with    Fever    Headache    Shortness of Breath       History was obtained from the patient. Sunil Barraza is a 51 y.o. female here today virtually for above. Patient complains of cough, dyspnea, headache, myalgias, fatigue, fever (tmax 100.1 F). Symptoms started yesterday. Cough is non productive. She denies shortness of breath at rest, feels winded on exertion. No chest pain, sore throat, loss of taste or smell. Recent exposure to Covid-19 virus, daughter tested positive last week. She is taking Tylenol with mild relief of symptoms.     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 [address]    Environmental allergies    H/O thyroid cyst    History of cervical dysplasia    History of conization of cervix     History of low transverse  section    Vision abnormalities    Family history of breast cancer    Osteoarthritis    VASECTOMY in Male Partner    History of endometrial ablation    Pelvic pain in female    Hypothyroidism    Abnormal uterine bleeding    Diverticulosis of colon    Rectal bleeding    Constipation    Isolated corticotropin deficiency (HCC)    Persistent depressive disorder    Generalized anxiety disorder with panic attacks    Other forms of systemic lupus erythematosus (HCC)    Panic attacks    Stroke-like symptoms    Numbness     MEDICATIONS:      Current Outpatient Medications   Medication Sig Dispense Refill    citalopram (CELEXA) 10 MG tablet Take 1 tablet by mouth daily With the celexa 20mg tab to make 30mg dialy 30 tablet 3    citalopram (CELEXA) 20 MG tablet Take 1 tablet by mouth daily With the celexa 10mg tab to make 30mg dialy 30 tablet 3    naproxen (NAPROSYN) 500 MG tablet Take 1 tablet by mouth 2 times daily (with meals) 60 tablet 3    aspirin 81 MG EC tablet Take 1 tablet by mouth daily 30 tablet 3    atorvastatin (LIPITOR) 20 MG tablet Take 1 tablet by mouth nightly 30 tablet 3    levothyroxine (SYNTHROID) 50 MCG tablet Take 1 tablet by mouth daily 90 tablet 3    Omeprazole-Sodium Bicarbonate (ZEGERID)  MG CAPS Take by mouth      LORazepam (ATIVAN) 0.5 MG tablet 0.5 mg daily as needed.  pregabalin (LYRICA) 75 MG capsule take 1 capsule by mouth twice a day  0    Cholecalciferol (VITAMIN D3 PO) Take by mouth      Cetirizine HCl (ZYRTEC PO) Take 10 mg by mouth daily        No current facility-administered medications for this visit. ALLERGIES:      Allergies   Allergen Reactions    Other      Perch - twice had violent vomiting, diarrhea,severe dizziness and nausea    Percocet [Oxycodone-Acetaminophen] Hives and Itching     Hives everywhere, including roof of mouth and tear duct openings    Tramadol Hcl Hives and Itching     hives    Azithromycin      Palpitations.  Cortisone      Pt states she became very ill the day after cortisone injection in heel.  Pt. States pills by mouth not a problem    Ibuprofen Hives    Ceclor [Cefaclor] Hives and Rash    Penicillins Hives and Rash     SOCIAL HISTORY   Reviewed and no change from previous record. Lorelei  reports that she has quit smoking. She has never used smokeless tobacco.    REVIEW OF SYSTEMS:    Review of Systems   Constitutional: Positive for fatigue and fever. Negative for chills and diaphoresis. HENT: Positive for congestion. Negative for drooling, ear discharge, ear pain, hearing loss, postnasal drip, sinus pressure, sore throat, trouble swallowing and voice change. Eyes: Negative for pain and redness. Respiratory: Positive for cough and shortness of breath. Negative for chest tightness and wheezing. Cardiovascular: Negative for chest pain and leg swelling. Gastrointestinal: Negative for abdominal pain, nausea and vomiting. Musculoskeletal: Positive for myalgias. Negative for neck pain and neck stiffness. Skin: Negative for color change and rash. Neurological: Positive for headaches. Negative for dizziness, weakness, light-headedness and numbness. Hematological: Negative for adenopathy. PHYSICAL EXAM:      Patient-Reported Vitals 11/10/2020   Patient-Reported Weight 215   Patient-Reported Height 54   Patient-Reported Systolic 269   Patient-Reported Diastolic 87   Patient-Reported Pulse 86   Patient-Reported Temperature 100.1      Exam was limited due to virtual encounter.     General - alert, mildly ill appearing, non toxic, in no distress  Skin - normal coloration and turgor, no rash  Eyes - sclera appear clear, no conjunctival injection, no discharge  Ears - no pain with manipulation of tragus or auricle, no drainage, no mastoid tenderness, hearing normal   Nose - normal and patent, no erythema, discharge  Mouth - mucous membranes are moist  Neck - supple, no masses appreciated  Chest - respirations are unlabored, no tachypnea or signs or respiratory distress  Abdomen - soft, nontender, nondistended  Neurological - alert, oriented x 3, normal speech  Extremities - no pedal edema    ASSESSMENT AND PLAN:      1. Suspected COVID-19 virus infection  - Symptoms consistent with mild infection, advised to quarantine in home, supportive care, rest, hydration, tylenol as needed pain/fever, monitor symptoms, prompt return to clinic or emergency room for new or worsening symptoms, patient understands/agrees     Steps to help prevent the spread of COVID-19 if you are sick  SOURCE - [email or web address] html     Stay home except to get medical care   ; Stay home: People who are mildly ill with COVID-19 are able to isolate at home during their illness. You should restrict activities outside your home, except for getting medical care.   ; Avoid public areas: Do not go to work, school, or public areas.   ; Avoid public transportation: Avoid using public transportation, ride-sharing, or taxis.  ; Separate yourself from other people and animals in your home   ; Stay away from others: As much as possible, you should stay in a specific room and away from other people in your home. Also, you should use a separate bathroom, if available.   ; Limit contact with pets & animals: You should restrict contact with pets and other animals while you are sick with COVID-19, just like you would around other people. Although there have not been reports of pets or other animals becoming sick with COVID-19, it is still recommended that people sick with COVID-19 limit contact with animals until more information is known about the virus. ; When possible, have another member of your household care for your animals while you are sick. If you are sick with COVID-19, avoid contact with your pet, including petting, snuggling, being kissed or licked, and sharing food. If you must care for your pet or be around animals while you are sick, wash your hands before and after you interact with pets and wear a facemask.  See COVID-19 and Animals for more information. Other considerations   The ill person should eat/be fed in their room if possible. Non-disposable  items used should be handled with gloves and washed with hot water or in a . Clean hands after handling used  items.  If possible, dedicate a lined trash can for the ill person. Use gloves when removing garbage bags, handling, and disposing of trash. Wash hands after handling or disposing of trash.  Consider consulting with your local health department about trash disposal guidance if available. Information for Household Members and Caregivers of Someone who is Sick   Call ahead before visiting your doctor   Call ahead: If you have a medical appointment, call the healthcare provider and tell them that you have or may have COVID-19. This will help the healthcare provider's office take steps to keep other people from getting infected or exposed. Wear a facemask if you are sick   ; If you are sick: You should wear a facemask when you are around other people (e.g., sharing a room or vehicle) or pets and before you enter a healthcare provider's office. ; If you are caring for others: If the person who is sick is not able to wear a facemask (for example, because it causes trouble breathing), then people who live with the person who is sick should not stay in the same room with them, or they should wear a facemask if they enter a room with the person who is sick. Cover your coughs and sneezes   ; Cover: Cover your mouth and nose with a tissue when you cough or sneeze.   ; Dispose: Throw used tissues in a lined trash can.   ; Wash hands: Immediately wash your hands with soap and water for at least 20 seconds or, if soap and water are not available, clean your hands with an alcohol-based hand  that contains at least 60% alcohol. Clean your hands often   ;  Wash hands: Wash your hands often with soap and water for at least 20 seconds, especially after blowing your nose, coughing, or sneezing; going to the bathroom; and before eating or preparing food.   ; Hand : If soap and water are not readily available, use an alcohol-based hand  with at least 60% alcohol, covering all surfaces of your hands and rubbing them together until they feel dry.   ; Soap and water: Soap and water are the best option if hands are visibly dirty.   ; Avoid touching: Avoid touching your eyes, nose, and mouth with unwashed hands. Handwashing Tips   ; Wet your hands with clean, running water (warm or cold), turn off the tap, and apply soap.  ; Lather your hands by rubbing them together with the soap. Lather the backs of your hands, between your fingers, and under your nails. ; Scrub your hands for at least 20 seconds. Need a timer? Hum the [place] from beginning to end twice.  ; Rinse your hands well under clean, running water.  ; Dry your hands using a clean towel or air dry them. Avoid sharing personal household items   ; Do not share: You should not share dishes, drinking glasses, cups, eating utensils, towels, or bedding with other people or pets in your home.   ; Wash thoroughly after use: After using these items, they should be washed thoroughly with soap and water. Clean all high-touch surfaces everyday   ; Clean and disinfect: Practice routine cleaning of high touch surfaces.  ; High touch surfaces include counters, tabletops, doorknobs, bathroom fixtures, toilets, phones, keyboards, tablets, and bedside tables.  ; Disinfect areas with bodily fluids: Also, clean any surfaces that may have blood, stool, or body fluids on them.   ; Household : Use a household cleaning spray or wipe, according to the label instructions.  Labels contain instructions for safe and effective use of the cleaning product including precautions you should take when applying the product, such as wearing gloves and making sure you have good ventilation during use of conducted with patient's (and/or legal guardian's) consent, to reduce the patient's risk of exposure to COVID-19 and provide necessary medical care. The patient (and/or legal guardian) has also been advised to contact this office for worsening conditions or problems, and seek emergency medical treatment and/or call 911 if deemed necessary. Services were provided through a video synchronous discussion virtually to substitute for in-person clinic visit. Patient and provider were located at their individual homes. SAVANNAH U[facility]  11/10/2020, 2:50 PM    Please note that this chart wasgenerated using voice recognition Dragon dictation software. Although every effort was made to ensure the accuracy of this automated transcription, some errors in transcription may have occurred.

## 2020-12-16 ENCOUNTER — TELEPHONE (OUTPATIENT)
Dept: INTERNAL MEDICINE CLINIC | Age: 50
End: 2020-12-16

## 2020-12-16 ENCOUNTER — OFFICE VISIT (OUTPATIENT)
Dept: INTERNAL MEDICINE CLINIC | Age: 50
End: 2020-12-16
Payer: COMMERCIAL

## 2020-12-16 VITALS
SYSTOLIC BLOOD PRESSURE: 124 MMHG | DIASTOLIC BLOOD PRESSURE: 70 MMHG | OXYGEN SATURATION: 98 % | TEMPERATURE: 97.1 F | WEIGHT: 217 LBS | BODY MASS INDEX: 37.05 KG/M2 | HEIGHT: 64 IN | HEART RATE: 88 BPM

## 2020-12-16 DIAGNOSIS — Z80.3 FAMILY HISTORY OF BREAST CANCER: ICD-10-CM

## 2020-12-16 DIAGNOSIS — N64.4 PAIN OF LEFT BREAST: ICD-10-CM

## 2020-12-16 DIAGNOSIS — B35.4 TINEA CORPORIS: ICD-10-CM

## 2020-12-16 DIAGNOSIS — D22.9 BENIGN MOLE: Primary | ICD-10-CM

## 2020-12-16 PROCEDURE — 99213 OFFICE O/P EST LOW 20 MIN: CPT | Performed by: NURSE PRACTITIONER

## 2020-12-16 RX ORDER — NYSTATIN 100000 [USP'U]/G
POWDER TOPICAL
Qty: 60 G | Refills: 3 | Status: SHIPPED | OUTPATIENT
Start: 2020-12-16 | End: 2021-06-30 | Stop reason: SDUPTHER

## 2020-12-16 NOTE — PROGRESS NOTES
Visit Information    Have you changed or started any medications since your last visit including any over-the-counter medicines, vitamins, or herbal medicines? no   Are you having any side effects from any of your medications? -  no  Have you stopped taking any of your medications? Is so, why? -  no    Have you seen any other physician or provider since your last visit? Yes - Records Obtained  Have you had any other diagnostic tests since your last visit? No  Have you been seen in the emergency room and/or had an admission to a hospital since we last saw you? No  Have you had your routine dental cleaning in the past 6 months? yes    Have you activated your [employer] account? If not, what are your barriers?  Yes     Patient Care Team:  Terrie Talbert MD as PCP - General (Internal Medicine)  Terrie Talbert MD as PCP - [facility] Provider  Jh Longo DO as Consulting Physician (Obstetrics & Gynecology)    Medical History Review  Past Medical, Family, and Social History reviewed and does not contribute to the patient presenting condition    Health Maintenance   Topic Date Due    Pneumococcal 0-64 years Vaccine (1 of 1 - PPSV23) 12/03/1977    DTaP/Tdap/Td vaccine (1 - Tdap) 12/03/1990    Flu vaccine (1) 10/30/2021 (Originally 9/1/2020)    TSH testing  07/08/2021    Diabetes screen  10/18/2021    Lipid screen  10/26/2021    Cervical cancer screen  11/07/2024    Colon cancer screen colonoscopy  11/21/2027    Hepatitis C screen  Completed    HIV screen  Completed    Hepatitis A vaccine  Aged Out    Hepatitis B vaccine  Aged Out    Hib vaccine  Aged Out    Meningococcal (ACWY) vaccine  Aged Out         [address] [address]  Dept: [phone]  Dept Fax: [phone]    OfficeProgress/Follow Up Note  Date of patient's visit: 1/5/2021  Patient's Name:  Manny Ayers YOB: 1970            Patient Care Team: Laquita Dumont MD as PCP - General (Internal Medicine)  Laquita Dumont MD as PCP - [facility] Empaneled Provider  Stephen Darby DO as Consulting Physician (Obstetrics & Gynecology)    REASON FOR VISIT:Same day visit    HISTORY OF PRESENT ILLNESS:      Chief Complaint   Patient presents with    Other     pain in left breast       History was obtained from the patient. Abdon Randolph is a 53 y.o. female here today for pain in L axilla. Patient reports pain originates at nevus found in L axilla. Patient denies changes in size, color, shape or evolution of nevus. Pain is characterized as an intermittent ache. Denies aggravating or alleviating factors. Pain radiates from nevus into left breast during palpation. Patient no longer wears underwire bras due to irritation.      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[address]    Environmental allergies    H/O thyroid cyst    History of cervical dysplasia    History of conization of cervix     History of low transverse  section    Vision abnormalities    Family history of breast cancer    Osteoarthritis    VASECTOMY in Male Partner    History of endometrial ablation    Pelvic pain in female    Hypothyroidism    Abnormal uterine bleeding    Diverticulosis of colon    Rectal bleeding    Constipation    Isolated corticotropin deficiency (HCC)    Persistent depressive disorder    Generalized anxiety disorder with panic attacks    Other forms of systemic lupus erythematosus (HCC)    Panic attacks  Stroke-like symptoms    Numbness       MEDICATIONS:      Current Outpatient Medications   Medication Sig Dispense Refill    nystatin (MYCOSTATIN) 554289 UNIT/GM powder Apply 3 times daily. (Patient not taking: Reported on 12/17/2020) 60 g 3    citalopram (CELEXA) 10 MG tablet Take 1 tablet by mouth daily With the celexa 20mg tab to make 30mg dialy 30 tablet 3    citalopram (CELEXA) 20 MG tablet Take 1 tablet by mouth daily With the celexa 10mg tab to make 30mg dialy 30 tablet 3    naproxen (NAPROSYN) 500 MG tablet Take 1 tablet by mouth 2 times daily (with meals) 60 tablet 3    aspirin 81 MG EC tablet Take 1 tablet by mouth daily 30 tablet 3    atorvastatin (LIPITOR) 20 MG tablet Take 1 tablet by mouth nightly 30 tablet 3    levothyroxine (SYNTHROID) 50 MCG tablet Take 1 tablet by mouth daily 90 tablet 3    Omeprazole-Sodium Bicarbonate (ZEGERID)  MG CAPS Take by mouth      LORazepam (ATIVAN) 0.5 MG tablet 0.5 mg daily as needed.  pregabalin (LYRICA) 75 MG capsule take 1 capsule by mouth twice a day  0    Cholecalciferol (VITAMIN D3 PO) Take by mouth      Cetirizine HCl (ZYRTEC PO) Take 10 mg by mouth daily       B Complex Vitamins (VITAMIN B COMPLEX PO) Take by mouth daily       No current facility-administered medications for this visit. ALLERGIES:      Allergies   Allergen Reactions    Other      Perch - twice had violent vomiting, diarrhea,severe dizziness and nausea    Percocet [Oxycodone-Acetaminophen] Hives and Itching     Hives everywhere, including roof of mouth and tear duct openings    Tramadol Hcl Hives and Itching     hives    Azithromycin      Palpitations.  Cortisone      Pt states she became very ill the day after cortisone injection in heel. Pt. States pills by mouth not a problem    Ibuprofen Hives    Ceclor [Cefaclor] Hives and Rash    Penicillins Hives and Rash       SOCIAL HISTORY   Reviewed and no change from previous record. Lorelei  reports that she has quit smoking. She has never used smokeless tobacco.    FAMILY HISTORY:    Reviewed and No change from previous visit    REVIEW OF SYSTEMS:    Review of Systems   Constitutional: Negative for chills, diaphoresis, fatigue, fever and unexpected weight change. HENT: Negative for congestion, postnasal drip, rhinorrhea, sneezing, sore throat and trouble swallowing. Respiratory: Negative for cough, chest tightness, shortness of breath and wheezing. Cardiovascular: Positive for chest pain (L breast/ L axilla pain). Gastrointestinal: Negative for constipation, diarrhea, nausea and vomiting. Endocrine: Negative for polydipsia, polyphagia and polyuria. Genitourinary: Negative for difficulty urinating, dysuria, flank pain, frequency and urgency. Musculoskeletal: Negative for arthralgias. Skin: Positive for rash (abdominal pannus). Negative for color change. Neurological: Negative for dizziness, tremors, syncope, weakness and numbness. Psychiatric/Behavioral: Negative for confusion and hallucinations. PHYSICAL EXAM:      Vitals:    12/16/20 0957   BP: 124/70   Pulse: 88   Temp: 97.1 °F (36.2 °C)   SpO2: 98%   Weight: 217 lb (98.4 kg)   Height: 5' 4\" (1.626 m)       Physical Exam  Vitals signs reviewed. Constitutional:       Appearance: Normal appearance. HENT:      Head: Normocephalic. Cardiovascular:      Rate and Rhythm: Normal rate and regular rhythm. Pulses: Normal pulses. Heart sounds: Normal heart sounds. Pulmonary:      Effort: Pulmonary effort is normal.      Breath sounds: Normal breath sounds. Chest:       Abdominal:      General: Bowel sounds are normal.      Palpations: Abdomen is soft. Musculoskeletal: Normal range of motion. General: No swelling, tenderness or deformity. Skin:     General: Skin is warm and dry. Findings: Rash (erythematous rash noted in abdominal pannus) present.    Neurological: General: No focal deficit present. Mental Status: She is alert and oriented to person, place, and time. Psychiatric:         Mood and Affect: Mood normal.         Behavior: Behavior normal.         Thought Content: Thought content normal.         Judgment: Judgment normal.          ASSESSMENT AND PLAN:      1. Benign mole  - External Referral To Dermatology    2. Pain of left breast  - SARAHY DIGITAL SCREEN W OR WO CAD BILATERAL; Future    3. Family history of breast cancer    - SARAHY DIGITAL SCREEN W OR WO CAD BILATERAL; Future    4. Tinea corporis  - nystatin (MYCOSTATIN) 143403 UNIT/GM powder; Apply 3 times daily. (Patient not taking: Reported on 12/17/2020)  Dispense: 60 g; Refill: 3      FOLLOW UP AND INSTRUCTIONS:   Return in about 2 weeks (around 12/30/2020) for evaluation of rash. Discussed use, benefit, and side effects of prescribed medications. All patient questions answered. Patient voiced understanding. Patient given educational materials - see patient instructions    STEPHIE Luna - CNP   NICOLE VIRGEN [facility]  1/5/2021, 8:27 AM    Please note that this chart wasgenerated using voice recognition Dragon dictation software. Although every effort was made to ensure the accuracy of this automated transcription, some errors in transcription may have occurred.

## 2020-12-17 ENCOUNTER — OFFICE VISIT (OUTPATIENT)
Dept: NEUROLOGY | Age: 50
End: 2020-12-17
Payer: COMMERCIAL

## 2020-12-17 VITALS
BODY MASS INDEX: 37.05 KG/M2 | HEART RATE: 92 BPM | SYSTOLIC BLOOD PRESSURE: 129 MMHG | DIASTOLIC BLOOD PRESSURE: 82 MMHG | TEMPERATURE: 97.2 F | HEIGHT: 64 IN | WEIGHT: 217 LBS

## 2020-12-17 PROCEDURE — 99214 OFFICE O/P EST MOD 30 MIN: CPT | Performed by: NURSE PRACTITIONER

## 2020-12-17 NOTE — PROGRESS NOTES
She is here today reporting that approximately one week ago there was another episode where she was eating and then began having excruciating pain on the left side of her face. This only lasted momentarily but there was residual numbness in face for a couple of days following. She reports that she is currently going through menopause. She has had migraines most of her life but they have been infrequent with manageable pain levels. She reports that she has been having balance issues lately. She is currently followed by rheumatology for management of rheumatoid arthritis and fibromyalgia. Prior testing reviewed:    Lab Results   Component Value Date     CHOL 222 (H) 10/26/2020     LDLCHOLESTEROL 138 (H) 10/26/2020     HDL 36 (L) 10/26/2020     TRIG 240 (H) 10/26/2020     ALT 12 05/21/2019     AST 13 05/21/2019     TSH 4.71 07/08/2020     INR 0.9 10/25/2020     LABA1C 5.4 10/18/2018      Hematology:        Recent Labs     10/25/20  2115 10/26/20  0534 10/27/20  0527   WBC 8.7 7.0 6.8   HGB 13.3 13.2 13.0   HCT 38.1 39.3 38.3    203 189   INR 0.9  --   --       Chemistry:        Recent Labs     10/25/20  2115 10/26/20  0534 10/27/20  0527    139 138   K 3.9 4.0 4.1    103 102   CO2 24 26 26   GLUCOSE 110* 107* 117*   BUN 8 8 14   CREATININE 0.61 0.53 0.65   CALCIUM 9.1 9.0 9.1           Recent Labs     10/25/20  2115 10/26/20  0534   CHOL  --  222*   HDL  --  36*   LDLCHOLESTEROL  --  138*   CHOLHDLRATIO  --  6.2*   TRIG  --  240*   VLDL  --  NOT REPORTED*   CKTOTAL 92  --    CKMB 1.8  --          No results found for: PHENYTOIN, PHENYTOIN, VALPROATE, CBMZ           Diagnostic data reviewed:    CT head 10/25/2020: No acute intracranial abnormalities.     CTA head and neck 10/25/2020: No LVO or critical stenosis.     Echo: Left ventricle is normal in size and wall thickness.  Global left ventricular systolic function is normal. EF 60-65 COLONOSCOPY performed by Fauzia Rand MD at [address] [place] [address] EGD TRANSORAL BIOPSY SINGLE/MULTIPLE N/A 11/21/2017    EGD BIOPSY performed by Fauzia Rand MD at [facility] 08/09/2016    Right thyroid lobectomy and isthmusectomy. Continuous monitoring of the recurrent laryngeal nerve using nerve integrity monitor. Frozen section.  TONSILLECTOMY      UPPER GASTROINTESTINAL ENDOSCOPY  12/12/2008    with BRAVO, gastric polyp-fundic gland polyp    UPPER GASTROINTESTINAL ENDOSCOPY  11/21/2017    multiple small gastric polyps-fundic gland polyps        SOCIAL HISTORY:     Social History     Socioeconomic History    Marital status:      Spouse name: Not on file    Number of children: Not on file    Years of education: Not on file    Highest education level: Not on file   Occupational History    Not on file   Social Needs    Financial resource strain: Not on file    Food insecurity     Worry: Not on file     Inability: Not on file    Transportation needs     Medical: Not on file     Non-medical: Not on file   Tobacco Use    Smoking status: Former Smoker    Smokeless tobacco: Never Used   Substance and Sexual Activity    Alcohol use:  Yes     Alcohol/week: 0.0 standard drinks     Comment: recovering alcoholic    Drug use: No    Sexual activity: Yes     Partners: Male     Birth control/protection: Other-see comments     Comment:  had vasectomy   Lifestyle    Physical activity     Days per week: Not on file     Minutes per session: Not on file    Stress: Not on file   Relationships    Social connections     Talks on phone: Not on file     Gets together: Not on file     Attends Jew service: Not on file     Active member of club or organization: Not on file     Attends meetings of clubs or organizations: Not on file     Relationship status: Not on file    Intimate partner violence     Fear of current or ex partner: Not on file Emotionally abused: Not on file     Physically abused: Not on file     Forced sexual activity: Not on file   Other Topics Concern    Not on file   Social History Narrative    Not on file       CURRENT MEDICATIONS:     Current Outpatient Medications   Medication Sig Dispense Refill    B Complex Vitamins (VITAMIN B COMPLEX PO) Take by mouth daily      citalopram (CELEXA) 10 MG tablet Take 1 tablet by mouth daily With the celexa 20mg tab to make 30mg dialy 30 tablet 3    citalopram (CELEXA) 20 MG tablet Take 1 tablet by mouth daily With the celexa 10mg tab to make 30mg dialy 30 tablet 3    naproxen (NAPROSYN) 500 MG tablet Take 1 tablet by mouth 2 times daily (with meals) 60 tablet 3    aspirin 81 MG EC tablet Take 1 tablet by mouth daily 30 tablet 3    atorvastatin (LIPITOR) 20 MG tablet Take 1 tablet by mouth nightly 30 tablet 3    levothyroxine (SYNTHROID) 50 MCG tablet Take 1 tablet by mouth daily 90 tablet 3    Omeprazole-Sodium Bicarbonate (ZEGERID)  MG CAPS Take by mouth      LORazepam (ATIVAN) 0.5 MG tablet 0.5 mg daily as needed.  pregabalin (LYRICA) 75 MG capsule take 1 capsule by mouth twice a day  0    Cholecalciferol (VITAMIN D3 PO) Take by mouth      Cetirizine HCl (ZYRTEC PO) Take 10 mg by mouth daily       nystatin (MYCOSTATIN) 251281 UNIT/GM powder Apply 3 times daily. (Patient not taking: Reported on 12/17/2020) 60 g 3     No current facility-administered medications for this visit. ALLERGIES:     Allergies   Allergen Reactions    Other      Perch - twice had violent vomiting, diarrhea,severe dizziness and nausea    Percocet [Oxycodone-Acetaminophen] Hives and Itching     Hives everywhere, including roof of mouth and tear duct openings    Tramadol Hcl Hives and Itching     hives    Azithromycin      Palpitations.  Cortisone      Pt states she became very ill the day after cortisone injection in heel.  Pt. States pills by mouth not a problem  Ibuprofen Hives    Ceclor [Cefaclor] Hives and Rash    Penicillins Hives and Rash                                 REVIEW OF SYSTEMS        All items selected indicate a positive finding. Those items not selected are negative. Constitutional [] Weight loss/gain   [x] Fatigue  [] Fever/Chills   HEENT [] Hearing Loss  [] Visual Disturbance  [] Tinnitus  [] Eye pain   Respiratory [] Shortness of Breath  [] Cough  [] Snoring   Cardiovascular [] Chest Pain  [] Palpitations  [] Lightheaded   GI [] Constipation  [] Diarrhea  [] Swallowing change  [] Nausea/vomiting    [] Urinary Frequency  [] Urinary Urgency   Musculoskeletal [] Neck pain  [] Back pain  [] Muscle pain  [] Restless legs   Dermatologic [] Skin changes   Neurologic [] Memory loss/confusion  [] Seizures  [x] Trouble walking or imbalance  [] Dizziness  [] Sleep disturbance  [] Weakness  [x] Numbness  [] Tremors  [] Speech Difficulty  [x] Headaches  [] Light Sensitivity  [] Sound Sensitivity   Endocrinology []Excessive thirst  []Excessive hunger   Psychiatric [] Anxiety/Depression  [] Hallucination   Allergy/immunology []Hives/environmental allergies   Hematologic/lymph [] Abnormal bleeding  [] Abnormal bruising         PHYSICAL EXAMINATION:       Vitals:    12/17/20 0947   BP: 129/82   Pulse: 92   Temp: 97.2 °F (36.2 °C)                                              .                                                                                                     General Appearance:  Alert, cooperative, no signs of distress, appears stated age   Head:  Normocephalic, no signs of trauma   Eyes:  Conjunctiva/corneas clear;  eyelids intact   Ears:  Normal external ear and canals   Nose: Nares normal, mucosa normal, no drainage    Throat: Lips and tongue normal; teeth normal;  gums normal   Neck: Supple, intact flexion, extension and rotation;   trachea midline;  no adenopathy;   thyroid: not enlarged;   no carotid pulse abnormality Back:   Symmetric, no curvature, ROM adequate   Lungs:   Respirations unlabored   Heart:  Regular rate and rhythm           Extremities: Extremities normal, no cyanosis, no edema   Pulses: Symmetric over head and neck   Skin: Skin color, texture normal, no rashes, no lesions                                     NEUROLOGIC EXAMINATION    Neurologic Exam  Mental status    Alert and oriented x 3; intact memory with no confusion, speech or language problems; no hallucinations or delusions  Fund of information appropriate for level of education    Cranial nerves    II - visual fields intact to confrontation bilaterally  III, IV, VI  extra-ocular muscles full: no pupillary defect; no ARNOLD, no nystagmus, no ptosis   V - normal facial sensation                                                               VII - normal facial symmetry                                                             VIII - intact hearing                                                                             IX, X - symmetrical palate                                                                  XI - symmetrical shoulder shrug                                                       XII - tongue midline without atrophy or fasciculation      Motor function  Normal muscle bulk and tone; strength 5/5 on all 4 extremities, no pronator drift      Sensory function Intact to light touch, pinprick, vibration, proprioception on all 4 extremities      Cerebellar Intact fine motor movement. No involuntary movements or tremors. No ataxia or dysmetria on finger to nose or heel to shin testing      Reflex function DTR 2+ on bilateral UE and LE, symmetric. Negative Babinski      Gait                   normal base and arm swing                  ASSESSMENT AND PLAN:           In summary, your patient, Cyn Henderson exhibits the following, with associated plan:    1. Probable Complex Migraines  1.  Continue aspirin 81 mg daily and atorvastatin 20 mg nightly 2. Recommended that patient keep log of these episodes, depending on the frequency we will consider adding prophylactic medication  3. Patient to return for follow up visit in 6 months, she was instructed to contact the office for a sooner appointment if these begin to increase in frequency  2. Lupus, rheumatoid arthritis and Fibromyalgia  1. Continue to follow with rheumatology          Signed: Graciela Suggs CNP      *Please note that portions of this note were completed with a voice recognition program.  Although every effort was made to insure the accuracy of this automated transcription, some errors in transcription may have occurred, occasionally words and are mis-transcribed    Provider Attestation:    *The documentation recorded by the scribe accurately reflects the service I personally performed and the decisions made by myself. Portions of this exam were transcribed by a scribe. I personally performed the history, physical exam, and the medical decision-making and confirm the accuracy of the information in the transcribed note. *      Scribe Attestation:     By signing my name below, I, Estephania Reveles, attest that this documentation has been prepared under the direction and in the presence of Graciela Suggs CNP.

## 2020-12-18 ENCOUNTER — TELEPHONE (OUTPATIENT)
Dept: INTERNAL MEDICINE CLINIC | Age: 50
End: 2020-12-18

## 2020-12-24 ENCOUNTER — HOSPITAL ENCOUNTER (OUTPATIENT)
Dept: WOMENS IMAGING | Age: 50
Discharge: HOME | End: 2020-12-26
Payer: COMMERCIAL

## 2020-12-24 PROCEDURE — G0279 TOMOSYNTHESIS, MAMMO: HCPCS

## 2020-12-24 PROCEDURE — 76642 ULTRASOUND BREAST LIMITED: CPT

## 2021-01-04 RX ORDER — NAPROXEN 500 MG/1
500 TABLET ORAL 2 TIMES DAILY WITH MEALS
Qty: 180 TABLET | Refills: 3 | Status: CANCELLED | OUTPATIENT
Start: 2021-01-04

## 2021-01-04 NOTE — TELEPHONE ENCOUNTER
Medication:Naproxen  Last visit: 12/16/2020  Next visit: Visit date not found  Last refill: 10/27/2020  Pharmacy: [facility]

## 2021-01-05 ASSESSMENT — ENCOUNTER SYMPTOMS
CHEST TIGHTNESS: 0
RHINORRHEA: 0
WHEEZING: 0
VOMITING: 0
NAUSEA: 0
COUGH: 0
DIARRHEA: 0
TROUBLE SWALLOWING: 0
SHORTNESS OF BREATH: 0
SORE THROAT: 0
COLOR CHANGE: 0
CONSTIPATION: 0

## 2021-01-08 RX ORDER — NAPROXEN 500 MG/1
500 TABLET ORAL 2 TIMES DAILY WITH MEALS
Qty: 180 TABLET | Refills: 1 | Status: ON HOLD | OUTPATIENT
Start: 2021-01-08 | End: 2021-03-04 | Stop reason: HOSPADM

## 2021-01-08 NOTE — TELEPHONE ENCOUNTER
Spoke w/ pt regarding external referral to dermatology. Pt states she has not looked into where she is going to go. She is going to call back once she makes a decision w/ providers information.

## 2021-01-08 NOTE — TELEPHONE ENCOUNTER
Medication: naproxyn  Last visit: 12/16/20  Next visit: Visit date not found  Last refill: 10/27/20  Ph[facility]: [facility]    Dr Hosea Meckel patient--she is off

## 2021-01-14 NOTE — TELEPHONE ENCOUNTER
Writer spoke w/ pt and she stated that she couldn't remember why she was referred to derm    Writer informed pt that she was being referred for benign mole. Writer gave pt Dr. Rose Marie Simmons office phone number to call tomorrow to see if they would accept her [address]. Pt to follow up w/ fabianar after she reaches Dr. Rose Marie Simmons office.

## 2021-01-18 NOTE — TELEPHONE ENCOUNTER
Pt called and stated she got an appt w/ Jacklyn Scalse NP @ Dr. Kayce Collado office in [place]. Referral updated and faxed.

## 2021-02-15 DIAGNOSIS — F41.9 ANXIETY AND DEPRESSION: Chronic | ICD-10-CM

## 2021-02-15 DIAGNOSIS — F32.A ANXIETY AND DEPRESSION: Chronic | ICD-10-CM

## 2021-02-18 RX ORDER — CITALOPRAM 10 MG/1
TABLET ORAL
Qty: 30 TABLET | Refills: 3 | Status: SHIPPED | OUTPATIENT
Start: 2021-02-18 | End: 2021-03-01 | Stop reason: SDUPTHER

## 2021-03-01 ENCOUNTER — APPOINTMENT (OUTPATIENT)
Dept: ULTRASOUND IMAGING | Age: 51
DRG: 378 | End: 2021-03-01
Attending: INTERNAL MEDICINE
Payer: COMMERCIAL

## 2021-03-01 ENCOUNTER — HOSPITAL ENCOUNTER (OUTPATIENT)
Age: 51
Setting detail: OBSERVATION
Discharge: HOME | DRG: 378 | End: 2021-03-04
Attending: INTERNAL MEDICINE | Admitting: INTERNAL MEDICINE
Payer: COMMERCIAL

## 2021-03-01 ENCOUNTER — OFFICE VISIT (OUTPATIENT)
Dept: INTERNAL MEDICINE CLINIC | Age: 51
End: 2021-03-01
Payer: COMMERCIAL

## 2021-03-01 VITALS
BODY MASS INDEX: 36.81 KG/M2 | WEIGHT: 215.6 LBS | TEMPERATURE: 97 F | SYSTOLIC BLOOD PRESSURE: 110 MMHG | HEIGHT: 64 IN | DIASTOLIC BLOOD PRESSURE: 72 MMHG

## 2021-03-01 DIAGNOSIS — F32.A ANXIETY AND DEPRESSION: Chronic | ICD-10-CM

## 2021-03-01 DIAGNOSIS — E78.5 HYPERLIPIDEMIA, UNSPECIFIED HYPERLIPIDEMIA TYPE: Primary | ICD-10-CM

## 2021-03-01 DIAGNOSIS — F41.9 ANXIETY AND DEPRESSION: Chronic | ICD-10-CM

## 2021-03-01 PROBLEM — K92.2 GI BLEED: Status: ACTIVE | Noted: 2021-03-01

## 2021-03-01 PROBLEM — R19.7 DIARRHEA: Status: ACTIVE | Noted: 2021-03-01

## 2021-03-01 PROBLEM — G43.909 MIGRAINE: Status: ACTIVE | Noted: 2021-03-01

## 2021-03-01 LAB
ALBUMIN SERPL-MCNC: 4.2 G/DL (ref 3.5–5.2)
ALBUMIN/GLOBULIN RATIO: ABNORMAL (ref 1–2.5)
ALP BLD-CCNC: 84 U/L (ref 35–104)
ALT SERPL-CCNC: 12 U/L (ref 5–33)
ANION GAP SERPL CALCULATED.3IONS-SCNC: 8 MMOL/L (ref 9–17)
AST SERPL-CCNC: 13 U/L
BILIRUB SERPL-MCNC: 0.36 MG/DL (ref 0.3–1.2)
BUN BLDV-MCNC: 12 MG/DL (ref 6–20)
BUN/CREAT BLD: ABNORMAL (ref 9–20)
CALCIUM SERPL-MCNC: 9.2 MG/DL (ref 8.6–10.4)
CHLORIDE BLD-SCNC: 104 MMOL/L (ref 98–107)
CO2: 25 MMOL/L (ref 20–31)
CREAT SERPL-MCNC: 0.47 MG/DL (ref 0.5–0.9)
GFR AFRICAN AMERICAN: >60 ML/MIN
GFR NON-AFRICAN AMERICAN: >60 ML/MIN
GFR SERPL CREATININE-BSD FRML MDRD: ABNORMAL ML/MIN/{1.73_M2}
GFR SERPL CREATININE-BSD FRML MDRD: ABNORMAL ML/MIN/{1.73_M2}
GLUCOSE BLD-MCNC: 104 MG/DL (ref 70–99)
HCT VFR BLD CALC: 34.8 % (ref 36–46)
HCT VFR BLD CALC: 34.9 % (ref 36–46)
HEMOGLOBIN: 11.5 G/DL (ref 12–16)
HEMOGLOBIN: 11.7 G/DL (ref 12–16)
INR BLD: 1
MCH RBC QN AUTO: 30.1 PG (ref 26–34)
MCHC RBC AUTO-ENTMCNC: 33.1 G/DL (ref 31–37)
MCV RBC AUTO: 91 FL (ref 80–100)
NRBC AUTOMATED: ABNORMAL PER 100 WBC
PARTIAL THROMBOPLASTIN TIME: 35.6 SEC (ref 24–36)
PDW BLD-RTO: 14.6 % (ref 11.5–14.9)
PLATELET # BLD: 170 K/UL (ref 150–450)
PMV BLD AUTO: 10.5 FL (ref 6–12)
POTASSIUM SERPL-SCNC: 3.8 MMOL/L (ref 3.7–5.3)
PROTHROMBIN TIME: 13.3 SEC (ref 11.8–14.6)
RBC # BLD: 3.83 M/UL (ref 4–5.2)
SODIUM BLD-SCNC: 137 MMOL/L (ref 135–144)
TOTAL PROTEIN: 6.7 G/DL (ref 6.4–8.3)
WBC # BLD: 6.8 K/UL (ref 3.5–11)

## 2021-03-01 PROCEDURE — G0378 HOSPITAL OBSERVATION PER HR: HCPCS

## 2021-03-01 PROCEDURE — 85014 HEMATOCRIT: CPT

## 2021-03-01 PROCEDURE — 96374 THER/PROPH/DIAG INJ IV PUSH: CPT

## 2021-03-01 PROCEDURE — 81003 URINALYSIS AUTO W/O SCOPE: CPT

## 2021-03-01 PROCEDURE — 36415 COLL VENOUS BLD VENIPUNCTURE: CPT

## 2021-03-01 PROCEDURE — 2580000003 HC RX 258: Performed by: INTERNAL MEDICINE

## 2021-03-01 PROCEDURE — 85027 COMPLETE CBC AUTOMATED: CPT

## 2021-03-01 PROCEDURE — G0379 DIRECT REFER HOSPITAL OBSERV: HCPCS

## 2021-03-01 PROCEDURE — 96375 TX/PRO/DX INJ NEW DRUG ADDON: CPT

## 2021-03-01 PROCEDURE — C9113 INJ PANTOPRAZOLE SODIUM, VIA: HCPCS | Performed by: STUDENT IN AN ORGANIZED HEALTH CARE EDUCATION/TRAINING PROGRAM

## 2021-03-01 PROCEDURE — 2580000003 HC RX 258: Performed by: STUDENT IN AN ORGANIZED HEALTH CARE EDUCATION/TRAINING PROGRAM

## 2021-03-01 PROCEDURE — 6360000002 HC RX W HCPCS: Performed by: STUDENT IN AN ORGANIZED HEALTH CARE EDUCATION/TRAINING PROGRAM

## 2021-03-01 PROCEDURE — 85610 PROTHROMBIN TIME: CPT

## 2021-03-01 PROCEDURE — 6370000000 HC RX 637 (ALT 250 FOR IP): Performed by: STUDENT IN AN ORGANIZED HEALTH CARE EDUCATION/TRAINING PROGRAM

## 2021-03-01 PROCEDURE — 80053 COMPREHEN METABOLIC PANEL: CPT

## 2021-03-01 PROCEDURE — 85730 THROMBOPLASTIN TIME PARTIAL: CPT

## 2021-03-01 PROCEDURE — 85018 HEMOGLOBIN: CPT

## 2021-03-01 PROCEDURE — 99214 OFFICE O/P EST MOD 30 MIN: CPT | Performed by: INTERNAL MEDICINE

## 2021-03-01 PROCEDURE — 99223 1ST HOSP IP/OBS HIGH 75: CPT | Performed by: INTERNAL MEDICINE

## 2021-03-01 PROCEDURE — 76705 ECHO EXAM OF ABDOMEN: CPT

## 2021-03-01 RX ORDER — VITAMIN C
1 TAB ORAL DAILY
Status: DISCONTINUED | OUTPATIENT
Start: 2021-03-01 | End: 2021-03-04 | Stop reason: HOSPADM

## 2021-03-01 RX ORDER — CITALOPRAM 10 MG/1
TABLET ORAL
Qty: 30 TABLET | Refills: 3 | Status: SHIPPED | OUTPATIENT
Start: 2021-03-01 | End: 2021-06-30 | Stop reason: SDUPTHER

## 2021-03-01 RX ORDER — POTASSIUM CHLORIDE 20 MEQ/1
40 TABLET, EXTENDED RELEASE ORAL PRN
Status: DISCONTINUED | OUTPATIENT
Start: 2021-03-01 | End: 2021-03-04 | Stop reason: HOSPADM

## 2021-03-01 RX ORDER — SODIUM CHLORIDE 9 MG/ML
10 INJECTION INTRAVENOUS DAILY
Status: DISCONTINUED | OUTPATIENT
Start: 2021-03-01 | End: 2021-03-03

## 2021-03-01 RX ORDER — FAMOTIDINE 20 MG/1
20 TABLET, FILM COATED ORAL 2 TIMES DAILY
Status: DISCONTINUED | OUTPATIENT
Start: 2021-03-01 | End: 2021-03-01

## 2021-03-01 RX ORDER — LEVOTHYROXINE SODIUM 0.05 MG/1
50 TABLET ORAL DAILY
Status: DISCONTINUED | OUTPATIENT
Start: 2021-03-01 | End: 2021-03-04 | Stop reason: HOSPADM

## 2021-03-01 RX ORDER — CITALOPRAM 20 MG/1
10 TABLET ORAL DAILY
Status: DISCONTINUED | OUTPATIENT
Start: 2021-03-01 | End: 2021-03-04 | Stop reason: HOSPADM

## 2021-03-01 RX ORDER — POTASSIUM CHLORIDE 7.45 MG/ML
10 INJECTION INTRAVENOUS PRN
Status: DISCONTINUED | OUTPATIENT
Start: 2021-03-01 | End: 2021-03-04 | Stop reason: HOSPADM

## 2021-03-01 RX ORDER — OMEPRAZOLE 40 MG/1
40 CAPSULE, DELAYED RELEASE ORAL NIGHTLY
COMMUNITY
Start: 2021-01-13

## 2021-03-01 RX ORDER — PANTOPRAZOLE SODIUM 40 MG/10ML
40 INJECTION, POWDER, LYOPHILIZED, FOR SOLUTION INTRAVENOUS DAILY
Status: DISCONTINUED | OUTPATIENT
Start: 2021-03-01 | End: 2021-03-03

## 2021-03-01 RX ORDER — METRONIDAZOLE 7.5 MG/G
GEL TOPICAL
COMMUNITY
Start: 2021-01-28 | End: 2021-10-01

## 2021-03-01 RX ORDER — SODIUM CHLORIDE 0.9 % (FLUSH) 0.9 %
10 SYRINGE (ML) INJECTION EVERY 12 HOURS SCHEDULED
Status: DISCONTINUED | OUTPATIENT
Start: 2021-03-01 | End: 2021-03-04 | Stop reason: HOSPADM

## 2021-03-01 RX ORDER — ASPIRIN 81 MG/1
81 TABLET ORAL DAILY
Status: DISCONTINUED | OUTPATIENT
Start: 2021-03-01 | End: 2021-03-04 | Stop reason: HOSPADM

## 2021-03-01 RX ORDER — SODIUM CHLORIDE 9 MG/ML
INJECTION, SOLUTION INTRAVENOUS CONTINUOUS
Status: DISCONTINUED | OUTPATIENT
Start: 2021-03-01 | End: 2021-03-04 | Stop reason: HOSPADM

## 2021-03-01 RX ORDER — POLYETHYLENE GLYCOL 3350 17 G/17G
17 POWDER, FOR SOLUTION ORAL DAILY PRN
Status: DISCONTINUED | OUTPATIENT
Start: 2021-03-01 | End: 2021-03-04 | Stop reason: HOSPADM

## 2021-03-01 RX ORDER — ATORVASTATIN CALCIUM 20 MG/1
20 TABLET, FILM COATED ORAL NIGHTLY
Status: DISCONTINUED | OUTPATIENT
Start: 2021-03-01 | End: 2021-03-04 | Stop reason: HOSPADM

## 2021-03-01 RX ORDER — CLOBETASOL PROPIONATE 0.5 MG/G
CREAM TOPICAL
COMMUNITY
Start: 2021-01-28

## 2021-03-01 RX ORDER — ATORVASTATIN CALCIUM 20 MG/1
20 TABLET, FILM COATED ORAL NIGHTLY
Qty: 30 TABLET | Refills: 3 | Status: SHIPPED | OUTPATIENT
Start: 2021-03-01 | End: 2021-03-08 | Stop reason: SDUPTHER

## 2021-03-01 RX ORDER — ONDANSETRON 2 MG/ML
4 INJECTION INTRAMUSCULAR; INTRAVENOUS EVERY 6 HOURS PRN
Status: DISCONTINUED | OUTPATIENT
Start: 2021-03-01 | End: 2021-03-04 | Stop reason: HOSPADM

## 2021-03-01 RX ORDER — CITALOPRAM 20 MG/1
20 TABLET ORAL DAILY
Status: DISCONTINUED | OUTPATIENT
Start: 2021-03-01 | End: 2021-03-04 | Stop reason: HOSPADM

## 2021-03-01 RX ORDER — ACETAMINOPHEN 325 MG/1
650 TABLET ORAL EVERY 6 HOURS PRN
Status: DISCONTINUED | OUTPATIENT
Start: 2021-03-01 | End: 2021-03-04 | Stop reason: HOSPADM

## 2021-03-01 RX ORDER — ACETAMINOPHEN 650 MG/1
650 SUPPOSITORY RECTAL EVERY 6 HOURS PRN
Status: DISCONTINUED | OUTPATIENT
Start: 2021-03-01 | End: 2021-03-04 | Stop reason: HOSPADM

## 2021-03-01 RX ORDER — SODIUM CHLORIDE 0.9 % (FLUSH) 0.9 %
10 SYRINGE (ML) INJECTION PRN
Status: DISCONTINUED | OUTPATIENT
Start: 2021-03-01 | End: 2021-03-04 | Stop reason: HOSPADM

## 2021-03-01 RX ORDER — PROMETHAZINE HYDROCHLORIDE 25 MG/1
12.5 TABLET ORAL EVERY 6 HOURS PRN
Status: DISCONTINUED | OUTPATIENT
Start: 2021-03-01 | End: 2021-03-04 | Stop reason: HOSPADM

## 2021-03-01 RX ORDER — CITALOPRAM 20 MG/1
20 TABLET ORAL DAILY
Qty: 30 TABLET | Refills: 3 | Status: SHIPPED | OUTPATIENT
Start: 2021-03-01 | End: 2021-06-30 | Stop reason: SDUPTHER

## 2021-03-01 RX ORDER — PREGABALIN 75 MG/1
75 CAPSULE ORAL 2 TIMES DAILY
Status: DISCONTINUED | OUTPATIENT
Start: 2021-03-01 | End: 2021-03-04 | Stop reason: HOSPADM

## 2021-03-01 RX ADMIN — PANTOPRAZOLE SODIUM 40 MG: 40 INJECTION, POWDER, FOR SOLUTION INTRAVENOUS at 19:52

## 2021-03-01 RX ADMIN — SODIUM CHLORIDE 10 ML: 9 INJECTION INTRAMUSCULAR; INTRAVENOUS; SUBCUTANEOUS at 19:52

## 2021-03-01 RX ADMIN — ONDANSETRON 4 MG: 2 INJECTION INTRAMUSCULAR; INTRAVENOUS at 23:09

## 2021-03-01 RX ADMIN — SODIUM CHLORIDE: 9 INJECTION, SOLUTION INTRAVENOUS at 22:36

## 2021-03-01 RX ADMIN — PREGABALIN 75 MG: 75 CAPSULE ORAL at 23:01

## 2021-03-01 RX ADMIN — Medication 10 ML: at 23:09

## 2021-03-01 ASSESSMENT — ENCOUNTER SYMPTOMS
CHEST TIGHTNESS: 0
ABDOMINAL PAIN: 1
VOMITING: 0
BACK PAIN: 1
DIARRHEA: 1
CONSTIPATION: 0
WHEEZING: 0
SHORTNESS OF BREATH: 1
NAUSEA: 1
BLOOD IN STOOL: 1
COUGH: 0

## 2021-03-01 ASSESSMENT — PAIN DESCRIPTION - LOCATION: LOCATION: OTHER (COMMENT)

## 2021-03-01 ASSESSMENT — PAIN SCALES - GENERAL: PAINLEVEL_OUTOF10: 6

## 2021-03-01 ASSESSMENT — PAIN DESCRIPTION - ORIENTATION: ORIENTATION: OTHER (COMMENT)

## 2021-03-01 ASSESSMENT — PAIN DESCRIPTION - PAIN TYPE: TYPE: ACUTE PAIN

## 2021-03-01 NOTE — H&P
[address]      [address] [facility]     HISTORY AND PHYSICAL EXAMINATION            Date:   3/1/2021  Patient name:  Lorena Bhakta  Date of admission:  3/1/2021 11:51 AM  MRN:   [id number]  Account:  [de-identified]  YOB: 1970  PCP:    Kd Vidal MD  Room:   2074/2074-01  Code Status:    Prior    Chief Complaint:     Abdominal pain, diarrhea, blood in stool    History Obtained From:     patient    History of Present Illness: The patient is a 53 y.o. Non-/non  female who presents with abdominal pain, diarrhea, blood in stool and she is admitted to the hospital for the management of GI bleed. Patient is a 71-year-old  female with past medical history of fibromyalgia, delta storage pool disease, diverticulosis, lupus, rheumatoid arthritis, complex migraine, and recent hospital admission in October 2020 for stroke-like symptoms with negative workup was directly admitted to the hospital for the work-up of a GI bleed. Patient reports she has had abdominal cramping and diarrhea for the past 5 days. She reports tenderness in LUQ, LLQ, RLQ, and mid-back. She states seeing pink tinged blood on her toilet paper after wiping as well as clots of blood in her toilet. Her stool is a dark brown color. She also has symptoms of dysphagia to solids, nausea, lightheadedness, headache, blurry vision, lower abdominal cramping, epigastric pain, and mid back pain, SOB. Denies sick contacts, traveling, fever, chills, paresthesia, vomiting, chest pain. No hx of alcohol abuse. Patient report taking 500mg naproxen twice daily for years. Patient last endoscopy and colonoscopy was in November 2017. It was positive for diverticulosis in the descending/sigmoid colon. Small hemorrhoids in the rectum/anus.     Vitals stable with /79, HR 72. GI was consulted. CBC, PT/INR, PTT, CMP were ordered. Normal saline infusion 100ml/hr started. Past Medical History:     Past Medical History:   Diagnosis Date    Ad[facility]al insufficiency [facility]     Asthma     Bleeding after intercourse     Cancer ([facility]     CERVICAL    Delta storage pool disease [facility])     Diverticulosis of colon     Environmental allergies     Family history of breast cancer     MGM in her 45s    GERD (gastroesophageal reflux disease)     H/O thyroid cyst     mild hypothyroidism.     Headache     History of cervical dysplasia     had cone    History of conization of cervix     dysplastic cells    Hypothyroidism, unspecified 3/18/2016    Lupus ([facility]     Osteoarthritis     Pain     all over body    Sleep apnea     awaiting test results currently    Vision abnormalities     glasses/ far sighted        Past SurgicalHistory:     Past Surgical History:   Procedure Laterality Date    BONE CYST EXCISION Right     WRIST    CARPAL TUNNEL RELEASE Right 10-6-15    CARPAL TUNNEL RELEASE Left 11/3/15    CERVIX BIOPSY       SECTION, LOW TRANSVERSE      COLONOSCOPY      COLONOSCOPY  2009    diverticulosis, no other gross lesions    COLONOSCOPY  2017    10 yr recall, small hemorrhoids, diverticulosis    DILATION AND CURETTAGE OF UTERUS N/A 2017    HYSTEROSCOPY AND D& C, myosure performed by Ekaterina Gill MD at [facility], COLON, DIAGNOSTIC      UGI    FRACTURE SURGERY Left     THUMB    GYNECOLOGIC CRYOSURGERY      conization   [address]      with mesh    LIPOMA RESECTION Right     RING FINGER    OTHER SURGICAL HISTORY      VOCAL CORD SURG    OK COLON CA SCRN NOT  [address] [place] [address] IND N/A 2017    COLONOSCOPY performed by Candy Avila MD at [address] [place] [address] EGD TRANSORAL BIOPSY SINGLE/MULTIPLE N/A 2017    EGD BIOPSY performed by [facility]andy [facility]ila MD at [facility] 2016 0.5 mg daily as needed. 4/24/18   Historical Provider, MD   pregabalin (LYRICA) 75 MG capsule take 1 capsule by mouth twice a day 10/10/19   Historical Provider, MD   Cholecalciferol (VITAMIN D3 PO) Take by mouth    Historical Provider, MD   Cetirizine HCl (ZYRTEC PO) Take 10 mg by mouth daily     Historical Provider, MD        Allergies: Other, Percocet [oxycodone-acetaminophen], Tramadol hcl, Azithromycin, Cortisone, Ibuprofen, Ceclor [cefaclor], and Penicillins    Social History:     Tobacco:    reports that she has quit smoking. She has never used smokeless tobacco.  Alcohol:      reports current alcohol use. Drug Use:  reports no history of drug use. Family History:     Family History   Problem Relation Age of Onset    Alcohol Abuse Father     Other Father         murder    Diabetes Mother     Other Mother         thyroid    High Blood Pressure Mother     Lupus Sister     Drug Abuse Brother     Breast Cancer Maternal Grandmother         45s    Heart Surgery Maternal Grandmother     Heart Failure Maternal Grandmother     Hypertension Maternal Grandfather     Stroke Maternal Grandfather     Heart Attack Maternal Grandfather     Alcohol Abuse Maternal Grandfather     Diabetes Maternal Grandfather     Cancer Paternal Grandmother         lymphnoid    Heart Failure Paternal Grandfather     Heart Surgery Paternal Grandfather         x2       Review of Systems:     Positive and Negative as described in HPI. Review of Systems   Constitutional: Negative for activity change, appetite change, chills and fever. HENT: Negative for congestion. Respiratory: Positive for shortness of breath. Negative for cough, chest tightness and wheezing. Cardiovascular: Negative for chest pain, palpitations and leg swelling. Gastrointestinal: Positive for abdominal pain, blood in stool, diarrhea and nausea. Negative for constipation and vomiting. Genitourinary: Positive for frequency and urgency. Negative for difficulty urinating. Musculoskeletal: Positive for back pain. Negative for joint swelling. Skin: Negative for rash. Neurological: Positive for light-headedness and headaches. Negative for dizziness and weakness. Psychiatric/Behavioral: Negative for agitation and behavioral problems. Physical Exam:   BP (!) 141/95   Pulse 83   Temp 98.6 °F (37 °C) (Oral)   Resp 18   SpO2 98%   Temp (24hrs), Av.8 °F (36.6 °C), Min:97 °F (36.1 °C), Max:98.6 °F (37 °C)    No results for input(s): POCGLU in the last 72 hours. No intake or output data in the 24 hours ending 21 1327    Physical Exam  Vitals signs reviewed. Constitutional:       General: She is not in acute distress. Appearance: She is obese. HENT:      Head: Normocephalic. Eyes:      Extraocular Movements: Extraocular movements intact. Pupils: Pupils are equal, round, and reactive to light. Cardiovascular:      Rate and Rhythm: Normal rate and regular rhythm. Heart sounds: Normal heart sounds. No murmur. No gallop. Pulmonary:      Effort: Pulmonary effort is normal. No respiratory distress. Breath sounds: Normal breath sounds. No wheezing. Abdominal:      General: Bowel sounds are normal.      Palpations: Abdomen is soft. Tenderness: There is abdominal tenderness. There is no guarding. Musculoskeletal:         General: No swelling. Right lower leg: No edema. Left lower leg: No edema. Skin:     General: Skin is warm and dry. Comments: Scratch marks from pet dogs on bilateral forearms   Neurological:      General: No focal deficit present. Mental Status: She is alert and oriented to person, place, and time.    Psychiatric:         Mood and Affect: Mood normal.         Behavior: Behavior normal.         Investigations:     Laboratory Testing:  Recent Results (from the past 24 hour(s))   CBC    Collection Time: 21  1:04 PM   Result Value Ref Range    WBC 6.8 3.5 - 11.0 k/uL    RBC 3.83 (L) 4.0 - 5.2 m/uL    Hemoglobin 11.5 (L) 12.0 - 16.0 g/dL    Hematocrit 34.9 (L) 36 - 46 %    MCV 91.0 80 - 100 fL    MCH 30.1 26 - 34 pg    MCHC 33.1 31 - 37 g/dL    RDW 14.6 11.5 - 14.9 %    Platelets 269 670 - 087 k/uL    MPV 10.5 6.0 - 12.0 fL    NRBC Automated NOT REPORTED per 100 WBC       Imaging/Diagnostics:  No results found. Assessment :      Primary Problem  GI bleed    Active Hospital Problems    Diagnosis Date Noted    GERD (gastroesophageal reflux disease) [K21.9]      Priority: Medium    Anxiety and depression [F41.9, F32.9] 07/07/2015     Priority: Medium    GI bleed [K92.2] 03/01/2021    Diarrhea [R19.7] 03/01/2021    Diverticulosis of colon [K57.30]     Hypothyroidism [E03.9] 05/09/2017    Delta storage pool disease [facility]) [D69.1]        Plan:     Patient status Admit as inpatient in the  Med/Surge    Probable GI bleed 2/2 to chronic NSAIDs usage  Mildly low Hgb 11.5  Normal: PT 13.3, INR 1.0, PTT 35.6  Endoscopy and colonoscopy 11/2017 shows diverticulosis in the descending/sigmoid colon. Small hemorrhoids in the rectum/anus.   Check Hbg & Hct in 8hr  PRBC transfusion if less than 7  IV PPI  Stop all anticoagulations  Order U/S liver  Order Lipid panel  GI consult    Diarrhea  Start normal saline 100ml/hr  Order CMP    Hypothyroidism  Continue Synthroid 50 mcg tablet daily    Generalized anxiety with a history of panic disorder  Continue citalopram 30 mg daily    Hx of Complex Migraines  Continue aspirin 81 mg daily atorvastatin 20 mg nightly    Fibromyalgia  Continue Lyrica 75 mg twice daily    Diet: NPO  DVT prophylaxis: EPC  GI prophylaxis: IV Protonix  PT/OT/SW     Disposition:     Consultations:   IP CONSULT TO GI    Patient is admitted as inpatient status because of co-morbiditieslisted above, severity of signs and symptoms as outlined, requirement for current medical therapies and most importantly because of direct risk to patient if care not provided in a hospital setting.     Danielle Heard MD  3/1/2021  1:27 PM    Copy sent to Dr. Gustavo Calzada MD

## 2021-03-01 NOTE — PROGRESS NOTES
Subjective:      Patient ID: Manny Ayers is a 53 y.o. female. HPI       HPI   1) Location/Symptom - Blood in stools , when ever she wipes her after passing stools   2) Timing/Onset: 5 days   3) Context/Setting: has H/o Diverticulitis , Brownish Red stools   4) Quality:   5) Duration: continuous   6) Modifying Factors: Associated with Loose stools , 4-5 times /day ,   7) Severity: moderate   Last EGD and Colonoscopy in 2017 , was told that she had Diverticulitis   Has Difficulty in swallowing Food   Feels Dizzy , Fatigue , Tired   Feels that she is Going to pass Out   Take Naprosyn BID   Has H/o Lupus /Fibromyalgia , Platelet Dysfunction   Review of Systems   Constitutional: Positive for fatigue. Negative for activity change, appetite change, chills, diaphoresis and fever. HENT: Negative for congestion, dental problem, drooling and ear discharge. Eyes: Negative for pain, discharge, redness and itching. Respiratory: Negative for apnea, cough, choking, chest tightness and shortness of breath. Cardiovascular: Negative for chest pain and leg swelling. Gastrointestinal: Positive for blood in stool. Negative for abdominal distention, abdominal pain, constipation and diarrhea. Endocrine: Negative for cold intolerance and heat intolerance. Genitourinary: Negative for difficulty urinating, dysuria, enuresis, flank pain and frequency. Musculoskeletal: Negative for arthralgias, back pain, gait problem and joint swelling. Skin: Negative for color change, pallor and rash. Neurological: Positive for dizziness. Negative for facial asymmetry, light-headedness, numbness and headaches. Psychiatric/Behavioral: Negative for agitation, behavioral problems, confusion, decreased concentration and dysphoric mood. Objective:   Physical Exam  Constitutional:       Appearance: She is well-developed. She is obese. She is not diaphoretic. HENT:      Head: Normocephalic and atraumatic.       Mouth/Throat: PM    Please note that this chart was generated using voice recognition Dragon dictation software. Although every effort was made to ensure the accuracy of this automated transcription, some errors in transcription may have occurred. Visit Information    Have you changed or started any medications since your last visit including any over-the-counter medicines, vitamins, or herbal medicines? no   Are you having any side effects from any of your medications? -  no  Have you stopped taking any of your medications? Is so, why? -  no    Have you seen any other physician or provider since your last visit? Yes - Records Requested  Have you had any other diagnostic tests since your last visit? No  Have you been seen in the emergency room and/or had an admission to a hospital since we last saw you? No  Have you had your routine dental cleaning in the past 6 months? yes -     Have you activated your [employer] account? If not, what are your barriers?  Yes     Patient Care Team:  Hazel Mtz MD as PCP - General (Internal Medicine)  Hazel Mtz MD as PCP - [facility]  Clarke Benavidez DO as Consulting Physician (Obstetrics & Gynecology)    Medical History Review  Past Medical, Family, and Social History reviewed and does not contribute to the patient presenting condition    Health Maintenance   Topic Date Due    Pneumococcal 0-64 years Vaccine (1 of 1 - PPSV23) 12/03/1977    DTaP/Tdap/Td vaccine (1 - Tdap) 12/03/1990    Flu vaccine (1) 10/30/2021 (Originally 9/1/2020)    TSH testing  07/08/2021    Diabetes screen  10/18/2021    Lipid screen  10/26/2021    Cervical cancer screen  11/07/2024    Colon cancer screen colonoscopy  11/21/2027    Hepatitis C screen  Completed    HIV screen  Completed    Hepatitis A vaccine  Aged Out    Hepatitis B vaccine  Aged Out    Hib vaccine  Aged Out    Meningococcal (ACWY) vaccine  Aged Out

## 2021-03-01 NOTE — PROGRESS NOTES
I sent a perfect serve message to Ammy Acevedo NP for Dr. Mary Lou Dunn at 13:00 pm on 3/1/21. [address] [place] [address]

## 2021-03-01 NOTE — PROGRESS NOTES
Physical Therapy    DATE: 3/1/2021    NAME: Jordan Rome  MRN: [id number]   : 1970      Patient not seen this date for Physical Therapy due to:    Patient independent with functional mobility with no acute PT needs. Will defer PT evaluation at this time. Please reorder PT if future needs arise.   3/1/21 @ [phone]      Electronically signed by Lefty Galvez PT on 3/1/2021 at 3:30 PM

## 2021-03-02 LAB
ABSOLUTE EOS #: 0.2 K/UL (ref 0–0.4)
ABSOLUTE IMMATURE GRANULOCYTE: ABNORMAL K/UL (ref 0–0.3)
ABSOLUTE LYMPH #: 1.7 K/UL (ref 1–4.8)
ABSOLUTE MONO #: 0.4 K/UL (ref 0.1–1.3)
ANION GAP SERPL CALCULATED.3IONS-SCNC: 7 MMOL/L (ref 9–17)
BASOPHILS # BLD: 1 % (ref 0–2)
BASOPHILS ABSOLUTE: 0.1 K/UL (ref 0–0.2)
BILIRUBIN URINE: NEGATIVE
BUN BLDV-MCNC: 14 MG/DL (ref 6–20)
BUN/CREAT BLD: ABNORMAL (ref 9–20)
CALCIUM SERPL-MCNC: 8.4 MG/DL (ref 8.6–10.4)
CHLORIDE BLD-SCNC: 107 MMOL/L (ref 98–107)
CO2: 25 MMOL/L (ref 20–31)
COLOR: YELLOW
COMMENT UA: NORMAL
CREAT SERPL-MCNC: 0.57 MG/DL (ref 0.5–0.9)
DIFFERENTIAL TYPE: ABNORMAL
EOSINOPHILS RELATIVE PERCENT: 4 % (ref 0–4)
GFR AFRICAN AMERICAN: >60 ML/MIN
GFR NON-AFRICAN AMERICAN: >60 ML/MIN
GFR SERPL CREATININE-BSD FRML MDRD: ABNORMAL ML/MIN/{1.73_M2}
GFR SERPL CREATININE-BSD FRML MDRD: ABNORMAL ML/MIN/{1.73_M2}
GLUCOSE BLD-MCNC: 105 MG/DL (ref 65–105)
GLUCOSE BLD-MCNC: 106 MG/DL (ref 70–99)
GLUCOSE BLD-MCNC: 117 MG/DL (ref 65–105)
GLUCOSE BLD-MCNC: 94 MG/DL (ref 65–105)
GLUCOSE BLD-MCNC: 95 MG/DL (ref 65–105)
GLUCOSE URINE: NEGATIVE
HCT VFR BLD CALC: 32.2 % (ref 36–46)
HEMOGLOBIN: 10.9 G/DL (ref 12–16)
IMMATURE GRANULOCYTES: ABNORMAL %
KETONES, URINE: NEGATIVE
LEUKOCYTE ESTERASE, URINE: NEGATIVE
LIPASE: 36 U/L (ref 13–60)
LYMPHOCYTES # BLD: 33 % (ref 24–44)
MCH RBC QN AUTO: 30.8 PG (ref 26–34)
MCHC RBC AUTO-ENTMCNC: 33.8 G/DL (ref 31–37)
MCV RBC AUTO: 91.2 FL (ref 80–100)
MONOCYTES # BLD: 8 % (ref 1–7)
NITRITE, URINE: NEGATIVE
NRBC AUTOMATED: ABNORMAL PER 100 WBC
PDW BLD-RTO: 14.6 % (ref 11.5–14.9)
PH UA: 5.5 (ref 5–8)
PLATELET # BLD: 152 K/UL (ref 150–450)
PLATELET ESTIMATE: ABNORMAL
PMV BLD AUTO: 10.2 FL (ref 6–12)
POTASSIUM SERPL-SCNC: 3.7 MMOL/L (ref 3.7–5.3)
PROTEIN UA: NEGATIVE
RBC # BLD: 3.52 M/UL (ref 4–5.2)
RBC # BLD: ABNORMAL 10*6/UL
SARS-COV-2, RAPID: NOT DETECTED
SEG NEUTROPHILS: 54 % (ref 36–66)
SEGMENTED NEUTROPHILS ABSOLUTE COUNT: 2.9 K/UL (ref 1.3–9.1)
SODIUM BLD-SCNC: 139 MMOL/L (ref 135–144)
SPECIFIC GRAVITY UA: 1 (ref 1–1.03)
SPECIMEN DESCRIPTION: NORMAL
TURBIDITY: CLEAR
URINE HGB: NEGATIVE
UROBILINOGEN, URINE: NORMAL
WBC # BLD: 5.2 K/UL (ref 3.5–11)
WBC # BLD: ABNORMAL 10*3/UL

## 2021-03-02 PROCEDURE — U0002 COVID-19 LAB TEST NON-CDC: HCPCS

## 2021-03-02 PROCEDURE — 82947 ASSAY GLUCOSE BLOOD QUANT: CPT

## 2021-03-02 PROCEDURE — 2580000003 HC RX 258: Performed by: STUDENT IN AN ORGANIZED HEALTH CARE EDUCATION/TRAINING PROGRAM

## 2021-03-02 PROCEDURE — 6360000002 HC RX W HCPCS: Performed by: STUDENT IN AN ORGANIZED HEALTH CARE EDUCATION/TRAINING PROGRAM

## 2021-03-02 PROCEDURE — 80048 BASIC METABOLIC PNL TOTAL CA: CPT

## 2021-03-02 PROCEDURE — 83690 ASSAY OF LIPASE: CPT

## 2021-03-02 PROCEDURE — G0378 HOSPITAL OBSERVATION PER HR: HCPCS

## 2021-03-02 PROCEDURE — APPNB30 APP NON BILLABLE TIME 0-30 MINS: Performed by: NURSE PRACTITIONER

## 2021-03-02 PROCEDURE — 96376 TX/PRO/DX INJ SAME DRUG ADON: CPT

## 2021-03-02 PROCEDURE — 6370000000 HC RX 637 (ALT 250 FOR IP): Performed by: STUDENT IN AN ORGANIZED HEALTH CARE EDUCATION/TRAINING PROGRAM

## 2021-03-02 PROCEDURE — 36415 COLL VENOUS BLD VENIPUNCTURE: CPT

## 2021-03-02 PROCEDURE — C9113 INJ PANTOPRAZOLE SODIUM, VIA: HCPCS | Performed by: STUDENT IN AN ORGANIZED HEALTH CARE EDUCATION/TRAINING PROGRAM

## 2021-03-02 PROCEDURE — 99222 1ST HOSP IP/OBS MODERATE 55: CPT | Performed by: INTERNAL MEDICINE

## 2021-03-02 PROCEDURE — 85025 COMPLETE CBC W/AUTO DIFF WBC: CPT

## 2021-03-02 PROCEDURE — 99232 SBSQ HOSP IP/OBS MODERATE 35: CPT | Performed by: INTERNAL MEDICINE

## 2021-03-02 RX ADMIN — ATORVASTATIN CALCIUM 20 MG: 20 TABLET, FILM COATED ORAL at 20:46

## 2021-03-02 RX ADMIN — ACETAMINOPHEN 650 MG: 325 TABLET ORAL at 16:02

## 2021-03-02 RX ADMIN — CITALOPRAM HYDROBROMIDE 20 MG: 20 TABLET ORAL at 08:53

## 2021-03-02 RX ADMIN — PROMETHAZINE HYDROCHLORIDE 12.5 MG: 25 TABLET ORAL at 08:52

## 2021-03-02 RX ADMIN — LEVOTHYROXINE SODIUM 50 MCG: 0.05 TABLET ORAL at 08:52

## 2021-03-02 RX ADMIN — PANTOPRAZOLE SODIUM 40 MG: 40 INJECTION, POWDER, FOR SOLUTION INTRAVENOUS at 08:52

## 2021-03-02 RX ADMIN — Medication 1 TABLET: at 20:46

## 2021-03-02 RX ADMIN — PREGABALIN 75 MG: 75 CAPSULE ORAL at 20:46

## 2021-03-02 RX ADMIN — ACETAMINOPHEN 650 MG: 325 TABLET ORAL at 03:28

## 2021-03-02 RX ADMIN — ACETAMINOPHEN 650 MG: 325 TABLET ORAL at 08:52

## 2021-03-02 RX ADMIN — SODIUM CHLORIDE 10 ML: 9 INJECTION INTRAMUSCULAR; INTRAVENOUS; SUBCUTANEOUS at 08:52

## 2021-03-02 RX ADMIN — PREGABALIN 75 MG: 75 CAPSULE ORAL at 08:52

## 2021-03-02 RX ADMIN — CITALOPRAM HYDROBROMIDE 10 MG: 20 TABLET ORAL at 08:52

## 2021-03-02 RX ADMIN — ACETAMINOPHEN 650 MG: 325 TABLET ORAL at 23:02

## 2021-03-02 ASSESSMENT — ENCOUNTER SYMPTOMS
BLOOD IN STOOL: 0
ABDOMINAL PAIN: 1
CONSTIPATION: 0
COUGH: 0
DIARRHEA: 0
NAUSEA: 1
VOMITING: 0
BACK PAIN: 1
WHEEZING: 0
SHORTNESS OF BREATH: 0
CHEST TIGHTNESS: 0

## 2021-03-02 ASSESSMENT — PAIN DESCRIPTION - LOCATION: LOCATION: ABDOMEN

## 2021-03-02 ASSESSMENT — PAIN SCALES - GENERAL
PAINLEVEL_OUTOF10: 0
PAINLEVEL_OUTOF10: 4

## 2021-03-02 NOTE — PLAN OF CARE
PRE CONSULT ROUNDING NOTE  HPI  53year old female with pmh of fibromyalgia, lupus delta storage pool disease, RA cervical cancer gerd who presented to the ED for abdomina pain with diarrhea, hematochezia and dysphagia. She reports upper and lower abdominal cramping that radiates to her upper back with pink tinged stool and blood on the toilet paper for 5 days. Pain is constant, no fevers or chills. She has taken an NSAID bid for years. She has not had recent travel or antibiotics. She has not had further BM since admission. Liver us shows cholelithiasis with steatosis. Since January of this year she has had odynophagia and dysphagia with certain foods and liquids. Her labs are unremarkable except hgb 10.9. She has not had weight loss, melena hematemesis rash. Endoscopy 11/21/17 egd and colonoscopy (pangular) egd showed multiple small polyps in the stomach, colonoscopy showed small hemorrhoids and diverticulosis    Family reports states son has colitis? ? No hx of liver pancreatic stomach or colon cancer  Social quit smoking no etoh or illicit drugs   /48   Pulse 78   Temp 98.3 °F (36.8 °C) (Oral)   Resp 18   Ht 5' 4\" (1.626 m)   Wt 215 lb 9.8 oz (97.8 kg)   SpO2 99%   BMI 37.01 kg/m²     ROS as above meds labs imaging and past medical records were reviewed    Exam  General Appearance: alert and oriented to person, place and time, well-developed and well-nourished, in no acute distress  Skin: warm and dry, no rash or erythema  Head: normocephalic and atraumatic  Eyes: pupils equal, round, and reactive to light, extraocular eye movements intact, conjunctivae normal  ENT: hearing grossly normal bilaterally  Neck: neck supple and non tender without mass, no thyromegaly or thyroid nodules, no cervical lymphadenopathy   Pulmonary/Chest: clear to auscultation bilaterally- no wheezes, rales or rhonchi, normal air movement, no respiratory distress  Cardiovascular: normal rate, regular rhythm, normal S1 and S2, no murmurs, rubs, clicks or gallops, distal pulses intact, no carotid bruits  Abdomen: soft, obese mid lower abd is tender with palpation, non-distended, normal bowel sounds, no masses or organomegaly no ascites  Extremities: no cyanosis, clubbing or edema  Musculoskeletal: normal range of motion, no joint swelling, deformity or tenderness  Neurologic: no cranial nerve deficit and muscle strength normal    Assessment  Abdominal pain with diarrhea  Reported hematochezia  anemia  Dysphagia  Cholelithiasis  Hepatic steatosis    Plan  Will discuss case with md, will need egd to eval the dysphagia and abdominal pain, may need abd ct to eval the pain and possibility of colitis. Stool studies and cdiff testing is ordered  Stop nsaid  Pain mgt per primary service  Formal gi consult to follow  . Alannah Leal, APRN - CNP

## 2021-03-02 NOTE — PROGRESS NOTES
Attending Physician Statement  I have discussed the care of [place] with the resident team. I have examined the patient myself and taken ros and hpi , including pertinent history and exam findings,  with the resident. I have reviewed the key elements of all parts of the encounter with the resident. I agree with the assessment, plan and orders as documented by the resident. Principal Problem:    GI bleed  Active Problems:    GERD (gastroesophageal reflux disease)    Delta storage pool disease [facility])    Hypothyroidism    Diverticulosis of colon    Generalized anxiety disorder with panic attacks    Diarrhea    Migraine    Abdominal pain    Dysphagia    Anemia  Resolved Problems:    * No resolved hospital problems. *    Rectal bleed, dark stool. Hemodynamically stable. C/o dysphagia, upper GI discomfort, concerning for upper GI bleed, in the setting of chronic use of NSAIDs. We will start IV PPI, patient is n.p.o., will consult GI patient will likely need EGD. Some upper abdominal discomfort-check lipase, right upper quadrant ultrasound. Data storage pool disease-platelet count stable. 3/2  Hemoglobin slightly downtrending to 10.5, no further rectal bleed. Patient has been n.p.o. since yesterday. Upper GI discomfort has improved, no vomiting. Await GI recommendations. Full note to follow.       Electronically signed by Jolynn Quiros MD

## 2021-03-02 NOTE — PROGRESS NOTES
[facility]   OCCUPATIONAL THERAPY MISSED TREATMENT NOTE   INPATIENT   Date: 3/2/21  Patient Name: Pita Tipton       Room: 8573/7917-41  MRN: [id number]   Account #: [de-identified]    : 1970  (53 y.o.)  Gender: female     REASON FOR MISSED TREATMENT:  Pt reports that she is IND with self-care and mobility. Denies need for therapy at this time.   -   OT being discontinued at this time.  Patient functioning at Premorbid Level  No further needs  [address] [place] [address] Avenue

## 2021-03-02 NOTE — CONSULTS
INITIAL CONSULTATION     HISTORY OF PRESENT ILLNESS: Pita Tipton is a 53 y.o. female admitted to the hospital on 3/1/2021 for rectal bleeding. She reports 5 days of diarrhea followed by bright red blood per rectum. She reports diffuse abdominal cramping with radiation to the back. Several loose bowel movements per day. No fever or chills. No sick contacts. No skin rash or joint swelling. No prior issues with diarrhea. She has had issues with constipation in the past.  She underwent EGD and colonoscopy in 2017 for evaluation of that. She was found to have diverticulosis. She reports dysphagia. Intermittent this started around 2 months ago. Sensation of food getting stuck in the esophagus with pain. She reports family history of colitis-son. She is ex-smoker. She reports no alcohol or drugs. 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     Headache     History of cervical dysplasia     had cone    History of conization of cervix     dysplastic cells    Hypothyroidism, unspecified 3/18/2016    Lupus ([facility]     Osteoarthritis     Pain     all over body    Sleep apnea     awaiting test results currently    Vision abnormalities     glasses/ far sighted        Past Surgical History:   Procedure Laterality Date    BONE CYST EXCISION Right     WRIST    CARPAL TUNNEL RELEASE Right 10-6-15    CARPAL TUNNEL RELEASE Left 11/3/15    CERVIX BIOPSY       SECTION, LOW TRANSVERSE      COLONOSCOPY      COLONOSCOPY  2009    diverticulosis, no other gross lesions    COLONOSCOPY  2017    10 yr recall, small hemorrhoids, diverticulosis    DILATION AND CURETTAGE OF UTERUS N/A 7/18/2017    HYSTEROSCOPY AND D& C, myosure performed by Matthew Nugent MD at [facility], COLON, DIAGNOSTIC      UGI    FRACTURE SURGERY Left     THUMB    GYNECOLOGIC CRYOSURGERY  2000    conization   [address]      with mesh    LIPOMA RESECTION Right     RING FINGER    OTHER SURGICAL HISTORY      VOCAL CORD SURG    NM COLON CA SCRN NOT  [address] IND N/A 11/21/2017    COLONOSCOPY performed by Victorino Teran MD at [address] EGD TRANSORAL BIOPSY SINGLE/MULTIPLE N/A 11/21/2017    EGD BIOPSY performed by Victorino Teran MD at [facility] 08/09/2016    Right thyroid lobectomy and isthmusectomy. Continuous monitoring of the recurrent laryngeal nerve using nerve integrity monitor. Frozen section.     TONSILLECTOMY      UPPER GASTROINTESTINAL ENDOSCOPY  12/12/2008    with BRAVO, gastric polyp-fundic gland polyp    UPPER GASTROINTESTINAL ENDOSCOPY  11/21/2017    multiple small gastric polyps-fundic gland polyps          CURRENT MEDICATIONS:       Current Facility-Administered Medications:     0.9 % sodium chloride infusion, , Intravenous, Continuous, Delmis Landaverde MD, Last Rate: 100 mL/hr at 03/01/21 2236, New Bag at 03/01/21 2236    [Held by provider] aspirin EC tablet 81 mg, 81 mg, Oral, Daily, Elizabeth Villaseñor MD    atorvastatin (LIPITOR) tablet 20 mg, 20 mg, Oral, Nightly, Elizabeth Villaseñor MD    vitamin B and C (TOTAL B-C) 1 tablet, 1 tablet, Oral, Daily, Elizabeth Villaseñor MD    citalopram (CELEXA) tablet 10 mg, 10 mg, Oral, Daily, Elizabeth Villaseñor MD, 10 mg at 03/02/21 9246    citalopram (CELEXA) tablet 20 mg, 20 mg, Oral, Daily, Elizabeth Villaseñor MD, 20 mg at 03/02/21 0853    pregabalin (LYRICA) capsule 75 mg, 75 mg, Oral, BID, Elizabeth Villaseñor MD, 75 mg at 03/02/21 2920    levothyroxine (SYNTHROID) tablet 50 mcg, 50 mcg, Oral, Daily, Elizabeth Villaseñor MD, 50 mcg at 03/02/21 0852    sodium chloride flush 0.9 % injection 10 mL, 10 mL, Intravenous, 2 times per day, Sharmaine Maza MD, 10 mL at 03/01/21 2309    sodium chloride flush 0.9 % injection 10 mL, 10 mL, Intravenous, PRN, Sharmaine Maza MD    promethazine (PHENERGAN) tablet 12.5 mg, 12.5 mg, Oral, Q6H PRN, 12.5 mg at 03/02/21 0852 **OR** ondansetron (ZOFRAN) injection 4 mg, 4 mg, Intravenous, Q6H PRN, Sharmaine Maza MD, 4 mg at 03/01/21 2309    polyethylene glycol (GLYCOLAX) packet 17 g, 17 g, Oral, Daily PRN, Sharmaine Maza MD    acetaminophen (TYLENOL) tablet 650 mg, 650 mg, Oral, Q6H PRN, 650 mg at 03/02/21 0852 **OR** acetaminophen (TYLENOL) suppository 650 mg, 650 mg, Rectal, Q6H PRN, Sharmaine Maza MD    potassium chloride (KLOR-CON M) extended release tablet 40 mEq, 40 mEq, Oral, PRN **OR** potassium bicarb-citric acid (EFFER-K) effervescent tablet 40 mEq, 40 mEq, Oral, PRN **OR** potassium chloride 10 mEq/100 mL IVPB (Peripheral Line), 10 mEq, Intravenous, PRN, Sharmaine Maza MD    magnesium sulfate 2,000 mg in dextrose 5 % 100 mL IVPB, 2,000 mg, Intravenous, PRN, Sharmaine Maza MD    pantoprazole (PROTONIX) injection 40 mg, 40 mg, Intravenous, Daily, 40 mg at 03/02/21 0852 **AND** sodium chloride (PF) 0.9 % injection 10 mL, 10 mL, Intravenous, Daily, Dilan Gilbert MD, 10 mL at 03/02/21 2258       ALLERGIES:   Allergies   Allergen Reactions    Other      Perch - twice had violent vomiting, diarrhea,severe dizziness and nausea    Percocet [Oxycodone-Acetaminophen] Hives and Itching     Hives everywhere, including roof of mouth and tear duct openings    Tramadol Hcl Hives and Itching     hives    Azithromycin      Palpitations.  Ibuprofen Hives    Ceclor [Cefaclor] Hives and Rash    Penicillins Hives and Rash          FAMILY HISTORY: The patient's family history was reviewed.        SOCIAL HISTORY:   Social History     Socioeconomic History    Marital status:      Spouse name: Not on file    Number of children: Not on file    Years of education: Not on file    Highest education level: Not on file   Occupational History    Not on file   Social Needs    Financial resource strain: Not on file    Food insecurity     Worry: Not on file     Inability: Not on file    Transportation needs     Medical: Not on file     Non-medical: Not on file   Tobacco Use    Smoking status: Former Smoker    Smokeless tobacco: Never Used   Substance and Sexual Activity    Alcohol use: Yes     Alcohol/week: 0.0 standard drinks     Comment: recovering alcoholic    Drug use: No    Sexual activity: Yes     Partners: Male     Birth control/protection: Other-see comments     Comment:  had vasectomy   Lifestyle    Physical activity     Days per week: Not on file     Minutes per session: Not on file    Stress: Not on file   Relationships    Social connections     Talks on phone: Not on file     Gets together: Not on file     Attends Judaism service: Not on file     Active member of club or organization: Not on file     Attends meetings of clubs or organizations: Not on file     Relationship status: Not on file    Intimate partner violence     Fear of current or ex partner: Not on file     Emotionally abused: Not on file     Physically abused: Not on file     Forced sexual activity: Not on file   Other Topics Concern    Not on file   Social History Narrative    Not on file         REVIEW OF SYSTEMS: A 14-point review of systems was obtained and pertinent positives andnegatives were enumerated above in the history of present illness. All other reviewed systems / symptoms were negative. Review of Systems      PHYSICAL EXAMINATION: Vital signs reviewed per the nursing documentation. /67   Pulse 68 Comment: 68  Temp 98.4 °F (36.9 °C) (Oral)   Resp 18   Ht 5' 4\" (1.626 m)   Wt 215 lb 9.8 oz (97.8 kg)   SpO2 95%   BMI 37.01 kg/m²    [unfilled]   Body mass index is 37.01 kg/m². General:  A O x 3 in NAD   Psych: . Normal affect. Mentation normal   HEENT: PERRLA. Clear conjunctivae and sclerae. Moist oral mucosae, no lesions orulcers. The neck is supple, without lymphadenopathy or jugular venous distension. No masses. Normal thyroid. Cardiovascular: S1 S2 RRR no rubs or murmurs. Pulmonary: clear BL. No accessory muscle usage. Abd Exam: Soft, NT ND, no hepato or spleno megaly, +BS, no ascites. No groin masses or lymphadenopathy. Extremities: No edema. Skin: Warm skin. No skin rash. No spider nevi palmar erythema naildystrophy. Joint: No joint swelling or deformity. Neurological: intact sensory. DTR+. No asterixis     LABORATORY DATA: Reviewed   Lab Results   Component Value Date    WBC 5.2 03/02/2021    HGB 10.9 (L) 03/02/2021    HCT 32.2 (L) 03/02/2021    MCV 91.2 03/02/2021     03/02/2021     03/02/2021    K 3.7 03/02/2021     03/02/2021    CO2 25 03/02/2021    BUN 14 03/02/2021    CREATININE 0.57 03/02/2021    LABPROT 7.5 04/05/2012    LABALBU 4.2 03/01/2021    BILITOT 0.36 03/01/2021    ALKPHOS 84 03/01/2021    AST 13 03/01/2021    ALT 12 03/01/2021    INR 1.0 03/01/2021      Lab Results   Component Value Date    RBC 3.52 (L) 03/02/2021    HGB 10.9 (L) 03/02/2021    MCV 91.2 03/02/2021    MCH 30.8 03/02/2021    MCHC 33.8 03/02/2021    RDW 14.6 03/02/2021    MPV 10.2 03/02/2021    BASOPCT 1 03/02/2021    LYMPHSABS 1.70 03/02/2021    MONOSABS 0.40 03/02/2021    NEUTROABS 2.90 03/02/2021    EOSABS 0.20 03/02/2021    BASOSABS 0.10 03/02/2021          DIAGNOSTIC TESTING:   Us Liver    Result Date: 3/1/2021  EXAMINATION: RIGHT UPPER QUADRANT ULTRASOUND 3/1/2021 5:04 pm COMPARISON: None. HISTORY: ORDERING SYSTEM PROVIDED HISTORY: RUQ pain TECHNOLOGIST PROVIDED HISTORY: RUQ pain Reason for Exam: ruq pain, diarrhea, blood in stool Acuity: Acute Type of Exam: Initial FINDINGS: LIVER:  Diffuse mild increased echogenicity of the liver with slight heterogeneity indicating underlying steatosis.   No focal hepatic lesion is appreciated. Limited Doppler evaluation of the portal vein demonstrates hepatopetal flow, which is normal. BILIARY SYSTEM:  Cholelithiasis without mucosal thickening or pericholecystic fluid. No sonographic Mckeon sign. Common bile duct is within normal limits measuring 4 mm. PANCREAS:  The pancreas is not visualized. OTHER: No evidence of right upper quadrant ascites. 1. Cholelithiasis with no evidence of acute cholecystitis. 2. Hepatic steatosis with no focal lesion. IMPRESSION: Ms. Anil Lopez is a 53 y.o. female with diarrhea. Rectal bleeding. Very likely self-limiting viral infectious process. No further diarrhea or bleeding since admission last night. We will monitor. May need to consider repeat colonoscopy on outpatient basis in 3 to 4 weeks after resolution of acute infectious process. She reports dysphagia. We will plan for EGD with possible dilation. In case of bleeding during the procedure we may need to give her platelets with hematology consultation. She reports no prior bleeding with  and other procedures. Thank you for allowing me to participate in the care of Ms. Anil Lopez. For any further questions please do not hesitate to contact me.        MD Elida Neff

## 2021-03-02 NOTE — PROGRESS NOTES
[employer] message sent to Adrian CannonrSTARLA, to clarify if pt can have sips of water with meds. Awaiting response.

## 2021-03-02 NOTE — FLOWSHEET NOTE
03/02/21 6432   Encounter Summary   Services provided to: Patient not available  (patient on the telephone)

## 2021-03-02 NOTE — PROGRESS NOTES
[address] [employer]    PROGRESS NOTE             3/2/2021    2:19 PM    Name:   Zach Banks  MRN:     [id number]     Acct:      [de-identified]   Room:   2074/2074-01   Day:  0  Admit Date:  3/1/2021 11:51 AM    PCP:  Nafisa Figueroa MD  Code Status:  Full Code    Subjective:     C/C: Hematochezia     Interval History Status:     Patient was seen and evaluated by bedside. No acute events overnight. Patient reports her abdominal pain has improved. The bloating in her abdomen has decreased. She has been passing gas and urinating. No episodes of diarrhea while in hospital.  Denies chest pain, shortness of breath, lightheadedness. She had a headache overnight and was given Tylenol. Pain is well controlled. GI was consulted and will observe the patient overnight. She is on a clear liquid diet. GI will reevaluate tomorrow. Brief History:     Patient is a 71-year-old  female with past medical history of fibromyalgia, delta storage pool disease, diverticulosis, lupus, rheumatoid arthritis, complex migraine, and recent hospital admission in October 2020 for stroke-like symptoms with negative workup was directly admitted to the hospital for the work-up of a GI bleed. Patient reports she has had abdominal cramping and diarrhea for the past 5 days. She reports tenderness in LUQ, LLQ, RLQ, and mid-back. She states seeing pink tinged blood on her toilet paper after wiping as well as clots of blood in her toilet. Her stool is a dark brown color. She also has symptoms of dysphagia to solids, nausea, lightheadedness, headache, blurry vision, lower abdominal cramping, epigastric pain, and mid back pain, SOB. Denies sick contacts, traveling, fever, chills, paresthesia, vomiting, chest pain. No hx of alcohol abuse.     Patient report taking 500mg naproxen twice daily for years. Patient last endoscopy and colonoscopy was in November 2017.  It was positive for diverticulosis in the descending/sigmoid colon. Small hemorrhoids in the rectum/anus.     Vitals stable with /79, HR 72. GI was consulted. CBC, PT/INR, PTT, CMP were ordered. Normal saline infusion 100ml/hr started. Review of Systems:     Review of Systems   Constitutional: Negative for activity change, appetite change, chills and fever. HENT: Negative for congestion. Respiratory: Negative for cough, chest tightness, shortness of breath and wheezing. Cardiovascular: Negative for chest pain, palpitations and leg swelling. Gastrointestinal: Positive for abdominal pain and nausea. Negative for blood in stool, constipation, diarrhea and vomiting. Genitourinary: Positive for frequency and urgency. Negative for difficulty urinating. Musculoskeletal: Positive for back pain. Negative for joint swelling. Skin: Negative for rash. Neurological: Positive for headaches. Negative for dizziness, weakness and light-headedness. Psychiatric/Behavioral: Negative for agitation and behavioral problems. Medications: Allergies: Allergies   Allergen Reactions    Other      Perch - twice had violent vomiting, diarrhea,severe dizziness and nausea    Percocet [Oxycodone-Acetaminophen] Hives and Itching     Hives everywhere, including roof of mouth and tear duct openings    Tramadol Hcl Hives and Itching     hives    Azithromycin      Palpitations.      Ibuprofen Hives    Ceclor [Cefaclor] Hives and Rash    Penicillins Hives and Rash       Current Meds:   Scheduled Meds:    [Held by provider] aspirin  81 mg Oral Daily    atorvastatin  20 mg Oral Nightly    vitamin B and C  1 tablet Oral Daily    citalopram  10 mg Oral Daily    citalopram  20 mg Oral Daily    pregabalin  75 mg Oral BID    levothyroxine  50 mcg Oral Daily    sodium chloride flush  10 mL Intravenous 2 times per day    pantoprazole  40 mg Intravenous Daily    And    sodium chloride (PF)  10 mL Intravenous Daily     Continuous Infusions:    sodium chloride 100 mL/hr at 21 2236     PRN Meds: sodium chloride flush, promethazine **OR** ondansetron, polyethylene glycol, acetaminophen **OR** acetaminophen, potassium chloride **OR** potassium alternative oral replacement **OR** potassium chloride, magnesium sulfate    Data:     Past Medical History:   has a past medical history of Adrenal insufficiency ([facility] Asthma, Bleeding after intercourse, Cancer ([facility] 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 Social History:   reports that she has quit smoking. She has never used smokeless tobacco. She reports current alcohol use. She reports that she does not use drugs.      Family History:   Family History   Problem Relation Age of Onset    Alcohol Abuse Father     Other Father         murder    Diabetes Mother     Other Mother         thyroid    High Blood Pressure Mother     Lupus Sister     Drug Abuse Brother     Breast Cancer Maternal Grandmother         45s    Heart Surgery Maternal Grandmother     Heart Failure Maternal Grandmother     Hypertension Maternal Grandfather     Stroke Maternal Grandfather     Heart Attack Maternal Grandfather     Alcohol Abuse Maternal Grandfather     Diabetes Maternal Grandfather     Cancer Paternal Grandmother         lymphnoid    Heart Failure Paternal Grandfather     Heart Surgery Paternal Grandfather         x2       Vitals:  /67   Pulse 68 Comment: 68  Temp 98.4 °F (36.9 °C) (Oral)   Resp 18   Ht 5' 4\" (1.626 m)   Wt 215 lb 9.8 oz (97.8 kg)   SpO2 95%   BMI 37.01 kg/m²   Temp (24hrs), Av.1 °F (36.7 °C), Min:97.7 °F (36.5 °C), Max:98.4 °F (36.9 sonographic Mckeon sign. Common bile duct is within normal limits measuring 4 mm. PANCREAS:  The pancreas is not visualized. OTHER: No evidence of right upper quadrant ascites. 1. Cholelithiasis with no evidence of acute cholecystitis. 2. Hepatic steatosis with no focal lesion. Physical Examination:        Physical Exam  Vitals signs reviewed. Constitutional:       General: She is not in acute distress. Appearance: She is obese. HENT:      Head: Normocephalic. Eyes:      Extraocular Movements: Extraocular movements intact. Pupils: Pupils are equal, round, and reactive to light. Cardiovascular:      Rate and Rhythm: Normal rate and regular rhythm. Heart sounds: Normal heart sounds. No murmur. No gallop. Pulmonary:      Effort: Pulmonary effort is normal. No respiratory distress. Breath sounds: Normal breath sounds. No wheezing. Abdominal:      General: Bowel sounds are normal.      Palpations: Abdomen is soft. Tenderness: There is abdominal tenderness. There is no guarding. Musculoskeletal:         General: No swelling. Right lower leg: No edema. Left lower leg: No edema. Skin:     General: Skin is warm and dry. Comments: Scratch marks from pet dogs on bilateral forearms   Neurological:      General: No focal deficit present. Mental Status: She is alert and oriented to person, place, and time.    Psychiatric:         Mood and Affect: Mood normal.         Behavior: Behavior normal.         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 Small hemorrhoids in the rectum/anus. Recheck Hbg & Hct show 11.7, 10.9  PRBC transfusion if less than 7  IV PPI  Stop all anticoagulations  U/S liver shows cholelithiasis, no acute cholecystitis. Hepatic steatosis with no focal lesion. Lipase 36, wnl  GI consult, observe patient overnight. Reevaluate patient tomorrow.  Placed on clear liquid diet.     Diarrhea  On normal saline 100ml/hr  No episodes of diarrhea while in hospital     Hypothyroidism  Continue Synthroid 50 mcg tablet daily     Generalized anxiety with a history of panic disorder  Continue citalopram 30 mg daily     Hx of Complex Migraines  Aspirin 81 mg daily, held  Continue atorvastatin 20 mg nightly     Fibromyalgia  Continue Lyrica 75 mg twice daily     Diet: Clear liquid diet  DVT prophylaxis: EPC  GI prophylaxis: IV Protonix  PT/OT/SW     Disposition:     Marylin Valle MD  3/2/2021  2:19 PM

## 2021-03-02 NOTE — FLOWSHEET NOTE
03/02/21 1507   Encounter Summary   Services provided to: Patient   Referral/Consult From: Rounding   Complexity of Encounter Low   Length of Encounter 15 minutes   Spiritual/Yazidism   Type Spiritual support   Assessment Sleeping   Intervention Prayer   Outcome Did not respond

## 2021-03-02 NOTE — CARE COORDINATION
CASE MANAGEMENT NOTE:    Admission Date:  3/1/2021 Pita Tipton is a 53 y.o.  female    Admitted for : Lower gastrointestinal bleed [K92.2]  GI bleed [K92.2]    Met with:  Patient    PCP:  Dr. Lulu Jacob:  [address]       Current Residence/ Living Arrangements:  independently at home with spouse and drives            Current Services PTA:  No    Is patient agreeable to VNS: No    Freedom of choice provided:  Yes    List of [address] provided: No    VNS chosen:  NA    DME:  none    Home Oxygen: No    Nebulizer: No    CPAP/BIPAP: No    Supplier: N/A    Potential Assistance Needed: No    SNF needed: No    Freedom of choice and list provided: NA    Pharmacy:  [address] [place] [address] on [facility]       Does Patient want to use MEDS to BEDS? No    Is patient currently receiving oral anticoagulation therapy? No    Is the Patient an [facility] with Readmission Risk Score greater than 14%? No  If yes, pt needs a follow up appointment made within 7 days. Family Members/Caregivers that pt would like involved in their care:    Yes    If yes, list name here:  Karla Macdonald    Transportation Provider:  Patient and Family             Discharge Plan:  Home without needs. Hgb 10.9 today. NPO, GI consult.                  Electronically signed by: Chelo Padilla RN on 3/2/2021 at 9:30 AM

## 2021-03-03 ENCOUNTER — ANESTHESIA EVENT (OUTPATIENT)
Dept: ENDOSCOPY | Age: 51
DRG: 378 | End: 2021-03-03
Payer: COMMERCIAL

## 2021-03-03 ENCOUNTER — ANESTHESIA (OUTPATIENT)
Dept: ENDOSCOPY | Age: 51
DRG: 378 | End: 2021-03-03
Payer: COMMERCIAL

## 2021-03-03 VITALS — OXYGEN SATURATION: 96 % | DIASTOLIC BLOOD PRESSURE: 69 MMHG | SYSTOLIC BLOOD PRESSURE: 123 MMHG

## 2021-03-03 PROBLEM — K92.1 MELENA: Status: ACTIVE | Noted: 2021-03-03

## 2021-03-03 LAB
ABSOLUTE EOS #: 0.2 K/UL (ref 0–0.4)
ABSOLUTE IMMATURE GRANULOCYTE: ABNORMAL K/UL (ref 0–0.3)
ABSOLUTE LYMPH #: 1.7 K/UL (ref 1–4.8)
ABSOLUTE MONO #: 0.3 K/UL (ref 0.1–1.3)
BASOPHILS # BLD: 1 % (ref 0–2)
BASOPHILS ABSOLUTE: 0 K/UL (ref 0–0.2)
DIFFERENTIAL TYPE: ABNORMAL
EOSINOPHILS RELATIVE PERCENT: 3 % (ref 0–4)
GLUCOSE BLD-MCNC: 151 MG/DL (ref 65–105)
GLUCOSE BLD-MCNC: 98 MG/DL (ref 65–105)
GLUCOSE BLD-MCNC: 99 MG/DL (ref 65–105)
GLUCOSE BLD-MCNC: 99 MG/DL (ref 65–105)
HCT VFR BLD CALC: 32.5 % (ref 36–46)
HEMOGLOBIN: 10.6 G/DL (ref 12–16)
IMMATURE GRANULOCYTES: ABNORMAL %
LYMPHOCYTES # BLD: 32 % (ref 24–44)
MCH RBC QN AUTO: 30.5 PG (ref 26–34)
MCHC RBC AUTO-ENTMCNC: 32.6 G/DL (ref 31–37)
MCV RBC AUTO: 93.5 FL (ref 80–100)
MONOCYTES # BLD: 6 % (ref 1–7)
NRBC AUTOMATED: ABNORMAL PER 100 WBC
PDW BLD-RTO: 14.9 % (ref 11.5–14.9)
PLATELET # BLD: 170 K/UL (ref 150–450)
PLATELET ESTIMATE: ABNORMAL
PMV BLD AUTO: 10.3 FL (ref 6–12)
RBC # BLD: 3.47 M/UL (ref 4–5.2)
RBC # BLD: ABNORMAL 10*6/UL
SEG NEUTROPHILS: 58 % (ref 36–66)
SEGMENTED NEUTROPHILS ABSOLUTE COUNT: 3.1 K/UL (ref 1.3–9.1)
WBC # BLD: 5.3 K/UL (ref 3.5–11)
WBC # BLD: ABNORMAL 10*3/UL

## 2021-03-03 PROCEDURE — 3700000000 HC ANESTHESIA ATTENDED CARE: Performed by: INTERNAL MEDICINE

## 2021-03-03 PROCEDURE — 2500000003 HC RX 250 WO HCPCS: Performed by: NURSE ANESTHETIST, CERTIFIED REGISTERED

## 2021-03-03 PROCEDURE — 7100000001 HC PACU RECOVERY - ADDTL 15 MIN: Performed by: INTERNAL MEDICINE

## 2021-03-03 PROCEDURE — 99232 SBSQ HOSP IP/OBS MODERATE 35: CPT | Performed by: INTERNAL MEDICINE

## 2021-03-03 PROCEDURE — 88305 TISSUE EXAM BY PATHOLOGIST: CPT

## 2021-03-03 PROCEDURE — 6360000002 HC RX W HCPCS: Performed by: STUDENT IN AN ORGANIZED HEALTH CARE EDUCATION/TRAINING PROGRAM

## 2021-03-03 PROCEDURE — 87077 CULTURE AEROBIC IDENTIFY: CPT

## 2021-03-03 PROCEDURE — 2709999900 HC NON-CHARGEABLE SUPPLY: Performed by: INTERNAL MEDICINE

## 2021-03-03 PROCEDURE — 7100000000 HC PACU RECOVERY - FIRST 15 MIN: Performed by: INTERNAL MEDICINE

## 2021-03-03 PROCEDURE — 6370000000 HC RX 637 (ALT 250 FOR IP): Performed by: INTERNAL MEDICINE

## 2021-03-03 PROCEDURE — 2580000003 HC RX 258: Performed by: INTERNAL MEDICINE

## 2021-03-03 PROCEDURE — 43239 EGD BIOPSY SINGLE/MULTIPLE: CPT | Performed by: INTERNAL MEDICINE

## 2021-03-03 PROCEDURE — 6370000000 HC RX 637 (ALT 250 FOR IP): Performed by: STUDENT IN AN ORGANIZED HEALTH CARE EDUCATION/TRAINING PROGRAM

## 2021-03-03 PROCEDURE — 36415 COLL VENOUS BLD VENIPUNCTURE: CPT

## 2021-03-03 PROCEDURE — 43248 EGD GUIDE WIRE INSERTION: CPT | Performed by: INTERNAL MEDICINE

## 2021-03-03 PROCEDURE — 96376 TX/PRO/DX INJ SAME DRUG ADON: CPT

## 2021-03-03 PROCEDURE — 85025 COMPLETE CBC W/AUTO DIFF WBC: CPT

## 2021-03-03 PROCEDURE — 1200000000 HC SEMI PRIVATE

## 2021-03-03 PROCEDURE — C9113 INJ PANTOPRAZOLE SODIUM, VIA: HCPCS | Performed by: INTERNAL MEDICINE

## 2021-03-03 PROCEDURE — 0DB68ZX EXCISION OF STOMACH, VIA NATURAL OR ARTIFICIAL OPENING ENDOSCOPIC, DIAGNOSTIC: ICD-10-PCS | Performed by: INTERNAL MEDICINE

## 2021-03-03 PROCEDURE — 82947 ASSAY GLUCOSE BLOOD QUANT: CPT

## 2021-03-03 PROCEDURE — 6360000002 HC RX W HCPCS: Performed by: NURSE ANESTHETIST, CERTIFIED REGISTERED

## 2021-03-03 PROCEDURE — 3700000001 HC ADD 15 MINUTES (ANESTHESIA): Performed by: INTERNAL MEDICINE

## 2021-03-03 PROCEDURE — C1769 GUIDE WIRE: HCPCS | Performed by: INTERNAL MEDICINE

## 2021-03-03 PROCEDURE — 3609012400 HC EGD TRANSORAL BIOPSY SINGLE/MULTIPLE: Performed by: INTERNAL MEDICINE

## 2021-03-03 PROCEDURE — 6360000002 HC RX W HCPCS: Performed by: INTERNAL MEDICINE

## 2021-03-03 PROCEDURE — 0D758ZZ DILATION OF ESOPHAGUS, VIA NATURAL OR ARTIFICIAL OPENING ENDOSCOPIC: ICD-10-PCS | Performed by: INTERNAL MEDICINE

## 2021-03-03 PROCEDURE — G0378 HOSPITAL OBSERVATION PER HR: HCPCS

## 2021-03-03 RX ORDER — PROPOFOL 10 MG/ML
INJECTION, EMULSION INTRAVENOUS PRN
Status: DISCONTINUED | OUTPATIENT
Start: 2021-03-03 | End: 2021-03-03 | Stop reason: SDUPTHER

## 2021-03-03 RX ORDER — PANTOPRAZOLE SODIUM 40 MG/10ML
40 INJECTION, POWDER, LYOPHILIZED, FOR SOLUTION INTRAVENOUS 2 TIMES DAILY
Status: DISCONTINUED | OUTPATIENT
Start: 2021-03-03 | End: 2021-03-03

## 2021-03-03 RX ORDER — MIDAZOLAM HYDROCHLORIDE 1 MG/ML
INJECTION INTRAMUSCULAR; INTRAVENOUS PRN
Status: DISCONTINUED | OUTPATIENT
Start: 2021-03-03 | End: 2021-03-03 | Stop reason: SDUPTHER

## 2021-03-03 RX ORDER — SODIUM CHLORIDE 9 MG/ML
10 INJECTION INTRAVENOUS 2 TIMES DAILY
Status: DISCONTINUED | OUTPATIENT
Start: 2021-03-03 | End: 2021-03-03

## 2021-03-03 RX ORDER — SODIUM CHLORIDE 9 MG/ML
10 INJECTION INTRAVENOUS 2 TIMES DAILY
Status: DISCONTINUED | OUTPATIENT
Start: 2021-03-03 | End: 2021-03-04 | Stop reason: HOSPADM

## 2021-03-03 RX ORDER — PANTOPRAZOLE SODIUM 40 MG/10ML
40 INJECTION, POWDER, LYOPHILIZED, FOR SOLUTION INTRAVENOUS 2 TIMES DAILY
Status: DISCONTINUED | OUTPATIENT
Start: 2021-03-03 | End: 2021-03-04 | Stop reason: HOSPADM

## 2021-03-03 RX ORDER — LIDOCAINE HYDROCHLORIDE 20 MG/ML
INJECTION, SOLUTION EPIDURAL; INFILTRATION; INTRACAUDAL; PERINEURAL PRN
Status: DISCONTINUED | OUTPATIENT
Start: 2021-03-03 | End: 2021-03-03 | Stop reason: SDUPTHER

## 2021-03-03 RX ORDER — PANTOPRAZOLE SODIUM 40 MG/1
40 TABLET, DELAYED RELEASE ORAL
Status: DISCONTINUED | OUTPATIENT
Start: 2021-03-04 | End: 2021-03-04 | Stop reason: HOSPADM

## 2021-03-03 RX ADMIN — PROPOFOL 400 MG: 10 INJECTION, EMULSION INTRAVENOUS at 12:56

## 2021-03-03 RX ADMIN — MIDAZOLAM 2 MG: 1 INJECTION INTRAMUSCULAR; INTRAVENOUS at 12:48

## 2021-03-03 RX ADMIN — PANTOPRAZOLE SODIUM 40 MG: 40 INJECTION, POWDER, FOR SOLUTION INTRAVENOUS at 20:10

## 2021-03-03 RX ADMIN — ONDANSETRON 4 MG: 2 INJECTION INTRAMUSCULAR; INTRAVENOUS at 08:15

## 2021-03-03 RX ADMIN — ATORVASTATIN CALCIUM 20 MG: 20 TABLET, FILM COATED ORAL at 20:10

## 2021-03-03 RX ADMIN — PREGABALIN 75 MG: 75 CAPSULE ORAL at 20:10

## 2021-03-03 RX ADMIN — SODIUM CHLORIDE, PRESERVATIVE FREE 10 ML: 5 INJECTION INTRAVENOUS at 20:11

## 2021-03-03 RX ADMIN — SODIUM CHLORIDE: 9 INJECTION, SOLUTION INTRAVENOUS at 12:16

## 2021-03-03 RX ADMIN — ACETAMINOPHEN 650 MG: 325 TABLET ORAL at 07:43

## 2021-03-03 RX ADMIN — ACETAMINOPHEN 650 MG: 325 TABLET ORAL at 20:10

## 2021-03-03 RX ADMIN — LIDOCAINE HYDROCHLORIDE 100 MG: 20 INJECTION, SOLUTION EPIDURAL; INFILTRATION; INTRACAUDAL at 12:56

## 2021-03-03 ASSESSMENT — PULMONARY FUNCTION TESTS
PIF_VALUE: 1

## 2021-03-03 ASSESSMENT — PAIN SCALES - GENERAL
PAINLEVEL_OUTOF10: 0
PAINLEVEL_OUTOF10: 7
PAINLEVEL_OUTOF10: 0
PAINLEVEL_OUTOF10: 0
PAINLEVEL_OUTOF10: 2

## 2021-03-03 ASSESSMENT — ENCOUNTER SYMPTOMS: STRIDOR: 0

## 2021-03-03 NOTE — PROGRESS NOTES
Writer spoke with Dr. Valorie Ennis regarding pt's drop in HGB and black stools. MD to place order for HGB recheck, pt to remain here overnight for observation. Clinical lead Eneida Cid notified.

## 2021-03-03 NOTE — PROGRESS NOTES
[place] [id number] Internal Medicine    Progress Note     3/3/2021    11:55 AM    Name:   Juany Burleson  MRN:     [id number]     Acct:      [de-identified]   Room:   [facility]/207401   Day:  0  Admit Date:  3/1/2021 11:51 AM    PCP:   Vipin Mullen MD  Code Status:  Full Code    Subjective:     C/C: No chief complaint on file. 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 *      Interval History Status: worsened. Overnight diarrhea continues with dark tarry stool noted by the staff patient feels dizzy hemoglobin dropped from 13-10.9     Significant last 24 hr data reviewed ;   Vitals:    03/02/21 1215 03/02/21 1815 03/03/21 0000 03/03/21 0715   BP: 114/67 114/84  (!) 107/57   Pulse: 68 70  72   Resp: 18 16  18   Temp: 98.4 °F (36.9 °C) 97.9 °F (36.6 °C)  98.2 °F (36.8 °C)   TempSrc: Oral Oral  Oral   SpO2: 95% 97%  96%   Weight:   228 lb 2.8 oz (103.5 kg)    Height:          Recent Results (from the past 24 hour(s))   POC Glucose Fingerstick    Collection Time: 03/02/21  4:07 PM   Result Value Ref Range    POC Glucose 117 (H) 65 - 105 mg/dL   COVID-19, Rapid    Collection Time: 03/02/21  6:00 PM    Specimen: Nasopharyngeal Swab   Result Value Ref Range    Specimen Description . NASOPHARYNGEAL SWAB     SARS-CoV-2, Rapid Not Detected Not Detected   POC Glucose Fingerstick    Collection Time: 03/02/21  7:59 PM   Result Value Ref Range    POC Glucose 105 65 - 105 mg/dL   POC Glucose Fingerstick    Collection Time: 03/03/21  7:35 AM   Result Value Ref Range    POC Glucose 99 65 - 105 mg/dL   POC Glucose Fingerstick    Collection Time: 03/03/21 11:03 AM   Result Value Ref Range    POC Glucose 98 65 - 105 mg/dL     Recent Labs     03/02/21  1055 03/02/21  1607 03/02/21  1959 03/03/21  0735 03/03/21  1103   POCGLU 95 117* 105 99 98        Us Liver    Result Date: 3/1/2021  EXAMINATION: RIGHT UPPER QUADRANT ULTRASOUND 3/1/2021 5:04 pm COMPARISON: None. HISTORY: ORDERING SYSTEM PROVIDED HISTORY: RUQ pain TECHNOLOGIST PROVIDED HISTORY: RUQ pain Reason for Exam: ruq pain, diarrhea, blood in stool Acuity: Acute Type of Exam: Initial FINDINGS: LIVER:  Diffuse mild increased echogenicity of the liver with slight heterogeneity indicating underlying steatosis. No focal hepatic lesion is appreciated. Limited Doppler evaluation of the portal vein demonstrates hepatopetal flow, which is normal. BILIARY SYSTEM:  Cholelithiasis without mucosal thickening or pericholecystic fluid. No sonographic Mckeon sign. Common bile duct is within normal limits measuring 4 mm. PANCREAS:  The pancreas is not visualized. OTHER: No evidence of right upper quadrant ascites. 1. Cholelithiasis with no evidence of acute cholecystitis. 2. Hepatic steatosis with no focal lesion. HPI:   See history in H and P      Review of Systems:     Constitutional:  negative for chills, fevers, sweats  Respiratory:  negative for cough, dyspnea on exertion, hemoptysis, shortness of breath, wheezing  Cardiovascular:  negative for chest pain, chest pressure/discomfort, lower extremity edema, palpitations  Gastrointestinal: Positive for diarrhea with fresh blood dark stool noted by the nursing staff neurological:  negative for dizziness, headache  Data:     Past Medical History:  no change     Social History:  no change    Family History: @no change    Vitals:      I/O (24Hr):     Intake/Output Summary (Last 24 hours) at 3/3/2021 1155  Last data filed at 3/3/2021 8621  Gross per 24 hour   Intake 2906 ml   Output 3450 ml   Net -544 ml       Labs:    URINE ANALYSIS: No results found for: LABURIN     CBC:  Lab Results   Component Value Date    WBC 5.2 2021    HGB 10.9 2021     2021     2012        BMP:    Lab Results   Component Value Date     2021    K 3.7 2021     2021    CO2 25 2021    BUN 14 2021    CREATININE 0.57 2021    GLUCOSE 106 2021    GLUCOSE 105 2012      LIVER PROFILE:  Lab Results   Component Value Date    ALT 12 2021    AST 13 2021    PROT 6.7 2021    BILITOT 0.36 2021    LABALBU 4.2 2021    LABALBU 4.6 2012               Radiology:  Medications:      Allergies:      Current Meds:   Scheduled Meds:    [Held by provider] aspirin  81 mg Oral Daily    atorvastatin  20 mg Oral Nightly    vitamin B and C  1 tablet Oral Daily    citalopram  10 mg Oral Daily    citalopram  20 mg Oral Daily    pregabalin  75 mg Oral BID    levothyroxine  50 mcg Oral Daily    sodium chloride flush  10 mL Intravenous 2 times per day    pantoprazole  40 mg Intravenous Daily    And    sodium chloride (PF)  10 mL Intravenous Daily     Continuous Infusions:    sodium chloride 100 mL/hr at 216     PRN Meds: sodium chloride flush, promethazine **OR** ondansetron, polyethylene glycol, acetaminophen **OR** acetaminophen, potassium chloride **OR** potassium alternative oral replacement **OR** potassium chloride, magnesium sulfate      Physical Examination:        BP (!) 107/57   Pulse 72   Temp 98.2 °F (36.8 °C) (Oral)   Resp 18   Ht 5' 4\" (1.626 m)   Wt 228 lb 2.8 oz (103.5 kg)   SpO2 96%   BMI 39.17 kg/m²   Temp (24hrs), Av.2 °F (36.8 °C), Min:97.9 °F (36.6 °C), Max:98.4 °F (36.9 °C)    Recent Labs     21  1607 21  1959 21  0735 21  1103   POCGLU 117* 105 99 98       Intake/Output Summary (Last 24 hours) at 3/3/2021 1155  Last data filed at 3/3/2021 0727  Gross per 24 hour   Intake 2906 ml   Output 3450 ml   Net -544 ml     Obesity  General Appearance:  alert, well appearing, and in no acute distress  Mental status: oriented to person, place, and time with normal affect  Head:  normocephalic, atraumatic. Eye: no icterus, redness, pupils equal and reactive, extraocular eye movements intact, conjunctiva clear  Ear: normal external ear, no discharge, hearing intact  Nose:  no drainage noted  Mouth: mucous membranes moist  Neck: supple, no carotid bruits, thyroid not palpable  Lungs: Bilateral equal air entry, clear to ausculation, no wheezing, rales or rhonchi, normal effort  Cardiovascular: normal rate, regular rhythm, no murmur, gallop, rub. Abdomen: Soft, nontender, nondistended, normal bowel sounds, no hepatomegaly or splenomegaly  Neurologic: There are no new focal motor or sensory deficits, normal muscle tone and bulk, no abnormal sensation, normal speech, cranial nerves II through XII grossly intact  Skin: No gross lesions, rashes, bruising or bleeding on exposed skin area  Extremities:  peripheral pulses palpable, no pedal edema or calf pain with palpation  Psych: Normal mood    Assessment:        Primary Problem  GI bleed    Active Hospital Problems    Diagnosis Date Noted    GERD (gastroesophageal reflux disease) [K21.9]      Priority: Medium    Abdominal pain [R10.9]     Dysphagia [R13.10]     Anemia [D64.9]     GI bleed [K92.2] 03/01/2021    Diarrhea [R19.7] 03/01/2021    Migraine [G43.909] 03/01/2021    Generalized anxiety disorder with panic attacks [F41.1, F41.0] 09/11/2019    Diverticulosis of colon [K57.30]     Hypothyroidism [E03.9] 05/09/2017    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 Diarrhea with dark tarry stool  2. Hemoglobin dropped from 13-10.9  3. History of rheumatoid arthritis and fibromyalgia on chronic naproxen twice a day  4.  Rule out peptic ulcer disease upper endoscopy planned n.p.o. IV Protonix    Twan Kaplan MD  3/3/2021  11:55 AM

## 2021-03-03 NOTE — PLAN OF CARE
Problem: Pain:  Goal: Pain level will decrease  Description: Pain level will decrease  3/3/2021 1739 by Shahid Fox RN  Outcome: Ongoing  3/3/2021 1436 by Bobo Melgoza RN  Outcome: Met This Shift  3/3/2021 0425 by Cale Garcia RN  Outcome: Ongoing  Goal: Control of acute pain  Description: Control of acute pain  3/3/2021 1739 by Shahid Fox RN  Outcome: Ongoing  3/3/2021 1436 by Bobo Melgoza RN  Outcome: Met This Shift  3/3/2021 0425 by Cale Garcia RN  Outcome: Ongoing  Goal: Control of chronic pain  Description: Control of chronic pain  3/3/2021 1739 by Shahid Fox RN  Outcome: Ongoing  3/3/2021 1436 by Bobo Melgoza RN  Outcome: Met This Shift  3/3/2021 0425 by Cale Garcia RN  Outcome: Ongoing     Problem: Falls - Risk of:  Goal: Will remain free from falls  Description: Will remain free from falls  3/3/2021 1739 by Shahid Fox RN  Outcome: Ongoing  3/3/2021 1436 by Bobo Melgoza RN  Outcome: Met This Shift  3/3/2021 0425 by Cale Garcia RN  Outcome: Ongoing  Note: Pt remains free from falls this shift. Bed in lowest position with wheels locked and 2/4 siderails up. Nonskid socks on. Call light and bedside table within reach. Hourly rounding to ensure pt safety. Will continue to monitor.    Goal: Absence of physical injury  Description: Absence of physical injury  3/3/2021 1739 by Shahid Fox RN  Outcome: Ongoing  3/3/2021 1436 by Bobo Melgoza RN  Outcome: Met This Shift  3/3/2021 0425 by Cale Garcia RN  Outcome: Ongoing     Problem: Nausea/Vomiting:  Goal: Absence of nausea/vomiting  Description: Absence of nausea/vomiting  3/3/2021 1739 by Shahid Fox RN  Outcome: Ongoing  3/3/2021 1436 by Bobo Melgoza RN  Outcome: Met This Shift  3/3/2021 0425 by Cale Garcia RN  Outcome: Ongoing  Goal: Able to drink  Description: Able to drink  3/3/2021 1739 by Shahid Fox RN  Outcome: Ongoing  3/3/2021 1436 by Bobo Melgoza RN  Outcome: Met This Shift  3/3/2021 0425 by Alyx Lara Deepthi Louie RN  Outcome: Ongoing  Goal: Able to eat  Description: Able to eat  3/3/2021 1739 by Nroa Ramirez RN  Outcome: Ongoing  3/3/2021 1436 by Maxi Kidd RN  Outcome: Met This Shift  3/3/2021 0425 by Lossie Goodell, RN  Outcome: Ongoing  Goal: Ability to achieve adequate nutritional intake will improve  Description: Ability to achieve adequate nutritional intake will improve  3/3/2021 1739 by Nora Ramirez RN  Outcome: Ongoing  3/3/2021 1436 by Maxi Kidd RN  Outcome: Met This Shift  3/3/2021 0425 by Lossie Goodell, RN  Outcome: Ongoing     Problem: Nutrition  Goal: Optimal nutrition therapy  Description: Nutrition Problem #1: Predicted inadequate energy intake  Intervention: Food and/or Nutrient Delivery: Start Oral Diet  Nutritional Goals: po intake greater than 50%     3/3/2021 1739 by Nora Ramirez RN  Outcome: Ongoing  3/3/2021 1436 by Maxi Kidd RN  Outcome: Met This Shift

## 2021-03-03 NOTE — PROGRESS NOTES
Bedside reporting done, per David RN's  IV intact, no redness noted  HOB up  @  50 degrees  Pt. Has no c/o of pain, or SOB at this time  Pt. Verbalized understanding of being NPO after midnight tonight. IV cont.  To infuse per volumetric pump

## 2021-03-03 NOTE — ANESTHESIA POSTPROCEDURE EVALUATION
POST- ANESTHESIA EVALUATION       Pt Name: Zach Banks  MRN: [id number]  YOB: 1970  Date of evaluation: 3/3/2021  Time:  1:22 PM      /75   Pulse 74   Temp 97.5 °F (36.4 °C) (Infrared)   Resp 18   Ht 5' 4\" (1.626 m)   Wt 228 lb (103.4 kg)   SpO2 99%   BMI 39.14 kg/m²      Consciousness Level  Awake  Cardiopulmonary Status  Stable  Pain Adequately Treated YES  Nausea / Vomiting  NO  Adequate Hydration  YES  Anesthesia Related Complications NONE      Electronically signed by Kailey Ramirez MD on 3/3/2021 at 1:22 PM       Department of Anesthesiology  Postprocedure Note    Patient: Zach Banks  MRN: [id number]  YOB: 1970  Date of evaluation: 3/3/2021  Time:  1:22 PM     Procedure Summary     Date: 03/03/21 Room / Location: [facility] 04 / [facility]    Anesthesia Start: 8028 Anesthesia Stop: 9649    Procedure: EGD BIOPSY & SAVARY DILATION (N/A Esophagus) Diagnosis: (DIFFICULTY SWALLOWING)    Surgeons: Jack Elaine MD Responsible Provider: Kailey Ramirez MD    Anesthesia Type: MAC ASA Status: 2          Anesthesia Type: MAC    Chivo Phase I:      Chivo Phase II:      Last vitals: Reviewed and per EMR flowsheets.        Anesthesia Post Evaluation

## 2021-03-03 NOTE — PLAN OF CARE
Problem: Pain:  Goal: Pain level will decrease  Description: Pain level will decrease  3/3/2021 1436 by Sidra Harvey RN  Outcome: Met This Shift  3/3/2021 0425 by Manfred Geller RN  Outcome: Ongoing  Goal: Control of acute pain  Description: Control of acute pain  3/3/2021 1436 by Sidra Harvey RN  Outcome: Met This Shift  3/3/2021 0425 by Manfred Geller RN  Outcome: Ongoing  Goal: Control of chronic pain  Description: Control of chronic pain  3/3/2021 1436 by Sidra Harvey RN  Outcome: Met This Shift  3/3/2021 0425 by Manfred Gellre RN  Outcome: Ongoing     Problem: Falls - Risk of:  Goal: Will remain free from falls  Description: Will remain free from falls  3/3/2021 1436 by Sidra Harvey RN  Outcome: Met This Shift  3/3/2021 0425 by Manfred Geller RN  Outcome: Ongoing  Note: Pt remains free from falls this shift. Bed in lowest position with wheels locked and 2/4 siderails up. Nonskid socks on. Call light and bedside table within reach. Hourly rounding to ensure pt safety. Will continue to monitor.    Goal: Absence of physical injury  Description: Absence of physical injury  3/3/2021 1436 by Sidra Harvey RN  Outcome: Met This Shift  3/3/2021 0425 by Manfred Geller RN  Outcome: Ongoing

## 2021-03-03 NOTE — PLAN OF CARE
Problem: Pain:  Goal: Pain level will decrease  3/3/2021 0425 by Joi Diaz RN  Outcome: Ongoing  3/2/2021 1507 by Mariposa King RN  Outcome: Ongoing  Goal: Control of acute pain  3/3/2021 0425 by Joi Diaz RN  Outcome: Ongoing  3/2/2021 1507 by Mariposa King RN  Outcome: Ongoing  Goal: Control of chronic pain  Outcome: Ongoing     Problem: Falls - Risk of:  Goal: Will remain free from falls  3/3/2021 0425 by Joi Diaz RN  Outcome: Ongoing  Note: Pt remains free from falls this shift. Bed in lowest position with wheels locked and 2/4 siderails up. Nonskid socks on. Call light and bedside table within reach. Hourly rounding to ensure pt safety. Will continue to monitor.    3/2/2021 1507 by Mariposa King RN  Outcome: Ongoing  Goal: Absence of physical injury  3/3/2021 0425 by Joi Diaz RN  Outcome: Ongoing  3/2/2021 1507 by Mariposa King RN  Outcome: Ongoing     Problem: Nausea/Vomiting:  Goal: Absence of nausea/vomiting  Outcome: Ongoing  Goal: Able to drink  Outcome: Ongoing  Goal: Able to eat  Outcome: Ongoing  Goal: Ability to achieve adequate nutritional intake will improve  Outcome: Ongoing

## 2021-03-03 NOTE — OP NOTE
bowel was inspected through the bulb, sweep, and second portion of the duodenum. The endoscopic exam showed no pathology. RECOMMENDATIONS:   1) Transfer back to the floor for further management as per primary care team.   2) Follow up on pathology report. 3) Advance diet as tolerated. 4) Okay to discharge home from GI standpoint. Outpatient follow-up.          Sina Artis

## 2021-03-03 NOTE — PROGRESS NOTES
Transfer to private    Patient is a 42-year-old female admitted on 3/1/2021 for rectal bleeding, dysphagia and diarrhea. Diarrhea most likely infectious resolved. Per GI patient will be going for EGD with possible dilation today.     [facility] [place]  [facility]

## 2021-03-03 NOTE — CARE COORDINATION
ONGOING DISCHARGE PLAN:    Spoke with patient regarding discharge plan yesterday and patient confirmed that plan is home without needs. VNS has been declined. Pt to have EGD today. Will continue to follow for additional discharge needs.     Electronically signed by Heriberto Willard RN on 3/3/2021 at 11:26 AM

## 2021-03-03 NOTE — PROGRESS NOTES
Comprehensive Nutrition Assessment    Type and Reason for Visit:  Initial, Positive Nutrition Screen(nausea/vomiting/diarrhea)    Nutrition Recommendations/Plan: Recommend General diet as tolerated   Will add Ensure High Protein once daily when diet is initiated. Nutrition Assessment:  Pt admitted due to GI Bleed. Plan is for EGD later today. Pt states she has noticed she has difficulty swallowing chicken and bread so she avoids these foods. Review of wt history shows not wt loss. Pt was able to consuming more than 75 of first meal provided. Malnutrition Assessment:  Malnutrition Status: At risk for malnutrition (Comment)    Context:  Acute Illness     Findings of the 6 clinical characteristics of malnutrition:  Energy Intake:  Mild decrease in energy intake (Comment)  Weight Loss:  No significant weight loss     Body Fat Loss:  No significant body fat loss     Muscle Mass Loss:  No significant muscle mass loss    Fluid Accumulation:  No significant fluid accumulation     Strength:  Not Performed    Estimated Daily Nutrient Needs:  Energy (kcal): [place] x 1.2= 2000 kcal; Weight Used for Energy Requirements:  Current     Protein (g):  1.4g/kg= 75 g; Weight Used for Protein Requirements:  Ideal          Nutrition Related Findings:  no edema, Labs (3/3) POC Glu 98-99.  Meds: Reviewed, BM 3/3, H/O Lupus, Cervical Ca, Delta [place] Pool Disease      Wounds:  None       Current Nutrition Therapies:    Diet NPO Time Specified Exceptions are: Sips of Water with Meds    Anthropometric Measures:  · Height: 5' 4\" (162.6 cm)  · Current Body Weight: 228 lb (103.4 kg)   · Admission Body Weight: 228 lb (103.4 kg)    · Usual Body Weight: 220 lb (99.8 kg)(10/20)     · Ideal Body Weight: 120 lbs;   · BMI: 39.1  · BMI Categories: Obese Class 2 (BMI 35.0 -39.9)       Nutrition Diagnosis:   · Predicted inadequate energy intake related to (poor appetite, N/V, diarrhea) as evidenced by poor intake prior to admission    Nutrition Interventions:   Food and/or Nutrient Delivery:  Start Oral Diet  Nutrition Education/Counseling:  No recommendation at this time   Coordination of Nutrition Care:  Continue to monitor while inpatient    Goals:  po intake greater than 50%       Nutrition Monitoring and Evaluation:   Food/Nutrient Intake Outcomes:  Diet Advancement/Tolerance  Physical Signs/Symptoms Outcomes:  Biochemical Data, GI Status, Skin, Weight, Fluid Status or Edema     Discharge Planning:     Too soon to determine     Electronically signed by Santo Holly RD, LD on 3/3/21 at 1:26 PM EST    Contact: [phone]

## 2021-03-03 NOTE — PLAN OF CARE
Nutrition Problem #1: Predicted inadequate energy intake  Intervention: Food and/or Nutrient Delivery: Start Oral Diet  Nutritional Goals: po intake greater than 50%

## 2021-03-03 NOTE — ANESTHESIA PRE PROCEDURE
Reported on 12/17/2020 12/16/20   Albin Phalen, APRN - CNP   Omeprazole-Sodium Bicarbonate (ZEGERID)  MG CAPS Take by mouth    Historical Provider, MD   LORazepam (ATIVAN) 0.5 MG tablet 0.5 mg daily as needed.  4/24/18   Historical Provider, MD       Current medications:    Current Facility-Administered Medications   Medication Dose Route Frequency Provider Last Rate Last Admin    0.9 % sodium chloride infusion   Intravenous Continuous Jocy Jasmine  mL/hr at 03/01/21 2236 New Bag at 03/01/21 2236    [Held by provider] aspirin EC tablet 81 mg  81 mg Oral Daily Saurabh Hogue MD        atorvastatin (LIPITOR) tablet 20 mg  20 mg Oral Nightly Saurabh Hogue MD   20 mg at 03/02/21 2046    vitamin B and C (TOTAL B-C) 1 tablet  1 tablet Oral Daily Saurabh Hogue MD   1 tablet at 03/02/21 2046    citalopram (CELEXA) tablet 10 mg  10 mg Oral Daily Saurabh Hogue MD   10 mg at 03/02/21 4778    citalopram (CELEXA) tablet 20 mg  20 mg Oral Daily Saurabh Hogue MD   20 mg at 03/02/21 0853    pregabalin (LYRICA) capsule 75 mg  75 mg Oral BID Saurabh Hogue MD   75 mg at 03/02/21 2046    levothyroxine (SYNTHROID) tablet 50 mcg  50 mcg Oral Daily Saurabh Hogue MD   50 mcg at 03/02/21 8051    sodium chloride flush 0.9 % injection 10 mL  10 mL Intravenous 2 times per day Saurabh Hogue MD   10 mL at 03/01/21 2309    sodium chloride flush 0.9 % injection 10 mL  10 mL Intravenous PRN Saurabh Hogue MD        promethazine (PHENERGAN) tablet 12.5 mg  12.5 mg Oral Q6H PRN Saurabh Hogue MD   12.5 mg at 03/02/21 3945    Or    ondansetron (ZOFRAN) injection 4 mg  4 mg Intravenous Q6H PRN Saurabh Hogue MD   4 mg at 03/03/21 0815    polyethylene glycol (GLYCOLAX) packet 17 g  17 g Oral Daily PRN Saurabh Hogue MD        acetaminophen (TYLENOL) tablet 650 mg  650 mg Oral Q6H PRN Saurabh Hogue MD   650 mg at 03/03/21 0743    Or    acetaminophen (TYLENOL) suppository 650 mg  650 mg Rectal Q6H PRN Saurabh Hogue MD        potassium chloride (KLOR-CON M) extended release tablet 40 mEq  40 mEq Oral PRN Ziyad Nascimento MD        Or    potassium bicarb-citric acid (EFFER-K) effervescent tablet 40 mEq  40 mEq Oral PRN Ziyad Nascimento MD        Or    potassium chloride 10 mEq/100 mL IVPB (Peripheral Line)  10 mEq Intravenous PRN Ziyad Nascimento MD        magnesium sulfate 2,000 mg in dextrose 5 % 100 mL IVPB  2,000 mg Intravenous PRN Ziyad Nascimento MD        pantoprazole (PROTONIX) injection 40 mg  40 mg Intravenous Daily Beck Gordon MD   40 mg at 21 8216    And    sodium chloride (PF) 0.9 % injection 10 mL  10 mL Intravenous Daily Beck Gordon MD   10 mL at 21 9629       Allergies: Allergies   Allergen Reactions    Other      Perch - twice had violent vomiting, diarrhea,severe dizziness and nausea    Percocet [Oxycodone-Acetaminophen] Hives and Itching     Hives everywhere, including roof of mouth and tear duct openings    Tramadol Hcl Hives and Itching     hives    Azithromycin      Palpitations.      Ibuprofen Hives    Ceclor [Cefaclor] Hives and Rash    Penicillins Hives and Rash       Problem List:    Patient Active Problem List   Diagnosis Code    Dermatitis, contact L25.9    Anxiety and depression F41.9, F32.9    Bilateral carpal tunnel syndrome G56.03    Tenderness of left calf M79.662    Posterior left knee pain M25.562    Cervical polyp N84.1    Fatigue R53.83    Hypothyroidism E03.9    Bilateral low back pain without sciatica M54.5    Left foot pain M79.672    Lumbar degenerative disc disease M51.36    Arthralgia of left hip M25.552    Midline low back pain with sciatica M54.40    Asthma J45.909    GERD (gastroesophageal reflux disease) K21.9    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    Environmental allergies Z91.09    H/O thyroid cyst Z86.39    History of cervical dysplasia Z87.410    History of conization of cervix 2000 Z98.890    History of low transverse  section Z98.891    Vision abnormalities H53.9    Family history of breast cancer Z80.3    Osteoarthritis M19.90    VASECTOMY in Male Partner Z28.80    History of endometrial ablation Z98.890    Pelvic pain in female R10.2    Hypothyroidism E03.9    Abnormal uterine bleeding N93.9    Diverticulosis of colon K57.30    Rectal bleeding K62.5    Constipation K59.00    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    Persistent depressive disorder F34.1    Generalized anxiety disorder with panic attacks F41.1, F41.0    Other forms of systemic lupus erythematosus (HCC) M32.8    Panic attacks F41.0    Stroke-like symptoms R29.90    Numbness R20.0    GI bleed K92.2    Diarrhea R19.7    Migraine G43.909    Abdominal pain R10.9    Dysphagia R13.10    Anemia D64.9       Past Medical History:        Diagnosis Date    Adrenal insufficiency (HCC)     Asthma     Bleeding after intercourse     Cancer ([facility]     CERVICAL    Delta storage pool disease [facility])     Diverticulosis of colon     Environmental allergies     Family history of breast cancer     MGM in her 45s    GERD (gastroesophageal reflux disease)     H/O thyroid cyst     mild hypothyroidism.     Headache     History of cervical dysplasia 2000    had cone    History of conization of cervix     dysplastic cells    Hypothyroidism, unspecified 3/18/2016    Lupus ([facility]     Osteoarthritis     Pain     all over body    Sleep apnea     awaiting test results currently    Vision abnormalities     glasses/ far sighted       Past Surgical History:        Procedure Laterality Date    BONE CYST EXCISION Right     WRIST    CARPAL TUNNEL RELEASE Right 10-6-15    CARPAL TUNNEL RELEASE Left 11/3/15    CERVIX BIOPSY       SECTION, LOW TRANSVERSE      COLONOSCOPY      COLONOSCOPY  2009    diverticulosis, no other gross lesions    COLONOSCOPY  2017    10 yr recall, small hemorrhoids, diverticulosis    DILATION AND CURETTAGE OF UTERUS N/A 2017    HYSTEROSCOPY AND D& C, myosure performed by Ariel Duran MD at [facility] 1, COLON, DIAGNOSTIC      UGI    FRACTURE SURGERY Left     THUMB    GYNECOLOGIC CRYOSURGERY  2000    conization   [address]      with mesh    LIPOMA RESECTION Right     RING FINGER    OTHER SURGICAL HISTORY      VOCAL CORD SURG    VT COLON CA SCRN NOT  [address] IND N/A 11/21/2017    COLONOSCOPY performed by Filiberto Olivera MD at [address] [place] [address] EGD TRANSORAL BIOPSY SINGLE/MULTIPLE N/A 11/21/2017    EGD BIOPSY performed by Filiberto Olivera MD at [facility] 08/09/2016    Right thyroid lobectomy and isthmusectomy. Continuous monitoring of the recurrent laryngeal nerve using nerve integrity monitor. Frozen section.  TONSILLECTOMY      UPPER GASTROINTESTINAL ENDOSCOPY  12/12/2008    with BRAVO, gastric polyp-fundic gland polyp    UPPER GASTROINTESTINAL ENDOSCOPY  11/21/2017    multiple small gastric polyps-fundic gland polyps       Social History:    Social History     Tobacco Use    Smoking status: Former Smoker    Smokeless tobacco: Never Used   Substance Use Topics    Alcohol use:  Yes     Alcohol/week: 0.0 standard drinks     Comment: recovering alcoholic                                Counseling given: Not Answered      Vital Signs (Current):   Vitals:    03/02/21 1215 03/02/21 1815 03/03/21 0000 03/03/21 0715   BP: 114/67 114/84  (!) 107/57   Pulse: 68 70  72   Resp: 18 16  18   Temp: 98.4 °F (36.9 °C) 97.9 °F (36.6 °C)  98.2 °F (36.8 °C)   TempSrc: Oral Oral  Oral   SpO2: 95% 97%  96%   Weight:   228 lb 2.8 oz (103.5 kg)    Height:                                                  BP Readings from Last 3 Encounters:   03/03/21 (!) 107/57   03/01/21 110/72   12/17/20 129/82       NPO Status:                                                                                 BMI:   Wt Readings from Last 3 Encounters:   03/03/21 228 lb 2.8 oz (103.5 kg)   03/01/21 215 lb 9.6 oz (97.8 kg) 12/17/20 217 lb (98.4 kg)     Body mass index is 39.17 kg/m².     CBC:   Lab Results   Component Value Date    WBC 5.2 03/02/2021    RBC 3.52 03/02/2021    RBC 4.40 04/05/2012    HGB 10.9 03/02/2021    HCT 32.2 03/02/2021    MCV 91.2 03/02/2021    RDW 14.6 03/02/2021     03/02/2021     04/05/2012       CMP:   Lab Results   Component Value Date     03/02/2021    K 3.7 03/02/2021     03/02/2021    CO2 25 03/02/2021    BUN 14 03/02/2021    CREATININE 0.57 03/02/2021    GFRAA >60 03/02/2021    LABGLOM >60 03/02/2021    GLUCOSE 106 03/02/2021    GLUCOSE 105 04/05/2012    PROT 6.7 03/01/2021    CALCIUM 8.4 03/02/2021    BILITOT 0.36 03/01/2021    ALKPHOS 84 03/01/2021    AST 13 03/01/2021    ALT 12 03/01/2021       POC Tests:   Recent Labs     03/03/21  1103   POCGLU 98       Coags:   Lab Results   Component Value Date    PROTIME 13.3 03/01/2021    INR 1.0 03/01/2021    APTT 35.6 03/01/2021       HCG (If Applicable):   Lab Results   Component Value Date    PREGTESTUR NEGATIVE 09/06/2011    HCG NEGATIVE 07/18/2017    HCGQUANT <1 04/20/2016        ABGs: No results found for: PHART, PO2ART, YWV6ONI, COO8JBO, BEART, T4ZYUXBA     Type & Screen (If Applicable):  No results found for: LABABO, LABRH    Drug/Infectious Status (If Applicable):  Lab Results   Component Value Date    HEPCAB NONREACTIVE 08/18/2015       COVID-19 Screening (If Applicable):   Lab Results   Component Value Date    COVID19 Not Detected 03/02/2021         Anesthesia Evaluation  Patient summary reviewed and Nursing notes reviewed  Airway: Mallampati: II  TM distance: >3 FB   Neck ROM: full  Mouth opening: > = 3 FB Dental:          Pulmonary: breath sounds clear to auscultation  (+) sleep apnea:  asthma:     (-) rhonchi, wheezes, rales and stridor                           Cardiovascular:        (-) murmur, weak pulses,  friction rub, systolic click, carotid bruit,  JVD and peripheral edema    ECG reviewed  Rhythm: regular  Rate: normal  Echocardiogram reviewed               ROS comment: Global left ventricular systolic function is normal. Estimated LV EF 60-65  %. Neuro/Psych:   (+) neuromuscular disease:, headaches:, psychiatric history:            GI/Hepatic/Renal:   (+) GERD:,           Endo/Other:    (+) hypothyroidism: arthritis:., .                 Abdominal:           Vascular:                                      Anesthesia Plan      MAC     ASA 2       Induction: intravenous. Anesthetic plan and risks discussed with patient. Plan discussed with CRNA.                   Teresa Lewis MD   3/3/2021

## 2021-03-04 ENCOUNTER — TELEPHONE (OUTPATIENT)
Dept: INTERNAL MEDICINE CLINIC | Age: 51
End: 2021-03-04

## 2021-03-04 VITALS
OXYGEN SATURATION: 98 % | DIASTOLIC BLOOD PRESSURE: 78 MMHG | HEART RATE: 70 BPM | RESPIRATION RATE: 18 BRPM | HEIGHT: 64 IN | BODY MASS INDEX: 38.93 KG/M2 | WEIGHT: 228 LBS | SYSTOLIC BLOOD PRESSURE: 129 MMHG | TEMPERATURE: 98.6 F

## 2021-03-04 LAB
GLUCOSE BLD-MCNC: 128 MG/DL (ref 65–105)
GLUCOSE BLD-MCNC: 89 MG/DL (ref 65–105)
HCT VFR BLD CALC: 32.3 % (ref 36–46)
HEMOGLOBIN: 10.6 G/DL (ref 12–16)
SURGICAL PATHOLOGY REPORT: NORMAL

## 2021-03-04 PROCEDURE — 2580000003 HC RX 258: Performed by: INTERNAL MEDICINE

## 2021-03-04 PROCEDURE — 85014 HEMATOCRIT: CPT

## 2021-03-04 PROCEDURE — 85018 HEMOGLOBIN: CPT

## 2021-03-04 PROCEDURE — 87506 IADNA-DNA/RNA PROBE TQ 6-11: CPT

## 2021-03-04 PROCEDURE — 99239 HOSP IP/OBS DSCHRG MGMT >30: CPT | Performed by: INTERNAL MEDICINE

## 2021-03-04 PROCEDURE — G0378 HOSPITAL OBSERVATION PER HR: HCPCS

## 2021-03-04 PROCEDURE — 6360000002 HC RX W HCPCS: Performed by: INTERNAL MEDICINE

## 2021-03-04 PROCEDURE — 6370000000 HC RX 637 (ALT 250 FOR IP): Performed by: INTERNAL MEDICINE

## 2021-03-04 PROCEDURE — 36415 COLL VENOUS BLD VENIPUNCTURE: CPT

## 2021-03-04 PROCEDURE — C9113 INJ PANTOPRAZOLE SODIUM, VIA: HCPCS | Performed by: INTERNAL MEDICINE

## 2021-03-04 PROCEDURE — 82947 ASSAY GLUCOSE BLOOD QUANT: CPT

## 2021-03-04 RX ADMIN — SODIUM CHLORIDE, PRESERVATIVE FREE 10 ML: 5 INJECTION INTRAVENOUS at 08:08

## 2021-03-04 RX ADMIN — PANTOPRAZOLE SODIUM 40 MG: 40 INJECTION, POWDER, FOR SOLUTION INTRAVENOUS at 08:08

## 2021-03-04 RX ADMIN — LEVOTHYROXINE SODIUM 50 MCG: 0.05 TABLET ORAL at 08:07

## 2021-03-04 RX ADMIN — PREGABALIN 75 MG: 75 CAPSULE ORAL at 08:06

## 2021-03-04 RX ADMIN — SODIUM CHLORIDE, PRESERVATIVE FREE 10 ML: 5 INJECTION INTRAVENOUS at 08:09

## 2021-03-04 RX ADMIN — Medication 10 ML: at 08:09

## 2021-03-04 RX ADMIN — Medication 1 TABLET: at 08:13

## 2021-03-04 RX ADMIN — CITALOPRAM HYDROBROMIDE 10 MG: 20 TABLET ORAL at 08:07

## 2021-03-04 RX ADMIN — ACETAMINOPHEN 650 MG: 325 TABLET ORAL at 05:24

## 2021-03-04 RX ADMIN — CITALOPRAM HYDROBROMIDE 20 MG: 20 TABLET ORAL at 08:13

## 2021-03-04 ASSESSMENT — PAIN SCALES - GENERAL
PAINLEVEL_OUTOF10: 4
PAINLEVEL_OUTOF10: 0
PAINLEVEL_OUTOF10: 4

## 2021-03-04 NOTE — DISCHARGE INSTR - DIET

## 2021-03-04 NOTE — TELEPHONE ENCOUNTER
[facility] Transitions Initial Follow Up Call    Outreach made within 2 business days of discharge: Yes    Patient: Shikha Jean-Baptiste Patient : 1970   MRN: [id number]  Reason for Admission: There are no discharge diagnoses documented for the most recent discharge. Discharge Date: 3/4/21       Spoke with: Pt      Discharge department/facility: [facility] Interactive Patient Contact:  Was patient able to fill all prescriptions: Yes  Was patient instructed to bring all medications to the follow-up visit: Yes  Is patient taking all medications as directed in the discharge summary?  Yes  Does patient understand their discharge instructions: Yes  Does patient have questions or concerns that need addressed prior to 7-14 day follow up office visit: no    Scheduled appointment with PCP within 7-14 days    Follow Up  Future Appointments   Date Time Provider Timmy Jc   2021  9:20 STEPHIE Alanis - CNP Neuro Spec [place], [place]

## 2021-03-04 NOTE — CARE COORDINATION
ONGOING DISCHARGE PLAN:    Spoke with patient regarding discharge plan and patient confirms that plan is still home without needs. Will be discharged home today. Hgb 10.6 today. Will continue to follow for additional discharge needs.     Electronically signed by Amaury Gutierrez RN on 3/4/2021 at 1:20 PM

## 2021-03-04 NOTE — PLAN OF CARE
Problem: Pain:  Goal: Pain level will decrease  3/4/2021 0517 by Soni Vallejo RN  Outcome: Ongoing  3/3/2021 1739 by Favio Lennon RN  Outcome: Ongoing  Goal: Control of acute pain  3/4/2021 0517 by Soni Vallejo RN  Outcome: Ongoing  3/3/2021 1739 by Favio Lennon RN  Outcome: Ongoing  Goal: Control of chronic pain  3/4/2021 0517 by Soni Vallejo RN  Outcome: Ongoing  3/3/2021 1739 by Favio Lennon RN  Outcome: Ongoing     Problem: Falls - Risk of:  Goal: Will remain free from falls  3/4/2021 0517 by Soni Vallejo RN  Outcome: Ongoing  Note: Pt remains free from falls this shift. Bed in lowest position with wheels locked and 2/4 siderails up. Nonskid socks on. Call light and bedside table within reach. Will continue to monitor. 3/3/2021 1739 by Favio Lennon RN  Outcome: Ongoing  Goal: Absence of physical injury  3/4/2021 0517 by Soni Vallejo RN  Outcome: Ongoing  3/3/2021 1739 by Favio Lennon RN  Outcome: Ongoing     Problem: Nausea/Vomiting:  Goal: Absence of nausea/vomiting  3/4/2021 0517 by Soni Vallejo RN  Outcome: Ongoing  Note: Pt has no c/o nausea this shift. Will continue to monitor.    3/3/2021 1739 by Favio Lennon RN  Outcome: Ongoing  Goal: Able to drink  3/4/2021 0517 by Soni Vallejo RN  Outcome: Ongoing  3/3/2021 1739 by Favio Lennon RN  Outcome: Ongoing  Goal: Able to eat  3/4/2021 0517 by Soni Vallejo RN  Outcome: Ongoing  3/3/2021 1739 by Favio Lennon RN  Outcome: Ongoing  Goal: Ability to achieve adequate nutritional intake will improve  3/4/2021 0517 by Soni Vallejo RN  Outcome: Ongoing  3/3/2021 1739 by Favio Lennon RN  Outcome: Ongoing     Problem: Nutrition  Goal: Optimal nutrition therapy  3/4/2021 0517 by Sharlon Vallejo, RN  Outcome: Ongoing  3/3/2021 1739 by Favio Lennon RN  Outcome: Ongoing

## 2021-03-04 NOTE — PLAN OF CARE
Problem: Pain:  Description: Pain management should include both nonpharmacologic and pharmacologic interventions. Goal: Pain level will decrease  Description: Pain level will decrease  3/4/2021 1236 by Soraida Kaur RN  Outcome: Completed  3/4/2021 0517 by Aneudy Parks RN  Outcome: Ongoing  Goal: Control of acute pain  Description: Control of acute pain  3/4/2021 1236 by Soraida Kaur RN  Outcome: Completed  3/4/2021 0517 by Aneudy Parks RN  Outcome: Ongoing  Goal: Control of chronic pain  Description: Control of chronic pain  3/4/2021 1236 by Soraida Karu RN  Outcome: Completed  3/4/2021 0517 by Aneudy Parks RN  Outcome: Ongoing     Problem: Falls - Risk of:  Goal: Will remain free from falls  Description: Will remain free from falls  3/4/2021 1236 by Soraida Kaur RN  Outcome: Completed  3/4/2021 0517 by Aneudy Parks RN  Outcome: Ongoing  Note: Pt remains free from falls this shift. Bed in lowest position with wheels locked and 2/4 siderails up. Nonskid socks on. Call light and bedside table within reach. Will continue to monitor. Goal: Absence of physical injury  Description: Absence of physical injury  3/4/2021 1236 by Soraida Kaur RN  Outcome: Completed  3/4/2021 0517 by Aneudy Parks RN  Outcome: Ongoing     Problem: Nausea/Vomiting:  Goal: Absence of nausea/vomiting  Description: Absence of nausea/vomiting  3/4/2021 1236 by Soraida Kaur RN  Outcome: Completed  3/4/2021 0517 by Aneudy Parks RN  Outcome: Ongoing  Note: Pt has no c/o nausea this shift. Will continue to monitor.    Goal: Able to drink  Description: Able to drink  3/4/2021 1236 by Soraida Kaur RN  Outcome: Completed  3/4/2021 0517 by Aneudy Parks RN  Outcome: Ongoing  Goal: Able to eat  Description: Able to eat  3/4/2021 1236 by Soraida Kaur RN  Outcome: Completed  3/4/2021 0517 by Aneudy Parks RN  Outcome: Ongoing  Goal: Ability to achieve adequate nutritional intake will improve  Description: Ability to achieve adequate nutritional intake will improve  3/4/2021 1236 by Мария Anderson RN  Outcome: Completed  3/4/2021 0517 by Mervat Cartagena RN  Outcome: Ongoing     Problem: Nutrition  Goal: Optimal nutrition therapy  Description: Nutrition Problem #1: Predicted inadequate energy intake  Intervention: Food and/or Nutrient Delivery: Start Oral Diet  Nutritional Goals: po intake greater than 50%     3/4/2021 1236 by Мария Anderson RN  Outcome: Completed  3/4/2021 0517 by Mervat Cartagena RN  Outcome: Ongoing

## 2021-03-04 NOTE — DISCHARGE SUMMARY
[place] [address] Internal Medicine    Discharge Summary     Patient ID: Lorena Bhakta  :  1970   MRN: [id number]     ACCOUNT:  [de-identified]   Patient's PCP: Kd Vidal MD  Admit Date: 3/1/2021   Discharge Date: 3/4/2021    Length of Stay: 1  Code Status:  Full Code  Admitting Physician: Carola Toledo MD  Discharge Physician: Saranya Borden MD     Active Discharge Diagnoses:     Primary Problem  GI bleed      [facility] Problems    Diagnosis Date Noted    GERD (gastroesophageal reflux disease) [K21.9]      Priority: Medium    Melena [K92.1] 2021    Abdominal pain [R10.9]     Dysphagia [R13.10]     Anemia [D64.9]     GI bleed [K92.2] 2021    Diarrhea [R19.7] 2021    Migraine [G43.909] 2021    Generalized anxiety disorder with panic attacks [F41.1, F41.0] 2019    Diverticulosis of colon [K57.30]     Hypothyroidism [E03.9] 2017    Delta storage pool disease [facility]) [D69.1]        Admission Condition:  fair     Discharged Condition: fair    Hospital Stay:     Hospital Course:  Lorena Bhakta is a 53 y.o. female who was admitted for the management of GI bleed , presented to ER with No chief complaint on file.  -year-old lady with a history of migraine history of hypothyroidism delta storage pool disorder history omeprazole for gastritis and GERD   Has been taking NSAIDs   admitted with some loose stool fresh blood baseline hemoglobin 8 12-13 came at 10.9  FOBT positive hemoglobin remained stable in-house patient had an upper endoscopy upper endoscopy negative biopsy taken for H. pylori results pending  Diarrhea resolved no blood in the stool plan is for outpatient GI eval with the biopsy and possible colonoscopy  She had a colonoscopy in the past but did not remember the reason  She is also follow-up with PCP within 2 weeks        Significant therapeutic interventions:     Significant Diagnostic Studies:   Labs / Micro:        ,     Radiology:    Us Liver    Result Date: 3/1/2021  EXAMINATION: RIGHT UPPER QUADRANT ULTRASOUND 3/1/2021 5:04 pm COMPARISON: None. HISTORY: ORDERING SYSTEM PROVIDED HISTORY: RUQ pain TECHNOLOGIST PROVIDED HISTORY: RUQ pain Reason for Exam: ruq pain, diarrhea, blood in stool Acuity: Acute Type of Exam: Initial FINDINGS: LIVER:  Diffuse mild increased echogenicity of the liver with slight heterogeneity indicating underlying steatosis. No focal hepatic lesion is appreciated. Limited Doppler evaluation of the portal vein demonstrates hepatopetal flow, which is normal. BILIARY SYSTEM:  Cholelithiasis without mucosal thickening or pericholecystic fluid. No sonographic Mckeon sign. Common bile duct is within normal limits measuring 4 mm. PANCREAS:  The pancreas is not visualized. OTHER: No evidence of right upper quadrant ascites. 1. Cholelithiasis with no evidence of acute cholecystitis. 2. Hepatic steatosis with no focal lesion. Consultations:    Consults:     Final Specialist Recommendations/Findings:   IP CONSULT TO GI  IP CONSULT TO SOCIAL WORK      The patient was seen and examined on day of discharge and this discharge summary is in conjunction with any daily progress note from day of discharge.     Discharge plan:     Disposition: Home    Physician Follow Up:     Kiana Kowalski MD  [address] [place] [address]  [phone]    Schedule an appointment as soon as possible for a visit in 2 weeks  post-hospital visit    Clau Garcia MD  [address] [place] [address]  [phone]    Schedule an appointment as soon as possible for a visit in 2 weeks  bx results       Requiring Further Evaluation/Follow Up POST HOSPITALIZATION/Incidental Findings:    Diet: low fat      Activity: As tolerated    Instructions to Patient:     Discharge Medications:      Medication List      CONTINUE taking these medications    aspirin 81 MG EC tablet  Take 1 tablet by mouth daily

## 2021-03-05 ENCOUNTER — TELEPHONE (OUTPATIENT)
Dept: INTERNAL MEDICINE CLINIC | Age: 51
End: 2021-03-05

## 2021-03-05 LAB
CAMPYLOBACTER PCR: NORMAL
E COLI ENTEROTOXIGENIC PCR: NORMAL
PLESIOMONAS SHIGELLOIDES PCR: NORMAL
SALMONELLA PCR: NORMAL
SHIGATOXIN GENE PCR: NORMAL
SHIGELLA SP PCR: NORMAL
SPECIMEN DESCRIPTION: NORMAL
VIBRIO PCR: NORMAL
YERSINIA ENTEROCOLITICA PCR: NORMAL

## 2021-03-05 NOTE — TELEPHONE ENCOUNTER
Pts nurse  Sadiq from [address] called and left his information just incase the office needs to get in contact with him for any reason on behalf of the pt. Contact: [phone] ext.  [phone]

## 2021-03-07 ASSESSMENT — ENCOUNTER SYMPTOMS
CONSTIPATION: 0
EYE DISCHARGE: 0
CHOKING: 0
COUGH: 0
CHEST TIGHTNESS: 0
ABDOMINAL PAIN: 0
ABDOMINAL DISTENTION: 0
EYE ITCHING: 0
BACK PAIN: 0
APNEA: 0
SHORTNESS OF BREATH: 0
DIARRHEA: 0
EYE REDNESS: 0
BLOOD IN STOOL: 1
EYE PAIN: 0
COLOR CHANGE: 0

## 2021-03-08 DIAGNOSIS — E78.5 HYPERLIPIDEMIA, UNSPECIFIED HYPERLIPIDEMIA TYPE: ICD-10-CM

## 2021-03-08 RX ORDER — ATORVASTATIN CALCIUM 20 MG/1
20 TABLET, FILM COATED ORAL NIGHTLY
Qty: 90 TABLET | Refills: 1 | Status: SHIPPED | OUTPATIENT
Start: 2021-03-08 | End: 2021-06-01 | Stop reason: ALTCHOICE

## 2021-03-09 ENCOUNTER — OFFICE VISIT (OUTPATIENT)
Dept: GASTROENTEROLOGY | Age: 51
End: 2021-03-09
Payer: COMMERCIAL

## 2021-03-09 ENCOUNTER — HOSPITAL ENCOUNTER (OUTPATIENT)
Age: 51
Setting detail: OUTPATIENT SURGERY
End: 2021-03-09
Attending: INTERNAL MEDICINE | Admitting: INTERNAL MEDICINE
Payer: COMMERCIAL

## 2021-03-09 ENCOUNTER — HOSPITAL ENCOUNTER (OUTPATIENT)
Age: 51
Discharge: HOME | End: 2021-03-09
Payer: COMMERCIAL

## 2021-03-09 VITALS
WEIGHT: 228 LBS | SYSTOLIC BLOOD PRESSURE: 112 MMHG | HEART RATE: 90 BPM | BODY MASS INDEX: 39.14 KG/M2 | DIASTOLIC BLOOD PRESSURE: 81 MMHG

## 2021-03-09 DIAGNOSIS — R19.5 OCCULT BLOOD IN STOOLS: Primary | ICD-10-CM

## 2021-03-09 DIAGNOSIS — R19.5 OCCULT BLOOD IN STOOLS: ICD-10-CM

## 2021-03-09 LAB
ABSOLUTE RETIC #: 0.11 M/UL (ref 0.02–0.1)
FOLATE: >20 NG/ML
IMMATURE RETIC FRACT: ABNORMAL %
IRON SATURATION: 14 % (ref 20–55)
IRON: 41 UG/DL (ref 37–145)
RETIC %: 3.3 % (ref 0.5–2)
RETIC HEMOGLOBIN: ABNORMAL PG (ref 28.2–35.7)
TOTAL IRON BINDING CAPACITY: 300 UG/DL (ref 250–450)
UNSATURATED IRON BINDING CAPACITY: 259 UG/DL (ref 112–347)
VITAMIN B-12: 529 PG/ML (ref 232–1245)

## 2021-03-09 PROCEDURE — 99215 OFFICE O/P EST HI 40 MIN: CPT | Performed by: INTERNAL MEDICINE

## 2021-03-09 PROCEDURE — 83550 IRON BINDING TEST: CPT

## 2021-03-09 PROCEDURE — 83516 IMMUNOASSAY NONANTIBODY: CPT

## 2021-03-09 PROCEDURE — 83540 ASSAY OF IRON: CPT

## 2021-03-09 PROCEDURE — 36415 COLL VENOUS BLD VENIPUNCTURE: CPT

## 2021-03-09 PROCEDURE — 82746 ASSAY OF FOLIC ACID SERUM: CPT

## 2021-03-09 PROCEDURE — 82607 VITAMIN B-12: CPT

## 2021-03-09 PROCEDURE — 85045 AUTOMATED RETICULOCYTE COUNT: CPT

## 2021-03-09 RX ORDER — BISACODYL 5 MG
TABLET, DELAYED RELEASE (ENTERIC COATED) ORAL
Qty: 2 TABLET | Refills: 0 | Status: SHIPPED | OUTPATIENT
Start: 2021-03-09 | End: 2021-06-02

## 2021-03-09 RX ORDER — POLYETHYLENE GLYCOL 3350 17 G/17G
POWDER, FOR SOLUTION ORAL
Qty: 238 G | Refills: 0 | Status: SHIPPED | OUTPATIENT
Start: 2021-03-09 | End: 2021-06-02

## 2021-03-09 ASSESSMENT — ENCOUNTER SYMPTOMS
ALLERGIC/IMMUNOLOGIC NEGATIVE: 1
WHEEZING: 0
TROUBLE SWALLOWING: 1
DIARRHEA: 1
RECTAL PAIN: 0
VOMITING: 0
NAUSEA: 0
BLOOD IN STOOL: 0
CHOKING: 0
VOICE CHANGE: 0
ABDOMINAL DISTENTION: 1
COUGH: 1
BACK PAIN: 0
ABDOMINAL PAIN: 1
SINUS PRESSURE: 0
ANAL BLEEDING: 1
SORE THROAT: 0
CONSTIPATION: 0

## 2021-03-09 NOTE — PROGRESS NOTES
GI OFFICE FOLLOW UP    INTERVAL HISTORY:   No referring provider defined for this encounter. Chief Complaint   Patient presents with    Follow-up     EGD done 03/03/2021 She states 3 days ago still diarrhea with the black coffe ground look, yesterday a little better formed  and today better formed and still black but she can not get the black wiped off her bottom.  Dysphagia     Still can't eat, hurts to swallow and then hits her stomach and painful. No diagnosis found. HISTORY OF PRESENT ILLNESS: Ms.Amy Anna Marie Abdullahi is a 53 y.o. female with a past history remarkable for    Patient seen with a history of anemia, recent hospitalization with the diarrhea, history of GERD, dysphagia. When she was in the hospital about a week ago she had a EGD done and at that time also had a Savary dilation up to 15 mm size. EGD did reveal tortuous esophagus. Not clear whether she had esophageal stricture or mucosal stretch marks following the dilatation. During this visit she does not complain much about swallowing issues. States that she has a heartburns. Recently she had dark liquid stools. When she was in the hospital that is about a week ago, she was admitted with diarrhea. At that time it was felt that patient may need a colonoscopy 3 to 4 weeks following discharge. Following discharge patient continued to have abdominal cramps and bowel habit change. No obvious hematochezia except that she has dark liquid stools. Her recent ultrasound of the liver did reveal hepatic steatosis with no focal lesions. Also was found to have cholelithiasis. On further discussion patient does not have symptoms of gallbladder disease. She has storage pool disease. However no history of bleeding diathesis. She was seen by me in 2017 and at that time she had a EGD done and was nonspecific. Colonoscopy in 2016 did not reveal colitis. Apparently her son has colitis. She has good appetite. No weight loss. Past Medical,Family, and Social History reviewed and does contribute to the patient presenting condition. Patient also known to have anemia. Patient's PMH/PSH,SH,PSYCH Hx, MEDs, ALLERGIES, and ROS were all reviewed and updated in the appropriate sections. 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     Headache     History of cervical dysplasia     had cone    History of conization of cervix     dysplastic cells    Hypothyroidism, unspecified 3/18/2016    Lupus ([facility]     Osteoarthritis     Pain     all over body    Sleep apnea     awaiting test results currently    Vision abnormalities     glasses/ far sighted       Past Surgical History:   Procedure Laterality Date    BONE CYST EXCISION Right     WRIST    CARPAL TUNNEL RELEASE Right 10-6-15    CARPAL TUNNEL RELEASE Left 11/3/15    CERVIX BIOPSY       SECTION, LOW TRANSVERSE      COLONOSCOPY      COLONOSCOPY  2009    diverticulosis, no other gross lesions    COLONOSCOPY  2017    10 yr recall, small hemorrhoids, diverticulosis    DILATION AND CURETTAGE OF UTERUS N/A 2017    HYSTEROSCOPY AND D& C, myosure performed by Sarah [facility]ier MD at [facility], COLON, DIAGNOSTIC      UGI    FRACTURE SURGERY Left     THUMB   [address]    conization    HERNIA REPAIR      with mesh    LIPOMA RESECTION Right     RING FINGER    OTHER SURGICAL HISTORY      VOCAL CORD SURG    ND COLON CA SCRN NOT  [address] IND N/A 11/21/2017    COLONOSCOPY performed by Naeem Lam MD at [facility] EGD TRANSORAL BIOPSY SINGLE/MULTIPLE N/A 11/21/2017    EGD BIOPSY performed by Naeem Lam MD at [facility] 08/09/2016    Right thyroid lobectomy and isthmusectomy. Continuous monitoring of the recurrent laryngeal nerve using nerve integrity monitor. Frozen section.  TONSILLECTOMY      UPPER GASTROINTESTINAL ENDOSCOPY  12/12/2008    with BRAVO, gastric polyp-fundic gland polyp    UPPER GASTROINTESTINAL ENDOSCOPY  11/21/2017    multiple small gastric polyps-fundic gland polyps    UPPER GASTROINTESTINAL ENDOSCOPY N/A 3/3/2021    EGD BIOPSY & SAVARY DILATION performed by Reynaldo Ardon MD at [address]:    Current Outpatient Medications:     atorvastatin (LIPITOR) 20 MG tablet, Take 1 tablet by mouth nightly, Disp: 90 tablet, Rfl: 1    clobetasol (TEMOVATE) 0.05 % cream, apply to rash twice a day for up to 2 weeks ON and 1 week off, Disp: , Rfl:     metroNIDAZOLE (METROGEL) 0.75 % gel, apply to face twice a day, Disp: , Rfl:     omeprazole (PRILOSEC) 40 MG delayed release capsule, , Disp: , Rfl:     Calcium Carb-Cholecalciferol (CALCIUM/VITAMIN D PO), Take by mouth, Disp: , Rfl:     citalopram (CELEXA) 10 MG tablet, take 1 tablet by mouth once daily with 20 MG TABLET, Disp: 30 tablet, Rfl: 3    citalopram (CELEXA) 20 MG tablet, Take 1 tablet by mouth daily With the celexa 10mg tab to make 30mg dialy, Disp: 30 tablet, Rfl: 3    B Complex Vitamins (VITAMIN B COMPLEX PO), Take by mouth daily, Disp: , Rfl:     nystatin (MYCOSTATIN) 864709 UNIT/GM powder, Apply 3 times daily.  (Patient not taking: Reported on 12/17/2020), Disp: 60 g, Rfl: 3    aspirin 81 MG EC tablet, Take 1 tablet by mouth daily, Disp: 30 tablet, Rfl: 3    levothyroxine (SYNTHROID) 50 MCG tablet, Take 1 tablet by mouth daily, Disp: 90 tablet, Rfl: 3   Omeprazole-Sodium Bicarbonate (ZEGERID)  MG CAPS, Take by mouth, Disp: , Rfl:     LORazepam (ATIVAN) 0.5 MG tablet, 0.5 mg daily as needed. , Disp: , Rfl:     pregabalin (LYRICA) 75 MG capsule, take 1 capsule by mouth twice a day, Disp: , Rfl: 0    Cholecalciferol (VITAMIN D3 PO), Take by mouth, Disp: , Rfl:     Cetirizine HCl (ZYRTEC PO), Take 10 mg by mouth daily , Disp: , Rfl:     ALLERGIES:   Allergies   Allergen Reactions    Other      Perch - twice had violent vomiting, diarrhea,severe dizziness and nausea    Percocet [Oxycodone-Acetaminophen] Hives and Itching     Hives everywhere, including roof of mouth and tear duct openings    Tramadol Hcl Hives and Itching     hives    Azithromycin      Palpitations.      Ibuprofen Hives    Ceclor [Cefaclor] Hives and Rash    Penicillins Hives and Rash       FAMILY HISTORY:       Problem Relation Age of Onset    Alcohol Abuse Father     Other Father         murder    Diabetes Mother     Other Mother         thyroid    High Blood Pressure Mother     Lupus Sister     Drug Abuse Brother     Breast Cancer Maternal Grandmother         45s    Heart Surgery Maternal Grandmother     Heart Failure Maternal Grandmother     Hypertension Maternal Grandfather     Stroke Maternal Grandfather     Heart Attack Maternal Grandfather     Alcohol Abuse Maternal Grandfather     Diabetes Maternal Grandfather     Cancer Paternal Grandmother         lymphnoid    Heart Failure Paternal Grandfather     Heart Surgery Paternal Grandfather         x2         SOCIAL HISTORY:   Social History     Socioeconomic History    Marital status:      Spouse name: Not on file    Number of children: Not on file    Years of education: Not on file    Highest education level: Not on file   Occupational History    Not on file   Social Needs    Financial resource strain: Not on file    Food insecurity     Worry: Not on file     Inability: Not on file   [employer] needs     Medical: Not on file     Non-medical: Not on file   Tobacco Use    Smoking status: Former Smoker    Smokeless tobacco: Never Used   Substance and Sexual Activity    Alcohol use: Yes     Alcohol/week: 0.0 standard drinks     Comment: recovering alcoholic    Drug use: No    Sexual activity: Yes     Partners: Male     Birth control/protection: Other-see comments     Comment:  had vasectomy   Lifestyle    Physical activity     Days per week: Not on file     Minutes per session: Not on file    Stress: Not on file   Relationships    Social connections     Talks on phone: Not on file     Gets together: Not on file     Attends [employer] service: Not on file     Active member of club or organization: Not on file     Attends meetings of clubs or organizations: Not on file     Relationship status: Not on file    Intimate partner violence     Fear of current or ex partner: Not on file     Emotionally abused: Not on file     Physically abused: Not on file     Forced sexual activity: Not on file   Other Topics Concern    Not on file   Social History Narrative    Not on file         REVIEW OF SYSTEMS:         Review of Systems   Constitutional: Positive for appetite change and fatigue. Negative for unexpected weight change. HENT: Positive for trouble swallowing. Negative for dental problem, postnasal drip, sinus pressure, sore throat and voice change. Eyes: Positive for visual disturbance. Respiratory: Positive for cough. Negative for choking and wheezing. Cardiovascular: Negative for chest pain, palpitations and leg swelling. Gastrointestinal: Positive for abdominal distention, abdominal pain, anal bleeding and diarrhea. Negative for blood in stool, constipation, nausea, rectal pain and vomiting. Endocrine: Negative. Genitourinary: Negative for difficulty urinating. Musculoskeletal: Negative. Negative for arthralgias, back pain, gait problem and myalgias.    Allergic/Immunologic: Negative. Negative for environmental allergies and food allergies. Neurological: Positive for dizziness, light-headedness and numbness. Negative for weakness and headaches. Hematological: Does not bruise/bleed easily. Psychiatric/Behavioral: Positive for sleep disturbance. The patient is nervous/anxious. PHYSICAL EXAMINATION:     Vital signs reviewed per the nursing documentation. There were no vitals taken for this visit. There is no height or weight on file to calculate BMI. Physical Exam  Vitals signs and nursing note reviewed. Constitutional:       Appearance: She is well-developed. HENT:      Head: Normocephalic and atraumatic. Eyes:      General: No scleral icterus. Conjunctiva/sclera: Conjunctivae normal.      Pupils: Pupils are equal, round, and reactive to light. Neck:      Musculoskeletal: Normal range of motion and neck supple. Thyroid: No thyromegaly. Vascular: No hepatojugular reflux or JVD. Trachea: No tracheal deviation. Cardiovascular:      Rate and Rhythm: Normal rate and regular rhythm. Heart sounds: Normal heart sounds. Pulmonary:      Effort: Pulmonary effort is normal. No respiratory distress. Breath sounds: Normal breath sounds. No wheezing or rales. Abdominal:      General: Bowel sounds are normal. There is no distension. Palpations: Abdomen is soft. There is no hepatomegaly or mass. Tenderness: There is no abdominal tenderness. There is no rebound. Hernia: No hernia is present. Genitourinary:     Rectum: Guaiac result positive. Musculoskeletal:         General: No tenderness. Comments: No joint swelling   Lymphadenopathy:      Cervical: No cervical adenopathy. Skin:     General: Skin is warm. Findings: No bruising, ecchymosis, erythema or rash. Neurological:      Mental Status: She is alert and oriented to person, place, and time. Cranial Nerves: No cranial nerve deficit.    Psychiatric: Thought Content: Thought content normal.           LABORATORY DATA: Reviewed  Lab Results   Component Value Date    WBC 5.3 03/03/2021    HGB 10.6 (L) 03/04/2021    HCT 32.3 (L) 03/04/2021    MCV 93.5 03/03/2021     03/03/2021     03/02/2021    K 3.7 03/02/2021     03/02/2021    CO2 25 03/02/2021    BUN 14 03/02/2021    CREATININE 0.57 03/02/2021    LABPROT 7.5 04/05/2012    LABALBU 4.2 03/01/2021    BILITOT 0.36 03/01/2021    ALKPHOS 84 03/01/2021    AST 13 03/01/2021    ALT 12 03/01/2021    INR 1.0 03/01/2021         Lab Results   Component Value Date    RBC 3.47 (L) 03/03/2021    HGB 10.6 (L) 03/04/2021    MCV 93.5 03/03/2021    MCH 30.5 03/03/2021    MCHC 32.6 03/03/2021    RDW 14.9 03/03/2021    MPV 10.3 03/03/2021    BASOPCT 1 03/03/2021    LYMPHSABS 1.70 03/03/2021    MONOSABS 0.30 03/03/2021    NEUTROABS 3.10 03/03/2021    EOSABS 0.20 03/03/2021    BASOSABS 0.00 03/03/2021         DIAGNOSTIC TESTING:     Us Liver    Result Date: 3/1/2021  EXAMINATION: RIGHT UPPER QUADRANT ULTRASOUND 3/1/2021 5:04 pm COMPARISON: None. HISTORY: ORDERING SYSTEM PROVIDED HISTORY: RUQ pain TECHNOLOGIST PROVIDED HISTORY: RUQ pain Reason for Exam: ruq pain, diarrhea, blood in stool Acuity: Acute Type of Exam: Initial FINDINGS: LIVER:  Diffuse mild increased echogenicity of the liver with slight heterogeneity indicating underlying steatosis. No focal hepatic lesion is appreciated. Limited Doppler evaluation of the portal vein demonstrates hepatopetal flow, which is normal. BILIARY SYSTEM:  Cholelithiasis without mucosal thickening or pericholecystic fluid. No sonographic Mckeon sign. Common bile duct is within normal limits measuring 4 mm. PANCREAS:  The pancreas is not visualized. OTHER: No evidence of right upper quadrant ascites. 1. Cholelithiasis with no evidence of acute cholecystitis. 2. Hepatic steatosis with no focal lesion. Assessment  No diagnosis found.     Plan  During this visit stool is positive for blood. Need to evaluate colonic pathology. She has a family history of colitis. Also known to have delta storage pool disease and this need to be followed closely. She also appears to have some IBS-like symptoms. Discussed with the patient regarding this and management. Patient is reassured. Patient has anemia. She needs labs regarding this. Has fatty liver disease. Need further work-up regarding this down the line. Above discussed with the patient. Discussed with the patient regarding colonoscopy procedure, risks and benefits, adequate preparation. Patient understood and verbalized the consent. Thank you for allowing me to participate in the care of Ms. Jatinder Bolaños. For any further questions please do not hesitate to contact me. I have reviewed and agree with the ROS entered by the MA/LPN. Note is dictated utilizing voice recognition software. Unfortunately this leads to occasional typographical errors.  Please contact our office if you have any questions        Otilio Early MD,FACP, [facility]  Board Certified in Gastroenterology and [address] [place] [address] Gastroenterology  Office #: [phone]

## 2021-03-10 ENCOUNTER — OFFICE VISIT (OUTPATIENT)
Dept: INTERNAL MEDICINE CLINIC | Age: 51
End: 2021-03-10
Payer: COMMERCIAL

## 2021-03-10 VITALS
SYSTOLIC BLOOD PRESSURE: 122 MMHG | DIASTOLIC BLOOD PRESSURE: 76 MMHG | HEART RATE: 76 BPM | WEIGHT: 216 LBS | TEMPERATURE: 97.6 F | BODY MASS INDEX: 36.88 KG/M2 | RESPIRATION RATE: 16 BRPM | HEIGHT: 64 IN

## 2021-03-10 DIAGNOSIS — Z00.00 HEALTHCARE MAINTENANCE: Primary | ICD-10-CM

## 2021-03-10 DIAGNOSIS — D69.1 DELTA STORAGE POOL DISEASE (HCC): ICD-10-CM

## 2021-03-10 DIAGNOSIS — E27.40 ISOLATED CORTICOTROPIN DEFICIENCY (HCC): ICD-10-CM

## 2021-03-10 DIAGNOSIS — K21.9 GASTROESOPHAGEAL REFLUX DISEASE WITHOUT ESOPHAGITIS: ICD-10-CM

## 2021-03-10 LAB — TISSUE TRANSGLUTAMINASE IGA: 0.2 U/ML

## 2021-03-10 PROCEDURE — 1111F DSCHRG MED/CURRENT MED MERGE: CPT | Performed by: INTERNAL MEDICINE

## 2021-03-10 PROCEDURE — 99496 TRANSJ CARE MGMT HIGH F2F 7D: CPT | Performed by: INTERNAL MEDICINE

## 2021-03-10 ASSESSMENT — PATIENT HEALTH QUESTIONNAIRE - PHQ9
4. FEELING TIRED OR HAVING LITTLE ENERGY: 3
8. MOVING OR SPEAKING SO SLOWLY THAT OTHER PEOPLE COULD HAVE NOTICED. OR THE OPPOSITE, BEING SO FIGETY OR RESTLESS THAT YOU HAVE BEEN MOVING AROUND A LOT MORE THAN USUAL: 0
1. LITTLE INTEREST OR PLEASURE IN DOING THINGS: 2
3. TROUBLE FALLING OR STAYING ASLEEP: 2
SUM OF ALL RESPONSES TO PHQ QUESTIONS 1-9: 16
SUM OF ALL RESPONSES TO PHQ QUESTIONS 1-9: 16
6. FEELING BAD ABOUT YOURSELF - OR THAT YOU ARE A FAILURE OR HAVE LET YOURSELF OR YOUR FAMILY DOWN: 3
7. TROUBLE CONCENTRATING ON THINGS, SUCH AS READING THE NEWSPAPER OR WATCHING TELEVISION: 3
SUM OF ALL RESPONSES TO PHQ9 QUESTIONS 1 & 2: 5
10. IF YOU CHECKED OFF ANY PROBLEMS, HOW DIFFICULT HAVE THESE PROBLEMS MADE IT FOR YOU TO DO YOUR WORK, TAKE CARE OF THINGS AT HOME, OR GET ALONG WITH OTHER PEOPLE: 2

## 2021-03-10 NOTE — PROGRESS NOTES
Visit Information    Have you changed or started any medications since your last visit including any over-the-counter medicines, vitamins, or herbal medicines? no   Are you having any side effects from any of your medications? -  no  Have you stopped taking any of your medications? Is so, why? -  no    Have you seen any other physician or provider since your last visit? Yes - Records Obtained  Have you had any other diagnostic tests since your last visit? Yes - Records Obtained  Have you been seen in the emergency room and/or had an admission to a hospital since we last saw you? Yes - Records Obtained  Have you had your routine dental cleaning in the past 6 months? no    Have you activated your [employer] account? If not, what are your barriers? Yes     Patient Care Team:  Jolynn Quiros MD as PCP - General (Internal Medicine)  Jolynn Quiros MD as PCP - [facility] , DO as Consulting Physician (Obstetrics & Gynecology)    Medical History Review  Past Medical, Family, and Social History reviewed and does contribute to the patient presenting condition    Health Maintenance   Topic Date Due    Pneumococcal 0-64 years Vaccine (1 of 1 - PPSV23) Never done    DTaP/Tdap/Td vaccine (1 - Tdap) Never done    Flu vaccine (1) 10/30/2021 (Originally 9/1/2020)    TSH testing  07/08/2021    Diabetes screen  10/18/2021    Lipid screen  10/26/2021    Cervical cancer screen  11/07/2024    Colon cancer screen colonoscopy  11/21/2027    Hepatitis C screen  Completed    HIV screen  Completed    Hepatitis A vaccine  Aged Out    Hepatitis B vaccine  Aged Out    Hib vaccine  Aged Out    Meningococcal (ACWY) vaccine  Aged Out     Chief Complaint   Patient presents with    Follow-Up from Hospital     Pt is here following up after hospitalization. Pt was diagnosed with reflux and esophagitis. Pt also scheduled for colonoscopy on friday.  Pt is much better and denies any other concerns at this time.      Post-Discharge Transitional Care Management Services or Hospital Follow Up      Lorelei Mcdaniel   YOB: 1970    Date of Office Visit:  3/10/2021  Date of Hospital Admission: 3/1/21  Date of Hospital Discharge: 3/4/21  Risk of hospital readmission (high >=14%.  Medium >=10%) :Readmission Risk Score: 15      Care management risk score Rising risk (score 2-5) and Complex Care (Scores >=6): 5     Non face to face  following discharge, date last encounter closed (first attempt may have been earlier): 3/4/2021  3:31 PM    Call initiated 2 business days of discharge: Yes    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[address]    Environmental allergies    H/O thyroid cyst    History of cervical dysplasia    History of conization of cervix     History of low transverse  section    Vision abnormalities    Family history of breast cancer    Osteoarthritis    VASECTOMY in Male Partner    History of endometrial ablation    Pelvic pain in female    Hypothyroidism    Abnormal uterine bleeding    Diverticulosis of colon    Rectal bleeding    Constipation    Isolated corticotropin deficiency (HCC)    Persistent depressive disorder    Generalized anxiety disorder with panic attacks    Other forms of systemic lupus erythematosus (HCC)    Panic attacks    Stroke-like symptoms    Numbness    GI bleed    Diarrhea    Migraine    Abdominal pain    Dysphagia    Anemia    Melena       Allergies   Allergen Reactions    Other      Perch - twice had violent vomiting, diarrhea,severe dizziness and nausea    Percocet [Oxycodone-Acetaminophen] Hives and Itching     Hives everywhere, including roof of mouth and tear duct openings    Tramadol Hcl Hives and Itching     hives    Azithromycin      Palpitations.  Ibuprofen Hives    Ceclor [Cefaclor] Hives and Rash    Penicillins Hives and Rash       [facility] the [facility]   Home Medication Instructions LILI:    Printed on:03/10/21 8310   Medication Information                      aspirin 81 MG EC tablet  Take 1 tablet by mouth daily             atorvastatin (LIPITOR) 20 MG tablet  Take 1 tablet by mouth nightly             B Complex Vitamins (VITAMIN B COMPLEX PO)  Take by mouth daily             bisacodyl (BISACODYL) 5 MG EC tablet  Follow instructions provided given by the physician's office. Calcium Carb-Cholecalciferol (CALCIUM/VITAMIN D PO)  Take by mouth             Cetirizine HCl (ZYRTEC PO)  Take 10 mg by mouth daily              Cholecalciferol (VITAMIN D3 PO)  Take by mouth             citalopram (CELEXA) 10 MG tablet  take 1 tablet by mouth once daily with 20 MG TABLET             citalopram (CELEXA) 20 MG tablet  Take 1 tablet by mouth daily With the celexa 10mg tab to make 30mg dialy             clobetasol (TEMOVATE) 0.05 % cream  apply to rash twice a day for up to 2 weeks ON and 1 week off             levothyroxine (SYNTHROID) 50 MCG tablet  Take 1 tablet by mouth daily             LORazepam (ATIVAN) 0.5 MG tablet  0.5 mg daily as needed. metroNIDAZOLE (METROGEL) 0.75 % gel  apply to face twice a day             nystatin (MYCOSTATIN) 215610 UNIT/GM powder  Apply 3 times daily.              omeprazole (PRILOSEC) 40 MG delayed release capsule               Omeprazole-Sodium Bicarbonate (ZEGERID)  MG CAPS  Take by mouth             polyethylene glycol (GLYCOLAX) 17 GM/SCOOP powder  Follow instructions provided to you from physician's office. pregabalin (LYRICA) 75 MG capsule  take 1 capsule by mouth twice a day                   Medications marked \"taking\" at this time  Outpatient Medications Marked as Taking for the 3/10/21 encounter (Office Visit) with Kandace Roca MD   Medication Sig Dispense Refill    bisacodyl (BISACODYL) 5 MG EC tablet Follow instructions provided given by the physician's office. 2 tablet 0    atorvastatin (LIPITOR) 20 MG tablet Take 1 tablet by mouth nightly 90 tablet 1    clobetasol (TEMOVATE) 0.05 % cream apply to rash twice a day for up to 2 weeks ON and 1 week off      metroNIDAZOLE (METROGEL) 0.75 % gel apply to face twice a day      omeprazole (PRILOSEC) 40 MG delayed release capsule       Calcium Carb-Cholecalciferol (CALCIUM/VITAMIN D PO) Take by mouth      citalopram (CELEXA) 10 MG tablet take 1 tablet by mouth once daily with 20 MG TABLET 30 tablet 3    citalopram (CELEXA) 20 MG tablet Take 1 tablet by mouth daily With the celexa 10mg tab to make 30mg dialy 30 tablet 3    B Complex Vitamins (VITAMIN B COMPLEX PO) Take by mouth daily      nystatin (MYCOSTATIN) 408527 UNIT/GM powder Apply 3 times daily. 60 g 3    aspirin 81 MG EC tablet Take 1 tablet by mouth daily 30 tablet 3    Omeprazole-Sodium Bicarbonate (ZEGERID)  MG CAPS Take by mouth      LORazepam (ATIVAN) 0.5 MG tablet 0.5 mg daily as needed.  pregabalin (LYRICA) 75 MG capsule take 1 capsule by mouth twice a day  0    Cholecalciferol (VITAMIN D3 PO) Take by mouth      Cetirizine HCl (ZYRTEC PO) Take 10 mg by mouth daily           Medications patient taking as of now reconciled against medications ordered at time of hospital discharge: Yes    Chief Complaint   Patient presents with    Follow-Up from Hospital     Pt is here following up after hospitalization. Pt was diagnosed with reflux and esophagitis. Pt also scheduled for colonoscopy on friday.  Pt is much better and denies any other concerns at this time. History of Present illness - Follow up of Hospital diagnosis(es): GI bleed    Inpatient course: Discharge summary reviewed- see chart. Interval history/Current status:   Has been having black tarry stool last 2 days, not today. No bright red bleeding from rectum  Nausea present, no vomiting  Colonoscopy this Fri. Holing aspirin    Positive for gen fatigue, loss of appetite  Denies any shortness of breath or cough  Denies chest pain or palpitations  Has nausea, no abdominal pain, vomiting, some dark stool last couple days  Denies any new numbness tremors or focal weakness. Vitals:    03/10/21 1459   BP: 122/76   Site: Right Upper Arm   Position: Sitting   Cuff Size: Large Adult   Pulse: 76   Resp: 16   Temp: 97.6 °F (36.4 °C)   Weight: 216 lb (98 kg)   Height: 5' 4\" (1.626 m)     Body mass index is 37.08 kg/m². Wt Readings from Last 3 Encounters:   03/10/21 216 lb (98 kg)   03/09/21 228 lb (103.4 kg)   03/03/21 228 lb (103.4 kg)     BP Readings from Last 3 Encounters:   03/10/21 122/76   03/09/21 112/81   03/04/21 129/78        Physical Exam:  General appearance:  alert, cooperative and no distress  Eyes: Anicteric sclera. Pupils are equally round and reactive to light. Extraocular movements are intact.   Lungs:  clear to auscultation bilaterally, normal effort  Heart:  regular rate and rhythm, no murmur  Abdomen:  soft, nontender, nondistended, normal bowel sounds, no masses,   Extremities:   edema, redness, tenderness in the calves  Skin:  no gross lesions, rashes, induration  Neuro:  Alert, oriented X 3, Gait normal. Non-focal.      Assessment/Plan:  Lorelei was seen today for follow-up from hospital.    Diagnoses and all orders for this visit:    Gastroesophageal reflux disease without esophagitis  Comments:  black stool, acute anemia  colonoscopy fri    Isolated corticotropin deficiency (HCC)  Comments:  will check 8am cortisol  pt reports ongoing fatigue  Orders:  -

## 2021-03-11 ENCOUNTER — HOSPITAL ENCOUNTER (OUTPATIENT)
Dept: PREADMISSION TESTING | Age: 51
Setting detail: OUTPATIENT SURGERY
Discharge: HOME | End: 2021-03-15
Admitting: ANESTHESIOLOGY
Payer: COMMERCIAL

## 2021-03-11 ENCOUNTER — ANESTHESIA EVENT (OUTPATIENT)
Dept: ENDOSCOPY | Age: 51
End: 2021-03-11

## 2021-03-11 ENCOUNTER — HOSPITAL ENCOUNTER (OUTPATIENT)
Age: 51
Setting detail: OBSERVATION
Discharge: HOSPICE | End: 2021-03-12
Attending: EMERGENCY MEDICINE | Admitting: EMERGENCY MEDICINE
Payer: COMMERCIAL

## 2021-03-11 ENCOUNTER — HOSPITAL ENCOUNTER (OUTPATIENT)
Age: 51
Discharge: HOME | End: 2021-03-11
Payer: COMMERCIAL

## 2021-03-11 VITALS — HEIGHT: 64 IN | WEIGHT: 216 LBS | BODY MASS INDEX: 36.88 KG/M2

## 2021-03-11 DIAGNOSIS — K62.5 RECTAL BLEEDING: Primary | ICD-10-CM

## 2021-03-11 DIAGNOSIS — E27.40 ISOLATED CORTICOTROPIN DEFICIENCY (HCC): ICD-10-CM

## 2021-03-11 LAB
ABSOLUTE EOS #: 0.16 K/UL (ref 0–0.44)
ABSOLUTE IMMATURE GRANULOCYTE: 0.03 K/UL (ref 0–0.3)
ABSOLUTE LYMPH #: 1.95 K/UL (ref 1.1–3.7)
ABSOLUTE MONO #: 0.53 K/UL (ref 0.1–1.2)
ANION GAP SERPL CALCULATED.3IONS-SCNC: 10 MMOL/L (ref 9–17)
BASOPHILS # BLD: 1 % (ref 0–2)
BASOPHILS ABSOLUTE: 0.05 K/UL (ref 0–0.2)
BUN BLDV-MCNC: 8 MG/DL (ref 6–20)
BUN/CREAT BLD: ABNORMAL (ref 9–20)
CALCIUM SERPL-MCNC: 9.6 MG/DL (ref 8.6–10.4)
CHLORIDE BLD-SCNC: 103 MMOL/L (ref 98–107)
CO2: 28 MMOL/L (ref 20–31)
CORTISOL COLLECTION INFO: NORMAL
CORTISOL: 8.2 UG/DL (ref 2.7–18.4)
CREAT SERPL-MCNC: 0.65 MG/DL (ref 0.5–0.9)
DIFFERENTIAL TYPE: ABNORMAL
EOSINOPHILS RELATIVE PERCENT: 2 % (ref 1–4)
GFR AFRICAN AMERICAN: >60 ML/MIN
GFR NON-AFRICAN AMERICAN: >60 ML/MIN
GFR SERPL CREATININE-BSD FRML MDRD: ABNORMAL ML/MIN/{1.73_M2}
GFR SERPL CREATININE-BSD FRML MDRD: ABNORMAL ML/MIN/{1.73_M2}
GLUCOSE BLD-MCNC: 105 MG/DL (ref 70–99)
HCT VFR BLD CALC: 29.3 % (ref 36.3–47.1)
HEMOGLOBIN: 9.4 G/DL (ref 11.9–15.1)
IMMATURE GRANULOCYTES: 0 %
LYMPHOCYTES # BLD: 22 % (ref 24–43)
MAGNESIUM: 1.9 MG/DL (ref 1.6–2.6)
MCH RBC QN AUTO: 30 PG (ref 25.2–33.5)
MCHC RBC AUTO-ENTMCNC: 32.1 G/DL (ref 28.4–34.8)
MCV RBC AUTO: 93.6 FL (ref 82.6–102.9)
MONOCYTES # BLD: 6 % (ref 3–12)
NRBC AUTOMATED: 0 PER 100 WBC
PDW BLD-RTO: 13.7 % (ref 11.8–14.4)
PLATELET # BLD: ABNORMAL K/UL (ref 138–453)
PLATELET ESTIMATE: ABNORMAL
PLATELET, FLUORESCENCE: NORMAL K/UL (ref 138–453)
PLATELET, IMMATURE FRACTION: NORMAL % (ref 1.1–10.3)
PMV BLD AUTO: ABNORMAL FL (ref 8.1–13.5)
POTASSIUM SERPL-SCNC: 3.5 MMOL/L (ref 3.7–5.3)
RBC # BLD: 3.13 M/UL (ref 3.95–5.11)
RBC # BLD: ABNORMAL 10*6/UL
SEG NEUTROPHILS: 69 % (ref 36–65)
SEGMENTED NEUTROPHILS ABSOLUTE COUNT: 6.17 K/UL (ref 1.5–8.1)
SODIUM BLD-SCNC: 141 MMOL/L (ref 135–144)
TROPONIN INTERP: NORMAL
TROPONIN T: NORMAL NG/ML
TROPONIN, HIGH SENSITIVITY: <6 NG/L (ref 0–14)
WBC # BLD: 8.9 K/UL (ref 3.5–11.3)
WBC # BLD: ABNORMAL 10*3/UL

## 2021-03-11 PROCEDURE — 84484 ASSAY OF TROPONIN QUANT: CPT

## 2021-03-11 PROCEDURE — 80048 BASIC METABOLIC PNL TOTAL CA: CPT

## 2021-03-11 PROCEDURE — 83735 ASSAY OF MAGNESIUM: CPT

## 2021-03-11 PROCEDURE — 36415 COLL VENOUS BLD VENIPUNCTURE: CPT

## 2021-03-11 PROCEDURE — 2580000003 HC RX 258: Performed by: STUDENT IN AN ORGANIZED HEALTH CARE EDUCATION/TRAINING PROGRAM

## 2021-03-11 PROCEDURE — 85025 COMPLETE CBC W/AUTO DIFF WBC: CPT

## 2021-03-11 PROCEDURE — U0002 COVID-19 LAB TEST NON-CDC: HCPCS

## 2021-03-11 PROCEDURE — 82533 TOTAL CORTISOL: CPT

## 2021-03-11 PROCEDURE — G0378 HOSPITAL OBSERVATION PER HR: HCPCS

## 2021-03-11 PROCEDURE — 99285 EMERGENCY DEPT VISIT HI MDM: CPT

## 2021-03-11 PROCEDURE — 85055 RETICULATED PLATELET ASSAY: CPT

## 2021-03-11 PROCEDURE — 93005 ELECTROCARDIOGRAM TRACING: CPT | Performed by: STUDENT IN AN ORGANIZED HEALTH CARE EDUCATION/TRAINING PROGRAM

## 2021-03-11 RX ORDER — LIDOCAINE HYDROCHLORIDE 10 MG/ML
1 INJECTION, SOLUTION EPIDURAL; INFILTRATION; INTRACAUDAL; PERINEURAL
Status: CANCELLED | OUTPATIENT
Start: 2021-03-11 | End: 2021-03-11

## 2021-03-11 RX ORDER — SODIUM CHLORIDE 0.9 % (FLUSH) 0.9 %
10 SYRINGE (ML) INJECTION PRN
Status: CANCELLED | OUTPATIENT
Start: 2021-03-11

## 2021-03-11 RX ORDER — SODIUM CHLORIDE, SODIUM LACTATE, POTASSIUM CHLORIDE, CALCIUM CHLORIDE 600; 310; 30; 20 MG/100ML; MG/100ML; MG/100ML; MG/100ML
1000 INJECTION, SOLUTION INTRAVENOUS ONCE
Status: COMPLETED | OUTPATIENT
Start: 2021-03-11 | End: 2021-03-11

## 2021-03-11 RX ORDER — SODIUM CHLORIDE 0.9 % (FLUSH) 0.9 %
10 SYRINGE (ML) INJECTION EVERY 12 HOURS SCHEDULED
Status: CANCELLED | OUTPATIENT
Start: 2021-03-11

## 2021-03-11 RX ORDER — SODIUM CHLORIDE, SODIUM LACTATE, POTASSIUM CHLORIDE, CALCIUM CHLORIDE 600; 310; 30; 20 MG/100ML; MG/100ML; MG/100ML; MG/100ML
INJECTION, SOLUTION INTRAVENOUS CONTINUOUS
Status: CANCELLED | OUTPATIENT
Start: 2021-03-11

## 2021-03-11 RX ADMIN — SODIUM CHLORIDE, POTASSIUM CHLORIDE, SODIUM LACTATE AND CALCIUM CHLORIDE 1000 ML: 600; 310; 30; 20 INJECTION, SOLUTION INTRAVENOUS at 22:43

## 2021-03-11 NOTE — PROGRESS NOTES
Pre-op Instructions For Out-Patient Surgery relayed during telephone PAT visit    Medication Instructions:  · Please stop herbs and any supplements now (includes vitamins and minerals). · Please contact your surgeon and prescribing physician for pre-op instructions for any blood thinners. Aspirin already stopped    · Please take the following medications the morning of your surgery with a sip of water:    Ativan if needed, Synthroid, Patient may take after gets home if preferred. Surgery Instructions:  1. After midnight before surgery:  Do not eat or drink anything, including water, mints, gum, and hard candy. You may brush your teeth without swallowing. No smoking, chewing tobacco, or street drugs. 2. Please shower or bathe before surgery. 3. Please do not wear any cologne, lotion, powder, deodorant, jewelry, piercings, perfume, makeup, nail polish, hair accessories, or hair spray on the day of surgery. Wear loose comfortable clothing. 4. Leave your valuables at home. Bring a storage case for any glasses/contacts. 5. An adult who is responsible for you MUST drive you home and should be with you for the first 24 hours after surgery. The Day of Surgery:  · Arrive at [facility] at the time directed by your surgeon and check in at the desk. · If you have a living will or healthcare power of , please bring a copy. · You will be taken to the pre-op holding area where you will be prepared for surgery. A physical assessment will be performed by a nurse practitioner or house officer. Your IV will be started and you will meet your anesthesiologist.    · We are currently limiting visitors to only one designated person in the pre-op holding area. When you go to surgery, your family will be directed to the surgical waiting room, where the doctor should speak with them after your surgery.     · After surgery, you will be taken to the recovery room then when you are awake and stable you will go to the short stay unit for preparation to be discharged. Only your one designated person is allowed to come to short stay for your discharge.

## 2021-03-11 NOTE — Clinical Note
Patient Class: Observation [104]  REQUIRED: Diagnosis: Rectal bleeding [171586]  Estimated Length of Stay: Estimated stay of less than 2 midnights  Admitting Provider: Trinity Moya [[id number]]

## 2021-03-12 ENCOUNTER — ANESTHESIA (OUTPATIENT)
Dept: ENDOSCOPY | Age: 51
End: 2021-03-12

## 2021-03-12 VITALS
OXYGEN SATURATION: 91 % | RESPIRATION RATE: 18 BRPM | SYSTOLIC BLOOD PRESSURE: 96 MMHG | DIASTOLIC BLOOD PRESSURE: 52 MMHG

## 2021-03-12 VITALS
TEMPERATURE: 98.7 F | SYSTOLIC BLOOD PRESSURE: 111 MMHG | HEART RATE: 93 BPM | HEIGHT: 64 IN | OXYGEN SATURATION: 93 % | RESPIRATION RATE: 16 BRPM | WEIGHT: 213 LBS | BODY MASS INDEX: 36.37 KG/M2 | DIASTOLIC BLOOD PRESSURE: 63 MMHG

## 2021-03-12 LAB
ABO/RH: NORMAL
ABSOLUTE EOS #: 0.15 K/UL (ref 0–0.44)
ABSOLUTE EOS #: 0.16 K/UL (ref 0–0.44)
ABSOLUTE IMMATURE GRANULOCYTE: 0.03 K/UL (ref 0–0.3)
ABSOLUTE IMMATURE GRANULOCYTE: 0.06 K/UL (ref 0–0.3)
ABSOLUTE LYMPH #: 1.68 K/UL (ref 1.1–3.7)
ABSOLUTE LYMPH #: 2.22 K/UL (ref 1.1–3.7)
ABSOLUTE MONO #: 0.55 K/UL (ref 0.1–1.2)
ABSOLUTE MONO #: 0.81 K/UL (ref 0.1–1.2)
ANION GAP SERPL CALCULATED.3IONS-SCNC: 9 MMOL/L (ref 9–17)
ANTIBODY SCREEN: NEGATIVE
ARM BAND NUMBER: NORMAL
BASOPHILS # BLD: 1 % (ref 0–2)
BASOPHILS # BLD: 1 % (ref 0–2)
BASOPHILS ABSOLUTE: 0.04 K/UL (ref 0–0.2)
BASOPHILS ABSOLUTE: 0.06 K/UL (ref 0–0.2)
BLOOD BANK SPECIMEN: NORMAL
BUN BLDV-MCNC: 6 MG/DL (ref 6–20)
BUN/CREAT BLD: ABNORMAL (ref 9–20)
CALCIUM SERPL-MCNC: 8.4 MG/DL (ref 8.6–10.4)
CHLORIDE BLD-SCNC: 105 MMOL/L (ref 98–107)
CO2: 26 MMOL/L (ref 20–31)
CREAT SERPL-MCNC: 0.55 MG/DL (ref 0.5–0.9)
DIFFERENTIAL TYPE: ABNORMAL
DIFFERENTIAL TYPE: ABNORMAL
EKG ATRIAL RATE: 73 BPM
EKG P AXIS: 46 DEGREES
EKG P-R INTERVAL: 160 MS
EKG Q-T INTERVAL: 406 MS
EKG QRS DURATION: 84 MS
EKG QTC CALCULATION (BAZETT): 447 MS
EKG R AXIS: 46 DEGREES
EKG T AXIS: 42 DEGREES
EKG VENTRICULAR RATE: 73 BPM
EOSINOPHILS RELATIVE PERCENT: 2 % (ref 1–4)
EOSINOPHILS RELATIVE PERCENT: 2 % (ref 1–4)
EXPIRATION DATE: NORMAL
GFR AFRICAN AMERICAN: >60 ML/MIN
GFR NON-AFRICAN AMERICAN: >60 ML/MIN
GFR SERPL CREATININE-BSD FRML MDRD: ABNORMAL ML/MIN/{1.73_M2}
GFR SERPL CREATININE-BSD FRML MDRD: ABNORMAL ML/MIN/{1.73_M2}
GLUCOSE BLD-MCNC: 110 MG/DL (ref 65–105)
GLUCOSE BLD-MCNC: 148 MG/DL (ref 70–99)
HCT VFR BLD CALC: 25.5 % (ref 36.3–47.1)
HCT VFR BLD CALC: 27.5 % (ref 36.3–47.1)
HCT VFR BLD CALC: 28 % (ref 36.3–47.1)
HEMOGLOBIN: 8 G/DL (ref 11.9–15.1)
HEMOGLOBIN: 8.4 G/DL (ref 11.9–15.1)
HEMOGLOBIN: 9 G/DL (ref 11.9–15.1)
IMMATURE GRANULOCYTES: 0 %
IMMATURE GRANULOCYTES: 1 %
LYMPHOCYTES # BLD: 21 % (ref 24–43)
LYMPHOCYTES # BLD: 25 % (ref 24–43)
MAGNESIUM: 2 MG/DL (ref 1.6–2.6)
MCH RBC QN AUTO: 29.5 PG (ref 25.2–33.5)
MCH RBC QN AUTO: 29.6 PG (ref 25.2–33.5)
MCHC RBC AUTO-ENTMCNC: 30.5 G/DL (ref 28.4–34.8)
MCHC RBC AUTO-ENTMCNC: 31.4 G/DL (ref 28.4–34.8)
MCV RBC AUTO: 94.4 FL (ref 82.6–102.9)
MCV RBC AUTO: 96.5 FL (ref 82.6–102.9)
MONOCYTES # BLD: 8 % (ref 3–12)
MONOCYTES # BLD: 8 % (ref 3–12)
NRBC AUTOMATED: 0 PER 100 WBC
NRBC AUTOMATED: 0 PER 100 WBC
PDW BLD-RTO: 13.9 % (ref 11.8–14.4)
PDW BLD-RTO: 14 % (ref 11.8–14.4)
PLATELET # BLD: 197 K/UL (ref 138–453)
PLATELET # BLD: 232 K/UL (ref 138–453)
PLATELET ESTIMATE: ABNORMAL
PLATELET ESTIMATE: ABNORMAL
PMV BLD AUTO: 12.2 FL (ref 8.1–13.5)
PMV BLD AUTO: 12.2 FL (ref 8.1–13.5)
POTASSIUM SERPL-SCNC: 3.5 MMOL/L (ref 3.7–5.3)
RBC # BLD: 2.7 M/UL (ref 3.95–5.11)
RBC # BLD: 2.85 M/UL (ref 3.95–5.11)
RBC # BLD: ABNORMAL 10*6/UL
RBC # BLD: ABNORMAL 10*6/UL
SARS-COV-2, RAPID: NOT DETECTED
SEG NEUTROPHILS: 64 % (ref 36–65)
SEG NEUTROPHILS: 68 % (ref 36–65)
SEGMENTED NEUTROPHILS ABSOLUTE COUNT: 4.34 K/UL (ref 1.5–8.1)
SEGMENTED NEUTROPHILS ABSOLUTE COUNT: 7.16 K/UL (ref 1.5–8.1)
SODIUM BLD-SCNC: 140 MMOL/L (ref 135–144)
SPECIMEN DESCRIPTION: NORMAL
WBC # BLD: 10.5 K/UL (ref 3.5–11.3)
WBC # BLD: 6.8 K/UL (ref 3.5–11.3)
WBC # BLD: ABNORMAL 10*3/UL
WBC # BLD: ABNORMAL 10*3/UL

## 2021-03-12 PROCEDURE — 86850 RBC ANTIBODY SCREEN: CPT

## 2021-03-12 PROCEDURE — 93010 ELECTROCARDIOGRAM REPORT: CPT | Performed by: INTERNAL MEDICINE

## 2021-03-12 PROCEDURE — 80048 BASIC METABOLIC PNL TOTAL CA: CPT

## 2021-03-12 PROCEDURE — 2500000003 HC RX 250 WO HCPCS: Performed by: NURSE ANESTHETIST, CERTIFIED REGISTERED

## 2021-03-12 PROCEDURE — 43248 EGD GUIDE WIRE INSERTION: CPT | Performed by: INTERNAL MEDICINE

## 2021-03-12 PROCEDURE — 83735 ASSAY OF MAGNESIUM: CPT

## 2021-03-12 PROCEDURE — 85018 HEMOGLOBIN: CPT

## 2021-03-12 PROCEDURE — 86901 BLOOD TYPING SEROLOGIC RH(D): CPT

## 2021-03-12 PROCEDURE — 3700000001 HC ADD 15 MINUTES (ANESTHESIA): Performed by: INTERNAL MEDICINE

## 2021-03-12 PROCEDURE — 85014 HEMATOCRIT: CPT

## 2021-03-12 PROCEDURE — 88305 TISSUE EXAM BY PATHOLOGIST: CPT

## 2021-03-12 PROCEDURE — 85025 COMPLETE CBC W/AUTO DIFF WBC: CPT

## 2021-03-12 PROCEDURE — 7100000010 HC PHASE II RECOVERY - FIRST 15 MIN: Performed by: INTERNAL MEDICINE

## 2021-03-12 PROCEDURE — 2580000003 HC RX 258: Performed by: STUDENT IN AN ORGANIZED HEALTH CARE EDUCATION/TRAINING PROGRAM

## 2021-03-12 PROCEDURE — 3609012700 HC EGD DILATION SAVORY: Performed by: INTERNAL MEDICINE

## 2021-03-12 PROCEDURE — 96374 THER/PROPH/DIAG INJ IV PUSH: CPT

## 2021-03-12 PROCEDURE — C1769 GUIDE WIRE: HCPCS | Performed by: INTERNAL MEDICINE

## 2021-03-12 PROCEDURE — 2580000003 HC RX 258: Performed by: NURSE ANESTHETIST, CERTIFIED REGISTERED

## 2021-03-12 PROCEDURE — 6370000000 HC RX 637 (ALT 250 FOR IP): Performed by: STUDENT IN AN ORGANIZED HEALTH CARE EDUCATION/TRAINING PROGRAM

## 2021-03-12 PROCEDURE — G0378 HOSPITAL OBSERVATION PER HR: HCPCS

## 2021-03-12 PROCEDURE — 45385 COLONOSCOPY W/LESION REMOVAL: CPT | Performed by: INTERNAL MEDICINE

## 2021-03-12 PROCEDURE — 86900 BLOOD TYPING SEROLOGIC ABO: CPT

## 2021-03-12 PROCEDURE — 6360000002 HC RX W HCPCS

## 2021-03-12 PROCEDURE — 3700000000 HC ANESTHESIA ATTENDED CARE: Performed by: INTERNAL MEDICINE

## 2021-03-12 PROCEDURE — 6360000002 HC RX W HCPCS: Performed by: NURSE ANESTHETIST, CERTIFIED REGISTERED

## 2021-03-12 PROCEDURE — 3609010600 HC COLONOSCOPY POLYPECTOMY SNARE/COLD BIOPSY: Performed by: INTERNAL MEDICINE

## 2021-03-12 PROCEDURE — 82947 ASSAY GLUCOSE BLOOD QUANT: CPT

## 2021-03-12 PROCEDURE — 45380 COLONOSCOPY AND BIOPSY: CPT | Performed by: INTERNAL MEDICINE

## 2021-03-12 PROCEDURE — 99219 PR INITIAL OBSERVATION CARE/DAY 50 MINUTES: CPT | Performed by: INTERNAL MEDICINE

## 2021-03-12 PROCEDURE — 2709999900 HC NON-CHARGEABLE SUPPLY: Performed by: INTERNAL MEDICINE

## 2021-03-12 PROCEDURE — 36415 COLL VENOUS BLD VENIPUNCTURE: CPT

## 2021-03-12 PROCEDURE — 7100000011 HC PHASE II RECOVERY - ADDTL 15 MIN: Performed by: INTERNAL MEDICINE

## 2021-03-12 RX ORDER — ACETAMINOPHEN 325 MG/1
650 TABLET ORAL EVERY 4 HOURS PRN
Status: DISCONTINUED | OUTPATIENT
Start: 2021-03-12 | End: 2021-03-12 | Stop reason: HOSPADM

## 2021-03-12 RX ORDER — POTASSIUM CHLORIDE 7.45 MG/ML
10 INJECTION INTRAVENOUS ONCE
Status: DISCONTINUED | OUTPATIENT
Start: 2021-03-12 | End: 2021-03-12 | Stop reason: HOSPADM

## 2021-03-12 RX ORDER — DEXTROSE AND SODIUM CHLORIDE 5; .45 G/100ML; G/100ML
INJECTION, SOLUTION INTRAVENOUS
Status: DISPENSED
Start: 2021-03-12 | End: 2021-03-12

## 2021-03-12 RX ORDER — PANTOPRAZOLE SODIUM 40 MG/1
40 TABLET, DELAYED RELEASE ORAL
Status: DISCONTINUED | OUTPATIENT
Start: 2021-03-12 | End: 2021-03-12 | Stop reason: HOSPADM

## 2021-03-12 RX ORDER — LORAZEPAM 0.5 MG/1
0.5 TABLET ORAL DAILY PRN
Status: DISCONTINUED | OUTPATIENT
Start: 2021-03-12 | End: 2021-03-12 | Stop reason: HOSPADM

## 2021-03-12 RX ORDER — LEVOTHYROXINE SODIUM 0.05 MG/1
50 TABLET ORAL DAILY
Status: DISCONTINUED | OUTPATIENT
Start: 2021-03-12 | End: 2021-03-12 | Stop reason: HOSPADM

## 2021-03-12 RX ORDER — SODIUM CHLORIDE 9 MG/ML
INJECTION, SOLUTION INTRAVENOUS CONTINUOUS PRN
Status: DISCONTINUED | OUTPATIENT
Start: 2021-03-12 | End: 2021-03-12 | Stop reason: SDUPTHER

## 2021-03-12 RX ORDER — DEXTROSE, SODIUM CHLORIDE, AND POTASSIUM CHLORIDE 5; .45; .15 G/100ML; G/100ML; G/100ML
INJECTION INTRAVENOUS CONTINUOUS
Status: DISCONTINUED | OUTPATIENT
Start: 2021-03-12 | End: 2021-03-12 | Stop reason: HOSPADM

## 2021-03-12 RX ORDER — SODIUM CHLORIDE 0.9 % (FLUSH) 0.9 %
10 SYRINGE (ML) INJECTION PRN
Status: DISCONTINUED | OUTPATIENT
Start: 2021-03-12 | End: 2021-03-12 | Stop reason: HOSPADM

## 2021-03-12 RX ORDER — PREGABALIN 75 MG/1
75 CAPSULE ORAL DAILY
Status: DISCONTINUED | OUTPATIENT
Start: 2021-03-12 | End: 2021-03-12 | Stop reason: HOSPADM

## 2021-03-12 RX ORDER — CITALOPRAM 10 MG/1
10 TABLET ORAL DAILY
Status: DISCONTINUED | OUTPATIENT
Start: 2021-03-12 | End: 2021-03-12 | Stop reason: HOSPADM

## 2021-03-12 RX ORDER — LIDOCAINE HYDROCHLORIDE 10 MG/ML
INJECTION, SOLUTION EPIDURAL; INFILTRATION; INTRACAUDAL; PERINEURAL PRN
Status: DISCONTINUED | OUTPATIENT
Start: 2021-03-12 | End: 2021-03-12 | Stop reason: SDUPTHER

## 2021-03-12 RX ORDER — SODIUM CHLORIDE 0.9 % (FLUSH) 0.9 %
10 SYRINGE (ML) INJECTION EVERY 12 HOURS SCHEDULED
Status: DISCONTINUED | OUTPATIENT
Start: 2021-03-12 | End: 2021-03-12 | Stop reason: HOSPADM

## 2021-03-12 RX ORDER — ONDANSETRON 2 MG/ML
INJECTION INTRAMUSCULAR; INTRAVENOUS
Status: COMPLETED
Start: 2021-03-12 | End: 2021-03-12

## 2021-03-12 RX ORDER — ONDANSETRON 2 MG/ML
4 INJECTION INTRAMUSCULAR; INTRAVENOUS ONCE
Status: COMPLETED | OUTPATIENT
Start: 2021-03-12 | End: 2021-03-12

## 2021-03-12 RX ORDER — ATORVASTATIN CALCIUM 20 MG/1
20 TABLET, FILM COATED ORAL NIGHTLY
Status: DISCONTINUED | OUTPATIENT
Start: 2021-03-12 | End: 2021-03-12 | Stop reason: HOSPADM

## 2021-03-12 RX ORDER — CITALOPRAM 20 MG/1
20 TABLET ORAL DAILY
Status: DISCONTINUED | OUTPATIENT
Start: 2021-03-12 | End: 2021-03-12 | Stop reason: HOSPADM

## 2021-03-12 RX ORDER — PROPOFOL 10 MG/ML
INJECTION, EMULSION INTRAVENOUS PRN
Status: DISCONTINUED | OUTPATIENT
Start: 2021-03-12 | End: 2021-03-12 | Stop reason: SDUPTHER

## 2021-03-12 RX ADMIN — ONDANSETRON 4 MG: 2 INJECTION INTRAMUSCULAR; INTRAVENOUS at 03:45

## 2021-03-12 RX ADMIN — DEXTROSE AND SODIUM CHLORIDE: 5; 450 INJECTION, SOLUTION INTRAVENOUS at 02:18

## 2021-03-12 RX ADMIN — LIDOCAINE HYDROCHLORIDE 50 MG: 10 INJECTION, SOLUTION EPIDURAL; INFILTRATION; INTRACAUDAL; PERINEURAL at 10:35

## 2021-03-12 RX ADMIN — ACETAMINOPHEN 650 MG: 325 TABLET ORAL at 12:44

## 2021-03-12 RX ADMIN — LIDOCAINE HYDROCHLORIDE 100 MG: 10 INJECTION, SOLUTION EPIDURAL; INFILTRATION; INTRACAUDAL; PERINEURAL at 10:42

## 2021-03-12 RX ADMIN — POLYETHYLENE GLYCOL 3350, SODIUM SULFATE ANHYDROUS, SODIUM BICARBONATE, SODIUM CHLORIDE, POTASSIUM CHLORIDE 4000 ML: 236; 22.74; 6.74; 5.86; 2.97 POWDER, FOR SOLUTION ORAL at 04:30

## 2021-03-12 RX ADMIN — PROPOFOL 260 MG: 10 INJECTION, EMULSION INTRAVENOUS at 10:59

## 2021-03-12 RX ADMIN — SODIUM CHLORIDE: 9 INJECTION, SOLUTION INTRAVENOUS at 09:37

## 2021-03-12 ASSESSMENT — PAIN - FUNCTIONAL ASSESSMENT: PAIN_FUNCTIONAL_ASSESSMENT: 0-10

## 2021-03-12 ASSESSMENT — ENCOUNTER SYMPTOMS
CONSTIPATION: 0
SHORTNESS OF BREATH: 1
COUGH: 0
ABDOMINAL PAIN: 0
NAUSEA: 1
ANAL BLEEDING: 1
ABDOMINAL PAIN: 1
EYE PAIN: 0
NAUSEA: 0
BLOOD IN STOOL: 1
STRIDOR: 0
ABDOMINAL DISTENTION: 0
BACK PAIN: 0
SINUS PRESSURE: 0
SHORTNESS OF BREATH: 0
SINUS PAIN: 0
VOMITING: 0
SORE THROAT: 0
DIARRHEA: 1
EYE ITCHING: 0

## 2021-03-12 ASSESSMENT — PAIN SCALES - GENERAL
PAINLEVEL_OUTOF10: 0

## 2021-03-12 ASSESSMENT — PAIN DESCRIPTION - PROGRESSION: CLINICAL_PROGRESSION: GRADUALLY WORSENING

## 2021-03-12 ASSESSMENT — PAIN DESCRIPTION - DESCRIPTORS: DESCRIPTORS: SHARP;SORE;ACHING

## 2021-03-12 ASSESSMENT — PAIN DESCRIPTION - ONSET: ONSET: PROGRESSIVE

## 2021-03-12 ASSESSMENT — PAIN DESCRIPTION - PAIN TYPE: TYPE: ACUTE PAIN

## 2021-03-12 NOTE — ED PROVIDER NOTES
[address] [facility]     Emergency Department     Faculty Attestation    I performed a history and physical examination of the patient and discussed management with the resident. I reviewed the residents note and agree with the documented findings and plan of care. Any areas of disagreement are noted on the chart. I was personally present for the key portions of any procedures. I have documented in the chart those procedures where I was not present during the key portions. I have reviewed the emergency nurses triage note. I agree with the chief complaint, past medical history, past surgical history, allergies, medications, social and family history as documented unless otherwise noted below. For Physician Assistant/ Nurse Practitioner cases/documentation I have personally evaluated this patient and have completed at least one if not all key elements of the E/M (history, physical exam, and MDM). Additional findings are as noted. I have personally seen and evaluated the patient. I find the patient's history and physical exam are consistent with the NP/PA documentation. I agree with the care provided, treatment rendered, disposition and follow-up plan. 43-year-old female recently admitted to [facility] for rectal bleeding presenting with near syncopal event. Patient was undergoing preparation for colonoscopy when she had 2 large bloody bowel movements. She then became near syncopal. She did not fall nor hit her head. Exam:  General: Laying on the bed, awake, alert and pale  CV: normal rate and regular rhythm  Lungs: Breathing comfortably on room air with no tachypnea, hypoxia, or increased work of breathing    Plan:  Check electrolytes, check hemoglobin  Hydrate  Plan to discuss with GI regarding plans for colonoscopy. Patient signed out to oncoming attending pending lab results and discussion with GI.         Jw James MD   Attending Emergency  Physician    (Please note that portions of this note were completed with a voice recognition program. Efforts were made to edit the dictations but occasionally words are mis-transcribed.)             Real Ford MD  03/11/21 [id number]

## 2021-03-12 NOTE — PROGRESS NOTES
PT C/O RIGHT UPPER LIP HURTING; SMALL ABRASION NOTED FROM THE MOUTHPIECE WITH NO BLEEDING NOTED. ICE PACK SUPPLIED FOR COMFORT.

## 2021-03-12 NOTE — PROGRESS NOTES
Concern for patient being tachycardic and feeling unwell still. Repeat H&H taken and stable at 9.0. Patient was: Sis Longo at home and has been shravan here in the emergency department during her stay. Suspect that patient is dehydrated and that is for the tachycardia upon standing is from. Will increase patient's maintenance fluids to 150cc/hour overnight and give a 500 cc bolus. Patient, nurse at bedside.   Patient compliant and understanding of this plan    Electronically signed by Ailyn Rutherford DO on 3/12/2021 at 3:58 AM

## 2021-03-12 NOTE — CONSULTS
[facility]    GASTROENTEROLOGY CONSULT    Patient:   Jordan Rome   :    1970   Facility:   [facility]   Date:    3/12/2021  Admission Dx:  Rectal bleeding [K62.5]  Requesting physician: Bree Guerrero MD  Reason for consult:  Rectal bleeding   CC : Near-syncope, rectal bleed    SUBJECTIVE     HISTORY OF PRESENT ILLNESS  This is a 53 y.o.   female who was admitted 3/11/2021 with Rectal bleeding [K62.5]. We have been asked to see the patient in consultation by Bree Guerrero MD for rectal bleeding. Patient presented to the ED after having a near syncopal episode and several episodes of bright red blood per rectum while prepping for a planned outpatient colonoscopy. She was having bright red blood and melena 3 weeks ago. She was also experiencing dysphagia. She was seen at [facility] where she underwent EGD which showed mildly tortuous esophagus s/p dilation and mild gastritis. Patient reports plan was for outpatient colonoscopy. She was prepping for colonoscopy where she had 3 episodes of large-volume hematochezia and became symptomatic with near syncope and chest discomfort. She had associated nausea but no emesis. She presented here for further evaluation. Initial hemoglobin in the ED D was 9.4 g/dL. She was found to be tachycardic and received IV fluid boluses. Hemoglobin to declined to 8.0 g/dL. Patient denies any further hematochezia since. Patient does admit to some mild lower abdominal discomfort which she attributes to diarrhea from bowel prep. Patient denies any prior episodes of hematochezia. She does admit to a history of constipation, no diarrhea. Last colonoscopy completed 2017 showing small hemorrhoids     She reports a long history of heartburn and reflux. She still has mild dysphagia following EGD but reports dilation did improve symptoms.   She reports a long history of taking NSAIDs for lupus symptoms prior to EGD completion 3/3/2021. She was advised to discontinue after EGD completed. Patient denies alcohol, tobacco, or illicit drug use. No family history of gastric, liver, or colon cancer. No family history of IBD        Previous GI history:   EGD 03/03/2021  PREOPERATIVE DIAGNOSIS: dysphagia.      PROCEDURES:   1) Transoral Upper Endoscopy, biopsy, Savary dilation.      POSTOPERATIVE DIAGNOSIS:   Mildly tortuous esophagus  Mild gastritis    FINDINGS:   Esophagus: The esophagus was inspected to the Z-line. The endoscopic exam showed mildly tortuous esophagus throughout. Dilation to 15 mm was performed using Savary.      Stomach: The stomach was inspected in both forward and retroflex fashion and was appropriately distensible. The cardia, fundus, incisura, antrum and pylorus were identified via direct visualization. The endoscopic exam showed mild scattered erythema. Biopsy was obtained to exclude underlying H. pylori infection.      Duodenum: The proximal small bowel was inspected through the bulb, sweep, and second portion of the duodenum. The endoscopic exam showed no pathology.         RECOMMENDATIONS:   1) Transfer back to the floor for further management as per primary care team.   2) Follow up on pathology report. 3) Advance diet as tolerated. 4) Okay to discharge home from GI standpoint. Outpatient follow-up.           Kellee Blevins MD [facility]    COLONOSCOPY     DATE OF PROCEDURE: 11/21/2017     SURGEON: Mckenna Prajapati MD       PREOPERATIVE DIAGNOSIS: change of bowel habits, severe constipation     POSTOPERATIVE DIAGNOSIS: small hemorrhoids, diverticulosis         The colon was decompressed and the scope was removed. The patient tolerated the procedure well without complications.      Recommendations/Plan:   1. F/U Biopsies  2. F/U In Office as instructed  3. Discussed with the family  4. High fiber diet   5.  Precautions to avoid constipation      Next colonoscopy: 10 years.     Electronically signed by Rehana Morillo MD  on 2017 at 8:32 AM        OBJECTIVE:     PAST MEDICAL/SURGICAL HISTORY  Past Medical History:   Diagnosis Date    [facility] insufficiency [facility] [address]     Asthma     Bleeding after intercourse     History of    Bloody diarrhea     Cancer (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     Headache     History of cervical dysplasia     had cone    History of conization of cervix     dysplastic cells    Hyperlipidemia     Hypothyroidism, unspecified 3/18/2016    Lupus ([facility] [address]     Osteoarthritis     Rheumatoid arthritis (HCC)     Sleep apnea     tests were negative but snores badly    Vision abnormalities     glasses/ far sighted     Past Surgical History:   Procedure Laterality Date    BONE CYST EXCISION Right     WRIST    CARPAL TUNNEL RELEASE Right 10-6-15    CARPAL TUNNEL RELEASE Left 11/3/15    CERVIX BIOPSY       SECTION, LOW TRANSVERSE      COLONOSCOPY      COLONOSCOPY  2009    diverticulosis, no other gross lesions    COLONOSCOPY  2017    10 yr recall, small hemorrhoids, diverticulosis    DILATION AND CURETTAGE OF UTERUS N/A 2017    HYSTEROSCOPY AND D& C, myosure performed by Danial Bennett MD at [facility], COLON, DIAGNOSTIC      UGI    FRACTURE SURGERY Left     THUMB    GYNECOLOGIC CRYOSURGERY      conization   [address]      with mesh, umbilical    LIPOMA RESECTION Right     RING FINGER    OTHER SURGICAL HISTORY      VOCAL CORD SURG    [place] COLON [place] SCRN NOT  [address] IND N/A 2017    COLONOSCOPY performed by Rehana Morillo MD at [address] EGD TRANSORAL BIOPSY SINGLE/MULTIPLE N/A 2017    EGD BIOPSY performed by Segundo Ceja MD at [facility] 08/09/2016    Right thyroid lobectomy and isthmusectomy. Continuous monitoring of the recurrent laryngeal nerve using nerve integrity monitor. Frozen section.  TONSILLECTOMY      UPPER GASTROINTESTINAL ENDOSCOPY  12/12/2008    with BRAVO, gastric polyp-fundic gland polyp    UPPER GASTROINTESTINAL ENDOSCOPY  11/21/2017    multiple small gastric polyps-fundic gland polyps    UPPER GASTROINTESTINAL ENDOSCOPY N/A 3/3/2021    EGD BIOPSY & SAVARY DILATION performed by Jack Elaine MD at [address]:  Allergies   Allergen Reactions    Other      Perch - twice had violent vomiting, diarrhea,severe dizziness and nausea    Percocet [Oxycodone-Acetaminophen] Hives and Itching     Can take if she takes Benadryl with it. Hives everywhere, including roof of mouth and tear duct openings    Tramadol Hcl Hives and Itching     hives    Azithromycin      Palpitations.  Ibuprofen Hives    Ceclor [Cefaclor] Hives and Rash    Penicillins Hives and Rash       HOME MEDICATIONS:  Prior to Admission medications    Medication Sig Start Date End Date Taking? Authorizing Provider   polyethylene glycol (GLYCOLAX) 17 GM/SCOOP powder Follow instructions provided to you from physician's office. 3/9/21   Segundo Ceja MD   bisacodyl (BISACODYL) 5 MG EC tablet Follow instructions provided given by the physician's office.  3/9/21   Segundo Ceja MD   atorvastatin (LIPITOR) 20 MG tablet Take 1 tablet by mouth nightly 3/8/21   Deejay Javed MD   clobetasol (TEMOVATE) 0.05 % cream apply to rash twice a day for up to 2 weeks ON and 1 week off 1/28/21   Historical Provider, MD   metroNIDAZOLE (METROGEL) 0.75 % gel apply to face twice a day 1/28/21   Historical Provider, MD   omeprazole (PRILOSEC) 40 MG delayed release capsule Take 40 mg by mouth nightly  1/13/21   Historical Provider, MD   Calcium Carb-Cholecalciferol (CALCIUM/VITAMIN D PO) Take by mouth    Historical Provider, MD   citalopram (CELEXA) 10 MG tablet take 1 tablet by mouth once daily with 20 MG TABLET 3/1/21   Manjula Sinclair MD   citalopram (CELEXA) 20 MG tablet Take 1 tablet by mouth daily With the celexa 10mg tab to make 30mg dialy 3/1/21   Manjula Sinclair MD   B Complex Vitamins (VITAMIN B COMPLEX PO) Take by mouth daily    Historical Provider, MD   nystatin (MYCOSTATIN) 039660 UNIT/GM powder Apply 3 times daily. 12/16/20   STEPHIE Ellison - CNP   aspirin 81 MG EC tablet Take 1 tablet by mouth daily 10/28/20   aMnjula Sinclair MD   levothyroxine (SYNTHROID) 50 MCG tablet Take 1 tablet by mouth daily 9/29/20 3/11/21  Curtis Trevizo MD   Omeprazole-Sodium Bicarbonate (ZEGERID)  MG CAPS Take by mouth as needed     Historical Provider, MD   LORazepam (ATIVAN) 0.5 MG tablet 0.5 mg daily as needed. 4/24/18   Historical Provider, MD   pregabalin (LYRICA) 75 MG capsule take 1 capsule by mouth twice a day 10/10/19   Historical Provider, MD   Cetirizine HCl (ZYRTEC PO) Take 10 mg by mouth daily     Historical Provider, MD       CURRENT MEDICATIONS:  Scheduled Meds:   dextrose 5% and 0.45% NaCl  500 mL Intravenous Once     Continuous Infusions:   IV infusion builder 150 mL/hr at 03/12/21 0358     PRN Meds:acetaminophen    SOCIAL HISTORY:     Tobacco:   reports that she quit smoking about 33 years ago. She has never used smokeless tobacco.  Alcohol:   reports current alcohol use. Illicit drugs:  reports no history of drug use.     FAMILY HISTORY:     Family History   Problem Relation Age of Onset    Alcohol Abuse Father     Other Father         murder    Diabetes Mother     Other Mother         thyroid    High Blood Pressure Mother     Lupus Sister     Drug Abuse Brother     Asthma Brother     Breast Cancer Maternal Grandmother         45s    Heart Surgery Maternal Grandmother     Heart Failure Maternal Grandmother     Hypertension Maternal Grandfather     Stroke PLTFLUORE Platelet clumps present, count appears decreased. 03/11/2021       BMP  Recent Labs     03/11/21  2150 03/12/21  0636    140   K 3.5* 3.5*    105   CO2 28 26   BUN 8 6   CREATININE 0.65 0.55   GLUCOSE 105* 148*   CALCIUM 9.6 8.4*       LFTS  No results for input(s): ALKPHOS, ALT, AST, PROT, BILITOT, BILIDIR, LABALBU in the last 72 hours. AMYLASE/LIPASE/AMMONIA  No results for input(s): AMYLASE, LIPASE, AMMONIA in the last 72 hours. PT/INR  No results for input(s): PROTIME, INR in the last 72 hours. ANEMIA STUDIES  Recent Labs     03/09/21  1553   IRON 41   LABIRON 14*   TIBC 300   UIBC 259   [id number] 529   FOLATE >20.0       IMAGING  Us Liver    Result Date: 3/1/2021  EXAMINATION: RIGHT UPPER QUADRANT ULTRASOUND 3/1/2021 5:04 pm COMPARISON: None. HISTORY: ORDERING SYSTEM PROVIDED HISTORY: RUQ pain TECHNOLOGIST PROVIDED HISTORY: RUQ pain Reason for Exam: ruq pain, diarrhea, blood in stool Acuity: Acute Type of Exam: Initial FINDINGS: LIVER:  Diffuse mild increased echogenicity of the liver with slight heterogeneity indicating underlying steatosis. No focal hepatic lesion is appreciated. Limited Doppler evaluation of the portal vein demonstrates hepatopetal flow, which is normal. BILIARY SYSTEM:  Cholelithiasis without mucosal thickening or pericholecystic fluid. No sonographic Mckeon sign. Common bile duct is within normal limits measuring 4 mm. PANCREAS:  The pancreas is not visualized. OTHER: No evidence of right upper quadrant ascites. 1. Cholelithiasis with no evidence of acute cholecystitis. 2. Hepatic steatosis with no focal lesion. IMPRESSION:     Patient presented to the ED after having a near syncopal episode and several episodes of bright red blood per rectum while prepping for a planned outpatient colonoscopy. 1.  Painless Hematochezia - DDX-hemorrhoidal, CRC, inflamed polyp, AVM, diverticular bleed, IBD  2.   Acute blood loss anemia -baseline hemoglobin ~ 10-11. Presented with hemoglobin 9.4 g/dL which declined to 8.0 g/dL after fluid resuscitation. There is no signs of active or ongoing bleeding at this time. 3. Dysphagia - mild- s/p EGD with dilation 3/3/21  4. History of long-term NSAID use    Old records, labs and imaging reviewed. PLAN   1. We will plan for colonoscopy today. If colonoscopy is nondiagnostic, may consider repeat EGD. Discussed with patient who is agreeable  2. Monitor hemoglobin and hematocrit. Transfuse to keep hemoglobin greater than 7 g/dL or as clinically indicated. There is no signs of active or ongoing bleeding at this time  3. Continue Protonix 40 mg once daily for now  4. Keep n.p.o.  5. Avoid NSAIDs  6. Further plans to follow colonoscopy    This plan was formulated in collaboration with Dr. Juluis Nyhan  Thank you for allowing us to participate in the care of your patient. Electronically signed by: Ishmael STEELE on 3/12/2021 at 7:27 AM     Please note that this note was generated using a voice recognition dictation software. Although every effort was made to ensure the accuracy of this automated transcription, some errors in transcription may have occurred.

## 2021-03-12 NOTE — ED NOTES
Pt resting comfortably in stretcher with eyes closed, no acute distress noted.        Cristal Payne, RN  03/12/21 7498

## 2021-03-12 NOTE — ED NOTES
Report rec'd from Kirk Hmuphrey RN. All questions answered at this time.    Pt is waiting for the following:  [] Lab/rad results    [] Room assignment     [x] Other: GI consult         Max Capps RN  03/11/21 9405

## 2021-03-12 NOTE — PROGRESS NOTES
[address]  CDU / OBSERVATION ENCOUNTER  ATTENDING NOTE       I performed a history and physical examination of the patient and discussed management with the resident or midlevel provider. I reviewed the resident or midlevel provider's note and agree with the documented findings and plan of care. Any areas of disagreement are noted on the chart. I was personally present for the key portions of any procedures. I have documented in the chart those procedures where I was not present during the key portions. I have reviewed the nurses notes. I agree with the chief complaint, past medical history, past surgical history, allergies, medications, social and family history as documented unless otherwise noted below. The Family history, social history, and ROS are effectively unchanged since admission unless noted elsewhere in the chart. Patient had EGD. No bleeding noted. Patient had drop in hemoglobin with aggressive hydration earlier. We will recheck hemoglobin now. Patient for advancement diet per recommendations from GI. Patient for reassessment and probable discharge this afternoon provided she is improved. If patient's hemoglobin this afternoon is low will transfuse and hold for 24 hours for recheck.     Judy Rand MD  Attending Emergency  Physician

## 2021-03-12 NOTE — CARE COORDINATION
Case Management Initial Discharge Plan  Taina Faye,             Met with:patient to discuss discharge plans. Information verified: address, contacts, phone number, , insurance Yes    Emergency Contact/Next of Kin name & number: Ramírez Porter [phone]    PCP: Kandace Roca MD  Date of last visit: yesterday     Insurance Provider: Erin     Discharge Planning    Living Arrangements:  Spouse/Significant Other, Family Members   Support Systems:  Family Members, Friends/Neighbors, Spouse/Significant Other    Home has 2 stories  4 stairs to climb to get into front door, 1 flight stairs to climb to reach second floor  Location of bedroom/bathroom in home second floor with a bathroom is also on first     Patient able to perform ADL's:Independent    Current Services (outpatient & in home) none   DME equipment: none   DME provider: na     Receiving oral anticoagulation therapy? No    If indicated:   Physician managing anticoagulation treatment: na  Where does patient obtain lab work for ATC treatment? na      Potential Assistance Needed:  N/A    Patient agreeable to home care: No  [place] of choice provided:  n/a    Prior SNF/Rehab Placement and Facility: none   Agreeable to SNF/Rehab: No  [place] of choice provided: n/a     Evaluation: no    Expected Discharge date:       Patient expects to be discharged to:  Home  Follow Up Appointment: Best Day/ Time:      Transportation provider: family   Transportation arrangements needed for discharge: No    Readmission Risk              Risk of Unplanned Readmission:        0             Does patient have a readmission risk score greater than 14?: No  If yes, follow-up appointment must be made within 7 days of discharge. Goals of Care:  To find out why I'm bleeding       Discharge Plan: Home independently           Electronically signed by Segundo Ascencio RN on 3/12/21 at 5:35 PM EST

## 2021-03-12 NOTE — ANESTHESIA POSTPROCEDURE EVALUATION
Department of Anesthesiology  Postprocedure Note    Patient: Lorena Bhakta  MRN: [id number]  YOB: 1970  Date of evaluation: 3/12/2021  Time:  11:30 AM     Procedure Summary     Date: 03/12/21 Room / Location: [address]; [place] [address] 02 / 250 [facility] ENDO    Anesthesia Start: 1030 Anesthesia Stop: 2502    Procedures:       COLONOSCOPY DIAGNOSTIC (N/A )      COLONOSCOPY POLYPECTOMY SNARE/COLD BIOPSY (N/A )      EGD DILATION SAVORY Diagnosis:       (OCCULT BLOOD IN STOOL)      (**RAPID COVID ON ADMIT)      (rectal bleeding)    Surgeons: Solomon Pillai MD; Ward Wiseman MD Responsible Provider: Jade Hernandez MD    Anesthesia Type: MAC ASA Status: 3          Anesthesia Type: MAC    Chivo Phase I:      Chivo Phase II: Chivo Score: 10    Last vitals: Reviewed and per EMR flowsheets.        Anesthesia Post Evaluation    Patient location during evaluation: PACU  Patient participation: complete - patient participated  Level of consciousness: awake  Pain score: 1  Airway patency: patent  Nausea & Vomiting: no nausea and no vomiting  Complications: no  Cardiovascular status: blood pressure returned to baseline and hemodynamically stable  Respiratory status: acceptable  Hydration status: euvolemic

## 2021-03-12 NOTE — ED PROVIDER NOTES
[facility] [address]  ED  Emergency Department Encounter  Emergency Medicine Resident     Pt Name: Gerry Navarrete  MRN: [id number]  [place] 1970  Date of evaluation: 3/11/21  PCP:  Dagmar Oglesby MD    [address] [place] [address]       Chief Complaint   Patient presents with    Rectal Bleeding     Pt arrives via squad c/o rectal bleeding x 1 today, prepping for colonoscopy tomorrow at [facility], discharged from [facility] for same yesterday       HISTORY OFPRESENT ILLNESS  (Location/Symptom, Timing/Onset, Context/Setting, Quality, Duration, Modifying Factors,Severity.)      Gerry Navarrete is a 53 y.o. female who presents EMS with rectal bleeding, near syncope episode. Patient was admitted at [facility] 1 week ago for rectal bleeding. Hemoglobin stayed stable around 10.6 at the time and she was scheduled for colonoscopy tomorrow. Patient was seen for colonoscopy today, and had 3 bloody bowel movements this afternoon. During the most recent one, patient developed chest pain, shortness of breath, generalized weakness, and felt like she was going to faint, but never did. She is lightheaded and describes muffled hearing. When EMS arrived, they reported hypotensive pressures and applied oxygen. On arrival here patient is stable with normal pressures. She feels somewhat weak and has lower abdominal pain and cramping. No other significant pain or shortness of breath this time. Patient's  did show a picture of bloody bowel movements, which were bright red.      PAST MEDICAL / SURGICAL / SOCIAL / FAMILY HISTORY      has a past medical history of Adrenal insufficiency ([address] Asthma, Bleeding after intercourse, Bloody diarrhea, Cancer ([address] [employer] disease [facility]), Diverticulosis of colon, Environmental allergies, Family history of breast cancer, Fibromyalgia, GERD (gastroesophageal reflux disease), H/O thyroid cyst, Headache, History of cervical dysplasia, History of conization of cervix, Hyperlipidemia, Hypothyroidism, unspecified, Lupus ([facility] Osteoarthritis, Rheumatoid arthritis ([facility] Sleep apnea, and Vision abnormalities. has a past surgical history that includes bone cyst excision (Right); Tonsillectomy; Endometrial ablation; Cervix biopsy; lipoma resection (Right); other surgical history; Carpal tunnel release (Right, 10-6-15); Carpal tunnel release (Left, 11/3/15);  section, low transverse; Colonoscopy; Gynecologic cryosurgery (); Thyroid lobectomy (Right, 2016); Endoscopy, colon, diagnostic; Dilation and curettage of uterus (N/A, 2017); Upper gastrointestinal endoscopy (2008); Colonoscopy (2009); fracture surgery (Left); pr egd transoral biopsy single/multiple (N/A, 2017); pr colon ca scrn not hi rsk ind (N/A, 2017); Colonoscopy (2017); Upper gastrointestinal endoscopy (2017); Upper gastrointestinal endoscopy (N/A, 3/3/2021); and hernia repair. Social History     Socioeconomic History    Marital status:      Spouse name: Not on file    Number of children: Not on file    Years of education: Not on file    Highest education level: Not on file   Occupational History    Not on file   Social Needs    Financial resource strain: Not on file    Food insecurity     Worry: Not on file     Inability: Not on file    Transportation needs     Medical: Not on file     Non-medical: Not on file   Tobacco Use    Smoking status: Former Smoker     Quit date:      Years since quittin.2    Smokeless tobacco: Never Used    Tobacco comment: only smoked for 4 months as teenager only   Substance and Sexual Activity    Alcohol use:  Yes     Alcohol/week: 0.0 standard drinks     Comment: recovering alcoholic, quit heavy drinking , takes 1-2 every yearly since      Drug use: No    Sexual activity: Yes     Partners: Male     Birth control/protection: Other-see comments     Comment:  had vasectomy   Lifestyle    Physical activity     Days per week: Not on file     Minutes per session: Not on file    Stress: Not on file   Relationships    Social connections     Talks on phone: Not on file     Gets together: Not on file     Attends Shinto service: Not on file     Active member of club or organization: Not on file     Attends meetings of clubs or organizations: Not on file     Relationship status: Not on file    Intimate partner violence     Fear of current or ex partner: Not on file     Emotionally abused: Not on file     Physically abused: Not on file     Forced sexual activity: Not on file   Other Topics Concern    Not on file   Social History Narrative    Not on file       Family History   Problem Relation Age of Onset    Alcohol Abuse Father     Other Father         murder    Diabetes Mother     Other Mother         thyroid    High Blood Pressure Mother     Lupus Sister     Drug Abuse Brother     Asthma Brother     Breast Cancer Maternal Grandmother         45s    Heart Surgery Maternal Grandmother     Heart Failure Maternal Grandmother     Hypertension Maternal Grandfather     Stroke Maternal Grandfather     Heart Attack Maternal Grandfather     Alcohol Abuse Maternal Grandfather     Diabetes Maternal Grandfather     Cancer Paternal Grandmother         lymphnoid    Heart Failure Paternal Grandfather     Heart Surgery Paternal Grandfather         x2        Allergies: Other, Percocet [oxycodone-acetaminophen], Tramadol hcl, Azithromycin, Ibuprofen, Ceclor [cefaclor], and Penicillins    Home Medications:  Prior to Admission medications    Medication Sig Start Date End Date Taking? Authorizing Provider   polyethylene glycol (GLYCOLAX) 17 GM/SCOOP powder Follow instructions provided to you from physician's office. 3/9/21   Rehana Morillo MD   bisacodyl (BISACODYL) 5 MG EC tablet Follow instructions provided given by the physician's office.  3/9/21   Rehana Morillo MD   atorvastatin (LIPITOR) 20 MG tablet Take 1 tablet by mouth nightly 3/8/21   Lena Camp MD   clobetasol (TEMOVATE) 0.05 % cream apply to rash twice a day for up to 2 weeks ON and 1 week off 1/28/21   Historical Provider, MD   metroNIDAZOLE (METROGEL) 0.75 % gel apply to face twice a day 1/28/21   Historical Provider, MD   omeprazole (PRILOSEC) 40 MG delayed release capsule Take 40 mg by mouth nightly  1/13/21   Historical Provider, MD   Calcium Carb-Cholecalciferol (CALCIUM/VITAMIN D PO) Take by mouth    Historical Provider, MD   citalopram (CELEXA) 10 MG tablet take 1 tablet by mouth once daily with 20 MG TABLET 3/1/21   Lena Camp MD   citalopram (CELEXA) 20 MG tablet Take 1 tablet by mouth daily With the celexa 10mg tab to make 30mg dialy 3/1/21   Lena Camp MD   B Complex Vitamins (VITAMIN B COMPLEX PO) Take by mouth daily    Historical Provider, MD   nystatin (MYCOSTATIN) 639509 UNIT/GM powder Apply 3 times daily. 12/16/20   STEPHIE Redmond - CNP   aspirin 81 MG EC tablet Take 1 tablet by mouth daily 10/28/20   Lena Camp MD   levothyroxine (SYNTHROID) 50 MCG tablet Take 1 tablet by mouth daily 9/29/20 3/11/21  Ayesha Hensley MD   Omeprazole-Sodium Bicarbonate (ZEGERID)  MG CAPS Take by mouth as needed     Historical Provider, MD   LORazepam (ATIVAN) 0.5 MG tablet 0.5 mg daily as needed. 4/24/18   Historical Provider, MD   pregabalin (LYRICA) 75 MG capsule take 1 capsule by mouth twice a day 10/10/19   Historical Provider, MD   Cetirizine HCl (ZYRTEC PO) Take 10 mg by mouth daily     Historical Provider, MD       REVIEW OFSYSTEMS    (2-9 systems for level 4, 10 or more for level 5)      Review of Systems   Constitutional: Negative for chills and fever. HENT: Negative for congestion. Eyes: Negative for visual disturbance. Respiratory: Positive for shortness of breath. Cardiovascular: Positive for chest pain.    Gastrointestinal: Positive for abdominal pain, blood in stool, diarrhea and nausea. Negative for constipation and vomiting. Genitourinary: Negative for difficulty urinating and dysuria. Musculoskeletal: Negative for back pain and neck pain. Neurological: Positive for syncope (Near syncope). Negative for weakness, numbness and headaches. PHYSICAL EXAM   (up to 7 for level 4, 8 or more forlevel 5)      INITIAL VITALS:   ED Triage Vitals   BP Temp Temp Source Pulse Resp SpO2 Height Weight   03/11/21 2031 03/11/21 2036 03/11/21 2036 03/11/21 2031 03/11/21 2036 03/11/21 2031 03/11/21 2036 03/11/21 2036   127/80 98.8 °F (37.1 °C) Oral 69 18 99 % 5' 4\" (1.626 m) 213 lb (96.6 kg)       Physical Exam  Constitutional:       General: She is not in acute distress. Appearance: Normal appearance. HENT:      Head: Normocephalic and atraumatic. Mouth/Throat:      Mouth: Mucous membranes are dry. Pharynx: Oropharynx is clear. Eyes:      Extraocular Movements: Extraocular movements intact. Neck:      Musculoskeletal: Neck supple. Cardiovascular:      Rate and Rhythm: Normal rate and regular rhythm. Pulses: Normal pulses. Pulmonary:      Effort: Pulmonary effort is normal.      Breath sounds: Normal breath sounds. No wheezing, rhonchi or rales. Chest:      Chest wall: No tenderness. Abdominal:      General: There is no distension. Palpations: Abdomen is soft. Tenderness: There is abdominal tenderness (mild diffuse lower abdominal tenderness to palpation). There is no guarding or rebound. Musculoskeletal: Normal range of motion. Right lower leg: No edema. Left lower leg: No edema. Skin:     General: Skin is warm and dry. Neurological:      General: No focal deficit present. Mental Status: She is alert and oriented to person, place, and time. Sensory: No sensory deficit. Motor: No weakness.    Psychiatric:         Mood and Affect: Mood normal.         Behavior: Behavior normal.         DIFFERENTIAL  DIAGNOSIS     PLAN (LABS / IMAGING / EKG):  Orders Placed This Encounter   Procedures    COVID-19, Rapid    CBC Auto Differential    Basic Metabolic Panel w/ Reflex to MG    Troponin    Immature Platelet Fraction    Magnesium    Inpatient consult to GI    EKG 12 Lead    PATIENT STATUS (FROM ED OR OR/PROCEDURAL) Observation       MEDICATIONS ORDERED:  Orders Placed This Encounter   Medications    lactated ringers infusion 1,000 mL       DDX: Bleed, hemorrhoids, diverticulitis, syncope, cardiac arrhythmia, gastroenteritis, infectious process    Initial MDM/Plan/ED COURSE:    53 y.o. female who presents with rectal bleeding. Patient was recently admitted for this and was scheduled for colonoscopy tomorrow. She had 3 bloody bowel movements this afternoon while prepping for colonoscopy. When EMS arrived, they had difficulty getting blood pressure, and reported hypotensive once. She was also put on oxygen mask for reported desaturations. On arrival here, patient is stable, blood pressure within normal ranges and heart rate within normal ranges as well. She is saturating 100% on room air. Patient does appear pale, but  states this is her baseline for the past week or 2. On exam, heart is regular rate and rhythm and lungs are clear to auscultation bilaterally. There is mild lower abdominal tenderness to palpation, consistent with the cramping she has had with this bowel prep. No rebound or guarding. No focal neuro deficits. Will send labs for CBC, BMP, troponin, and check EKG. Will likely talk to GI. Her gastroenterologist is Dr. Charleen Gilbert at [facility]. ED Course as of Mar 12 0034   Thu Mar 11, 2021   2227 CBC with hemoglobin 9.4. Most recently 10.6 at [facility] 1 week ago. [JS]   2237 Troponin negative    [JS]   2237 BMP also unremarkable. Electrolytes within normal limits. [JS]   2306 We will try to get a hold of patient's gastroenterologist, Dr. Charleen Gilbert.  If unable to, will consult our GI team. [JS]   Fri Mar 12, 2021   0003 Spoke with gastroenterologist covering for this GI group and [facility]. He recommends finishing prep with 1 gallon of GoLYTELY and plan for scope tomorrow. Will admit to observation unit in the meantime. [JS]      ED Course User Index  [JS] Leandra Torres DO         DIAGNOSTIC RESULTS / EMERGENCYDEPARTMENT COURSE / MDM     LABS:  Labs Reviewed   CBC WITH AUTO DIFFERENTIAL - Abnormal; Notable for the following components:       Result Value    RBC 3.13 (*)     Hemoglobin 9.4 (*)     Hematocrit 29.3 (*)     Seg Neutrophils 69 (*)     Lymphocytes 22 (*)     All other components within normal limits   BASIC METABOLIC PANEL W/ REFLEX TO MG FOR LOW K - Abnormal; Notable for the following components:    Glucose 105 (*)     Potassium 3.5 (*)     All other components within normal limits   COVID-19, RAPID   TROPONIN   IMMATURE PLATELET FRACTION   MAGNESIUM           No results found. EKG  EKG Interpretation    Interpreted by emergency department physician    Rhythm: normal sinus   Rate: normal  Axis: normal  Ectopy: none  Conduction: normal  ST Segments: normal  T Waves: normal  Q Waves: none    Clinical Impression: non-specific EKG,    Cristal Nolan      All EKG's are interpreted by the Emergency Department Physicianwho either signs or Co-signs this chart in the absence of a cardiologist.      PROCEDURES:  None    CONSULTS:  IP CONSULT TO GI    CRITICAL CARE:  Please see attending note    FINAL IMPRESSION      1. Rectal bleeding          DISPOSITION / PLAN     DISPOSITION Decision To Admit 03/11/2021 11:20:44 PM      PATIENT REFERRED TO:  No follow-up provider specified.     DISCHARGE MEDICATIONS:  New Prescriptions    No medications on file       Leandra Torres DO  Emergency Medicine Resident    (Please note that portions of this note were completed with a voice recognition program.Efforts were made to edit the dictations but occasionally words are mis-transcribed.)       Brett Canseco DO  Resident  03/12/21 3339

## 2021-03-12 NOTE — ED NOTES
ED to inpatient nurses report    Chief Complaint   Patient presents with    Rectal Bleeding     Pt arrives via squad c/o rectal bleeding x 1 today, prepping for colonoscopy tomorrow at [facility], discharged from [facility] for same yesterday      Present to ED from home  LOC: alert and orientated to name, place, date  Vital signs   Vitals:    03/12/21 0417 03/12/21 0501 03/12/21 0602 03/12/21 0631   BP: 119/76 (!) 130/58 114/64 108/63   Pulse: 87 105 87 78   Resp:       Temp:       TempSrc:       SpO2: 92% 96% 94% 94%   Weight:       Height:          Oxygen Baseline None    Current needs required none  LDAs:   Peripheral IV 03/11/21 Left Antecubital (Active)   Site Assessment Clean;Dry; Intact 03/11/21 2227   Line Status Blood return noted 03/11/21 2227   Dressing Status Clean;Dry; Intact 03/11/21 2227   Dressing Intervention New 03/11/21 2227     Mobility: Requires assistance * 1  Pending ED orders: GI consult  Present condition: stable  Code Status: [unfilled]   Consults:  []  Hospitalist  Completed  [] yes [] no  []  Medicine  Completed  [] yes [] No  []  Cardiology  Completed  [] yes [] No  [x]  GI   Completed  [] yes [x] No  []  Neurology  Completed  [] yes [] No  []  Nephrology Completed  [] yes [] No  []  Vascular  Completed  [] yes [] No   []  Surgery  Completed  [] yes [] No   []  Urology  Completed  [] yes [] No   []  Plastics  Completed  [] yes [] No   []  ENT  Completed  [] yes [] No   []  Other none  Completed  [] yes [] No  Pertinent event(s) Dizzy and lightheaded on standing,  at bedside assisting with commode   Pertinent event(s)   Electronically signed by Melinda Buckner RN on 3/12/2021 at 7:29 AM       Melinda Buckner RN  03/12/21 4520

## 2021-03-12 NOTE — ED NOTES
The following labs were labeled with patient stickers & tubed to lab;    [x]Lavender   []On Ice  []Blue  [x]Green/ Yellow  []Green/ Black []On Ice  []Pink  []Red  []Yellow    []COVID-19 Swab []Rapid    []Urine Sample  []Pelvic Cultures    []Blood Cultures     Lorelei Roth RN  03/12/21 1869

## 2021-03-12 NOTE — OP NOTE
[id number] [facility]      Patient:   Juany Burleson    :    1970    Referring/PCP: Vipin Mullen MD  Procedure:   Colonoscopy with polypectomy (cold snare), polypectomy (cold biopsy); Esophagogastroduodenoscopy  with esophageal dilation over guidewire  Facility:  [id number]   Date:     3/12/2021  Endoscopist:  Tri Clements MD, [facility]    Indication: Esophageal dysphagia, hematochezia    Postprocedure diagnosis: Schatzki ring, hiatal hernia, polyps, diverticulosis, hemorrhoids    Anesthesia:  MAC    Complications: None    EBL: None from the procedure    Specimen: Sent to the lab    Description of Procedure:  Informed consent was obtained from the patient after explanation of the procedure including indications, description of the procedure,  benefits and possible risks and complications of the procedure, and alternatives. Questions were answered. The patient's history was reviewed and a directed physical examination was performed prior to the procedure. Patient was monitored throughout the procedure with pulse oximetry and periodic assessment of vital signs. Patient was sedated as noted above. With the patient initially in the left lateral decubitus position, a digital rectal examination was performed and revealed negative without mass or lesions. The Olympus video colonoscope was placed in the patient's rectum and advanced without difficulty  to the terminal ileum. The prep was good. Examination of the mucosa was performed during both introduction and withdrawal of the colonoscope. Retroflexed view of the rectum was performed. Findings:   1. Normal terminal ileum  2.  2 mm sessile polyp in the ascending colon resected with cold biopsy forceps. The resection and retrieval was complete. 3.  5 mm sessile polyp in the sigmoid colon which was resected with cold snare. The resection and retrieval was complete.   4.  Mild sigmoid diverticulosis which was small in size. 5.  Colon was otherwise normal.  6.  Grade 1 internal hemorrhoids on retroflexion. With the patient in the left lateral decubitus position, the Olympus videoendoscope was placed in the patient's mouth and under direct visualization passed into the esophagus. Visualization of the esophagus, stomach, and duodenum was performed during both introduction and withdrawal of the endoscope and retroflexed view of the proximal stomach was obtained. The scope was passed to the second portion of the duodenum. The patient tolerated the procedure well and was taken to the recovery area in good condition. Findings[de-identified]   Esophagus: There was a nonobstructing Schatzki ring in the lower esophagus. This was dilated over the guidewire with 19 mm savory. Post dilatation there was rupture of the ring. The esophagus was otherwise normal.   Stomach: The stomach had medium size hiatal hernia. The stomach was otherwise normal.  Duodenum: normal       Recommendations:  Await pathology. Repeat colonoscopy in 5 years. -Continue acid suppression. Antireflux diet and lifestyle measures including weight loss. Okay to discharge from GI standpoint on regular diet. We will sign off. Please call with any questions.     Electronically signed by Maninder Lim MD, FACG on 3/12/2021 at 11:06 AM

## 2021-03-12 NOTE — ED NOTES
Bed: 25  Expected date:   Expected time:   Means of arrival:   Comments:  ELI Robert RN  03/11/21 2026

## 2021-03-12 NOTE — H&P
[address] [place] [address]  CDU / [address] [facility] NOTE     Pt Name: Lorena Bhakta  MRN: [id number]  [place] 1970  Date of evaluation: 3/12/21  Patient's PCP is :  Kd Vidal MD    CHIEF COMPLAINT       Chief Complaint   Patient presents with    Rectal Bleeding     Pt arrives via squad c/o rectal bleeding x 1 today, prepping for colonoscopy tomorrow at [facility], discharged from [facility] for same yesterday         HISTORY OF PRESENT ILLNESS    Lorelei Chatterjee is a 53 y.o. female who presents with several episodes of rectal bleeding. Patient states that she was admitted at [facility] 1 week ago for the BR BPR and some episodes of melena, hemoglobin stable around 10.6 at the time. She was seen by her doctor outpatient and prescribed a colonoscopy prep and scheduled for colonoscopy on Friday. During the prep she noticed recurrence of blood in her stool and began feeling unwell. Reported to the ED last night with near syncopal episode, weakness, and complaint of rectal bleeding. In the ED last night she had an episode of tachycardia upon standing. Maintenance fluids were increased and a fluid bolus was given. Initial hemoglobin yesterday was 9.4, most recent hemoglobin of 8.0. Medical history includes hypothyroidism, RA, lupus and fibromyalgia. Was previously taking naproxen daily but since having signs of GI bleeding was instructed to stop taking her naproxen. Location/Symptom: GI bleeding  Timing/Onset: Greater than 1 week ago  Provocation: None identified  Quality: N/A  Radiation: N/A  Severity: Moderate  Timing/Duration: >1wk  Modifying Factors: None    REVIEW OF SYSTEMS       Review of Systems   Constitutional: Negative for activity change, chills and fever. HENT: Negative for congestion, sinus pressure, sinus pain and sore throat. Eyes: Negative for pain and itching. Respiratory: Negative for cough and shortness of breath.     Cardiovascular: Negative for chest pain. Gastrointestinal: Positive for anal bleeding, blood in stool and diarrhea (undergoing colonoscopy prep). Negative for abdominal distention, abdominal pain, constipation and nausea. Endocrine: Negative for polyuria. Genitourinary: Negative for dysuria and frequency. Musculoskeletal: Negative for arthralgias. Skin: Negative for rash. Neurological: Positive for light-headedness. Negative for headaches. (PQRS) Advance directives on face sheet per hospital policy. 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 I have reviewed the past medical history with the patient and it is pertinent to this complaint. SURGICAL HISTORY      has a past surgical history that includes bone cyst excision (Right); Tonsillectomy; Endometrial ablation; Cervix biopsy; lipoma resection (Right); other surgical history; Carpal tunnel release (Right, 10-6-15); Carpal tunnel release (Left, 11/3/15);  section, low transverse; Colonoscopy; Gynecologic cryosurgery (); Thyroid lobectomy (Right, 2016); Endoscopy, colon, diagnostic; Dilation and curettage of uterus (N/A, 2017); Upper gastrointestinal endoscopy (2008); Colonoscopy (2009); fracture surgery (Left); pr egd transoral biopsy single/multiple (N/A, 2017); pr colon ca scrn not hi rsk ind (N/A, 2017); Colonoscopy (2017);  Upper gastrointestinal endoscopy (2017); Upper gastrointestinal endoscopy (N/A, 3/3/2021); and hernia repair. I have reviewed and agree with Surgical History entered and it is pertinent to this complaint. CURRENT MEDICATIONS     acetaminophen (TYLENOL) tablet 650 mg, Q4H PRN  dextrose 5 % and 0.45 % NaCl 1,000 mL infusion, Continuous  dextrose 5 % and 0.45 % nacl bolus, Once        All medication charted and reviewed. ALLERGIES     is allergic to other; percocet [oxycodone-acetaminophen]; tramadol hcl; azithromycin; ibuprofen; ceclor [cefaclor]; and penicillins. FAMILY HISTORY     She indicated that her mother is alive. She indicated that her father is . She indicated that her sister is alive. She indicated that her brother is alive. She indicated that her maternal grandmother is . She indicated that her maternal grandfather is . She indicated that her paternal grandmother is . She indicated that her paternal grandfather is . family history includes Alcohol Abuse in her father and maternal grandfather; Asthma in her brother; Breast Cancer in her maternal grandmother; Cancer in her paternal grandmother; Diabetes in her maternal grandfather and mother; Drug Abuse in her brother; Heart Attack in her maternal grandfather; Heart Failure in her maternal grandmother and paternal grandfather; Heart Surgery in her maternal grandmother and paternal grandfather; High Blood Pressure in her mother; Hypertension in her maternal grandfather; Lupus in her sister; Other in her father and mother; Stroke in her maternal grandfather. The patient denies any pertinent family history. I have reviewed and agree with the family history entered. I have reviewed the Family History and it is not significant to the case    SOCIAL HISTORY      reports that she quit smoking about 33 years ago. She has never used smokeless tobacco. She reports current alcohol use. She reports that she does not use drugs.   I have reviewed and agree with all Social.  There are no concerns for substance abuse/use. PHYSICAL EXAM     INITIAL VITALS:  height is 5' 4\" (1.626 m) and weight is 213 lb (96.6 kg). Her oral temperature is 98.8 °F (37.1 °C). Her blood pressure is 108/63 and her pulse is 78. Her respiration is 15 and oxygen saturation is 94%. Physical Exam  Vitals signs reviewed. Constitutional:       General: She is not in acute distress. HENT:      Head: Normocephalic and atraumatic. Right Ear: External ear normal.      Left Ear: External ear normal.      Ears:      Comments: Hearing grossly normal     Nose: Nose normal.      Mouth/Throat:      Mouth: Mucous membranes are moist.      Pharynx: Oropharynx is clear. Eyes:      General: No scleral icterus. Conjunctiva/sclera: Conjunctivae normal.      Pupils: Pupils are equal, round, and reactive to light. Neck:      Musculoskeletal: No muscular tenderness. Cardiovascular:      Rate and Rhythm: Normal rate and regular rhythm. Pulses: Normal pulses. Pulmonary:      Effort: Pulmonary effort is normal. No respiratory distress. Breath sounds: Normal breath sounds. Abdominal:      General: There is no distension. Tenderness: There is no abdominal tenderness. There is no guarding. Musculoskeletal:      Right lower leg: No edema. Left lower leg: No edema. Skin:     General: Skin is warm and dry. Capillary Refill: Capillary refill takes less than 2 seconds. Neurological:      General: No focal deficit present. Mental Status: She is alert and oriented to person, place, and time. Mental status is at baseline. Psychiatric:         Mood and Affect: Mood normal.         Thought Content: Thought content normal.           DIFFERENTIAL DIAGNOSIS/MDM:     Patient admitted to observation for CC of rectal bleeding. Also complains of several previous episodes of melena.   Was undergoing colonoscopy prep outpatient and noted repeat episodes of bright red blood in her stool and began feeling weak. Initial hemoglobin yesterday was 9.4 most recently 8.0 this a.m. Patient states she has completed colonoscopy prep and remains n.p.o.  GI recommendations pending. DIAGNOSTIC RESULTS     EKG: All EKG's are interpreted by the Observation Physician who either signs or Co-signs this chart in the absence of a cardiologist.    EKG Interpretation      Interpreted by observation physician    Rhythm: normal sinus   Rate: Normal  Axis: normal  Ectopy: none  Conduction: normal  ST Segments: no acute change  T Waves: no acute change  Q Waves: none    Clinical Impression: no acute changes and non-specific EKG    Juliette Ybarra, DO      RADIOLOGY:   I directly visualized the following  images and reviewed the radiologist interpretations:    No results found. LABS:  I have reviewed and interpreted all available lab results.   Labs Reviewed   CBC WITH AUTO DIFFERENTIAL - Abnormal; Notable for the following components:       Result Value    RBC 3.13 (*)     Hemoglobin 9.4 (*)     Hematocrit 29.3 (*)     Seg Neutrophils 69 (*)     Lymphocytes 22 (*)     All other components within normal limits   BASIC METABOLIC PANEL W/ REFLEX TO MG FOR LOW K - Abnormal; Notable for the following components:    Glucose 105 (*)     Potassium 3.5 (*)     All other components within normal limits   HEMOGLOBIN AND HEMATOCRIT, BLOOD - Abnormal; Notable for the following components:    Hemoglobin 9.0 (*)     Hematocrit 28.0 (*)     All other components within normal limits   CBC WITH AUTO DIFFERENTIAL - Abnormal; Notable for the following components:    RBC 2.70 (*)     Hemoglobin 8.0 (*)     Hematocrit 25.5 (*)     All other components within normal limits   BASIC METABOLIC PANEL W/ REFLEX TO MG FOR LOW K - Abnormal; Notable for the following components:    Glucose 148 (*)     Calcium 8.4 (*)     Potassium 3.5 (*)     All other components within normal limits   POC GLUCOSE FINGERSTICK - Abnormal; Notable for the following components:    POC Glucose 110 (*)     All other components within normal limits   COVID-19, RAPID   TROPONIN   IMMATURE PLATELET FRACTION   MAGNESIUM   MAGNESIUM       SCREENING TOOLS:    HEART Risk Score for Chest Pain Patients   History and Physical Exam Suspicion Level  (Nausea, Vomiting, Diaphoresis, Radiation, Exertion)   Slightly Suspicious (0 pts)   Moderately Suspicious (1 pt)   Highly Suspicious (2 pts)   EKG Interpretation   Normal (0 pts)   Non-Specific Repolarization Disturbance (1 pt)   Significant ST-Depression (2 pts)   Age of Patient (in years)   = 39 (0 pts)   46-64 (1 pt)   = 65 (2 pts)   Risk Factors   No Risk Factors (0 pts)   1-2 Risk Factors (1 pt)   = 3 Risk Factors (2 pts)   Risk Factors Include:   Hypercholesterolemia   Hypertension   Diabetes Mellitus   Cigarette smoking   Positive family history   Obesity   CAD   (SLE, CKDz, HIV, Cocaine abuse)   Troponin Levels   = Normal Limit (0 pts)   1-3 Times Normal Limit (1 pt)   > 3 Times Normal Limit (2 pts)  TOTAL:    Percent Risk for Major Adverse Cardiac Event (MACE)  0-3 pts indicates low risk for MACE   2.5% (DISCHARGE)   4-7 pts indicates moderate risk for MACE  20.3% (OBS)  8-10 pts indicates high risk for MACE  72.7% (EARLY INVASIVE TX)    CDU CARINA / Deepa Colvin is a 53 y.o. female who presents with complaint of bright red blood in stool and near syncopal episode. Undergoing colonoscopy prep.   Initial hemoglobin on presentation 9.4, now 8.0 this a.m.  GI recommendations pending    · GI bleeding  · Colonoscopy prep completed per patient  · Remains otherwise NPO  · mIVF D5 0.45% NaCl @150cc/hr  · GI recommendations pending  · F/u labs  · K 3.5, will replenish  · F/u repeat hgb (9.4 on admission, 8.0 this AM)  · Continue home medications and pain control  · Monitor vitals, labs, and imaging  · DISPO: pending consults and clinical improvement    CONSULTS:    IP CONSULT TO GI    PROCEDURES:  Not indicated       PATIENT REFERRED TO:    No follow-up provider specified. --  Ck Garcias,    Emergency Medicine Resident     This dictation was generated by voice recognition computer software. Although all attempts are made to edit the dictation for accuracy, there may be errors in the transcription that are not intended.

## 2021-03-12 NOTE — ANESTHESIA PRE PROCEDURE
Department of Anesthesiology  Preprocedure Note       Name:  Jesus Alberto Manrique   Age:  53 y.o.  :  1970                                          MRN:  [id number]         Date:  3/12/2021      Surgeon: Tyra Sinha):  Carol Rhoades MD    Procedure: Procedure(s):  COLONOSCOPY DIAGNOSTIC and EGD    Medications prior to admission:   Prior to Admission medications    Medication Sig Start Date End Date Taking? Authorizing Provider   polyethylene glycol (GLYCOLAX) 17 GM/SCOOP powder Follow instructions provided to you from physician's office. 3/9/21   Carol Rhoades MD   bisacodyl (BISACODYL) 5 MG EC tablet Follow instructions provided given by the physician's office. 3/9/21   [facility] Domi Elizalde MD   atorvastatin (LIPITOR) 20 MG tablet Take 1 tablet by mouth nightly 3/8/21   Adwoa Long MD   clobetasol (TEMOVATE) 0.05 % cream apply to rash twice a day for up to 2 weeks ON and 1 week off 21   Historical Provider, MD   metroNIDAZOLE (METROGEL) 0.75 % gel apply to face twice a day 21   Historical Provider, MD   omeprazole (PRILOSEC) 40 MG delayed release capsule Take 40 mg by mouth nightly  21   Historical Provider, MD   Calcium Carb-Cholecalciferol (CALCIUM/VITAMIN D PO) Take by mouth    Historical Provider, MD   citalopram (CELEXA) 10 MG tablet take 1 tablet by mouth once daily with 20 MG TABLET 3/1/21   Adwoa Long MD   citalopram (CELEXA) 20 MG tablet Take 1 tablet by mouth daily With the celexa 10mg tab to make 30mg dialy 3/1/21   Adwoa Long MD   B Complex Vitamins (VITAMIN B COMPLEX PO) Take by mouth daily    Historical Provider, MD   nystatin (MYCOSTATIN) 530540 UNIT/GM powder Apply 3 times daily.  20   STEPHIE Fleming - CNP   aspirin 81 MG EC tablet Take 1 tablet by mouth daily 10/28/20   Adwoa Long MD   levothyroxine (SYNTHROID) 50 MCG tablet Take 1 tablet by mouth daily 9/29/20 3/11/21  Jon Pan MD   Omeprazole-Sodium Bicarbonate (ZEGERID)  MG CAPS Take by mouth as needed     Historical Provider, MD   LORazepam (ATIVAN) 0.5 MG tablet 0.5 mg daily as needed. 4/24/18   Historical Provider, MD   pregabalin (LYRICA) 75 MG capsule take 1 capsule by mouth twice a day 10/10/19   Historical Provider, MD   Cetirizine HCl (ZYRTEC PO) Take 10 mg by mouth daily     Historical Provider, MD       Current medications:    No current facility-administered medications for this visit. No current outpatient medications on file. Facility-Administered Medications Ordered in Other Visits   Medication Dose Route Frequency Provider Last Rate Last Admin    acetaminophen (TYLENOL) tablet 650 mg  650 mg Oral Q4H PRN Cristal Nolan, DO        dextrose 5 % and 0.45 % nacl bolus  500 mL Intravenous Once Corita Nones, DO   Stopped at 03/12/21 0455    atorvastatin (LIPITOR) tablet 20 mg  20 mg Oral Nightly Juliette A Amada, DO        citalopram (CELEXA) tablet 10 mg  10 mg Oral Daily Juliette A Amada, DO        citalopram (CELEXA) tablet 20 mg  20 mg Oral Daily Juliette A Amada, DO        levothyroxine (SYNTHROID) tablet 50 mcg  50 mcg Oral Daily Juliette A Amada, DO        LORazepam (ATIVAN) tablet 0.5 mg  0.5 mg Oral Daily PRN Juliette A Amada, DO        pantoprazole (PROTONIX) tablet 40 mg  40 mg Oral QAM AC Juliette A Amada, DO        pregabalin (LYRICA) capsule 75 mg  75 mg Oral Daily Juliette A Amada, DO        dextrose 5 % and 0.45 % NaCl with KCl 20 mEq infusion   Intravenous Continuous Juliette A Amada, DO        potassium chloride 10 mEq/100 mL IVPB (Peripheral Line)  10 mEq Intravenous Once Juliette A Amada, DO           Allergies: Allergies   Allergen Reactions    Other      Perch - twice had violent vomiting, diarrhea,severe dizziness and nausea    Percocet [Oxycodone-Acetaminophen] Hives and Itching     Can take if she takes Benadryl with it.  Hives everywhere, including roof of mouth and tear duct openings    Tramadol Hcl Hives and Itching     hives    Azithromycin Palpitations.      Ibuprofen Hives    Ceclor [Cefaclor] Hives and Rash    Penicillins Hives and Rash       Problem List:    Patient Active Problem List   Diagnosis Code    Dermatitis, contact L25.9    Anxiety and depression F41.9, F32.9    Bilateral carpal tunnel syndrome G56.03    Tenderness of left calf M79.662    Posterior left knee pain M25.562    Cervical polyp N84.1    Fatigue R53.83    Hypothyroidism E03.9    Bilateral low back pain without sciatica M54.5    Left foot pain M79.672    Lumbar degenerative disc disease M51.36    Arthralgia of left hip M25.552    Midline low back pain with sciatica M54.40    Asthma J45.909    GERD (gastroesophageal reflux disease) K21.9    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    Environmental allergies Z91.09    H/O thyroid cyst Z86.39    History of cervical dysplasia Z87.410    History of conization of cervix  Z98.890    History of low transverse  section Z98.891    Vision abnormalities H53.9    Family history of breast cancer Z80.3    Osteoarthritis M19.90    VASECTOMY in Male Partner Z28.80    History of endometrial ablation Z98.890    Pelvic pain in female R10.2    Hypothyroidism E03.9    Abnormal uterine bleeding N93.9    Diverticulosis of colon K57.30    Rectal bleeding K62.5    Constipation K59.00    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  Headache     History of cervical dysplasia     had cone    History of conization of cervix     dysplastic cells    Hyperlipidemia     Hypothyroidism, unspecified 3/18/2016    Lupus ([address]     Osteoarthritis     Rheumatoid arthritis (HCC)     Sleep apnea     tests were negative but snores badly    Vision abnormalities     glasses/ far sighted       Past Surgical History:        Procedure Laterality Date    BONE CYST EXCISION Right     WRIST    CARPAL TUNNEL RELEASE Right 10-6-15    CARPAL TUNNEL RELEASE Left 11/3/15    CERVIX BIOPSY       SECTION, LOW TRANSVERSE      COLONOSCOPY      COLONOSCOPY  2009    diverticulosis, no other gross lesions    COLONOSCOPY  2017    10 yr recall, small hemorrhoids, diverticulosis    DILATION AND CURETTAGE OF UTERUS N/A 2017    HYSTEROSCOPY AND D& C, myosure performed by Millie Barreto MD at [facility] 1, COLON, DIAGNOSTIC      UGI    FRACTURE SURGERY Left     THUMB    GYNECOLOGIC CRYOSURGERY      conization   [address] [facility]      with mesh, umbilical    LIPOMA RESECTION Right     RING FINGER    OTHER SURGICAL HISTORY      VOCAL CORD SURG    LA COLON CA SCRN NOT  [address] IND N/A 2017    COLONOSCOPY performed by George Hayes MD at [address] EGD TRANSORAL BIOPSY SINGLE/MULTIPLE N/A 2017    EGD BIOPSY performed by George Hayes MD at [facility] Right 2016    Right thyroid lobectomy and isthmusectomy. Continuous monitoring of the recurrent laryngeal nerve using nerve integrity monitor. Frozen section.     TONSILLECTOMY      UPPER GASTROINTESTINAL ENDOSCOPY  2008    with BRAVO, gastric polyp-fundic gland polyp    UPPER GASTROINTESTINAL ENDOSCOPY  2017    multiple small gastric polyps-fundic gland polyps    UPPER GASTROINTESTINAL ENDOSCOPY N/A 3/3/2021    EGD BIOPSY & SAVARY DILATION performed by Sina Smith MD at [facility]       Social History:    Social History     Tobacco Use    Smoking status: Former Smoker     Quit date:      Years since quittin.2    Smokeless tobacco: Never Used    Tobacco comment: only smoked for 4 months as teenager only   Substance Use Topics    Alcohol use: Yes     Alcohol/week: 0.0 standard drinks     Comment: recovering alcoholic, quit heavy drinking , takes 1-2 every yearly since                                  Counseling given: Not Answered  Comment: only smoked for 4 months as teenager only      Vital Signs (Current): There were no vitals filed for this visit.                                            BP Readings from Last 3 Encounters:   21 105/68   03/10/21 122/76   21 112/81       NPO Status:                                                                                 BMI:   Wt Readings from Last 3 Encounters:   21 213 lb (96.6 kg)   21 216 lb (98 kg)   03/10/21 216 lb (98 kg)     There is no height or weight on file to calculate BMI.    CBC:   Lab Results   Component Value Date    WBC 6.8 2021    RBC 2.70 2021    RBC 4.40 2012    HGB 8.0 2021    HCT 25.5 2021    MCV 94.4 2021    RDW 13.9 2021     2021     2012       CMP:   Lab Results   Component Value Date     2021    K 3.5 2021     2021    CO2 26 2021    BUN 6 2021    CREATININE 0.55 2021    GFRAA >60 2021    LABGLOM >60 2021    GLUCOSE 148 2021    GLUCOSE 105 2012    PROT 6.7 2021    CALCIUM 8.4 2021    BILITOT 0.36 2021    ALKPHOS 84 2021    AST 13 2021    ALT 12 2021 POC Tests:   Recent Labs     03/12/21  0400   POCGLU 110*       Coags:   Lab Results   Component Value Date    PROTIME 13.3 03/01/2021    INR 1.0 03/01/2021    APTT 35.6 03/01/2021       HCG (If Applicable):   Lab Results   Component Value Date    PREGTESTUR NEGATIVE 09/06/2011    HCG NEGATIVE 07/18/2017    HCGQUANT <1 04/20/2016        ABGs: No results found for: PHART, PO2ART, WZT1JVT, ADC4YDA, BEART, M7QRPLLY     Type & Screen (If Applicable):  No results found for: LABABO, LABRH    Drug/Infectious Status (If Applicable):  Lab Results   Component Value Date    HEPCAB NONREACTIVE 08/18/2015       COVID-19 Screening (If Applicable):   Lab Results   Component Value Date    COVID19 Not Detected 03/11/2021         Anesthesia Evaluation  Patient summary reviewed and Nursing notes reviewed no history of anesthetic complications:   Airway: Mallampati: II  TM distance: >3 FB   Neck ROM: full  Mouth opening: > = 3 FB Dental:          Pulmonary: breath sounds clear to auscultation  (+) sleep apnea:  asthma:     (-) rhonchi, wheezes, rales and stridor                           Cardiovascular:        (-) murmur, weak pulses,  friction rub, systolic click, carotid bruit,  JVD and peripheral edema    ECG reviewed  Rhythm: regular  Rate: normal  Echocardiogram reviewed               ROS comment: Global left ventricular systolic function is normal. Estimated LV EF 60-65  %. Neuro/Psych:   (+) neuromuscular disease:, headaches:, psychiatric history:            GI/Hepatic/Renal:   (+) GERD:,           Endo/Other:    (+) hypothyroidism: arthritis: rheumatoid. , .                  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 Anesthetic plan and risks discussed with patient. Plan discussed with CRNA. Summary  Left ventricle is normal in size and wall thickness.   Global left ventricular systolic function is normal. Estimated LV EF 60-65  %. No obvious wall motion abnormality seen. Both atria are normal in size. Negative bubble study, with and without Valsalva maneuver, no suggestion of  inter-atrial shunt. Normal right ventricular size and function. Mild mitral regurgitation. Mild tricuspid regurgitation. No significant pericardial effusion is seen. Normal aortic root dimension. IVC not well visualized.       Rahel Mendoza MD   3/12/2021

## 2021-03-12 NOTE — ED PROVIDER NOTES
[facility]  FACULTY HANDOFF       Handoff taken on the following patient from prior Attending Physician: Dr. Kelly Singletary. Pt Name: Iglesia Boss  PCP:  Jolynn Quiros MD    Attestation  I was available and discussed any additional care issues that arose and coordinated the management plans with the resident(s) caring for the patient during my duty period. Any areas of disagreement with resident's documentation of care or procedures are noted on the chart. I was personally present for the key portions of any/all procedures during my duty period. I have documented in the chart those procedures where I was not present during the key portions. CHIEF COMPLAINT       Chief Complaint   Patient presents with    Rectal Bleeding     Pt arrives via squad c/o rectal bleeding x 1 today, prepping for colonoscopy tomorrow at [facility], discharged from [facility] for same yesterday         CURRENT MEDICATIONS     Previous Medications  Previous Medications    ASPIRIN 81 MG EC TABLET    Take 1 tablet by mouth daily    ATORVASTATIN (LIPITOR) 20 MG TABLET    Take 1 tablet by mouth nightly    B COMPLEX VITAMINS (VITAMIN B COMPLEX PO)    Take by mouth daily    BISACODYL (BISACODYL) 5 MG EC TABLET    Follow instructions provided given by the physician's office. CALCIUM CARB-CHOLECALCIFEROL (CALCIUM/VITAMIN D PO)    Take by mouth    CETIRIZINE HCL (ZYRTEC PO)    Take 10 mg by mouth daily     CITALOPRAM (CELEXA) 10 MG TABLET    take 1 tablet by mouth once daily with 20 MG TABLET    CITALOPRAM (CELEXA) 20 MG TABLET    Take 1 tablet by mouth daily With the celexa 10mg tab to make 30mg dialy    CLOBETASOL (TEMOVATE) 0.05 % CREAM    apply to rash twice a day for up to 2 weeks ON and 1 week off    LEVOTHYROXINE (SYNTHROID) 50 MCG TABLET    Take 1 tablet by mouth daily    LORAZEPAM (ATIVAN) 0.5 MG TABLET    0.5 mg daily as needed.     METRONIDAZOLE (METROGEL) 0.75 % GEL    apply to face twice a day    NYSTATIN (MYCOSTATIN) 629183 UNIT/GM POWDER    Apply 3 times daily. OMEPRAZOLE (PRILOSEC) 40 MG DELAYED RELEASE CAPSULE    Take 40 mg by mouth nightly     OMEPRAZOLE-SODIUM BICARBONATE (ZEGERID)  MG CAPS    Take by mouth as needed     POLYETHYLENE GLYCOL (GLYCOLAX) 17 GM/SCOOP POWDER    Follow instructions provided to you from physician's office. PREGABALIN (LYRICA) 75 MG CAPSULE    take 1 capsule by mouth twice a day       Encounter Medications  Orders Placed This Encounter   Medications    lactated ringers infusion 1,000 mL       ALLERGIES     is allergic to other; percocet [oxycodone-acetaminophen]; tramadol hcl; azithromycin; ibuprofen; ceclor [cefaclor]; and penicillins. RECENT VITALS:   Temp: 98.8 °F (37.1 °C),  Pulse: 95, Resp: 15, BP: 107/71    RADIOLOGY:   No orders to display       LABS:  Labs Reviewed   CBC WITH AUTO DIFFERENTIAL - Abnormal; Notable for the following components:       Result Value    RBC 3.13 (*)     Hemoglobin 9.4 (*)     Hematocrit 29.3 (*)     Seg Neutrophils 69 (*)     Lymphocytes 22 (*)     All other components within normal limits   BASIC METABOLIC PANEL W/ REFLEX TO MG FOR LOW K - Abnormal; Notable for the following components:    Glucose 105 (*)     Potassium 3.5 (*)     All other components within normal limits   TROPONIN   IMMATURE PLATELET FRACTION   MAGNESIUM           PLAN/ TASKS OUTSTANDING     Plan for call to her GI doctor for recommendation. Pt has significant GI bleed and hypotensive for EMS. Pt to have colonoscopy tomorrow.   (Please note that portions of this note were completed with a voice recognition program.  Efforts were made to edit the dictations but occasionally words are mis-transcribed.)    Vladimir Butler MD,   Attending Emergency Physician         Vladimir Butler MD  03/11/21 [id number]

## 2021-03-13 NOTE — DISCHARGE SUMMARY
CDU Discharge Summary        Patient:  Roland Hauser  YOB: 1970    MRN: [id number]   Acct: [de-identified]    Primary Care Physician: Tremaine Marin MD    Admit date:  3/11/2021  8:26 PM  Discharge date: 3/12/2021  5:36 PM     Discharge Diagnoses:     1.) Acute Anemia secondary to unknown etiology, possible hiatal hernia. Spontaneous resolution. Follow-up:  Call today/tomorrow for a follow up appointment with Tremaine Marin MD , or return to the Emergency Room with worsening symptoms    Stressed to patient the importance of following up with primary care doctor for further workup/management of symptoms. Pt verbalizes understanding and agrees with plan. Discharge Medication Changes:       Medication List      CONTINUE taking these medications    aspirin 81 MG EC tablet  Take 1 tablet by mouth daily     atorvastatin 20 MG tablet  Commonly known as: LIPITOR  Take 1 tablet by mouth nightly     bisacodyl 5 MG EC tablet  Generic drug: bisacodyl  Follow instructions provided given by the physician's office. CALCIUM/VITAMIN D PO     * citalopram 10 MG tablet  Commonly known as: CELEXA  take 1 tablet by mouth once daily with 20 MG TABLET     * citalopram 20 MG tablet  Commonly known as: CeleXA  Take 1 tablet by mouth daily With the celexa 10mg tab to make 30mg dialy     clobetasol 0.05 % cream  Commonly known as: TEMOVATE     levothyroxine 50 MCG tablet  Commonly known as: SYNTHROID  Take 1 tablet by mouth daily     LORazepam 0.5 MG tablet  Commonly known as: ATIVAN     metroNIDAZOLE 0.75 % gel  Commonly known as: METROGEL     nystatin 605154 UNIT/GM powder  Commonly known as: MYCOSTATIN  Apply 3 times daily. omeprazole 40 MG delayed release capsule  Commonly known as: PRILOSEC     polyethylene glycol 17 GM/SCOOP powder  Commonly known as: GLYCOLAX  Follow instructions provided to you from physician's office.      pregabalin 75 MG capsule  Commonly known as: LYRICA     VITAMIN B COMPLEX PO Zegerid  MG Caps  Generic drug: Omeprazole-Sodium Bicarbonate     ZYRTEC PO         * This list has 2 medication(s) that are the same as other medications prescribed for you. Read the directions carefully, and ask your doctor or other care provider to review them with you. Diet:  DIET CARDIAC;, advance as tolerated     Activity:  As tolerated    Consultants: IP CONSULT TO GI    Procedures:  Not indicated     Diagnostic Test:   Results for orders placed or performed during the hospital encounter of 03/11/21   COVID-19, Rapid    Specimen: Nasopharyngeal Swab   Result Value Ref Range    Specimen Description . NASOPHARYNGEAL SWAB     SARS-CoV-2, Rapid Not Detected Not Detected   CBC Auto Differential   Result Value Ref Range    WBC 8.9 3.5 - 11.3 k/uL    RBC 3.13 (L) 3.95 - 5.11 m/uL    Hemoglobin 9.4 (L) 11.9 - 15.1 g/dL    Hematocrit 29.3 (L) 36.3 - 47.1 %    MCV 93.6 82.6 - 102.9 fL    MCH 30.0 25.2 - 33.5 pg    MCHC 32.1 28.4 - 34.8 g/dL    RDW 13.7 11.8 - 14.4 %    Platelets See Reflexed IPF Result 138 - 453 k/uL    MPV NOT REPORTED 8.1 - 13.5 fL    NRBC Automated 0.0 0.0 per 100 WBC    Differential Type NOT REPORTED     WBC Morphology NOT REPORTED     RBC Morphology NOT REPORTED     Platelet Estimate NOT REPORTED     Seg Neutrophils 69 (H) 36 - 65 %    Lymphocytes 22 (L) 24 - 43 %    Monocytes 6 3 - 12 %    Eosinophils % 2 1 - 4 %    Basophils 1 0 - 2 %    Immature Granulocytes 0 0 %    Segs Absolute 6.17 1.50 - 8.10 k/uL    Absolute Lymph # 1.95 1.10 - 3.70 k/uL    Absolute Mono # 0.53 0.10 - 1.20 k/uL    Absolute Eos # 0.16 0.00 - 0.44 k/uL    Basophils Absolute 0.05 0.00 - 0.20 k/uL    Absolute Immature Granulocyte 0.03 0.00 - 0.30 k/uL   Basic Metabolic Panel w/ Reflex to MG   Result Value Ref Range    Glucose 105 (H) 70 - 99 mg/dL    BUN 8 6 - 20 mg/dL    CREATININE 0.65 0.50 - 0.90 mg/dL    Bun/Cre Ratio NOT REPORTED 9 - 20    Calcium 9.6 8.6 - 10.4 mg/dL    Sodium 141 135 - 144 mmol/L Potassium 3.5 (L) 3.7 - 5.3 mmol/L    Chloride 103 98 - 107 mmol/L    CO2 28 20 - 31 mmol/L    Anion Gap 10 9 - 17 mmol/L    GFR Non-African American >60 >60 mL/min    GFR African American >60 >60 mL/min    GFR Comment          GFR Staging NOT REPORTED    Troponin   Result Value Ref Range    Troponin, High Sensitivity <6 0 - 14 ng/L    Troponin T NOT REPORTED <0.03 ng/mL    Troponin Interp NOT REPORTED    Immature Platelet Fraction   Result Value Ref Range    Platelet, Immature Fraction NOT REPORTED 1.1 - 10.3 %    Platelet, Fluorescence Platelet clumps present, count appears decreased.  138 - 453 k/uL   Magnesium   Result Value Ref Range    Magnesium 1.9 1.6 - 2.6 mg/dL   HEMOGLOBIN AND HEMATOCRIT, BLOOD   Result Value Ref Range    Hemoglobin 9.0 (L) 11.9 - 15.1 g/dL    Hematocrit 28.0 (L) 36.3 - 47.1 %   CBC WITH AUTO DIFFERENTIAL   Result Value Ref Range    WBC 6.8 3.5 - 11.3 k/uL    RBC 2.70 (L) 3.95 - 5.11 m/uL    Hemoglobin 8.0 (L) 11.9 - 15.1 g/dL    Hematocrit 25.5 (L) 36.3 - 47.1 %    MCV 94.4 82.6 - 102.9 fL    MCH 29.6 25.2 - 33.5 pg    MCHC 31.4 28.4 - 34.8 g/dL    RDW 13.9 11.8 - 14.4 %    Platelets 454 017 - 794 k/uL    MPV 12.2 8.1 - 13.5 fL    NRBC Automated 0.0 0.0 per 100 WBC    Differential Type NOT REPORTED     Seg Neutrophils 64 36 - 65 %    Lymphocytes 25 24 - 43 %    Monocytes 8 3 - 12 %    Eosinophils % 2 1 - 4 %    Basophils 1 0 - 2 %    Immature Granulocytes 0 0 %    Segs Absolute 4.34 1.50 - 8.10 k/uL    Absolute Lymph # 1.68 1.10 - 3.70 k/uL    Absolute Mono # 0.55 0.10 - 1.20 k/uL    Absolute Eos # 0.15 0.00 - 0.44 k/uL    Basophils Absolute 0.04 0.00 - 0.20 k/uL    Absolute Immature Granulocyte 0.03 0.00 - 0.30 k/uL    WBC Morphology NOT REPORTED     RBC Morphology NOT REPORTED     Platelet Estimate NOT REPORTED    Basic Metabolic Panel w/ Reflex to MG   Result Value Ref Range    Glucose 148 (H) 70 - 99 mg/dL    BUN 6 6 - 20 mg/dL    CREATININE 0.55 0.50 - 0.90 mg/dL    Bun/Cre Ratio NOT REPORTED 9 - 20    Calcium 8.4 (L) 8.6 - 10.4 mg/dL    Sodium 140 135 - 144 mmol/L    Potassium 3.5 (L) 3.7 - 5.3 mmol/L    Chloride 105 98 - 107 mmol/L    CO2 26 20 - 31 mmol/L    Anion Gap 9 9 - 17 mmol/L    GFR Non-African American >60 >60 mL/min    GFR African American >60 >60 mL/min    GFR Comment          GFR Staging NOT REPORTED    Magnesium   Result Value Ref Range    Magnesium 2.0 1.6 - 2.6 mg/dL   CBC WITH AUTO DIFFERENTIAL   Result Value Ref Range    WBC 10.5 3.5 - 11.3 k/uL    RBC 2.85 (L) 3.95 - 5.11 m/uL    Hemoglobin 8.4 (L) 11.9 - 15.1 g/dL    Hematocrit 27.5 (L) 36.3 - 47.1 %    MCV 96.5 82.6 - 102.9 fL    MCH 29.5 25.2 - 33.5 pg    MCHC 30.5 28.4 - 34.8 g/dL    RDW 14.0 11.8 - 14.4 %    Platelets 472 644 - 292 k/uL    MPV 12.2 8.1 - 13.5 fL    NRBC Automated 0.0 0.0 per 100 WBC    Differential Type NOT REPORTED     WBC Morphology NOT REPORTED     RBC Morphology NOT REPORTED     Platelet Estimate NOT REPORTED     Seg Neutrophils 68 (H) 36 - 65 %    Lymphocytes 21 (L) 24 - 43 %    Monocytes 8 3 - 12 %    Eosinophils % 2 1 - 4 %    Basophils 1 0 - 2 %    Immature Granulocytes 1 (H) 0 %    Segs Absolute 7.16 1.50 - 8.10 k/uL    Absolute Lymph # 2.22 1.10 - 3.70 k/uL    Absolute Mono # 0.81 0.10 - 1.20 k/uL    Absolute Eos # 0.16 0.00 - 0.44 k/uL    Basophils Absolute 0.06 0.00 - 0.20 k/uL    Absolute Immature Granulocyte 0.06 0.00 - 0.30 k/uL   POC Glucose Fingerstick   Result Value Ref Range    POC Glucose 110 (H) 65 - 105 mg/dL   EKG 12 Lead   Result Value Ref Range    Ventricular Rate 73 BPM    Atrial Rate 73 BPM    P-R Interval 160 ms    QRS Duration 84 ms    Q-T Interval 406 ms    QTc Calculation (Bazett) 447 ms    P Axis 46 degrees    R Axis 46 degrees    T Axis 42 degrees   TYPE AND SCREEN   Result Value Ref Range    Expiration Date 03/15/2021,2359     Arm Band Number [id number]     ABO/Rh O NEGATIVE     Antibody Screen NEGATIVE    BLOOD BANK SPECIMEN   Result Value Ref Range    Blood Bank may be errors in the transcription that are not intended.

## 2021-03-15 LAB — SURGICAL PATHOLOGY REPORT: NORMAL

## 2021-03-22 ENCOUNTER — APPOINTMENT (OUTPATIENT)
Dept: CT IMAGING | Age: 51
End: 2021-03-22
Payer: COMMERCIAL

## 2021-03-22 ENCOUNTER — APPOINTMENT (OUTPATIENT)
Dept: GENERAL RADIOLOGY | Age: 51
End: 2021-03-22
Payer: COMMERCIAL

## 2021-03-22 ENCOUNTER — HOSPITAL ENCOUNTER (EMERGENCY)
Age: 51
Discharge: HOME | End: 2021-03-22
Attending: EMERGENCY MEDICINE
Payer: COMMERCIAL

## 2021-03-22 VITALS
WEIGHT: 213 LBS | HEIGHT: 64 IN | BODY MASS INDEX: 36.37 KG/M2 | RESPIRATION RATE: 16 BRPM | TEMPERATURE: 98.1 F | DIASTOLIC BLOOD PRESSURE: 84 MMHG | HEART RATE: 87 BPM | OXYGEN SATURATION: 98 % | SYSTOLIC BLOOD PRESSURE: 116 MMHG

## 2021-03-22 DIAGNOSIS — N30.00 ACUTE CYSTITIS WITHOUT HEMATURIA: Primary | ICD-10-CM

## 2021-03-22 LAB
-: ABNORMAL
ABSOLUTE EOS #: 0.1 K/UL (ref 0–0.4)
ABSOLUTE IMMATURE GRANULOCYTE: ABNORMAL K/UL (ref 0–0.3)
ABSOLUTE LYMPH #: 1.2 K/UL (ref 1–4.8)
ABSOLUTE MONO #: 0.4 K/UL (ref 0.1–1.3)
ALBUMIN SERPL-MCNC: 4.5 G/DL (ref 3.5–5.2)
ALBUMIN/GLOBULIN RATIO: ABNORMAL (ref 1–2.5)
ALP BLD-CCNC: 99 U/L (ref 35–104)
ALT SERPL-CCNC: 10 U/L (ref 5–33)
AMORPHOUS: ABNORMAL
ANION GAP SERPL CALCULATED.3IONS-SCNC: 11 MMOL/L (ref 9–17)
AST SERPL-CCNC: 19 U/L
BACTERIA: ABNORMAL
BASOPHILS # BLD: 1 % (ref 0–2)
BASOPHILS ABSOLUTE: 0 K/UL (ref 0–0.2)
BILIRUB SERPL-MCNC: 0.56 MG/DL (ref 0.3–1.2)
BILIRUBIN URINE: ABNORMAL
BUN BLDV-MCNC: 9 MG/DL (ref 6–20)
BUN/CREAT BLD: ABNORMAL (ref 9–20)
CALCIUM SERPL-MCNC: 9.3 MG/DL (ref 8.6–10.4)
CASTS UA: ABNORMAL /LPF
CHLORIDE BLD-SCNC: 103 MMOL/L (ref 98–107)
CO2: 25 MMOL/L (ref 20–31)
COLOR: ABNORMAL
COMMENT UA: ABNORMAL
CREAT SERPL-MCNC: 0.55 MG/DL (ref 0.5–0.9)
CRYSTALS, UA: ABNORMAL /HPF
DIFFERENTIAL TYPE: ABNORMAL
EOSINOPHILS RELATIVE PERCENT: 2 % (ref 0–4)
EPITHELIAL CELLS UA: ABNORMAL /HPF
GFR AFRICAN AMERICAN: >60 ML/MIN
GFR NON-AFRICAN AMERICAN: >60 ML/MIN
GFR SERPL CREATININE-BSD FRML MDRD: ABNORMAL ML/MIN/{1.73_M2}
GFR SERPL CREATININE-BSD FRML MDRD: ABNORMAL ML/MIN/{1.73_M2}
GLUCOSE BLD-MCNC: 105 MG/DL (ref 70–99)
GLUCOSE URINE: NEGATIVE
HCG(URINE) PREGNANCY TEST: NEGATIVE
HCT VFR BLD CALC: 29.2 % (ref 36–46)
HEMOGLOBIN: 9.5 G/DL (ref 12–16)
IMMATURE GRANULOCYTES: ABNORMAL %
KETONES, URINE: ABNORMAL
LACTIC ACID: 1.1 MMOL/L (ref 0.5–2.2)
LEUKOCYTE ESTERASE, URINE: ABNORMAL
LIPASE: 32 U/L (ref 13–60)
LYMPHOCYTES # BLD: 27 % (ref 24–44)
MCH RBC QN AUTO: 28.8 PG (ref 26–34)
MCHC RBC AUTO-ENTMCNC: 32.6 G/DL (ref 31–37)
MCV RBC AUTO: 88.3 FL (ref 80–100)
MONOCYTES # BLD: 9 % (ref 1–7)
MUCUS: ABNORMAL
NITRITE, URINE: NEGATIVE
NRBC AUTOMATED: ABNORMAL PER 100 WBC
OTHER OBSERVATIONS UA: ABNORMAL
PDW BLD-RTO: 14.3 % (ref 11.5–14.9)
PH UA: 5.5 (ref 5–8)
PLATELET # BLD: 197 K/UL (ref 150–450)
PLATELET ESTIMATE: ABNORMAL
PMV BLD AUTO: 9.1 FL (ref 6–12)
POTASSIUM SERPL-SCNC: 3.7 MMOL/L (ref 3.7–5.3)
PROTEIN UA: NEGATIVE
RBC # BLD: 3.31 M/UL (ref 4–5.2)
RBC # BLD: ABNORMAL 10*6/UL
RBC UA: ABNORMAL /HPF
RENAL EPITHELIAL, UA: ABNORMAL /HPF
SEG NEUTROPHILS: 61 % (ref 36–66)
SEGMENTED NEUTROPHILS ABSOLUTE COUNT: 2.7 K/UL (ref 1.3–9.1)
SODIUM BLD-SCNC: 139 MMOL/L (ref 135–144)
SPECIFIC GRAVITY UA: 1.03 (ref 1–1.03)
TOTAL PROTEIN: 7.5 G/DL (ref 6.4–8.3)
TRICHOMONAS: ABNORMAL
TURBIDITY: ABNORMAL
URINE HGB: NEGATIVE
UROBILINOGEN, URINE: NORMAL
WBC # BLD: 4.5 K/UL (ref 3.5–11)
WBC # BLD: ABNORMAL 10*3/UL
WBC UA: ABNORMAL /HPF
YEAST: ABNORMAL

## 2021-03-22 PROCEDURE — 80053 COMPREHEN METABOLIC PANEL: CPT

## 2021-03-22 PROCEDURE — 99284 EMERGENCY DEPT VISIT MOD MDM: CPT

## 2021-03-22 PROCEDURE — 6360000004 HC RX CONTRAST MEDICATION: Performed by: PHYSICIAN ASSISTANT

## 2021-03-22 PROCEDURE — 81025 URINE PREGNANCY TEST: CPT

## 2021-03-22 PROCEDURE — 74177 CT ABD & PELVIS W/CONTRAST: CPT

## 2021-03-22 PROCEDURE — 81001 URINALYSIS AUTO W/SCOPE: CPT

## 2021-03-22 PROCEDURE — 85025 COMPLETE CBC W/AUTO DIFF WBC: CPT

## 2021-03-22 PROCEDURE — 87086 URINE CULTURE/COLONY COUNT: CPT

## 2021-03-22 PROCEDURE — 2580000003 HC RX 258: Performed by: PHYSICIAN ASSISTANT

## 2021-03-22 PROCEDURE — 36415 COLL VENOUS BLD VENIPUNCTURE: CPT

## 2021-03-22 PROCEDURE — 83690 ASSAY OF LIPASE: CPT

## 2021-03-22 PROCEDURE — 83605 ASSAY OF LACTIC ACID: CPT

## 2021-03-22 PROCEDURE — 6370000000 HC RX 637 (ALT 250 FOR IP): Performed by: PHYSICIAN ASSISTANT

## 2021-03-22 PROCEDURE — 71045 X-RAY EXAM CHEST 1 VIEW: CPT

## 2021-03-22 RX ORDER — NITROFURANTOIN 25; 75 MG/1; MG/1
100 CAPSULE ORAL 2 TIMES DAILY
Qty: 20 CAPSULE | Refills: 0 | Status: SHIPPED | OUTPATIENT
Start: 2021-03-22 | End: 2021-04-01

## 2021-03-22 RX ORDER — 0.9 % SODIUM CHLORIDE 0.9 %
1000 INTRAVENOUS SOLUTION INTRAVENOUS ONCE
Status: COMPLETED | OUTPATIENT
Start: 2021-03-22 | End: 2021-03-22

## 2021-03-22 RX ORDER — SODIUM CHLORIDE 0.9 % (FLUSH) 0.9 %
10 SYRINGE (ML) INJECTION PRN
Status: DISCONTINUED | OUTPATIENT
Start: 2021-03-22 | End: 2021-03-23 | Stop reason: HOSPADM

## 2021-03-22 RX ORDER — 0.9 % SODIUM CHLORIDE 0.9 %
80 INTRAVENOUS SOLUTION INTRAVENOUS ONCE
Status: COMPLETED | OUTPATIENT
Start: 2021-03-22 | End: 2021-03-22

## 2021-03-22 RX ORDER — ACETAMINOPHEN 325 MG/1
650 TABLET ORAL ONCE
Status: COMPLETED | OUTPATIENT
Start: 2021-03-22 | End: 2021-03-22

## 2021-03-22 RX ADMIN — ACETAMINOPHEN 650 MG: 325 TABLET ORAL at 16:51

## 2021-03-22 RX ADMIN — SODIUM CHLORIDE 80 ML: 9 INJECTION, SOLUTION INTRAVENOUS at 18:04

## 2021-03-22 RX ADMIN — Medication 10 ML: at 18:04

## 2021-03-22 RX ADMIN — IOPAMIDOL 75 ML: 755 INJECTION, SOLUTION INTRAVENOUS at 18:04

## 2021-03-22 RX ADMIN — SODIUM CHLORIDE 1000 ML: 9 INJECTION, SOLUTION INTRAVENOUS at 17:02

## 2021-03-22 ASSESSMENT — ENCOUNTER SYMPTOMS
CHEST TIGHTNESS: 0
VOMITING: 0
BACK PAIN: 0
NAUSEA: 1
CONSTIPATION: 0
SHORTNESS OF BREATH: 0
BLOOD IN STOOL: 0
SORE THROAT: 0
RHINORRHEA: 0
DIARRHEA: 1
ABDOMINAL PAIN: 0
COUGH: 0

## 2021-03-22 ASSESSMENT — PAIN DESCRIPTION - LOCATION: LOCATION: ABDOMEN

## 2021-03-22 ASSESSMENT — PAIN DESCRIPTION - ORIENTATION: ORIENTATION: UPPER

## 2021-03-22 ASSESSMENT — PAIN SCALES - GENERAL: PAINLEVEL_OUTOF10: 8

## 2021-03-22 NOTE — ED NOTES
Pt comes to this ER with c/o rt sided abdominal pain with diarrhea x 20 episodes since yesterday. Pt also reports having chills, fever of 101.8F, bodyaches, bilat earaches, and frontal headache. Pt arrives A+O x 4, GCS = 15, PMS x 4 intact, eupneic, and PWD. Pulse is regular et strong. Abdomen is soft et nondistended. Pt denies dysuria, hematuria, or vaginal discharge. Pt also denies bloody stools.      Nasreen Erickson RN  03/22/21 [id number]

## 2021-03-22 NOTE — ED PROVIDER NOTES
[address]  eMERGENCY dEPARTMENT eNCOUnter      Pt Name: Deisy Melgoza  MRN: [id number]  [place] 1970  Date of evaluation: 3/22/2021  Provider: Jace Moss PA-C    CHIEF COMPLAINT       Chief Complaint   Patient presents with    Diarrhea    Abdominal Pain    Fever    Nausea    Generalized Body Aches    Headache           HISTORY OF PRESENT ILLNESS  (Location/Symptom, Timing/Onset, Context/Setting, Quality, Duration, Modifying Factors, Severity.)   Lorelei Moreland is a 53 y.o. female who presents to the emergency department with complaints of diarrhea, abdominal pain, fever, nausea, body aches, headache that began last night. Patient reports last night she had severe diarrhea with abdominal pain, fever of 102, weakness, body aches. Patient states she thought she might of had the flu. Patient did call her family doctor today who recommended she come into the ER for possible infection following her scopes. Patient states she did have a temperature of 100 around noon but she took Tylenol. No diarrhea, emesis today. Patient reports mild abdominal discomfort. No chest pain, shortness of breath, cough, dysuria, bloody stools. Patient has no other complaints. Patient reports she was admitted to the hospital in early March for a GI bleed. Patient states she underwent endoscopy and colonoscopy on 3/12. Patient was told they did not find much on the scopes but believe her GI bleed was from diverticulitis. No hx of crohns  Or UC. Nursing Notes were reviewed. REVIEW OF SYSTEMS    (2-9 systems for level 4, 10 or more for level 5)     Review of Systems   Review of Systems   Constitutional: Positive for chills, diaphoresis, fatigue and fever. HENT: Negative for congestion, rhinorrhea and sore throat. Respiratory: Negative for cough, chest tightness and shortness of breath. Cardiovascular: Negative for chest pain. Gastrointestinal: Positive for diarrhea and nausea.  Negative HYSTEROSCOPY AND D& C, myosure performed by Patricia Bailey MD at [facility] 1, COLON, DIAGNOSTIC      UGI    FRACTURE SURGERY Left     THUMB    GYNECOLOGIC CRYOSURGERY  2000    conization   [address]      with mesh, umbilical    LIPOMA RESECTION Right     RING FINGER    OTHER SURGICAL HISTORY      VOCAL CORD SURG    NH COLON CA SCRN NOT  [address] IND N/A 11/21/2017    COLONOSCOPY performed by Segundo Ceja MD at [address] [place] [address] EGD TRANSORAL BIOPSY SINGLE/MULTIPLE N/A 11/21/2017    EGD BIOPSY performed by Segundo Ceja MD at [facility] 08/09/2016    Right thyroid lobectomy and isthmusectomy. Continuous monitoring of the recurrent laryngeal nerve using nerve integrity monitor. Frozen section.  TONSILLECTOMY      UPPER GASTROINTESTINAL ENDOSCOPY  12/12/2008    with BRAVO, gastric polyp-fundic gland polyp    UPPER GASTROINTESTINAL ENDOSCOPY  11/21/2017    multiple small gastric polyps-fundic gland polyps    UPPER GASTROINTESTINAL ENDOSCOPY N/A 3/3/2021    EGD BIOPSY & SAVARY DILATION performed by Jack Elaine MD at [place] [address]  3/12/2021    EGD DILATION SAVORY performed by Winifred Lai MD at [facility]     None otherwise stated in nurses notes    [address] [facility] [address]       Discharge Medication List as of 3/22/2021  9:58 PM      CONTINUE these medications which have NOT CHANGED    Details   polyethylene glycol (GLYCOLAX) 17 GM/SCOOP powder Follow instructions provided to you from physician's office. , Disp-238 g, R-0Normal      bisacodyl (BISACODYL) 5 MG EC tablet Follow instructions provided given by the physician's office. , Disp-2 tablet, R-0Normal      atorvastatin (LIPITOR) 20 MG tablet Take 1 tablet by mouth nightly, Disp-90 tablet, R-1Normal      clobetasol (TEMOVATE) 0.05 % cream apply to rash twice a day for up to 2 weeks ON and 1 week off, Historical Med      metroNIDAZOLE (METROGEL) 0.75 % gel apply to face twice a day, Historical Med      omeprazole (PRILOSEC) 40 MG delayed release capsule Take 40 mg by mouth nightly Historical Med      Calcium Carb-Cholecalciferol (CALCIUM/VITAMIN D PO) Take by mouthHistorical Med      !! citalopram (CELEXA) 10 MG tablet take 1 tablet by mouth once daily with 20 MG TABLET, Disp-30 tablet, R-3Normal      !! citalopram (CELEXA) 20 MG tablet Take 1 tablet by mouth daily With the celexa 10mg tab to make 30mg dialy, Disp-30 tablet, R-3Normal      B Complex Vitamins (VITAMIN B COMPLEX PO) Take by mouth dailyHistorical Med      nystatin (MYCOSTATIN) 900951 UNIT/GM powder Apply 3 times daily. , Disp-60 g, R-3, Normal      aspirin 81 MG EC tablet Take 1 tablet by mouth daily, Disp-30 tablet,R-3Normal      levothyroxine (SYNTHROID) 50 MCG tablet Take 1 tablet by mouth daily, Disp-90 tablet, R-3Normal      Omeprazole-Sodium Bicarbonate (ZEGERID)  MG CAPS Take by mouth as needed Historical Med      LORazepam (ATIVAN) 0.5 MG tablet 0.5 mg daily as needed. Historical Med      pregabalin (LYRICA) 75 MG capsule take 1 capsule by mouth twice a day, R-0Historical Med      Cetirizine HCl (ZYRTEC PO) Take 10 mg by mouth daily Historical Med       !! - Potential duplicate medications found. Please discuss with provider.           ALLERGIES     Other, Percocet [oxycodone-acetaminophen], Tramadol hcl, Azithromycin, Ibuprofen, Ceclor [cefaclor], and Penicillins    FAMILY HISTORY           Problem Relation Age of Onset    Alcohol Abuse Father     Other Father         murder    Diabetes Mother     Other Mother         thyroid    High Blood Pressure Mother     Lupus Sister     Drug Abuse Brother     Asthma Brother     Breast Cancer Maternal Grandmother         45s    Heart Surgery Maternal Grandmother     Heart Failure Maternal Grandmother     Hypertension Maternal Grandfather     Stroke Maternal Grandfather     Heart Attack Maternal Grandfather     Alcohol Abuse Maternal Grandfather     Diabetes Maternal Grandfather     Cancer Paternal Grandmother         lymphnoid    Heart Failure Paternal Grandfather     Heart Surgery Paternal Grandfather         x2     Family Status   Relation Name Status    Father      Mother  Alive    Sister  Alive    Brother  Alive    MGM      MGF      PGM      PGF        None otherwise stated in nurses notes    SOCIAL HISTORY      reports that she quit smoking about 33 years ago. She has never used smokeless tobacco. She reports current alcohol use. She reports that she does not use drugs. lives at home with others     PHYSICAL EXAM    (up to 7 for level 4, 8 or more for level 5)     ED Triage Vitals [21 1358]   BP Temp Temp Source Pulse Resp SpO2 Height Weight   (!) 118/56 98.3 °F (36.8 °C) Oral 99 15 98 % 5' 4\" (1.626 m) 213 lb (96.6 kg)       Physical Exam  Vitals signs and nursing note reviewed. Constitutional:       General: She is awake. Appearance: Normal appearance. She is well-developed, well-groomed and normal weight. HENT:      Head: Normocephalic. Nose: Nose normal.   Eyes:      Extraocular Movements: Extraocular movements intact. Conjunctiva/sclera: Conjunctivae normal.      Pupils: Pupils are equal, round, and reactive to light. Neck:      Musculoskeletal: Normal range of motion. Cardiovascular:      Rate and Rhythm: Normal rate and regular rhythm. Pulses: Normal pulses. Heart sounds: Normal heart sounds, S1 normal and S2 normal.   Pulmonary:      Effort: Pulmonary effort is normal. No respiratory distress. Breath sounds: Normal breath sounds and air entry. No stridor. No decreased breath sounds, wheezing, rhonchi or rales. Abdominal:      General: Bowel sounds are normal. There is no distension. Palpations: Abdomen is soft. Tenderness: There is no abdominal tenderness.  There is no right CVA tenderness, left CVA tenderness, guarding or rebound. Hernia: No hernia is present. Musculoskeletal:      Right lower leg: No edema. Left lower leg: No edema. Skin:     General: Skin is warm. Capillary Refill: Capillary refill takes less than 2 seconds. Findings: No rash. Neurological:      General: No focal deficit present. Mental Status: She is alert, oriented to person, place, and time and easily aroused. Mental status is at baseline. Psychiatric:         Behavior: Behavior is cooperative. DIAGNOSTIC RESULTS     EKG: All EKG's are interpreted by the Emergency Department Physician who either signs or Co-signs this chart in the absence of a cardiologist.        RADIOLOGY:   All plain film, CT, MRI, and formal ultrasound images (except ED bedside ultrasound) are read by the radiologist, see reports below, unless otherwise noted in MDM or here. CT ABDOMEN PELVIS W IV CONTRAST Additional Contrast? None   Final Result   Mild elongation and suggestion of mild wall thickening of the terminal ileum   possibly reflecting mild terminal ileitis. .         XR CHEST PORTABLE   Final Result   No acute process. Us Liver    Result Date: 3/1/2021  EXAMINATION: RIGHT UPPER QUADRANT ULTRASOUND 3/1/2021 5:04 pm COMPARISON: None. HISTORY: ORDERING SYSTEM PROVIDED HISTORY: RUQ pain TECHNOLOGIST PROVIDED HISTORY: RUQ pain Reason for Exam: ruq pain, diarrhea, blood in stool Acuity: Acute Type of Exam: Initial FINDINGS: LIVER:  Diffuse mild increased echogenicity of the liver with slight heterogeneity indicating underlying steatosis. No focal hepatic lesion is appreciated. Limited Doppler evaluation of the portal vein demonstrates hepatopetal flow, which is normal. BILIARY SYSTEM:  Cholelithiasis without mucosal thickening or pericholecystic fluid. No sonographic Mckeon sign. Common bile duct is within normal limits measuring 4 mm. PANCREAS:  The pancreas is not visualized.  OTHER: No evidence of right upper quadrant ascites. 1. Cholelithiasis with no evidence of acute cholecystitis. 2. Hepatic steatosis with no focal lesion. LABS:  Labs Reviewed   URINE RT REFLEX TO CULTURE - Abnormal; Notable for the following components:       Result Value    Color, UA DARK YELLOW (*)     Turbidity UA CLOUDY (*)     Bilirubin Urine   (*)     Value: Presumptive positive. Unable to confirm due to unavailability of reagent. Ketones, Urine TRACE (*)     Leukocyte Esterase, Urine MOD (*)     All other components within normal limits   CBC WITH AUTO DIFFERENTIAL - Abnormal; Notable for the following components:    RBC 3.31 (*)     Hemoglobin 9.5 (*)     Hematocrit 29.2 (*)     Monocytes 9 (*)     All other components within normal limits   COMPREHENSIVE METABOLIC PANEL W/ REFLEX TO MG FOR LOW K - Abnormal; Notable for the following components:    Glucose 105 (*)     All other components within normal limits   MICROSCOPIC URINALYSIS - Abnormal; Notable for the following components:    Bacteria, UA MANY (*)     All other components within normal limits   CULTURE, URINE   PREGNANCY, URINE   LIPASE   LACTIC ACID       All other labs were within normal range or not returned as of this dictation.     EMERGENCY DEPARTMENT COURSE and DIFFERENTIAL DIAGNOSIS/MDM:   Vitals:    Vitals:    03/22/21 2000 03/22/21 2015 03/22/21 2030 03/22/21 2045   BP:       Pulse:       Resp:       Temp:       TempSrc:       SpO2: 96% 96% 96% 98%   Weight:       Height:             Patient instructed to return to the emergency room if symptoms worsen, return, or any other concern right away which is agreed by the patient    ED MEDS:  Orders Placed This Encounter   Medications    0.9 % sodium chloride bolus    acetaminophen (TYLENOL) tablet 650 mg    iopamidol (ISOVUE-370) 76 % injection 75 mL    0.9 % sodium chloride bolus    DISCONTD: sodium chloride flush 0.9 % injection 10 mL    nitrofurantoin, macrocrystal-monohydrate, (MACROBID) 100 MG capsule Sig: Take 1 capsule by mouth 2 times daily for 10 days     Dispense:  20 capsule     Refill:  0         CONSULTS:  IP CONSULT TO GI    PROCEDURES:  None      FINAL IMPRESSION      1. Acute cystitis without hematuria          DISPOSITION/PLAN   DISPOSITION     PATIENT REFERRED TO:  [address] [facility] [address]  [phone]          [facility] ED  [place] [address]  [phone]        [address]  [address] [facility] [address]  [phone]    Call         DISCHARGE MEDICATIONS:  Discharge Medication List as of 3/22/2021  9:58 PM      START taking these medications    Details   nitrofurantoin, macrocrystal-monohydrate, (MACROBID) 100 MG capsule Take 1 capsule by mouth 2 times daily for 10 days, Disp-20 capsule, R-0Print               Summation      Patient Course:    Recent admission for GI bleed. Recent upper and lower scopes   Diarrhea, abd pain, fevers last night. Pt reports fever, nausea, body aches, headache today. Diarrhea has improved. No abd pain on exam.   No bloody stools. Pt is afebrile but did take tylenol at noon. On exam, abd is soft, nontender, non distended. No chest pain, sob. Will get basic labs, lactic, UA, chest xray. Due to recent admission for GI bleed and recent scopes will get ct scan with contrast.       11:41 AM  Pt updated on results. Ct scan is pending. Ct scan reveals ileitis. Discussed with dr. Andrea Valerio. Will discuss with Dr. Sanjay Lovett. Two pages out to Dr. Sanjay Lovett with no call back. Will sign case out to Dr. Andrea Valerio.                  ED Medications administered this visit:    Medications   0.9 % sodium chloride bolus (0 mLs Intravenous Stopped 3/22/21 1802)   acetaminophen (TYLENOL) tablet 650 mg (650 mg Oral Given 3/22/21 1651)   iopamidol (ISOVUE-370) 76 % injection 75 mL (75 mLs Intravenous Given 3/22/21 1804)   0.9 % sodium chloride bolus (0 mLs Intravenous Stopped 3/22/21 1807)       New Prescriptions from this visit:    Discharge Medication List as of 3/22/2021  9:58 PM      START taking these medications    Details   nitrofurantoin, macrocrystal-monohydrate, (MACROBID) 100 MG capsule Take 1 capsule by mouth 2 times daily for 10 days, Disp-20 capsule, R-0Print             Follow-up:  [address] [place] [address]  [address] [facility] [address]  [facility] [address] [place] [address]  [address] [facility] [address] [phone]    Call           Final Impression:   1.  Acute cystitis without hematuria               (Please note that portions of this note were completed with a voice recognition program.  Efforts were made to edit the dictations but occasionally words are mis-transcribed.)      (Please note that portions of this note were completed with a voice recognition program.  Efforts were made to edit the dictations but occasionally words are mis-transcribed.)    Linnea Yu, SAVANNAH  03/22/21 2037       Alida Fang PA-C  03/23/21 1141

## 2021-03-23 LAB
CULTURE: NORMAL
Lab: NORMAL
SPECIMEN DESCRIPTION: NORMAL

## 2021-03-26 ENCOUNTER — TELEPHONE (OUTPATIENT)
Dept: GASTROENTEROLOGY | Age: 51
End: 2021-03-26

## 2021-03-29 RX ORDER — TIZANIDINE 4 MG/1
1 TABLET ORAL NIGHTLY
COMMUNITY
Start: 2021-03-18 | End: 2021-04-30 | Stop reason: SDUPTHER

## 2021-03-30 ENCOUNTER — OFFICE VISIT (OUTPATIENT)
Dept: INTERNAL MEDICINE CLINIC | Age: 51
End: 2021-03-30
Payer: COMMERCIAL

## 2021-03-30 VITALS
OXYGEN SATURATION: 98 % | SYSTOLIC BLOOD PRESSURE: 116 MMHG | HEART RATE: 82 BPM | WEIGHT: 211.8 LBS | HEIGHT: 64 IN | DIASTOLIC BLOOD PRESSURE: 72 MMHG | BODY MASS INDEX: 36.16 KG/M2 | TEMPERATURE: 98.4 F

## 2021-03-30 DIAGNOSIS — M32.8 OTHER FORMS OF SYSTEMIC LUPUS ERYTHEMATOSUS, UNSPECIFIED ORGAN INVOLVEMENT STATUS (HCC): ICD-10-CM

## 2021-03-30 DIAGNOSIS — K50.119 CROHN'S DISEASE OF COLON WITH COMPLICATION (HCC): ICD-10-CM

## 2021-03-30 DIAGNOSIS — D50.0 IRON DEFICIENCY ANEMIA DUE TO CHRONIC BLOOD LOSS: ICD-10-CM

## 2021-03-30 DIAGNOSIS — E55.9 VITAMIN D DEFICIENCY: ICD-10-CM

## 2021-03-30 DIAGNOSIS — R42 DIZZINESS: Primary | ICD-10-CM

## 2021-03-30 PROCEDURE — 99214 OFFICE O/P EST MOD 30 MIN: CPT | Performed by: NURSE PRACTITIONER

## 2021-03-30 RX ORDER — FERROUS SULFATE 325(65) MG
325 TABLET ORAL
Qty: 90 TABLET | Refills: 1 | Status: SHIPPED | OUTPATIENT
Start: 2021-03-30 | End: 2021-12-28

## 2021-03-30 ASSESSMENT — PATIENT HEALTH QUESTIONNAIRE - PHQ9
2. FEELING DOWN, DEPRESSED OR HOPELESS: 0
SUM OF ALL RESPONSES TO PHQ QUESTIONS 1-9: 0
SUM OF ALL RESPONSES TO PHQ9 QUESTIONS 1 & 2: 0

## 2021-03-30 NOTE — PROGRESS NOTES
Visit Information    Have you changed or started any medications since your last visit including any over-the-counter medicines, vitamins, or herbal medicines? yes -    Are you having any side effects from any of your medications? -  no  Have you stopped taking any of your medications? Is so, why? -  no    Have you seen any other physician or provider since your last visit? No  Have you had any other diagnostic tests since your last visit? Yes - Records Obtained  Have you been seen in the emergency room and/or had an admission to a hospital since we last saw you? Yes - Records Obtained  Have you had your routine dental cleaning in the past 6 months? no    Have you activated your [employer] account? If not, what are your barriers?  Yes     Patient Care Team:  Shelly Hartley MD as PCP - General (Internal Medicine)  Shelly Hartley MD as PCP - [facility]  Nicolasa Ya DO as Consulting Physician (Obstetrics & Gynecology)    Medical History Review  Past Medical, Family, and Social History reviewed and does not contribute to the patient presenting condition    Health Maintenance   Topic Date Due    Pneumococcal 0-64 years Vaccine (1 of 1 - PPSV23) Never done    COVID-19 Vaccine (1) Never done    DTaP/Tdap/Td vaccine (1 - Tdap) Never done    Flu vaccine (Season Ended) 10/30/2021 (Originally 9/1/2021)    TSH testing  07/08/2021    Diabetes screen  10/18/2021    Lipid screen  10/26/2021    Cervical cancer screen  11/07/2024    Colon cancer screen colonoscopy  03/12/2031    Hepatitis C screen  Completed    HIV screen  Completed    Hepatitis A vaccine  Aged Out    Hepatitis B vaccine  Aged Out    Hib vaccine  Aged Out    Meningococcal (ACWY) vaccine  Aged Out         [address] [address]  Dept: [phone]  Dept Fax: [phone]    OfficeProgress/Follow Up Note  Date of patient's visit: 4/18/2021  Patient's Name:  Suzie Reddy Date Persistent depressive disorder    Generalized anxiety disorder with panic attacks    Other forms of systemic lupus erythematosus (HCC)    Panic attacks    Stroke-like symptoms    Numbness    GI bleed    Diarrhea    Migraine    Abdominal pain    Dysphagia    Anemia    Melena       Health Maintenance Due   Topic Date Due    Pneumococcal 0-64 years Vaccine (1 of 1 - PPSV23) Never done    COVID-19 Vaccine (1) Never done    DTaP/Tdap/Td vaccine (1 - Tdap) Never done       MEDICATIONS:      Current Outpatient Medications   Medication Sig Dispense Refill    ferrous sulfate (IRON 325) 325 (65 Fe) MG tablet Take 1 tablet by mouth daily (with breakfast) 90 tablet 1    tiZANidine (ZANAFLEX) 4 MG tablet Take 1 tablet by mouth nightly      polyethylene glycol (GLYCOLAX) 17 GM/SCOOP powder Follow instructions provided to you from physician's office. 238 g 0    bisacodyl (BISACODYL) 5 MG EC tablet Follow instructions provided given by the physician's office. 2 tablet 0    atorvastatin (LIPITOR) 20 MG tablet Take 1 tablet by mouth nightly 90 tablet 1    clobetasol (TEMOVATE) 0.05 % cream apply to rash twice a day for up to 2 weeks ON and 1 week off      metroNIDAZOLE (METROGEL) 0.75 % gel apply to face twice a day      omeprazole (PRILOSEC) 40 MG delayed release capsule Take 40 mg by mouth nightly       Calcium Carb-Cholecalciferol (CALCIUM/VITAMIN D PO) Take by mouth      citalopram (CELEXA) 10 MG tablet take 1 tablet by mouth once daily with 20 MG TABLET 30 tablet 3    citalopram (CELEXA) 20 MG tablet Take 1 tablet by mouth daily With the celexa 10mg tab to make 30mg dialy 30 tablet 3    B Complex Vitamins (VITAMIN B COMPLEX PO) Take by mouth daily      nystatin (MYCOSTATIN) 946868 UNIT/GM powder Apply 3 times daily.  60 g 3    aspirin 81 MG EC tablet Take 1 tablet by mouth daily 30 tablet 3    Omeprazole-Sodium Bicarbonate (ZEGERID)  MG CAPS Take by mouth as needed       LORazepam (ATIVAN) 0.5 MG tablet 0.5 mg daily as needed.  pregabalin (LYRICA) 75 MG capsule take 1 capsule by mouth twice a day  0    Cetirizine HCl (ZYRTEC PO) Take 10 mg by mouth daily       levothyroxine (SYNTHROID) 50 MCG tablet Take 1 tablet by mouth daily 90 tablet 3     No current facility-administered medications for this visit. ALLERGIES:      Allergies   Allergen Reactions    Other      Perch - twice had violent vomiting, diarrhea,severe dizziness and nausea    Percocet [Oxycodone-Acetaminophen] Hives and Itching     Can take if she takes Benadryl with it. Hives everywhere, including roof of mouth and tear duct openings    Tramadol Hcl Hives and Itching     hives    Azithromycin      Palpitations.  Ibuprofen Hives    Ceclor [Cefaclor] Hives and Rash    Penicillins Hives and Rash         SOCIAL HISTORY    Reviewed and no change from previous record. Lorelei  reports that she quit smoking about 33 years ago. She has never used smokeless tobacco.    FAMILY HISTORY:    Reviewed and No change from previous visit    REVIEW OF SYSTEMS:    Review of Systems   Constitutional: Negative for chills, diaphoresis, fatigue, fever and unexpected weight change. HENT: Negative for congestion, postnasal drip, rhinorrhea, sneezing, sore throat and trouble swallowing. Eyes: Negative for visual disturbance. Respiratory: Negative for cough, chest tightness, shortness of breath and wheezing. Cardiovascular: Negative for chest pain. Gastrointestinal: Negative for constipation, diarrhea, nausea and vomiting. Endocrine: Negative for polydipsia, polyphagia and polyuria. Genitourinary: Negative for difficulty urinating, dysuria, flank pain, frequency and urgency. Musculoskeletal: Negative for arthralgias. Skin: Negative for color change and rash. Neurological: Positive for dizziness and light-headedness. Negative for tremors, syncope, weakness and numbness.    Psychiatric/Behavioral: Negative for confusion and hallucinations. PHYSICAL EXAM:      Vitals:    03/30/21 1635 03/30/21 1710 03/30/21 1712 03/30/21 1714   BP: 120/76 124/88 122/76 116/72   Position:  Supine Sitting    Pulse: 72 82 85 82   Temp: 98.4 °F (36.9 °C)      SpO2: 98%      Weight: 211 lb 12.8 oz (96.1 kg)      Height: 5' 4\" (1.626 m)        BP Readings from Last 3 Encounters:   03/30/21 116/72   03/22/21 116/84   03/12/21 (!) 96/52      Wt Readings from Last 3 Encounters:   03/30/21 211 lb 12.8 oz (96.1 kg)   03/22/21 213 lb (96.6 kg)   03/11/21 216 lb (98 kg)       Physical Exam  Vitals signs reviewed. Constitutional:       Appearance: Normal appearance. HENT:      Head: Normocephalic. Cardiovascular:      Rate and Rhythm: Normal rate and regular rhythm. Pulses: Normal pulses. Heart sounds: Normal heart sounds. Pulmonary:      Effort: Pulmonary effort is normal.      Breath sounds: Normal breath sounds. Abdominal:      General: Bowel sounds are normal.      Palpations: Abdomen is soft. Musculoskeletal: Normal range of motion. General: No swelling, tenderness or deformity. Skin:     General: Skin is warm and dry. Neurological:      General: No focal deficit present. Mental Status: She is alert and oriented to person, place, and time. Psychiatric:         Mood and Affect: Mood normal.         Behavior: Behavior normal.         Thought Content:  Thought content normal.         Judgment: Judgment normal.          LABORATORY FINDINGS:    CBC:  Lab Results   Component Value Date    WBC 4.5 03/22/2021    HGB 9.5 03/22/2021     03/22/2021     04/05/2012       BMP:    Lab Results   Component Value Date     03/22/2021    K 3.7 03/22/2021     03/22/2021    CO2 25 03/22/2021    BUN 9 03/22/2021    CREATININE 0.55 03/22/2021    GLUCOSE 105 03/22/2021    GLUCOSE 105 04/05/2012       HEMOGLOBIN A1C:   Lab Results   Component Value Date    LABA1C 5.4 10/18/2018       FASTING LIPID PANEL:  Lab Results   Component Value Date    CHOL 222 (H) 10/26/2020    HDL 36 (L) 10/26/2020    TRIG 240 (H) 10/26/2020       ASSESSMENT AND PLAN:      1. Dizziness  - Echocardiogram complete; Future  - NM MYOCARDIAL SPECT REST EXERCISE OR RX; Future    2. Crohn's disease of colon with complication ([facility]  - scheduled follow up with Dr. Mac Brown on 4/23/21    3. Other forms of systemic lupus erythematosus, unspecified organ involvement status ([facility].)  - denies symptoms at this time (I.e. fatigue)    4. Vitamin D deficiency  - Vitamin D 25 Hydroxy; Future    5. Iron deficiency anemia due to chronic blood loss  - ferrous sulfate (IRON 325) 325 (65 Fe) MG tablet; Take 1 tablet by mouth daily (with breakfast)  Dispense: 90 tablet; Refill: 1      FOLLOW UP AND INSTRUCTIONS:   Return in about 1 month (around 4/30/2021) for dizziness follow up. Lorelei received counseling on the following healthy behaviors: nutrition, exercise and medication adherence    Discussed use, benefit, and side effects of prescribed medications. Barriers to medication compliance addressed. All patient questions answered. Patient voiced understanding. Patient given educational materials - see patient instructions    STEPHIE Heath - CNP   [facility]  4/18/2021, 6:34 PM    Please note that this chart was generated using voice recognition Dragon dictation software. Although everyeffort was made to ensure the accuracy of this automated transcription, some errors in transcription may have occurred.

## 2021-03-30 NOTE — PATIENT INSTRUCTIONS
Patient Education        Crohn's Disease: Care Instructions  Your Care Instructions     Crohn's disease is a lifelong inflammatory bowel disease. Parts of the digestive tract get swollen and irritated and may develop sores called ulcers. It usually occurs in the last part of the small intestine and the first part of the large intestine. The main symptoms of Crohn's disease are belly pain, diarrhea, fever, and weight loss. It sometimes causes problems with joints, eyes, or skin. Symptoms may be mild or severe. The disease can also go into remission (not be active). Bad attacks are often treated in the hospital with medicines and liquids through a tube in your vein (IV). Talk with your doctor about the best treatments for you. You may need medicines that help prevent or treat flare-ups. You may need surgery to remove part of your bowel if you have an abnormal opening in the bowel (fistula), an abscess, or an obstruction. Self-care can help reduce your symptoms. Follow-up care is a key part of your treatment and safety. Be sure to make and go to all appointments, and call your doctor if you are having problems. It's also a good idea to know your test results and keep a list of the medicines you take. How can you care for yourself at home? · Take your medicines exactly as prescribed. Call your doctor if you think you are having a problem with your medicine. You will get more details on the specific medicines your doctor prescribes. · Do not take anti-inflammatory medicines, such as aspirin, ibuprofen (Advil, Motrin), or naproxen (Aleve). They may make your symptoms worse. Do not take any other medicines or herbal products without talking to your doctor first.  · Avoid foods that make your symptoms worse. These might include milk, alcohol, high-fiber foods, or spicy foods. · Eat a healthy diet. Make sure to get enough iron. Rectal bleeding may make you lose iron.  Good sources of iron include beef, lentils, spinach, raisins, and iron-enriched breads and cereals. · Drink liquid meal replacements if your doctor recommends them. These are high in calories and contain vitamins and minerals. Severe symptoms may make it hard for your body to absorb vitamins and minerals. · Do not smoke. Smoking makes Crohn's disease worse. If you need help quitting, talk to your doctor about stop-smoking programs and medicines. These can increase your chances of quitting for good. · Seek support from friends and family to help cope with Crohn's disease. The illness can affect all parts of your life. Get counseling if you need it. When should you call for help? Call 911 anytime you think you may need emergency care. For example, call if:    · Your stools are maroon or very bloody.     · You passed out (lost consciousness). Call your doctor now or seek immediate medical care if:    · You are vomiting.     · You have new or worse belly pain.     · You have a fever.     · You cannot pass stools or gas.     · You have new or more blood in your stools. Watch closely for changes in your health, and be sure to contact your doctor if:    · You have new or worse symptoms.     · You are losing weight.     · You do not get better as expected. Where can you learn more? Go to https://Spriggle KidspeAppsperseeb.Axtria. org and sign in to your tarpipe account. Enter 21 [phone] in the Chimerix box to learn more about \"Crohn's Disease: Care Instructions. \"     If you do not have an account, please click on the \"Sign Up Now\" link. Current as of: April 15, 2020               Content Version: 12.8  © 6578-3537 Healthwise, Incorporated. Care instructions adapted under license by [facility] ([facility]). If you have questions about a medical condition or this instruction, always ask your healthcare professional. [place] [address] any warranty or liability for your use of this information.          Patient Education        Low-Fiber Diet: split peas  ? Crunchy peanut butter  ? Ice cream with fruit pieces in it  ? Coconut, nuts, popcorn, raisins, and seeds, or any ice cream, yogurt, or cheese with these in them  Where can you learn more? Go to https://chlazaroeb.[email or web address] org and sign in to your Funifi account. Enter U244 in the BannerView.com box to learn more about \"Low-Fiber Diet: Care Instructions. \"     If you do not have an account, please click on the \"Sign Up Now\" link. Current as of: December 17, 2020               Content Version: 12.8  © 2486-8992 Healthwise, Incorporated. Care instructions adapted under license by [facility] ([facility]). If you have questions about a medical condition or this instruction, always ask your healthcare professional. [address] any warranty or liability for your use of this information.

## 2021-04-18 ASSESSMENT — ENCOUNTER SYMPTOMS
TROUBLE SWALLOWING: 0
SORE THROAT: 0
SHORTNESS OF BREATH: 0
VOMITING: 0
RHINORRHEA: 0
CONSTIPATION: 0
WHEEZING: 0
NAUSEA: 0
CHEST TIGHTNESS: 0
COUGH: 0
COLOR CHANGE: 0
DIARRHEA: 0

## 2021-04-22 ENCOUNTER — TELEPHONE (OUTPATIENT)
Dept: GASTROENTEROLOGY | Age: 51
End: 2021-04-22

## 2021-04-22 NOTE — TELEPHONE ENCOUNTER
Patient wanted to cancel 4/23/21 due to being sick and will call us back to reschedule. Appointment was already previously cancelled.

## 2021-04-30 ENCOUNTER — HOSPITAL ENCOUNTER (OUTPATIENT)
Facility: CLINIC | Age: 51
Discharge: HOME | End: 2021-05-02
Payer: COMMERCIAL

## 2021-04-30 ENCOUNTER — OFFICE VISIT (OUTPATIENT)
Dept: INTERNAL MEDICINE CLINIC | Age: 51
End: 2021-04-30
Payer: COMMERCIAL

## 2021-04-30 ENCOUNTER — HOSPITAL ENCOUNTER (OUTPATIENT)
Dept: GENERAL RADIOLOGY | Facility: CLINIC | Age: 51
Discharge: HOME | End: 2021-05-02
Payer: COMMERCIAL

## 2021-04-30 VITALS
HEART RATE: 82 BPM | OXYGEN SATURATION: 98 % | BODY MASS INDEX: 36.05 KG/M2 | SYSTOLIC BLOOD PRESSURE: 118 MMHG | TEMPERATURE: 97 F | WEIGHT: 210 LBS | DIASTOLIC BLOOD PRESSURE: 82 MMHG

## 2021-04-30 DIAGNOSIS — M51.36 LUMBAR DEGENERATIVE DISC DISEASE: ICD-10-CM

## 2021-04-30 DIAGNOSIS — M79.672 CHRONIC FOOT PAIN, LEFT: ICD-10-CM

## 2021-04-30 DIAGNOSIS — M72.2 PLANTAR FASCIITIS OF LEFT FOOT: ICD-10-CM

## 2021-04-30 DIAGNOSIS — G89.29 CHRONIC FOOT PAIN, LEFT: ICD-10-CM

## 2021-04-30 DIAGNOSIS — R42 DIZZINESS: Primary | ICD-10-CM

## 2021-04-30 PROCEDURE — 99213 OFFICE O/P EST LOW 20 MIN: CPT | Performed by: NURSE PRACTITIONER

## 2021-04-30 PROCEDURE — 73620 X-RAY EXAM OF FOOT: CPT

## 2021-04-30 RX ORDER — TIZANIDINE 4 MG/1
4 TABLET ORAL NIGHTLY
Qty: 30 TABLET | Refills: 1 | Status: SHIPPED | OUTPATIENT
Start: 2021-04-30 | End: 2022-09-03 | Stop reason: SINTOL

## 2021-04-30 SDOH — ECONOMIC STABILITY: FOOD INSECURITY: WITHIN THE PAST 12 MONTHS, YOU WORRIED THAT YOUR FOOD WOULD RUN OUT BEFORE YOU GOT MONEY TO BUY MORE.: NEVER TRUE

## 2021-04-30 SDOH — ECONOMIC STABILITY: TRANSPORTATION INSECURITY
IN THE PAST 12 MONTHS, HAS THE LACK OF TRANSPORTATION KEPT YOU FROM MEDICAL APPOINTMENTS OR FROM GETTING MEDICATIONS?: NO

## 2021-04-30 ASSESSMENT — PATIENT HEALTH QUESTIONNAIRE - PHQ9
2. FEELING DOWN, DEPRESSED OR HOPELESS: 0
SUM OF ALL RESPONSES TO PHQ QUESTIONS 1-9: 0
SUM OF ALL RESPONSES TO PHQ QUESTIONS 1-9: 0
SUM OF ALL RESPONSES TO PHQ9 QUESTIONS 1 & 2: 0
SUM OF ALL RESPONSES TO PHQ QUESTIONS 1-9: 0

## 2021-04-30 NOTE — PROGRESS NOTES
Writer spoke with patient regarding stress test on 5/3/2021; requested that PT arrive to main reg by 0700, wear comfortable clothes shoes that are suitable for exercise, NPO after MN and no caffeine after 1800. PT agreeable/preferring exercise test vs. Pharmacological, but has concern over left foot pain. No medications were requested to be held. PT had no questions.

## 2021-04-30 NOTE — PROGRESS NOTES
Visit Information    Have you changed or started any medications since your last visit including any over-the-counter medicines, vitamins, or herbal medicines? no   Are you having any side effects from any of your medications? -  no  Have you stopped taking any of your medications? Is so, why? -  no    Have you seen any other physician or provider since your last visit? No  Have you had any other diagnostic tests since your last visit? No  Have you been seen in the emergency room and/or had an admission to a hospital since we last saw you? No  Have you had your routine dental cleaning in the past 6 months? yes -     Have you activated your Joongel account? If not, what are your barriers?  Yes     Patient Care Team:  Lanie Solis MD as PCP - General (Internal Medicine)  Lanie Solis MD as PCP - [facility]  Chavez Monahan DO as Consulting Physician (Obstetrics & Gynecology)    Medical History Review  Past Medical, Family, and Social History reviewed and does not contribute to the patient presenting condition    Health Maintenance   Topic Date Due    Pneumococcal 0-64 years Vaccine (1 of 2 - PPSV23) Never done    COVID-19 Vaccine (1) Never done    DTaP/Tdap/Td vaccine (1 - Tdap) Never done    Flu vaccine (Season Ended) 10/30/2021 (Originally 9/1/2021)    TSH testing  07/08/2021    Diabetes screen  10/18/2021    Lipid screen  10/26/2021    Cervical cancer screen  11/07/2024    Colon cancer screen colonoscopy  03/12/2031    Hepatitis C screen  Completed    HIV screen  Completed    Hepatitis A vaccine  Aged Out    Hepatitis B vaccine  Aged Out    Hib vaccine  Aged Out    Meningococcal (ACWY) vaccine  Aged Out         [address]  [place] [address]  Dept: [phone]  Dept Fax: [phone]    OfficeProgress/Follow Up Note  Date of patient's visit: 5/16/2021  Patient's Name:  Silvino Davis YOB: 1970            Patient Care Team:  Hiwot Reyes MD as PCP - General (Internal Medicine)  Hiwot Reyes MD as PCP - REHABILITATION [facility] Empaneled Provider  Saad Buckner DO as Consulting Physician (Obstetrics & Gynecology)    REASON FOR VISIT:  Routine outpatient follow up    HISTORY OF PRESENT ILLNESS:      Chief Complaint   Patient presents with    Follow-up     dizziness, had to reschedule Stress Test for Monday    Foot Pain     left foot heel pain       History was obtained from the patient. Mayra Prajapati is a 53 y.o. female here today for dizziness. Noted after stopping physcial activity, feels like a head rush. Endorses heart racing, palpitations. Shortness of breath and chest heaviness (x1 yesterday) at rest. She states she has not completed stress test/ECHO due to scheduling problems. Patient encouraged to complete testing in order to further investigate origin of dizziness/shortness of breath. Patient also reports left foot pain. Pain is located on the lateral aspect of her heel. . Feels like something is crushing her heel. Patient has tried orthotic shoes, stretches and Tylenol for pain with minimal relief.      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[address]    Environmental allergies    H/O thyroid cyst    History of cervical dysplasia    History of conization of cervix     History of low transverse  section    Vision abnormalities    Family history of breast cancer    Osteoarthritis    VASECTOMY in Male Partner    History of endometrial ablation    Pelvic pain in female    Hypothyroidism    Abnormal uterine bleeding    Diverticulosis of colon    Rectal bleeding    Constipation    Isolated corticotropin deficiency (HCC)    Persistent depressive disorder    Generalized anxiety disorder with panic attacks    Other forms of systemic lupus erythematosus (HCC)    Panic attacks    Stroke-like symptoms    Numbness    GI bleed    Diarrhea    Migraine    Abdominal pain    Dysphagia    Anemia    Melena       Health Maintenance Due   Topic Date Due    Pneumococcal 0-64 years Vaccine (1 of 2 - PPSV23) Never done    COVID-19 Vaccine (1) Never done    DTaP/Tdap/Td vaccine (1 - Tdap) Never done       MEDICATIONS:      Current Outpatient Medications   Medication Sig Dispense Refill    tiZANidine (ZANAFLEX) 4 MG tablet Take 1 tablet by mouth nightly 30 tablet 1    ferrous sulfate (IRON 325) 325 (65 Fe) MG tablet Take 1 tablet by mouth daily (with breakfast) 90 tablet 1    atorvastatin (LIPITOR) 20 MG tablet Take 1 tablet by mouth nightly 90 tablet 1    clobetasol (TEMOVATE) 0.05 % cream apply to rash twice a day for up to 2 weeks ON and 1 week off      metroNIDAZOLE (METROGEL) 0.75 % gel apply to face twice a day      omeprazole (PRILOSEC) 40 MG delayed release capsule Take 40 mg by mouth nightly       Calcium Carb-Cholecalciferol (CALCIUM/VITAMIN D PO) Take by mouth      citalopram (CELEXA) 10 MG tablet take 1 tablet by mouth once daily with 20 MG TABLET 30 tablet 3    citalopram (CELEXA) 20 MG tablet Take 1 tablet by mouth daily With the celexa 10mg tab to make 30mg dialy 30 tablet 3    B Complex Vitamins (VITAMIN B COMPLEX PO) Take by mouth daily      nystatin (MYCOSTATIN) 047794 UNIT/GM powder Apply 3 times daily.  60 g 3    levothyroxine (SYNTHROID) 50 MCG tablet Take 1 tablet by mouth daily 90 tablet 3    Omeprazole-Sodium Bicarbonate (ZEGERID)  MG CAPS Take by mouth as needed       LORazepam (ATIVAN) 0.5 MG tablet 0.5 mg daily as needed.  pregabalin (LYRICA) 75 MG capsule take 1 capsule by mouth twice a day  0    Cetirizine HCl (ZYRTEC PO) Take 10 mg by mouth daily       polyethylene glycol (GLYCOLAX) 17 GM/SCOOP powder Follow instructions provided to you from physician's office. (Patient not taking: Reported on 4/30/2021) 238 g 0    bisacodyl (BISACODYL) 5 MG EC tablet Follow instructions provided given by the physician's office. 2 tablet 0    aspirin 81 MG EC tablet Take 1 tablet by mouth daily (Patient not taking: Reported on 4/30/2021) 30 tablet 3     No current facility-administered medications for this visit. ALLERGIES:      Allergies   Allergen Reactions    Other      Perch - twice had violent vomiting, diarrhea,severe dizziness and nausea    Percocet [Oxycodone-Acetaminophen] Hives and Itching     Can take if she takes Benadryl with it. Hives everywhere, including roof of mouth and tear duct openings    Tramadol Hcl Hives and Itching     hives    Azithromycin      Palpitations.  Ibuprofen Hives    Ceclor [Cefaclor] Hives and Rash    Penicillins Hives and Rash         SOCIAL HISTORY    Reviewed and no change from previous record. Lorelei  reports that she quit smoking about 33 years ago. She has never used smokeless tobacco.    FAMILY HISTORY:    Reviewed and No change from previous visit    REVIEW OF SYSTEMS:    Review of Systems   Constitutional: Negative for chills, diaphoresis, fatigue, fever and unexpected weight change. HENT: Negative for congestion, postnasal drip, rhinorrhea, sneezing, sore throat and trouble swallowing. Respiratory: Positive for chest tightness and shortness of breath. Negative for cough and wheezing. Cardiovascular: Negative for chest pain. Gastrointestinal: Negative for constipation, diarrhea, nausea and vomiting. Endocrine: Negative for polydipsia, polyphagia and polyuria. Genitourinary: Negative for difficulty urinating, dysuria, flank pain, frequency and urgency.

## 2021-05-03 ENCOUNTER — HOSPITAL ENCOUNTER (OUTPATIENT)
Dept: NUCLEAR MEDICINE | Age: 51
Discharge: HOME | End: 2021-05-05
Payer: COMMERCIAL

## 2021-05-03 ENCOUNTER — HOSPITAL ENCOUNTER (OUTPATIENT)
Dept: NON INVASIVE DIAGNOSTICS | Age: 51
Discharge: HOME | End: 2021-05-03
Payer: COMMERCIAL

## 2021-05-03 DIAGNOSIS — R42 DIZZINESS: ICD-10-CM

## 2021-05-03 LAB
LV EF: 58 %
LV EF: 64 %
LVEF MODALITY: NORMAL
LVEF MODALITY: NORMAL

## 2021-05-03 PROCEDURE — 93017 CV STRESS TEST TRACING ONLY: CPT

## 2021-05-03 PROCEDURE — A9500 TC99M SESTAMIBI: HCPCS | Performed by: NURSE PRACTITIONER

## 2021-05-03 PROCEDURE — 78452 HT MUSCLE IMAGE SPECT MULT: CPT

## 2021-05-03 PROCEDURE — 3430000000 HC RX DIAGNOSTIC RADIOPHARMACEUTICAL: Performed by: NURSE PRACTITIONER

## 2021-05-03 PROCEDURE — 2580000003 HC RX 258: Performed by: NURSE PRACTITIONER

## 2021-05-03 PROCEDURE — 93306 TTE W/DOPPLER COMPLETE: CPT

## 2021-05-03 RX ORDER — SODIUM CHLORIDE 0.9 % (FLUSH) 0.9 %
5-40 SYRINGE (ML) INJECTION PRN
Status: DISCONTINUED | OUTPATIENT
Start: 2021-05-03 | End: 2021-05-03 | Stop reason: HOSPADM

## 2021-05-03 RX ORDER — SODIUM CHLORIDE 0.9 % (FLUSH) 0.9 %
10 SYRINGE (ML) INJECTION PRN
Status: DISCONTINUED | OUTPATIENT
Start: 2021-05-03 | End: 2021-05-06 | Stop reason: HOSPADM

## 2021-05-03 RX ORDER — METOPROLOL TARTRATE 5 MG/5ML
5 INJECTION INTRAVENOUS EVERY 5 MIN PRN
Status: DISCONTINUED | OUTPATIENT
Start: 2021-05-03 | End: 2021-05-03 | Stop reason: HOSPADM

## 2021-05-03 RX ORDER — ALBUTEROL SULFATE 90 UG/1
2 AEROSOL, METERED RESPIRATORY (INHALATION) PRN
Status: DISCONTINUED | OUTPATIENT
Start: 2021-05-03 | End: 2021-05-03 | Stop reason: HOSPADM

## 2021-05-03 RX ORDER — SODIUM CHLORIDE 9 MG/ML
500 INJECTION, SOLUTION INTRAVENOUS CONTINUOUS PRN
Status: DISCONTINUED | OUTPATIENT
Start: 2021-05-03 | End: 2021-05-03 | Stop reason: HOSPADM

## 2021-05-03 RX ORDER — ATROPINE SULFATE 0.1 MG/ML
0.5 INJECTION INTRAVENOUS EVERY 5 MIN PRN
Status: DISCONTINUED | OUTPATIENT
Start: 2021-05-03 | End: 2021-05-03 | Stop reason: HOSPADM

## 2021-05-03 RX ORDER — NITROGLYCERIN 0.4 MG/1
0.4 TABLET SUBLINGUAL EVERY 5 MIN PRN
Status: DISCONTINUED | OUTPATIENT
Start: 2021-05-03 | End: 2021-05-03 | Stop reason: HOSPADM

## 2021-05-03 RX ADMIN — SODIUM CHLORIDE, PRESERVATIVE FREE 10 ML: 5 INJECTION INTRAVENOUS at 09:19

## 2021-05-03 RX ADMIN — Medication 10 ML: at 07:19

## 2021-05-03 RX ADMIN — TETRAKIS(2-METHOXYISOBUTYLISOCYANIDE)COPPER(I) TETRAFLUOROBORATE 12.2 MILLICURIE: 1 INJECTION, POWDER, LYOPHILIZED, FOR SOLUTION INTRAVENOUS at 07:19

## 2021-05-03 RX ADMIN — SODIUM CHLORIDE, PRESERVATIVE FREE 10 ML: 5 INJECTION INTRAVENOUS at 08:38

## 2021-05-03 RX ADMIN — TETRAKIS(2-METHOXYISOBUTYLISOCYANIDE)COPPER(I) TETRAFLUOROBORATE 35.5 MILLICURIE: 1 INJECTION, POWDER, LYOPHILIZED, FOR SOLUTION INTRAVENOUS at 09:19

## 2021-05-03 RX ADMIN — Medication 10 ML: at 07:20

## 2021-05-03 NOTE — PROGRESS NOTES
CST Exercise. Stress Tech performs patient preparation of physical comfort, review test procedures, pre-stress EKG. Lung Sounds clear t/o. Consent verified by pt. .  Educated patient on test procedure an possible side effects of Lexiscan as well as s/s to report. Cardiologist reviewed pre-test EKG and is present for test.  Patient tolerated test well. Start /77 HR 75  Stop /73 HR 96  EKG portion of testing is completed and negative, nuc. med. portion is still pending.

## 2021-05-16 ASSESSMENT — ENCOUNTER SYMPTOMS
WHEEZING: 0
CONSTIPATION: 0
TROUBLE SWALLOWING: 0
SHORTNESS OF BREATH: 1
COUGH: 0
SORE THROAT: 0
DIARRHEA: 0
NAUSEA: 0
RHINORRHEA: 0
COLOR CHANGE: 0
VOMITING: 0
CHEST TIGHTNESS: 1

## 2021-05-27 ENCOUNTER — OFFICE VISIT (OUTPATIENT)
Dept: PODIATRY | Age: 51
End: 2021-05-27
Payer: COMMERCIAL

## 2021-05-27 VITALS — WEIGHT: 215 LBS | HEIGHT: 64 IN | BODY MASS INDEX: 36.7 KG/M2

## 2021-05-27 DIAGNOSIS — R60.0 EDEMA OF LOWER EXTREMITY: ICD-10-CM

## 2021-05-27 DIAGNOSIS — M79.672 PAIN IN LEFT FOOT: ICD-10-CM

## 2021-05-27 DIAGNOSIS — M84.375A STRESS FRACTURE OF LEFT CALCANEUS: Primary | ICD-10-CM

## 2021-05-27 PROCEDURE — L4360 PNEUMAT WALKING BOOT PRE CST: HCPCS | Performed by: PODIATRIST

## 2021-05-27 PROCEDURE — 99203 OFFICE O/P NEW LOW 30 MIN: CPT | Performed by: PODIATRIST

## 2021-05-27 ASSESSMENT — ENCOUNTER SYMPTOMS
BACK PAIN: 0
SHORTNESS OF BREATH: 0
COLOR CHANGE: 0
NAUSEA: 0
DIARRHEA: 0

## 2021-05-27 NOTE — PROGRESS NOTES
Maura Bethea is a 53 y.o. female who presents to the office today with chief complaint of pain to the left heel. Chief Complaint   Patient presents with    Foot Pain     left heel pain/xrays in Epic   Symptoms began about 6 month(s) ago. Patient denies injury to the left foot. Patient states that she has rheumatoid arthritis and is wondering if this is contributing to her pain. Pain is rated 10 out of 10 at it's worst and is described as intermittent. Treatments prior to today's visit include: None. Allergies   Allergen Reactions    Other      Perch - twice had violent vomiting, diarrhea,severe dizziness and nausea    Percocet [Oxycodone-Acetaminophen] Hives and Itching     Can take if she takes Benadryl with it. Hives everywhere, including roof of mouth and tear duct openings    Tramadol Hcl Hives and Itching     hives    Azithromycin      Palpitations. 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  Headache     History of cervical dysplasia 2000    had cone    History of conization of cervix 2000    dysplastic cells    Hyperlipidemia     Hypothyroidism, unspecified 3/18/2016    Lupus ([facility]     Osteoarthritis     Rheumatoid arthritis (HCC)     Sleep apnea     tests were negative but snores badly    Vision abnormalities     glasses/ far sighted       Prior to Admission medications    Medication Sig Start Date End Date Taking?  Authorizing Provider   tiZANidine (ZANAFLEX) 4 MG tablet Take 1 tablet by mouth nightly 4/30/21  Yes STEPHIE Rajan CNP   ferrous sulfate (IRON 325) 325 (65 Fe) MG tablet Take 1 tablet by mouth daily (with breakfast) 3/30/21  Yes STEPHIE Rajan CNP   polyethylene glycol (GLYCOLAX) 17 GM/SCOOP powder Follow instructions provided to you from physician's office. 3/9/21  Yes Krissy Lee MD   bisacodyl (BISACODYL) 5 MG EC tablet Follow instructions provided given by the physician's office. 3/9/21  Yes Krissy Lee MD   atorvastatin (LIPITOR) 20 MG tablet Take 1 tablet by mouth nightly 3/8/21  Yes Cheyanne Gutierrez MD   clobetasol (TEMOVATE) 0.05 % cream apply to rash twice a day for up to 2 weeks ON and 1 week off 1/28/21  Yes Historical Provider, MD   metroNIDAZOLE (METROGEL) 0.75 % gel apply to face twice a day 1/28/21  Yes Historical Provider, MD   omeprazole (PRILOSEC) 40 MG delayed release capsule Take 40 mg by mouth nightly  1/13/21  Yes Historical Provider, MD   Calcium Carb-Cholecalciferol (CALCIUM/VITAMIN D PO) Take by mouth   Yes Historical Provider, MD   citalopram (CELEXA) 10 MG tablet take 1 tablet by mouth once daily with 20 MG TABLET 3/1/21  Yes Cheyanne Gutierrez MD   citalopram (CELEXA) 20 MG tablet Take 1 tablet by mouth daily With the celexa 10mg tab to make 30mg dialy 3/1/21  Yes Cheyanne Gutierrez MD   B Complex Vitamins (VITAMIN B COMPLEX PO) Take by mouth daily   Yes Historical Provider, MD   nystatin (MYCOSTATIN) 950879 UNIT/GM powder Apply 3 times daily. 12/16/20  Yes STEPHIE Rajan CNP   Omeprazole-Sodium Bicarbonate (ZEGERID)  MG CAPS Take by mouth as needed    Yes Historical Provider, MD   LORazepam (ATIVAN) 0.5 MG tablet 0.5 mg daily as needed.  4/24/18  Yes Historical Provider, MD   pregabalin (LYRICA) 75 MG capsule take 1 capsule by mouth twice a day 10/10/19  Yes Historical Provider, MD   Cetirizine HCl (ZYRTEC PO) Take 10 mg by mouth daily    Yes Historical Provider, MD   levothyroxine (SYNTHROID) 50 MCG tablet Take 1 tablet by mouth daily 20  Han Quiros MD       Past Surgical History:   Procedure Laterality Date    BONE CYST EXCISION Right     WRIST    CARPAL TUNNEL RELEASE Right 10-6-15    CARPAL TUNNEL RELEASE Left 11/3/15    CERVIX BIOPSY       SECTION, LOW TRANSVERSE      COLONOSCOPY      COLONOSCOPY  2009    diverticulosis, no other gross lesions    COLONOSCOPY  2017    10 yr recall, small hemorrhoids, diverticulosis    COLONOSCOPY N/A 3/12/2021    COLONOSCOPY POLYPECTOMY SNARE/COLD BIOPSY performed by Joanna Siddiqui MD at [place] [address] N/A 2017    HYSTEROSCOPY AND D& C, myosure performed by Jose Angel Stokes MD at [facility], COLON, DIAGNOSTIC      UGI    FRACTURE SURGERY Left     THUMB    GYNECOLOGIC CRYOSURGERY      conization   [address] [facility]      with mesh, umbilical    LIPOMA RESECTION Right     RING FINGER    OTHER SURGICAL HISTORY      VOCAL CORD SURG    [place] COLON CA SCRN NOT  [address] IND N/A 2017    COLONOSCOPY performed by Krissy Lee MD at [address] EGD TRANSORAL BIOPSY SINGLE/MULTIPLE N/A 2017    EGD BIOPSY performed [facility] Krissy Lee MD at [facility] 2016    Right thyroid lobectomy and isthmusectomy. Continuous monitoring of the recurrent laryngeal nerve using nerve integrity monitor. Frozen section.     TONSILLECTOMY      UPPER GASTROINTESTINAL ENDOSCOPY  2008    with BRAVO, gastric polyp-fundic gland polyp    UPPER GASTROINTESTINAL ENDOSCOPY  2017    multiple small gastric polyps-fundic gland polyps    UPPER GASTROINTESTINAL ENDOSCOPY N/A 3/3/2021    EGD BIOPSY & SAVARY DILATION performed by Larisa Scott MD at [facility]  3/12/2021    EGD DILATION SAVORY performed by Joanna Siddiqui MD at [facility]       Family History   Problem Relation Age of Onset    Alcohol Abuse Father  Other Father         murder    Diabetes Mother     Other Mother         thyroid    High Blood Pressure Mother     Lupus Sister     Drug Abuse Brother     Asthma Brother     Breast Cancer Maternal Grandmother         45s    Heart Surgery Maternal Grandmother     Heart Failure Maternal Grandmother     Hypertension Maternal Grandfather     Stroke Maternal Grandfather     Heart Attack Maternal Grandfather     Alcohol Abuse Maternal Grandfather     Diabetes Maternal Grandfather     Cancer Paternal Grandmother         lymphnoid    Heart Failure Paternal Grandfather     Heart Surgery Paternal Grandfather         x2       Social History     Tobacco Use    Smoking status: Former Smoker     Quit date:      Years since quittin.4    Smokeless tobacco: Never Used    Tobacco comment: only smoked for 4 months as teenager only   Substance Use Topics    Alcohol use: Yes     Alcohol/week: 0.0 standard drinks     Comment: recovering alcoholic, quit heavy drinking , takes 1-2 every yearly since         Review of Systems   Constitutional: Negative for activity change, appetite change, chills, diaphoresis, fatigue and fever. Respiratory: Negative for shortness of breath. Cardiovascular: Negative for leg swelling. Gastrointestinal: Negative for diarrhea and nausea. Endocrine: Negative for cold intolerance, heat intolerance and polyuria. Musculoskeletal: Positive for arthralgias. Negative for back pain, gait problem, joint swelling and myalgias. Skin: Negative for color change, pallor, rash and wound. Allergic/Immunologic: Negative for environmental allergies and food allergies. Neurological: Negative for dizziness, weakness, light-headedness and numbness. Hematological: Does not bruise/bleed easily. Psychiatric/Behavioral: Negative for behavioral problems, confusion and self-injury. The patient is not nervous/anxious. Vitals: There were no vitals filed for this visit. General: AAO x 3 in NAD. Integument: There are no rashes, ulcers, or breaks in the skin noted to the bilateral lower extremities. There is no induration, subcutaneous nodules, or tightening of the skin noted to the bilateral.     Toenails 1-5 of the right foot do not present with thickness, elongation, discoloration, brittleness, subungual debris. Toenails 1-5 of the left foot do not present with thickness, elongation, discoloration, brittleness, subungual debris. Interdigital maceration absent to web spaces 1-4, Bilateral.     There are no preulcerative lesions noted to the right foot. There are no preulcerative lesions noted to the left foot. The skin to the bilateral feet is not thin and shiny. The skin to the bilateral feet is  warm, supple, and dry. Vascular: DP pulse of the right foot is  palpable. DP pulse of the left foot is  palpable. PT pulse of the right foot is  palpable. PT pulse of the left foot is  palpable. CFT is less than 3 secs to the digits of the right foot. CFT is less than 3 secs to the digits of the left foot. There is edema noted to the left foot and ankle. There is hair growth noted to the digits of the bilateral feet. There are no varicosities noted to the right foot/ankle. There are no varicosities noted to the left foot/ankle. Erythema is absent to the bilateral feet. Neurological: Reflexes are present to the right plantar foot and to the Achilles tendon. Reflexes are present to the left plantar foot and to the Achilles tendon. Epicritic sensation is  intact to the right foot. Epicritic sensation is  intact to the left foot. Musculoskeletal:  Muscle strength is +5/5 to all four muscle groups of the right lower extremity and +5/5 to all four muscle groups of the left lower extremity. There are no areas of subluxation, dislocation, or laxity noted to either lower extremity.      Range of motion Plan:  1. Clinical evaluation of the patient. 2. I have dispensed a short pneumatic equalizer walker to the patient's left lower extremity. Due to the patient's diagnosis and related symptoms this is medically necessary for the treatment. The function of this device is to restrict and limit motion and provide stabilization and compression to the affected area. Specific instructions on weight bearing and ROM exercises were discussed and given to the patient. Patient advised to limit her activities to ADL's only. The goals and function of this device were explained in detail to the patient. Upon dispensing, the device appeared to be fitting well and the patient states that the device is comfortable at this time. The patient was shown how to properly apply, wear, and care for the device. The patient was able to properly apply the device and ambulate with it without distress. At the time that the device was dispensed it was suitable for the patient's condition and was not substandard. No guarantees were given or implied and the precautions of the device were reviewed the patient. Written instructions and warranty information was given along with the list of the twenty-one (21) Durable Medical Equipment Supplier Guidelines. All patient questions were answered satisfactorily. Patient was instructed on proper rest, ice, compression, and elevation of the left lower extremity. Patient given an order for an ultrasound to evaluate for a possible stress fracture to the calcaneus. 3. Contact office with any questions/problems/concerns. Return if symptoms worsen or fail to improve, for Patient to return once her MRI is done.    5/27/2021      Chirag Avelar DPM

## 2021-06-01 ENCOUNTER — OFFICE VISIT (OUTPATIENT)
Dept: INTERNAL MEDICINE CLINIC | Age: 51
End: 2021-06-01
Payer: COMMERCIAL

## 2021-06-01 VITALS
SYSTOLIC BLOOD PRESSURE: 118 MMHG | HEART RATE: 86 BPM | HEIGHT: 64 IN | OXYGEN SATURATION: 98 % | BODY MASS INDEX: 35.34 KG/M2 | DIASTOLIC BLOOD PRESSURE: 78 MMHG | WEIGHT: 207 LBS

## 2021-06-01 DIAGNOSIS — R94.39 ABNORMAL STRESS TEST: Primary | ICD-10-CM

## 2021-06-01 DIAGNOSIS — Z13.31 POSITIVE DEPRESSION SCREENING: ICD-10-CM

## 2021-06-01 DIAGNOSIS — I25.118 CORONARY ARTERY DISEASE OF NATIVE HEART WITH STABLE ANGINA PECTORIS, UNSPECIFIED VESSEL OR LESION TYPE (HCC): ICD-10-CM

## 2021-06-01 PROCEDURE — 93000 ELECTROCARDIOGRAM COMPLETE: CPT | Performed by: INTERNAL MEDICINE

## 2021-06-01 PROCEDURE — G8431 POS CLIN DEPRES SCRN F/U DOC: HCPCS | Performed by: INTERNAL MEDICINE

## 2021-06-01 PROCEDURE — 99215 OFFICE O/P EST HI 40 MIN: CPT | Performed by: INTERNAL MEDICINE

## 2021-06-01 RX ORDER — NITROGLYCERIN 0.3 MG/1
0.3 TABLET SUBLINGUAL EVERY 5 MIN PRN
Qty: 30 TABLET | Refills: 3 | Status: SHIPPED | OUTPATIENT
Start: 2021-06-01

## 2021-06-01 RX ORDER — ASPIRIN 81 MG/1
81 TABLET ORAL DAILY
Qty: 90 TABLET | Refills: 1 | Status: SHIPPED | OUTPATIENT
Start: 2021-06-01 | End: 2021-10-01

## 2021-06-01 RX ORDER — ATORVASTATIN CALCIUM 40 MG/1
40 TABLET, FILM COATED ORAL DAILY
Qty: 30 TABLET | Refills: 3 | Status: SHIPPED | OUTPATIENT
Start: 2021-06-01 | End: 2021-10-22

## 2021-06-01 ASSESSMENT — PATIENT HEALTH QUESTIONNAIRE - PHQ9
4. FEELING TIRED OR HAVING LITTLE ENERGY: 3
7. TROUBLE CONCENTRATING ON THINGS, SUCH AS READING THE NEWSPAPER OR WATCHING TELEVISION: 3
3. TROUBLE FALLING OR STAYING ASLEEP: 3
10. IF YOU CHECKED OFF ANY PROBLEMS, HOW DIFFICULT HAVE THESE PROBLEMS MADE IT FOR YOU TO DO YOUR WORK, TAKE CARE OF THINGS AT HOME, OR GET ALONG WITH OTHER PEOPLE: 2
2. FEELING DOWN, DEPRESSED OR HOPELESS: 3
SUM OF ALL RESPONSES TO PHQ QUESTIONS 1-9: 19
8. MOVING OR SPEAKING SO SLOWLY THAT OTHER PEOPLE COULD HAVE NOTICED. OR THE OPPOSITE, BEING SO FIGETY OR RESTLESS THAT YOU HAVE BEEN MOVING AROUND A LOT MORE THAN USUAL: 2
5. POOR APPETITE OR OVEREATING: 1
1. LITTLE INTEREST OR PLEASURE IN DOING THINGS: 1
SUM OF ALL RESPONSES TO PHQ9 QUESTIONS 1 & 2: 4
9. THOUGHTS THAT YOU WOULD BE BETTER OFF DEAD, OR OF HURTING YOURSELF: 0
SUM OF ALL RESPONSES TO PHQ QUESTIONS 1-9: 19
SUM OF ALL RESPONSES TO PHQ QUESTIONS 1-9: 19
6. FEELING BAD ABOUT YOURSELF - OR THAT YOU ARE A FAILURE OR HAVE LET YOURSELF OR YOUR FAMILY DOWN: 3

## 2021-06-01 ASSESSMENT — COLUMBIA-SUICIDE SEVERITY RATING SCALE - C-SSRS
1. WITHIN THE PAST MONTH, HAVE YOU WISHED YOU WERE DEAD OR WISHED YOU COULD GO TO SLEEP AND NOT WAKE UP?: NO
2. HAVE YOU ACTUALLY HAD ANY THOUGHTS OF KILLING YOURSELF?: NO
6. HAVE YOU EVER DONE ANYTHING, STARTED TO DO ANYTHING, OR PREPARED TO DO ANYTHING TO END YOUR LIFE?: NO

## 2021-06-01 NOTE — PROGRESS NOTES
Visit Information    Have you changed or started any medications since your last visit including any over-the-counter medicines, vitamins, or herbal medicines? no   Are you having any side effects from any of your medications? -  no  Have you stopped taking any of your medications? Is so, why? -  no    Have you seen any other physician or provider since your last visit? Yes - Records Obtained  Have you had any other diagnostic tests since your last visit? Yes - Records Obtained  Have you been seen in the emergency room and/or had an admission to a hospital since we last saw you? No  Have you had your routine dental cleaning in the past 6 months? yes -     Have you activated your [employer] account? If not, what are your barriers? Yes     Patient Care Team:  My Chao MD as PCP - General (Internal Medicine)  My Chao MD as PCP - [facility] Provider  Jerry Blakely DO as Consulting Physician (Obstetrics & Gynecology)    Medical History Review  Past Medical, Family, and Social History reviewed and does not contribute to the patient presenting condition    Health Maintenance   Topic Date Due    Pneumococcal 0-64 years Vaccine (1 of 2 - PPSV23) Never done    COVID-19 Vaccine (1) Never done    DTaP/Tdap/Td vaccine (1 - Tdap) Never done    Flu vaccine (Season Ended) 10/30/2021 (Originally 9/1/2021)    TSH testing  07/08/2021    Diabetes screen  10/18/2021    Lipid screen  10/26/2021    Cervical cancer screen  11/07/2024    Colon cancer screen colonoscopy  03/12/2031    Hepatitis C screen  Completed    HIV screen  Completed    Hepatitis A vaccine  Aged Out    Hepatitis B vaccine  Aged Out    Hib vaccine  Aged Out    Meningococcal (ACWY) vaccine  Aged Out     SUBJECTIVE:  Chelo Norman is a 53 y.o. female patient who  comes for complaints of   Chief Complaint   Patient presents with    Results     stress test, 4 week fu per Lauren Padilla.      Anxiety     pt would like to discuss the Celexa dosage       Patient here for results of stress test.  NM stress test shows area of reversible ischemia of left ventricle  EKG done in office shows T wave inversion in V1 and V2, no ST elevation    Patient reports ongoing symptoms of daily jaw pain, dizziness, head rash, chest discomfort  Denies any active chest pain or shortness of breath right now     Was on aspirin previously but was taken off for concern of GI bleed    Reports a lot of family stress from multiple sources  Anxiety has been worsening  Went 2mth without ativan, bt has been needing it again      REVIEW OF SYSTEMS (except Subjective (HPI))  GENERAL: No fevers / chills  RESPIRATORY: Negative for cough, wheezing or shortness of breath  CARDIOVASCULAR: Positive for recurrent chest discomfort  GI: no nausea, vomiting, or diarrhea  NEURO: No history of headaches    Past Medical History:   Diagnosis Date    [facility] insufficiency ([facility]     Asthma     Bleeding after intercourse     History of    Bloody diarrhea     Cancer ([facility]     CERVICAL    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     Headache     History of cervical dysplasia 2000    had cone    History of conization of cervix 2000    dysplastic cells    Hyperlipidemia     Hypothyroidism, unspecified 3/18/2016    Lupus ([facility]     Osteoarthritis     Rheumatoid arthritis (HCC)     Sleep apnea     tests were negative but snores badly    Vision abnormalities     glasses/ far sighted       SOCIAL HISTORY:  Social History     Socioeconomic History    Marital status:      Spouse name: Not on file    Number of children: Not on file    Years of education: Not on file    Highest education level: Not on file   Occupational History    Not on file   Tobacco Use    Smoking status: Former Smoker     Quit date:      Years since quittin.4    Smokeless tobacco: Never Used    Tobacco comment: only smoked for 4 months as teenager only   Vaping Use    Vaping Use: Never used   Substance and Sexual Activity    Alcohol use: Yes     Alcohol/week: 0.0 standard drinks     Comment: recovering alcoholic, quit heavy drinking , takes 1-2 every yearly since      Drug use: No    Sexual activity: Yes     Partners: Male     Birth control/protection: Other-see comments     Comment:  had vasectomy   Other Topics Concern    Not on file   Social History Narrative    Not on file     Social Determinants of Health     Financial Resource Strain:     Difficulty of Paying Living Expenses:    Food Insecurity: No Food Insecurity    Worried About Running Out of Food in the Last Year: Never true    Gurjit of Food in the Last Year: Never true   Transportation Needs: No Transportation Needs    Lack of Transportation (Medical): No    Lack of Transportation (Non-Medical):  No   Physical Activity:     Days of Exercise per Week:     Minutes of Exercise per Session:    Stress:     Feeling of Stress :    Social Connections:     Frequency of Communication with Friends and Family:     Frequency of Social Gatherings with Friends and Family:     Attends Hoah[employer] Services:     Active Member of Clubs or Organizations:     Attends Club or Organization Meetings:     Marital Status:    Intimate Partner Violence:     Fear of Current or Ex-Partner:     Emotionally Abused:     Physically Abused:     Sexually Abused:            CURRENT MEDICATIONS:  Current Outpatient Medications   Medication Sig Dispense Refill    tiZANidine (ZANAFLEX) 4 MG tablet Take 1 tablet by mouth nightly 30 tablet 1    ferrous sulfate (IRON 325) 325 (65 Fe) MG tablet Take 1 tablet by mouth daily (with breakfast) 90 tablet 1    polyethylene glycol (GLYCOLAX) 17 GM/SCOOP powder Follow instructions provided to you from physician's office. 238 g 0    bisacodyl (BISACODYL) 5 MG EC tablet Follow instructions provided given by the physician's office. 2 tablet 0    atorvastatin (LIPITOR) 20 MG tablet Take 1 tablet by mouth nightly 90 tablet 1    clobetasol (TEMOVATE) 0.05 % cream apply to rash twice a day for up to 2 weeks ON and 1 week off      metroNIDAZOLE (METROGEL) 0.75 % gel apply to face twice a day      omeprazole (PRILOSEC) 40 MG delayed release capsule Take 40 mg by mouth nightly       Calcium Carb-Cholecalciferol (CALCIUM/VITAMIN D PO) Take by mouth      citalopram (CELEXA) 10 MG tablet take 1 tablet by mouth once daily with 20 MG TABLET 30 tablet 3    citalopram (CELEXA) 20 MG tablet Take 1 tablet by mouth daily With the celexa 10mg tab to make 30mg dialy 30 tablet 3    B Complex Vitamins (VITAMIN B COMPLEX PO) Take by mouth daily      nystatin (MYCOSTATIN) 118803 UNIT/GM powder Apply 3 times daily. 60 g 3    Omeprazole-Sodium Bicarbonate (ZEGERID)  MG CAPS Take by mouth as needed       LORazepam (ATIVAN) 0.5 MG tablet 0.5 mg daily as needed.  pregabalin (LYRICA) 75 MG capsule take 1 capsule by mouth twice a day  0    Cetirizine HCl (ZYRTEC PO) Take 10 mg by mouth daily       levothyroxine (SYNTHROID) 50 MCG tablet Take 1 tablet by mouth daily 90 tablet 3     No current facility-administered medications for this visit. OBJECTIVE:  Vitals:    06/01/21 1543   BP: 118/78   Pulse: 86   SpO2: 98%     Body mass index is 35.53 kg/m². General exam (except above):  General appearance - well appearing, alert, in no acute distress  Head - Atraumatic, normocephalic  Eyes - EOMI, no jaundice or pallor  Lungs - Lungs clear to auscultation. No wheezing, rhonchi, rales  Heart - RRR without murmur, gallop, or rubs. No ectopy  Abdomen - Abdomen soft, non-tender. Bowel sounds normal. No masses, organomegaly  Extremities -No significant edema, or skin discoloration. Good capillary refill.   Neuro - Pt

## 2021-06-02 ENCOUNTER — HOSPITAL ENCOUNTER (OUTPATIENT)
Dept: CARDIAC CATH/INVASIVE PROCEDURES | Age: 51
Discharge: HOME | End: 2021-06-02
Payer: COMMERCIAL

## 2021-06-02 VITALS
BODY MASS INDEX: 36.37 KG/M2 | HEIGHT: 64 IN | TEMPERATURE: 98.5 F | DIASTOLIC BLOOD PRESSURE: 66 MMHG | SYSTOLIC BLOOD PRESSURE: 111 MMHG | WEIGHT: 213 LBS | OXYGEN SATURATION: 97 % | RESPIRATION RATE: 17 BRPM | HEART RATE: 77 BPM

## 2021-06-02 LAB
GFR NON-AFRICAN AMERICAN: >60 ML/MIN
GFR SERPL CREATININE-BSD FRML MDRD: >60 ML/MIN
GFR SERPL CREATININE-BSD FRML MDRD: NORMAL ML/MIN/{1.73_M2}
GLUCOSE BLD-MCNC: 119 MG/DL (ref 74–100)
PLATELET # BLD: 230 K/UL (ref 138–453)
POC BUN: 8 MG/DL (ref 8–26)
POC CHLORIDE: 107 MMOL/L (ref 98–107)
POC CREATININE: 0.66 MG/DL (ref 0.51–1.19)
POC HEMATOCRIT: 41 % (ref 36–46)
POC HEMOGLOBIN: 14 G/DL (ref 12–16)
POC POTASSIUM: 3.8 MMOL/L (ref 3.5–4.5)
POC SODIUM: 143 MMOL/L (ref 138–146)

## 2021-06-02 PROCEDURE — 2709999900 HC NON-CHARGEABLE SUPPLY

## 2021-06-02 PROCEDURE — 84295 ASSAY OF SERUM SODIUM: CPT

## 2021-06-02 PROCEDURE — 6360000004 HC RX CONTRAST MEDICATION

## 2021-06-02 PROCEDURE — C1725 CATH, TRANSLUMIN NON-LASER: HCPCS

## 2021-06-02 PROCEDURE — 85014 HEMATOCRIT: CPT

## 2021-06-02 PROCEDURE — 2500000003 HC RX 250 WO HCPCS

## 2021-06-02 PROCEDURE — 82947 ASSAY GLUCOSE BLOOD QUANT: CPT

## 2021-06-02 PROCEDURE — 82565 ASSAY OF CREATININE: CPT

## 2021-06-02 PROCEDURE — 84520 ASSAY OF UREA NITROGEN: CPT

## 2021-06-02 PROCEDURE — 7100000000 HC PACU RECOVERY - FIRST 15 MIN

## 2021-06-02 PROCEDURE — 82435 ASSAY OF BLOOD CHLORIDE: CPT

## 2021-06-02 PROCEDURE — 93454 CORONARY ARTERY ANGIO S&I: CPT | Performed by: INTERNAL MEDICINE

## 2021-06-02 PROCEDURE — 84132 ASSAY OF SERUM POTASSIUM: CPT

## 2021-06-02 PROCEDURE — 6360000002 HC RX W HCPCS

## 2021-06-02 PROCEDURE — C1894 INTRO/SHEATH, NON-LASER: HCPCS

## 2021-06-02 PROCEDURE — 7100000001 HC PACU RECOVERY - ADDTL 15 MIN

## 2021-06-02 PROCEDURE — 85049 AUTOMATED PLATELET COUNT: CPT

## 2021-06-02 PROCEDURE — C1769 GUIDE WIRE: HCPCS

## 2021-06-02 RX ORDER — SODIUM CHLORIDE 9 MG/ML
INJECTION, SOLUTION INTRAVENOUS CONTINUOUS
Status: DISCONTINUED | OUTPATIENT
Start: 2021-06-02 | End: 2021-06-03 | Stop reason: HOSPADM

## 2021-06-02 RX ADMIN — SODIUM CHLORIDE: 9 INJECTION, SOLUTION INTRAVENOUS at 12:33

## 2021-06-02 NOTE — PROGRESS NOTES
Received post procedure to [facility] to room 1. Assessment obtained. Restrictions reviewed with patient. Post procedure pathway initiated. Right groin site soft , VASC band intact. No hematoma noted. Family at side. Patient without complaints. Head of bed flat with right leg straight.

## 2021-06-02 NOTE — H&P
[facility] Cardiology Cardiology   History & Physical               Today's Date: 2021  Patient Name: Warner Trimble  Date of admission: 2021 10:29 AM  Patient's age: 53 y.o., 1970  Admission Dx: Abnormal result of other cardiovascular function study [R94.39]    Reason for Admission:  Left heart catheterization     CHIEF COMPLAINT:  Chest pain  No chief complaint on file. History Obtained From:  Patient, electronic medical record    HISTORY OF PRESENT ILLNESS:      The patient is a 53 y.o.  female who is admitted to the hospital for left heart cath. She has history of rheumatoid arthritis. She saw her PCP for chest pain. Stress test was ordered and it was positive as below. Stress test     Perfusion:  Perfusion defects at stress involve 9% of the left ventricular   myocardium.       Function:  Mild lateral wall hypokinesis.  Normal left ventricular ejection   fraction of 64%. Past Medical History:   has a past medical history of Adrenal insufficiency ([facility] [address] Asthma, Bleeding after intercourse, Bloody diarrhea, Cancer ([facility] [address] Chest pain, [employer] disease [facility]), Diverticulosis of colon, Environmental allergies, Family history of breast cancer, Fibromyalgia, GERD (gastroesophageal reflux disease), H/O thyroid cyst, Headache, History of cervical dysplasia, History of conization of cervix, Hyperlipidemia, Hypothyroidism, unspecified, Lupus ([address] Osteoarthritis, Positive cardiac stress test, Rheumatoid arthritis ([address] Sleep apnea, SOB (shortness of breath), and Vision abnormalities. Past Surgical History:   has a past surgical history that includes bone cyst excision (Right); Tonsillectomy; Endometrial ablation; Cervix biopsy; lipoma resection (Right); other surgical history; Carpal tunnel release (Right, 10-6-15); Carpal tunnel release (Left, 11/3/15);  section, low transverse; Colonoscopy; Gynecologic cryosurgery ();  Thyroid lobectomy (Right, 2016); Endoscopy, colon, diagnostic; Dilation and curettage of uterus (N/A, 7/18/2017); Upper gastrointestinal endoscopy (12/12/2008); Colonoscopy (07/24/2009); fracture surgery (Left); pr egd transoral biopsy single/multiple (N/A, 11/21/2017); pr colon ca scrn not hi rsk ind (N/A, 11/21/2017); Colonoscopy (11/21/2017); Upper gastrointestinal endoscopy (11/21/2017); Upper gastrointestinal endoscopy (N/A, 3/3/2021); hernia repair; Colonoscopy (N/A, 3/12/2021); Upper gastrointestinal endoscopy (3/12/2021); and Cardiac catheterization (06/02/2021). Home Medications:    Prior to Admission medications    Medication Sig Start Date End Date Taking? Authorizing Provider   aspirin EC 81 MG EC tablet Take 1 tablet by mouth daily 6/1/21  Yes Kayleen Ojeda MD   atorvastatin (LIPITOR) 40 MG tablet Take 1 tablet by mouth daily 6/1/21  Yes Kayleen Ojeda MD   tiZANidine (ZANAFLEX) 4 MG tablet Take 1 tablet by mouth nightly 4/30/21  Yes STEPHIE Lemus - CNP   Calcium Carb-Cholecalciferol (CALCIUM/VITAMIN D PO) Take by mouth   Yes Historical Provider, MD   citalopram (CELEXA) 10 MG tablet take 1 tablet by mouth once daily with 20 MG TABLET 3/1/21  Yes Oziel Marsh MD   citalopram (CELEXA) 20 MG tablet Take 1 tablet by mouth daily With the celexa 10mg tab to make 30mg dialy 3/1/21  Yes Oziel Marsh MD   B Complex Vitamins (VITAMIN B COMPLEX PO) Take by mouth daily   Yes Historical Provider, MD   levothyroxine (SYNTHROID) 50 MCG tablet Take 1 tablet by mouth daily 9/29/20 6/2/21 Yes Kayleen Ojeda MD   Omeprazole-Sodium Bicarbonate (ZEGERID)  MG CAPS Take by mouth as needed    Yes Historical Provider, MD   LORazepam (ATIVAN) 0.5 MG tablet 0.5 mg daily as needed.  4/24/18  Yes Historical Provider, MD   pregabalin (LYRICA) 75 MG capsule take 1 capsule by mouth twice a day 10/10/19  Yes Historical Provider, MD   Cetirizine HCl (ZYRTEC PO) Take 10 mg by mouth daily    Yes Historical Provider, MD   nitroGLYCERIN (NITROSTAT) 0.3 MG SL tablet Place 1 tablet under the tongue every 5 minutes as needed for Chest pain up to max of 3 total doses. If no relief after 1 dose, call 911. 6/1/21   Agustina Johnson MD   ferrous sulfate (IRON 325) 325 (65 Fe) MG tablet Take 1 tablet by mouth daily (with breakfast) 3/30/21   Harmeet Patient, APRN - CNP   clobetasol (TEMOVATE) 0.05 % cream apply to rash twice a day for up to 2 weeks ON and 1 week off 1/28/21   Historical Provider, MD   metroNIDAZOLE (METROGEL) 0.75 % gel apply to face twice a day 1/28/21   Historical Provider, MD   omeprazole (PRILOSEC) 40 MG delayed release capsule Take 40 mg by mouth nightly  1/13/21   Historical Provider, MD   nystatin (MYCOSTATIN) 167431 UNIT/GM powder Apply 3 times daily. 12/16/20   Harmeet Patient, APRN - CNP        Current Facility-Administered Medications: 0.9 % sodium chloride infusion, , Intravenous, Continuous    Allergies: Other, Percocet [oxycodone-acetaminophen], Tramadol hcl, Azithromycin, Ibuprofen, Ceclor [cefaclor], and Penicillins    Social History:   reports that she has never smoked. She has never used smokeless tobacco. She reports current alcohol use. She reports that she does not use drugs. Family History: family history includes Alcohol Abuse in her father and maternal grandfather; Asthma in her brother; Breast Cancer in her maternal grandmother; Cancer in her paternal grandmother; Diabetes in her maternal grandfather and mother; Drug Abuse in her brother; Heart Attack in her maternal grandfather; Heart Failure in her maternal grandmother and paternal grandfather; Heart Surgery in her maternal grandmother and paternal grandfather; High Blood Pressure in her mother; Hypertension in her maternal grandfather; Lupus in her sister; Other in her father and mother; Stroke in her maternal grandfather. REVIEW OF SYSTEMS:      · Constitutional: there has been no unanticipated weight loss. · Eyes: No visual changes or diplopia. LABALBU in the last 72 hours. Patient's Active Problem List  Active Problems:    * No active hospital problems. *  Resolved Problems:    * No resolved hospital problems. *        IMPRESSION:      1. Chest pain  2. Positive stress test  3. RA      RECOMMENDATIONS:  1. Left heart cath  2. Need cardiac cath for risk stratification. Risk and benefits of cardiac catheterization were discussed in detail. Risk of bleeding, requiring blood transfusion, vascular complication requiring surgery, renal insufficieny with need of dialysis, CVA, MI, death and anesthesia complications including intubation were discussed. Patient agrees to proceed and verbalizes understanding. Pre Procedure Conscious Sedation Data:     ASA Class:                  [] I [x] II [] III [] IV     Mallampati Class:       [] I [x] II [] III [] IV      Discussed with patient and Nurse. Ronnie Castellano MD, M.D. Fellow, [address]      Please note that part of this chart were generated using voice recognition  dictation software. Although every effort was made to ensure the accuracy of this automated transcription, some errors in transcription may have occurred. Attestation signed by      Attending Physician Statement:    I have discussed the care of  [facility] , including pertinent history and exam findings, with the Cardiology fellow/resident. I have seen and examined the patient and the key elements of all parts of the encounter have been performed by me. I agree with the assessment, plan and orders as documented by the fellow/resident, after I modified exam findings and plan of treatments, and the final version is my approved version of the assessment.      Additional Comments:

## 2021-06-02 NOTE — PROGRESS NOTES
Deflation of Vasc band completed. Vasc band removed and pressure dressing applied followed by arm board. No bleeding or hematoma.

## 2021-06-02 NOTE — PROCEDURES
[facility]    CARDIAC CATHETERIZATION    Date:   6/2/2021  Patient name:  Mayra Prajapati  Date of admission:  6/2/2021 10:29 AM  MRN:   [id number]  YOB: 1970    Operators:  Primary:   Yg Morillo MD (Attending Physician)    Assistant/CV fellow: Alfred Marquez MD      Procedure performed:     [x] Left Heart Catheterization. [] Graft Angiography.  [] Left Ventriculography. [] Right Heart Catheterization. [x] Coronary Angiography. [] Aortic Valve Studies. [] PCI:      [] Other:       Pre Procedure Conscious Sedation Data:  ASA Class:    [] I [x] II [] III [] IV    Mallampati Class:  [] I [x] II [] III [] IV      Indication:  [] STEMI      [x] + Stress test  [] ACS      [] + EKG Changes  [] Non Q MI       [] Significant Risk Factors  [] Recurrent Angina             [] Diabetes Mellitus    [] New LBBB      [] Other.  [] CHF / Low EF changes     [] Abnormal CTA / Ca Score      Procedure:  Access:  [] Femoral  [x] Radial  artery       [x] Right  [] Left    Procedure: After informed consent was obtained with explanation of the risks and benefits, patient was brought to the cath lab. The access area was prepped and draped in sterile fashion. 1% lidocaine was used for local block. The artery was cannulated with 6  Fr sheath with brisk arterial blood return. The side port was frequently flushed and aspirated with normal saline. Estimated Blood Loss:  [x] Minimal < 25 cc [] Moderate 25-50 cc  [] >50 cc    Findings:    LMCA: Normal 0% stenosis. LAD: Normal 0% stenosis. LCx: Normal 0% stenosis. RCA: Normal 0% stenosis. Conclusions:    Normal coronaries     Recommendations:  1. Post-cath protocol  2. Continue optimal medical therapy  3.  Risk factor modification      ____________________________________________________________________    History and Risk Factors    [] Hypertension     [] Family history of CAD  [] Hyperlipidemia     [] Cerebrovascular Disease   [] Prior MI       [] Peripheral Vascular disease   [] Prior PCI              [] Diabetes Mellitus    [] Left Main PCI. [] Currently on Dialysis. [] Prior CABG. [] Currently smoker. [] Cardiac Arrest outside of healthcare facility. [] Yes    [x] No        Witnessed     [] Yes   [] No     Arrest after arrival of EMS  [] Yes   [] No     [] Cardiac Arrest at other Facility. [] Yes   [x] No    Pre-Procedure Information. Heart Failure       [] Yes    [x] No        Class  [] I      [] II  [] III    [] IV. New Diagnosis    [] Yes  [] No    HF Type      [] Systolic   [] Diastolic          [] Unknown. Diagnostic Test:   EKG       [] Normal   [x] Abnormal    New antiarrhythmia medications:    [] Yes   [] No   New onset atrial fibrillation / Flutter     [] Yes   [] No   ECG Abnormalities:      [] V. Fib   [] Karina V. Tach           [] NS V. T   [] New LBBB           [] T. Inv  []  ST dev > 0.5 mm         [] PVC's freq  [] PVC's infrequent    Stress Test Performed:      [x] Yes    [] No     Type:     [] Stress Echo   [] Exercise Stress Test (no imaging)      [x] Stress Nuclear  [] Stress Imaging     Results   [] Negative   [x] Positive        [] Indeterminate  [] Unavailable     If Positive/ Risk / Extent of Ischemia:       [] Low  [] Intermediate         [] High  [] Unavailable      Cardiac CTA Performed:     [] Yes    [x] No      Results   [] CAD   [] Non obstructive CAD      [] No CAD   [] Uncertain      [] Unknown   [] Structural Disease.      Pre Procedure Medications:   [] Yes    [x] No         [] ASA   [] Beta Blockers      [] Nitrate   [] Ca Channel Blockers      [] Ranolazine   [] Statin       [] Plavix/Others antiplatelets      Electronically signed on 6/2/2021 at 1:56 PM by:    Brisa Mishra MD  Fellow, [id number] [facility]

## 2021-06-08 ENCOUNTER — TELEPHONE (OUTPATIENT)
Dept: INTERNAL MEDICINE CLINIC | Age: 51
End: 2021-06-08

## 2021-06-08 NOTE — TELEPHONE ENCOUNTER
Pt called & stated she received a ltr [employer] stating that they need documentation to approve heart cath that was done last week by Dr. Amy Albarado on 6/2/21. Notes, documents past medical hx, current symptoms, results of imaging stress test and other related notes to heart cath request.    Please fax information to #[phone] Attn: Additional Information - [phone]  Dept    Please advise.

## 2021-06-08 NOTE — TELEPHONE ENCOUNTER
Called and spoke with patient, I advised patient that we would need supporting information from Dr. Jesús Robin to support why he decided on a heart cath. Patient said that insurance company needs information from our office, regarding testing leading up to the heart cath, like the stress test and EKG that Dr. Gilbert Fonseca ordered. I advised that I could send our note from our office but if more information is needed for her insurance that it would have to come from Dr. Jesús Robin. Patient asked for my name and said \"you are not listening\". I attempted to express to patient that I was listening and that I would send the information that I could, she again said you are not listening to me, I will call Bethany Jones. I advised patient of my name, she then said \"okay I will call joie Juarez\".     I printed out the office note from her 6/1/21 visit and faxed it to the number provided

## 2021-06-11 ENCOUNTER — HOSPITAL ENCOUNTER (OUTPATIENT)
Dept: MRI IMAGING | Facility: CLINIC | Age: 51
Discharge: HOME | End: 2021-06-13
Payer: COMMERCIAL

## 2021-06-11 ENCOUNTER — TELEPHONE (OUTPATIENT)
Dept: PODIATRY | Age: 51
End: 2021-06-11

## 2021-06-11 DIAGNOSIS — R60.0 EDEMA OF LOWER EXTREMITY: ICD-10-CM

## 2021-06-11 DIAGNOSIS — M79.672 PAIN IN LEFT FOOT: ICD-10-CM

## 2021-06-11 DIAGNOSIS — M84.375A STRESS FRACTURE OF LEFT CALCANEUS: ICD-10-CM

## 2021-06-11 PROCEDURE — 73721 MRI JNT OF LWR EXTRE W/O DYE: CPT

## 2021-06-17 ENCOUNTER — OFFICE VISIT (OUTPATIENT)
Dept: NEUROLOGY | Age: 51
End: 2021-06-17
Payer: COMMERCIAL

## 2021-06-17 VITALS
WEIGHT: 211 LBS | HEIGHT: 64 IN | SYSTOLIC BLOOD PRESSURE: 118 MMHG | HEART RATE: 91 BPM | DIASTOLIC BLOOD PRESSURE: 82 MMHG | BODY MASS INDEX: 36.02 KG/M2

## 2021-06-17 DIAGNOSIS — G43.001 MIGRAINE WITHOUT AURA AND WITH STATUS MIGRAINOSUS, NOT INTRACTABLE: ICD-10-CM

## 2021-06-17 DIAGNOSIS — R29.90 STROKE-LIKE SYMPTOMS: Primary | ICD-10-CM

## 2021-06-17 PROCEDURE — 99214 OFFICE O/P EST MOD 30 MIN: CPT | Performed by: NURSE PRACTITIONER

## 2021-06-17 RX ORDER — LORAZEPAM 1 MG/1
TABLET ORAL
COMMUNITY
Start: 2021-06-04

## 2021-06-17 NOTE — PROGRESS NOTES
[facility]            [address]          [place], [address] [facility]          Dept: [phone]          Dept Fax: [phone]        MD Charmaine Lepe MD Izell Easterly, MD Sherrie Sia, CNP            6/17/2021      HISTORY OF PRESENT ILLNESS:       I had the pleasure of seeing Annabel Dixon, who returns for continuing neurologic care. The patient was seen last on December 17, 2020 for treatment of probable complex migraines. She is here today reporting that when she was driving she did have another episodes. She notes that she was gripping the steering wheel extremely intensely. She then begun to have numbness on the right side of her face. She did have to pull over due to a panic attack. For secondary stroke prevention she is prescribed aspirin 81 mg daily and atorvastatin 40 mg daily. She did previously have migraines but is now prescribed celexa 30 mg for anxiety which has alleviated her migraines. The patient has remained compliant to aspirin and celexa. She notes that following her episodes of facial numbness she felt as if she was going to have a migraine. She notes that the numbness begins in her head and travels to her feet then back to her head.        Testing reviewed:    Lab Results   Component Value Date     CHOL 222 (H) 10/26/2020     LDLCHOLESTEROL 138 (H) 10/26/2020     HDL 36 (L) 10/26/2020     TRIG 240 (H) 10/26/2020     ALT 12 05/21/2019     AST 13 05/21/2019     TSH 4.71 07/08/2020     INR 0.9 10/25/2020     LABA1C 5.4 10/18/2018      Hematology:            Recent Labs     10/25/20  2115 10/26/20  0534 10/27/20  0527   WBC 8.7 7.0 6.8   HGB 13.3 13.2 13.0   HCT 38.1 39.3 38.3    203 189   INR 0.9  --   --       Chemistry:            Recent Labs     10/25/20  2115 10/26/20  0534 10/27/20  0527    139 138   K 3.9 4.0 4.1    103 102   CO2 24 26 26 GLUCOSE 110* 107* 117*   BUN 8 8 14   CREATININE 0.61 0.53 0.65   CALCIUM 9.1 9.0 9.1              Recent Labs     10/25/20  2115 10/26/20  0534   CHOL  --  222*   HDL  --  36*   LDLCHOLESTEROL  --  138*   CHOLHDLRATIO  --  6.2*   TRIG  --  240*   VLDL  --  NOT REPORTED*   CKTOTAL 92  --    CKMB 1.8  --          No results found for: PHENYTOIN, PHENYTOIN, VALPROATE, CBMZ           Diagnostic data reviewed:     CT head 10/25/2020: No acute intracranial abnormalities.     CTA head and neck 10/25/2020: No LVO or critical stenosis.     Echo: Left ventricle is normal in size and wall thickness. Global left ventricular systolic function is normal. EF 60-65   %.   No obvious wall motion abnormality seen.   Both atria are normal in size.   Negative bubble study, with and without Valsalva maneuver, no suggestion of  inter-atrial shunt.      MRI brain 10/26/2020:: No acute infarct, no mass-effect or midline shift.  No evidence of bleed. No white matter disease. 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     Headache     History of cervical dysplasia 2000    had cone    History of conization of cervix 2000    dysplastic cells    Hyperlipidemia     Hypothyroidism, unspecified 3/18/2016    Lupus ([facility] [facility]     Osteoarthritis     Positive cardiac stress test     Rheumatoid arthritis (HCC)     Sleep apnea     tests were negative but snores badly    SOB (shortness of breath)     Vision abnormalities     glasses/ far sighted        PAST SURGICAL HISTORY:         Procedure Laterality Date    BONE CYST EXCISION Right     WRIST    CARDIAC CATHETERIZATION  2021    CARPAL TUNNEL RELEASE Right 10-6-15    CARPAL TUNNEL RELEASE Left 11/3/15    CERVIX BIOPSY       SECTION, LOW TRANSVERSE      COLONOSCOPY      COLONOSCOPY  2009    diverticulosis, no other gross lesions    COLONOSCOPY  2017    10 yr recall, small hemorrhoids, diverticulosis    COLONOSCOPY N/A 3/12/2021    COLONOSCOPY POLYPECTOMY SNARE/COLD BIOPSY performed by Micaela Cruz MD at [place] [address] N/A 2017    HYSTEROSCOPY AND D& C, myosure performed by Marivel Hutchison MD at [facility], COLON, DIAGNOSTIC      UGI    FRACTURE SURGERY Left     THUMB    GYNECOLOGIC CRYOSURGERY      conization   [address] [facility]      with mesh, umbilical    LIPOMA RESECTION Right     RING FINGER    OTHER SURGICAL HISTORY      VOCAL CORD SURG    [place] COLON CA SCRN NOT  [address]  [place] N/A 2017    COLONOSCOPY performed by Brennon Logan MD at [address] [place] [address] EGD TRANSORAL BIOPSY SINGLE/MULTIPLE N/A 2017    EGD BIOPSY performed by Brennon Logan MD at [facility] 2016    Right thyroid lobectomy and isthmusectomy. Continuous monitoring of the recurrent laryngeal nerve using nerve integrity monitor. Frozen section.     TONSILLECTOMY      UPPER GASTROINTESTINAL ENDOSCOPY  2008    with BRAVO, gastric polyp-fundic gland polyp    UPPER GASTROINTESTINAL ENDOSCOPY  2017    multiple small gastric polyps-fundic gland polyps    UPPER GASTROINTESTINAL ENDOSCOPY N/A 3/3/2021    EGD BIOPSY & SAVARY DILATION performed by Woodrow Brown MD at [place] [address]  3/12/2021    EGD DILATION SAVORY performed by Micaela Cruz MD at [facility]        SOCIAL HISTORY:     Social History     Socioeconomic History    Marital status:      Spouse name: Not on file    Number of children:  atorvastatin (LIPITOR) 40 MG tablet Take 1 tablet by mouth daily 30 tablet 3    nitroGLYCERIN (NITROSTAT) 0.3 MG SL tablet Place 1 tablet under the tongue every 5 minutes as needed for Chest pain up to max of 3 total doses. If no relief after 1 dose, call 911. 30 tablet 3    tiZANidine (ZANAFLEX) 4 MG tablet Take 1 tablet by mouth nightly 30 tablet 1    clobetasol (TEMOVATE) 0.05 % cream apply to rash twice a day for up to 2 weeks ON and 1 week off      metroNIDAZOLE (METROGEL) 0.75 % gel apply to face twice a day      omeprazole (PRILOSEC) 40 MG delayed release capsule Take 40 mg by mouth nightly       Calcium Carb-Cholecalciferol (CALCIUM/VITAMIN D PO) Take by mouth      citalopram (CELEXA) 10 MG tablet take 1 tablet by mouth once daily with 20 MG TABLET 30 tablet 3    citalopram (CELEXA) 20 MG tablet Take 1 tablet by mouth daily With the celexa 10mg tab to make 30mg dialy 30 tablet 3    B Complex Vitamins (VITAMIN B COMPLEX PO) Take by mouth daily      nystatin (MYCOSTATIN) 369203 UNIT/GM powder Apply 3 times daily. 60 g 3    levothyroxine (SYNTHROID) 50 MCG tablet Take 1 tablet by mouth daily 90 tablet 3    LORazepam (ATIVAN) 0.5 MG tablet 0.5 mg daily as needed.  pregabalin (LYRICA) 75 MG capsule take 1 capsule by mouth twice a day  0    Cetirizine HCl (ZYRTEC PO) Take 10 mg by mouth daily       ferrous sulfate (IRON 325) 325 (65 Fe) MG tablet Take 1 tablet by mouth daily (with breakfast) (Patient not taking: Reported on 6/17/2021) 90 tablet 1    Omeprazole-Sodium Bicarbonate (ZEGERID)  MG CAPS Take by mouth as needed  (Patient not taking: Reported on 6/17/2021)       No current facility-administered medications for this visit. ALLERGIES:     Allergies   Allergen Reactions    Other      Perch - twice had violent vomiting, diarrhea,severe dizziness and nausea    Percocet [Oxycodone-Acetaminophen] Hives and Itching     Can take if she takes Benadryl with it.  Hives everywhere, including roof of mouth and tear duct openings    Tramadol Hcl Hives and Itching     hives    Azithromycin      Palpitations.  Ibuprofen Hives    Ceclor [Cefaclor] Hives and Rash    Penicillins Hives and Rash                                 REVIEW OF SYSTEMS        All items selected indicate a positive finding. Those items not selected are negative.   Constitutional [] Weight loss/gain   [] Fatigue  [] Fever/Chills   HEENT [] Hearing Loss  [] Visual Disturbance  [] Tinnitus  [] Eye pain   Respiratory [] Shortness of Breath  [] Cough  [] Snoring   Cardiovascular [] Chest Pain  [] Palpitations  [] Lightheaded   GI [] Constipation  [] Diarrhea  [] Swallowing change  [] Nausea/vomiting    [] Urinary Frequency  [] Urinary Urgency   Musculoskeletal [] Neck pain  [] Back pain  [] Muscle pain  [] Restless legs   Dermatologic [] Skin changes   Neurologic [] Memory loss/confusion  [] Seizures  [] Trouble walking or imbalance  [] Dizziness  [] Sleep disturbance  [] Weakness  [x] Numbness  [] Tremors  [] Speech Difficulty  [x] Headaches  [] Light Sensitivity  [] Sound Sensitivity   Endocrinology []Excessive thirst  []Excessive hunger   Psychiatric [] Anxiety/Depression  [] Hallucination   Allergy/immunology []Hives/environmental allergies   Hematologic/lymph [] Abnormal bleeding  [] Abnormal bruising         PHYSICAL EXAMINATION:       Vitals:    06/17/21 0922   BP: 118/82   Pulse: 91                                              .                                                                                                    General Appearance:  Alert, cooperative, no signs of distress, appears stated age   Head:  Normocephalic, no signs of trauma   Eyes:  Conjunctiva/corneas clear;  eyelids intact   Ears:  Normal external ear and canals   Nose: Nares normal, mucosa normal, no drainage    Throat: Lips and tongue normal; teeth normal;  gums normal   Neck: Supple, intact flexion, extension and rotation;   trachea midline;  no adenopathy;   thyroid: not enlarged;   no carotid pulse abnormality   Back:   Symmetric, no curvature, ROM adequate   Lungs:   Respirations unlabored   Heart:  Regular rate and rhythm           Extremities: Extremities normal, no cyanosis, no edema   Pulses: Symmetric over head and neck   Skin: Skin color, texture normal, no rashes, no lesions                                     NEUROLOGIC EXAMINATION    Neurologic Exam  Mental status    Alert and oriented x 3; intact memory with no confusion, speech or language problems; no hallucinations or delusions  Fund of information appropriate for level of education    Cranial nerves    II - visual fields intact to confrontation bilaterally  III, IV, VI  extra-ocular muscles full: no pupillary defect; no ARNOLD, no nystagmus, no ptosis   V - normal facial sensation                                                               VII - normal facial symmetry                                                             VIII - intact hearing                                                                             IX, X - symmetrical palate                                                                  XI - symmetrical shoulder shrug                                                       XII - tongue midline without atrophy or fasciculation      Motor function  Normal muscle bulk and tone; strength 5/5 on all 4 extremities, no pronator drift      Sensory function Intact to light touch, pinprick, vibration, proprioception on all 4 extremities      Cerebellar Intact fine motor movement. No involuntary movements or tremors. No ataxia or dysmetria on finger to nose or heel to shin testing      Reflex function DTR 2+ on bilateral UE and LE, symmetric. Negative Babinski      Gait                   normal base and arm swing                  Medical Decision Making:        In summary, your patient, Armaan Pradhan exhibits the following, with associated plan:    1. Probable Complex Migraines versus focal seizures  1. Continue aspirin 81 mg daily and atorvastatin 20 mg nightly   2. EEG to check for electrical abnormalities, depending on the results we will consider starting topamax  3. Recommended that patient keep log of these episodes, depending on the frequency we will consider adding prophylactic medication  4. Patient to return for follow up visit in 6 months, she was instructed to contact the office for a sooner appointment if these begin to increase in frequency  2. Lupus, rheumatoid arthritis and Fibromyalgia  1. Continue to follow with rheumatology            Signed: Saud DerSTARLA      *Please note that portions of this note were completed with a voice recognition program.  Although every effort was made to insure the accuracy of this automated transcription, some errors in transcription may have occurred, occasionally words and are mis-transcribed    Provider Attestation: The documentation recorded by the scribe accurately reflects the service I personally performed and the decisions made by myself. Portions of this note were transcribed by a scribe. I personally performed the history, physical exam, and medical decision-making and confirm the accuracy of the information in the transcribed note. Scribe Attestation:   By signing my name below, Elizabeth Centeno, attest that this documentation has been prepared under the direction and in the presence of Saud Samuel CNP.

## 2021-06-21 ENCOUNTER — OFFICE VISIT (OUTPATIENT)
Dept: PODIATRY | Age: 51
End: 2021-06-21
Payer: COMMERCIAL

## 2021-06-21 VITALS — BODY MASS INDEX: 36.7 KG/M2 | HEIGHT: 64 IN | WEIGHT: 215 LBS

## 2021-06-21 DIAGNOSIS — M72.2 PLANTAR FASCIITIS OF LEFT FOOT: Primary | ICD-10-CM

## 2021-06-21 DIAGNOSIS — M79.672 PAIN IN LEFT FOOT: ICD-10-CM

## 2021-06-21 PROCEDURE — 99213 OFFICE O/P EST LOW 20 MIN: CPT | Performed by: PODIATRIST

## 2021-06-21 PROCEDURE — 20550 NJX 1 TENDON SHEATH/LIGAMENT: CPT | Performed by: PODIATRIST

## 2021-06-21 RX ORDER — BUPIVACAINE HYDROCHLORIDE 5 MG/ML
1 INJECTION, SOLUTION PERINEURAL ONCE
Status: COMPLETED | OUTPATIENT
Start: 2021-06-21 | End: 2021-06-21

## 2021-06-21 RX ADMIN — BUPIVACAINE HYDROCHLORIDE 5 MG: 5 INJECTION, SOLUTION PERINEURAL at 10:29

## 2021-06-21 ASSESSMENT — ENCOUNTER SYMPTOMS
SHORTNESS OF BREATH: 0
COLOR CHANGE: 0
NAUSEA: 0
DIARRHEA: 0
BACK PAIN: 0

## 2021-06-21 NOTE — PROGRESS NOTES
[facility]  Return Patient Progress Note    Subjective: Lorelei Andre 53 y.o. female that presents for follow up evaluation of plantar fasciitis to the left foot. Chief Complaint   Patient presents with    Follow-up     MRI left heel, still very painful    Patient's treatment thus far has included MRI, icing and stretching. Pain is rated 10 out of 10 and is described as intermittent. Patient has been following my prescribed course of therapy as instructed. Review of Systems   Constitutional: Negative for activity change, appetite change, chills, diaphoresis, fatigue and fever. Respiratory: Negative for shortness of breath. Cardiovascular: Negative for leg swelling. Gastrointestinal: Negative for diarrhea and nausea. Endocrine: Negative for cold intolerance, heat intolerance and polyuria. Musculoskeletal: Negative for arthralgias, back pain, gait problem, joint swelling and myalgias. Skin: Negative for color change, pallor, rash and wound. Allergic/Immunologic: Negative for environmental allergies and food allergies. Neurological: Negative for dizziness, weakness, light-headedness and numbness. Hematological: Does not bruise/bleed easily. Psychiatric/Behavioral: Negative for behavioral problems, confusion and self-injury. The patient is not nervous/anxious. Objective: Clinical evaluation of the patient reveals pain with palpation to the plantar medial calcaneal tubercle of the left foot. There is no pain with palpation to the plantar central aspect of the left heel. There is no pain with palpation to the kobi pedis of the left foot. There is no pain today with medial to lateral compression of the left calcaneus. Tinel's sign is negative to the left tarsal tunnel. There is no erythema, calor, or open lesion noted to the left foot or ankle. A review of the patient's MRI shows no evidence of a stress fracture, but only plantar fasciitis.     Assessment:    Diagnosis Orders   1. Plantar fasciitis of left foot  bupivacaine (MARCAINE) 0.5 % injection 5 mg    methylPREDNISolone acetate (DEPO-MEDROL) injection 10 mg    FL INJECT TENDON SHEATH/LIGAMENT   2. Pain in left foot  bupivacaine (MARCAINE) 0.5 % injection 5 mg    methylPREDNISolone acetate (DEPO-MEDROL) injection 10 mg    FL INJECT TENDON SHEATH/LIGAMENT         Plan: 1. Clinical evaluation of the patient. 2. Patient informed that her MRI is negative for any stress fracture, but does suggest plantar fasciitis. I recommended a steroid injection to the Left plantar heel. Verbal consent was obtained from the patient after all questions answered. The Left heel was prepped with an alcohol swab. The injection was placed at the most tender area using a  3.0 cc total of a combination of 1.5 cc of 0.5% Marcaine plain and 15 cc of Depomedrol 20 mg/ml. A band-aid was applied, patient advised of possible local steroid reaction symptoms, and patient instructed to limit activities to this area for 24 hours. Patient to continue with icing and stretching. 3. Return in about 2 weeks (around 7/5/2021) for Evaluation of plantar fasciitis.    6/21/2021      Bret Salinas DPM

## 2021-06-30 ENCOUNTER — OFFICE VISIT (OUTPATIENT)
Dept: INTERNAL MEDICINE CLINIC | Age: 51
End: 2021-06-30
Payer: COMMERCIAL

## 2021-06-30 VITALS
OXYGEN SATURATION: 96 % | WEIGHT: 208.6 LBS | DIASTOLIC BLOOD PRESSURE: 84 MMHG | HEART RATE: 108 BPM | TEMPERATURE: 97.5 F | BODY MASS INDEX: 35.61 KG/M2 | SYSTOLIC BLOOD PRESSURE: 128 MMHG | HEIGHT: 64 IN

## 2021-06-30 DIAGNOSIS — F32.A ANXIETY AND DEPRESSION: Primary | Chronic | ICD-10-CM

## 2021-06-30 DIAGNOSIS — F41.9 ANXIETY AND DEPRESSION: Primary | Chronic | ICD-10-CM

## 2021-06-30 DIAGNOSIS — D50.9 MICROCYTIC ANEMIA: ICD-10-CM

## 2021-06-30 DIAGNOSIS — B35.4 TINEA CORPORIS: ICD-10-CM

## 2021-06-30 PROCEDURE — 99213 OFFICE O/P EST LOW 20 MIN: CPT | Performed by: INTERNAL MEDICINE

## 2021-06-30 RX ORDER — CITALOPRAM 10 MG/1
TABLET ORAL
Qty: 30 TABLET | Refills: 3 | Status: SHIPPED | OUTPATIENT
Start: 2021-06-30

## 2021-06-30 RX ORDER — TRAMADOL HYDROCHLORIDE 50 MG/1
TABLET ORAL
COMMUNITY
Start: 2021-06-22

## 2021-06-30 RX ORDER — CITALOPRAM 20 MG/1
20 TABLET ORAL DAILY
Qty: 30 TABLET | Refills: 3 | Status: SHIPPED | OUTPATIENT
Start: 2021-06-30

## 2021-06-30 RX ORDER — NYSTATIN 100000 [USP'U]/G
POWDER TOPICAL
Qty: 60 G | Refills: 3 | Status: SHIPPED | OUTPATIENT
Start: 2021-06-30

## 2021-06-30 NOTE — PROGRESS NOTES
Visit Information    Have you changed or started any medications since your last visit including any over-the-counter medicines, vitamins, or herbal medicines? no   Are you having any side effects from any of your medications? -  no  Have you stopped taking any of your medications? Is so, why? -  no    Have you seen any other physician or provider since your last visit? Yes - Records Requested  Have you had any other diagnostic tests since your last visit? Yes - Records Requested  Have you been seen in the emergency room and/or had an admission to a hospital since we last saw you? No  Have you had your routine dental cleaning in the past 6 months? yes -     Have you activated your [employer] account? If not, what are your barriers?  Yes     Patient Care Team:  Pedro Denver, MD as PCP - General (Internal Medicine)  Pedro Denver, MD as PCP - [facility] Provider  Lizz Barrientos DO as Consulting Physician (Obstetrics & Gynecology)    Medical History Review  Past Medical, Family, and Social History reviewed and does not contribute to the patient presenting condition    Health Maintenance   Topic Date Due    Pneumococcal 0-64 years Vaccine (1 of 2 - PPSV23) Never done    COVID-19 Vaccine (1) Never done    DTaP/Tdap/Td vaccine (1 - Tdap) Never done    TSH testing  07/08/2021    Flu vaccine (Season Ended) 10/30/2021 (Originally 9/1/2021)    Diabetes screen  10/18/2021    Lipid screen  10/26/2021    Cervical cancer screen  11/07/2024    Colon cancer screen colonoscopy  03/12/2031    Hepatitis C screen  Completed    HIV screen  Completed    Hepatitis A vaccine  Aged Out    Hepatitis B vaccine  Aged Out    Hib vaccine  Aged Out    Meningococcal (ACWY) vaccine  Aged Out     SUBJECTIVE:  Kentrell Walter is a 53 y.o. female patient who  comes for complaints of   Chief Complaint   Patient presents with    Abnormal Test Results     Stress test    Depression    Referral - General     psychiatrist to manage meds           Abnormal labs-anemia of chronic disease  Hypochromic microcytic anemia  Noted recently on labs from [facility]   Upper GI bleed 2mth ago,   remains anemia at 9.5  Pt reports concern for Crohns disease at the time  Pt recently started on Iron supplements  Iron studies   Pt has a gastroenterologist at [facility]    Due for EEG per neurology  For  Left facial numbness intermittenly, always occurs with a panic attack         REVIEW OF SYSTEMS (except Subjective (HPI))  GENERAL: No fevers / chills  RESPIRATORY: Negative for cough, wheezing or shortness of breath  CARDIOVASCULAR: Negative for chest pain or palpitations. 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)     Diverticulosis of colon     Environmental allergies     Family history of breast cancer     MGM in her 45s    Fibromyalgia     GERD (gastroesophageal reflux disease)     H/O thyroid cyst     mild hypothyroidism.     Headache     History of cervical dysplasia 2000    had cone    History of conization of cervix 2000    dysplastic cells    Hyperlipidemia     Hypothyroidism, unspecified 3/18/2016    Lupus ([facility]     Osteoarthritis     Positive cardiac stress test     Rheumatoid arthritis (HCC)     Sleep apnea     tests were negative but snores badly    SOB (shortness of breath)     Vision abnormalities     glasses/ far sighted       SOCIAL HISTORY:  Social History     Socioeconomic History    Marital status:      Spouse name: Not on file    Number of children: Not on file    Years of education: Not on file    Highest education level: Not on file   Occupational History    Not on file   Tobacco Use    Smoking status: Never Smoker    Smokeless tobacco: Never Used   [facility] Place 1 tablet under the tongue every 5 minutes as needed for Chest pain up to max of 3 total doses. If no relief after 1 dose, call 911. 30 tablet 3    tiZANidine (ZANAFLEX) 4 MG tablet Take 1 tablet by mouth nightly 30 tablet 1    ferrous sulfate (IRON 325) 325 (65 Fe) MG tablet Take 1 tablet by mouth daily (with breakfast) 90 tablet 1    clobetasol (TEMOVATE) 0.05 % cream apply to rash twice a day for up to 2 weeks ON and 1 week off      metroNIDAZOLE (METROGEL) 0.75 % gel apply to face twice a day      omeprazole (PRILOSEC) 40 MG delayed release capsule Take 40 mg by mouth nightly       Calcium Carb-Cholecalciferol (CALCIUM/VITAMIN D PO) Take by mouth      citalopram (CELEXA) 10 MG tablet take 1 tablet by mouth once daily with 20 MG TABLET 30 tablet 3    citalopram (CELEXA) 20 MG tablet Take 1 tablet by mouth daily With the celexa 10mg tab to make 30mg dialy 30 tablet 3    B Complex Vitamins (VITAMIN B COMPLEX PO) Take by mouth daily      nystatin (MYCOSTATIN) 293137 UNIT/GM powder Apply 3 times daily. 60 g 3    levothyroxine (SYNTHROID) 50 MCG tablet Take 1 tablet by mouth daily 90 tablet 3    Omeprazole-Sodium Bicarbonate (ZEGERID)  MG CAPS Take by mouth as needed       pregabalin (LYRICA) 75 MG capsule take 1 capsule by mouth twice a day  0    Cetirizine HCl (ZYRTEC PO) Take 10 mg by mouth daily       LORazepam (ATIVAN) 0.5 MG tablet 0.5 mg daily as needed. (Patient not taking: Reported on 6/30/2021)       No current facility-administered medications for this visit. OBJECTIVE:  Vitals:    06/30/21 1328   BP: 128/84   Pulse: 108   Temp: 97.5 °F (36.4 °C)   SpO2: 96%     Body mass index is 35.81 kg/m². General exam (except above):  General appearance - well appearing, alert, in no acute distress  Head - Atraumatic, normocephalic  Eyes - EOMI, no jaundice or pallor  Lungs - Lungs clear to auscultation. No wheezing, rhonchi, rales  Heart - RRR without murmur, gallop, or rubs. No ectopy  Abdomen - Abdomen soft, non-tender. Bowel sounds normal. No masses, organomegaly  Extremities -No significant edema, or skin discoloration. Good capillary refill. Neuro - Pt Alert, awake and oriented x 3. No gross focal neurological deficits    ASSESSMENT AND PLAN (MEDICAL DECISION MAKING):     Lorelei was seen today for abnormal test results, depression and referral - general.    Diagnoses and all orders for this visit:    Anxiety and depression  Comments:  doing ok on celexa 30  Would like to continue current dose    Orders:  -     citalopram (CELEXA) 10 MG tablet; take 1 tablet by mouth once daily with 20 MG TABLET  -     citalopram (CELEXA) 20 MG tablet; Take 1 tablet by mouth daily With the celexa 10mg tab to make 30mg dialy  -     Cynthia Luna MD, Psychiatry, [place]    Tinea corporis  -     nystatin (MYCOSTATIN) 030873 UNIT/GM powder; Apply 3 times daily. Microcytic anemia  Comments: In the setting of rheumatoid arthritis awaiting start of therapy patient following with rheumatology. Recently started on oral iron supplements.   No evident bl         Follow up in: 3 months      Beatriz Graves MD

## 2021-07-06 ENCOUNTER — HOSPITAL ENCOUNTER (OUTPATIENT)
Dept: NEUROLOGY | Age: 51
Discharge: HOME | End: 2021-07-06
Payer: COMMERCIAL

## 2021-07-06 DIAGNOSIS — R29.90 STROKE-LIKE SYMPTOMS: ICD-10-CM

## 2021-07-06 PROCEDURE — 95816 EEG AWAKE AND DROWSY: CPT

## 2021-07-08 NOTE — PROCEDURES
EEG    Reason for EEG:      TECHNICAL DESCRIPTION  This is a 21 channel digital EEG recording with the patient awake. Electrodes were placed in accordance with the 10-20 International System of Electrode Placement. Single lead EKG monitoring was included    Waking background activity consists of well-formed and symmetrical medium voltage 9 Hz alpha activity seen posteriorly that attenuates with eye opening. Low voltage fast frequency beta activity is seen anteriorly. Hyperventilation is not performed. Photic stimulation is unremarkable. There are no epileptiform discharges seen. IMPRESSION  The awake EEG is normal. There is no electrophysiologic evidence of focal slowing and epileptiform activity. EKG showed normal sinus rhythm.       Souleymane Higginbotham MD

## 2021-07-13 ENCOUNTER — TELEPHONE (OUTPATIENT)
Dept: NEUROLOGY | Age: 51
End: 2021-07-13

## 2021-07-13 NOTE — TELEPHONE ENCOUNTER
Patient called the office today requesting her EEG results. Patient stated that she had this done on 7/6/21. I explained that the results were still out. I would check in to this and let her know. I spoke with my  Ana Mcdaniels and she will check in to this.

## 2021-07-19 PROCEDURE — 95816 EEG AWAKE AND DROWSY: CPT | Performed by: PSYCHIATRY & NEUROLOGY

## 2021-09-03 DIAGNOSIS — E89.0 POSTOPERATIVE HYPOTHYROIDISM: ICD-10-CM

## 2021-09-03 RX ORDER — LEVOTHYROXINE SODIUM 0.05 MG/1
TABLET ORAL
Qty: 90 TABLET | Refills: 3 | Status: SHIPPED | OUTPATIENT
Start: 2021-09-03 | End: 2022-08-29

## 2021-10-01 ENCOUNTER — OFFICE VISIT (OUTPATIENT)
Dept: INTERNAL MEDICINE CLINIC | Age: 51
End: 2021-10-01
Payer: COMMERCIAL

## 2021-10-01 VITALS
SYSTOLIC BLOOD PRESSURE: 114 MMHG | OXYGEN SATURATION: 98 % | WEIGHT: 203 LBS | BODY MASS INDEX: 34.84 KG/M2 | DIASTOLIC BLOOD PRESSURE: 78 MMHG | HEART RATE: 90 BPM

## 2021-10-01 DIAGNOSIS — M54.50 ACUTE MIDLINE LOW BACK PAIN, UNSPECIFIED WHETHER SCIATICA PRESENT: ICD-10-CM

## 2021-10-01 DIAGNOSIS — R53.82 CHRONIC FATIGUE: ICD-10-CM

## 2021-10-01 DIAGNOSIS — M32.9 LUPUS (HCC): ICD-10-CM

## 2021-10-01 DIAGNOSIS — R10.2 SUPRAPUBIC PAIN: Primary | ICD-10-CM

## 2021-10-01 PROCEDURE — 99214 OFFICE O/P EST MOD 30 MIN: CPT | Performed by: NURSE PRACTITIONER

## 2021-10-01 RX ORDER — TRIAMCINOLONE ACETONIDE 5 MG/G
OINTMENT TOPICAL
COMMUNITY
Start: 2021-07-13

## 2021-10-01 RX ORDER — ADALIMUMAB 40MG/0.4ML
KIT SUBCUTANEOUS
COMMUNITY
Start: 2021-09-28

## 2021-10-01 RX ORDER — QUETIAPINE FUMARATE 25 MG/1
TABLET, FILM COATED ORAL
COMMUNITY
Start: 2021-09-30 | End: 2022-01-19

## 2021-10-01 NOTE — PROGRESS NOTES
Visit Information    Have you changed or started any medications since your last visit including any over-the-counter medicines, vitamins, or herbal medicines? no   Are you having any side effects from any of your medications? -  no  Have you stopped taking any of your medications? Is so, why? -  yes -     Have you seen any other physician or provider since your last visit? Yes - Records Obtained  Have you had any other diagnostic tests since your last visit? Yes - Records Obtained  Have you been seen in the emergency room and/or had an admission to a hospital since we last saw you? Yes-Records obtained  Have you had your routine dental cleaning in the past 6 months? no    Have you activated your [employer] account? If not, what are your barriers?  Yes     Patient Care Team:  Mason Silva MD as PCP - General (Internal Medicine)  Mason Silva MD as PCP - [facility]  Lorena Arnett DO as Consulting Physician (Obstetrics & Gynecology)    Medical History Review  Past Medical, Family, and Social History reviewed and does not contribute to the patient presenting condition    Health Maintenance   Topic Date Due    Pneumococcal 0-64 years Vaccine (1 of 2 - PPSV23) Never done    COVID-19 Vaccine (1) Never done    DTaP/Tdap/Td vaccine (1 - Tdap) Never done    Flu vaccine (1) 10/01/2022 (Originally 9/1/2021)    Diabetes screen  10/18/2021    Lipid screen  10/26/2021    TSH testing  10/02/2022    Cervical cancer screen  11/07/2022    Colon cancer screen colonoscopy  03/12/2031    Hepatitis C screen  Completed    HIV screen  Completed    Hepatitis A vaccine  Aged Out    Hepatitis B vaccine  Aged Out    Hib vaccine  Aged Out    Meningococcal (ACWY) vaccine  Aged Out         [address] [address]  Dept: [phone]  Dept Fax: [phone]    OfficeProgress/Follow Up Note  Date of patient's visit: 10/8/2021  Patient's Name:  Izabela Cherry Malva Ny YOB: 1970            Patient Care Team:  Rehana Perez MD as PCP - General (Internal Medicine)  Rehana Perez MD as PCP - [facility]  Riley Macias DO as Consulting Physician (Obstetrics & Gynecology)    REASON FOR VISIT:Same day visit    HISTORY OF PRESENT ILLNESS:      History was obtained from the patient. Samuel Moise is a 53 y.o. female here today for Samuel Moise is a 53 y.o. female here today for Other (pain and pressure in groin that radiated to her back and legs, also wants labs to check lupus and her kidneys)    Reporting low back pain and suprapubic pressure  Patient is concerned for possible UTI  Denies fevers, chills, dysuria, hematuria, urgency or frequency with urination. She also presents with worsening arthralgias in feet, knees and back  She follows with Dr. Chula Crum, rheumatology for lupus management. She was started on Humira a couple months ago. Patient is concerned for possible Lupus flare. However she has not contacted rheumatologist at this time. Additional symptoms include forgetfulness, fatigue and headaches.      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    Environmental allergies    H/O thyroid cyst    History of cervical dysplasia    History of conization of cervix     History of low transverse  section    Vision abnormalities    Family history of breast cancer    Osteoarthritis    VASECTOMY in Male Partner    History of endometrial ablation    Pelvic pain in female  Hypothyroidism    Abnormal uterine bleeding    Diverticulosis of colon    Rectal bleeding    Constipation    Isolated corticotropin deficiency (HCC)    Persistent depressive disorder    Generalized anxiety disorder with panic attacks    Other forms of systemic lupus erythematosus (HCC)    Panic attacks    Stroke-like symptoms    Numbness    GI bleed    Diarrhea    Migraine    Abdominal pain    Dysphagia    Microcytic anemia    Melena       MEDICATIONS:      Current Outpatient Medications   Medication Sig Dispense Refill    HUMIRA PEN 40 MG/0.4ML PNKT       QUEtiapine (SEROQUEL) 25 MG tablet       triamcinolone (ARISTOCORT) 0.5 % ointment apply A THIN LAYER topically to affected area twice a day      levothyroxine (SYNTHROID) 50 MCG tablet TAKE 1 TABLET DAILY 90 tablet 3    traMADol (ULTRAM) 50 MG tablet take 1 tablet by mouth twice a day if needed      citalopram (CELEXA) 10 MG tablet take 1 tablet by mouth once daily with 20 MG TABLET 30 tablet 3    citalopram (CELEXA) 20 MG tablet Take 1 tablet by mouth daily With the celexa 10mg tab to make 30mg dialy 30 tablet 3    nystatin (MYCOSTATIN) 405043 UNIT/GM powder Apply 3 times daily. 60 g 3    LORazepam (ATIVAN) 1 MG tablet take 1/2 to 1 tablet by mouth three times a day if needed for anxiety      atorvastatin (LIPITOR) 40 MG tablet Take 1 tablet by mouth daily 30 tablet 3    nitroGLYCERIN (NITROSTAT) 0.3 MG SL tablet Place 1 tablet under the tongue every 5 minutes as needed for Chest pain up to max of 3 total doses.  If no relief after 1 dose, call 911. 30 tablet 3    tiZANidine (ZANAFLEX) 4 MG tablet Take 1 tablet by mouth nightly 30 tablet 1    ferrous sulfate (IRON 325) 325 (65 Fe) MG tablet Take 1 tablet by mouth daily (with breakfast) 90 tablet 1    clobetasol (TEMOVATE) 0.05 % cream apply to rash twice a day for up to 2 weeks ON and 1 week off      omeprazole (PRILOSEC) 40 MG delayed release capsule Take 40 mg by mouth SpO2: 98%   Weight: 203 lb (92.1 kg)       Physical Exam  Vitals reviewed. Constitutional:       Appearance: Normal appearance. HENT:      Head: Normocephalic. Cardiovascular:      Rate and Rhythm: Normal rate and regular rhythm. Pulses: Normal pulses. Heart sounds: Normal heart sounds. Pulmonary:      Effort: Pulmonary effort is normal.      Breath sounds: Normal breath sounds. Abdominal:      General: Bowel sounds are normal.      Palpations: Abdomen is soft. Tenderness: There is no right CVA tenderness or left CVA tenderness. Musculoskeletal:         General: Tenderness (lumbar spine) present. No swelling or deformity. Normal range of motion. Skin:     General: Skin is warm and dry. Neurological:      General: No focal deficit present. Mental Status: She is alert and oriented to person, place, and time. Psychiatric:         Mood and Affect: Mood normal.         Behavior: Behavior normal.         Thought Content: Thought content normal.         Judgment: Judgment normal.          ASSESSMENT AND PLAN:      1. Suprapubic pain  - Urinalysis Reflex to Culture; Future  - US URINARY BLADDER LIMITED; Future    2. Acute midline low back pain, unspecified whether sciatica present  - encourage supportive care measures and stretching to help with pain    3. Chronic fatigue  - CBC Auto Differential; Future  - Comprehensive Metabolic Panel; Future  - TSH without Reflex; Future  - Vitamin D 25 Hydroxy; Future  - Vitamin B12 & Folate; Future    4. Lupus ([address]  - encouraged to follow up with rheumatologist regarding Humira and dosing  - Sedimentation Rate; Future  - C3 Complement; Future  - C4 Complement; Future  - Anti-Dna Antibody, Double-Stranded; Future  - C-Reactive Protein; Future      FOLLOW UP AND INSTRUCTIONS:   No follow-ups on file. Discussed use, benefit, and side effects of prescribed medications. All patient questions answered. Patient voiced understanding.      Patient given educational materials - see patient instructions    STEPHIE Horta - CNP   [facility]  10/8/2021, 8:56 AM    Please note that this chart wasgenerated using voice recognition Dragon dictation software. Although every effort was made to ensure the accuracy of this automated transcription, some errors in transcription may have occurred.

## 2021-10-02 ENCOUNTER — HOSPITAL ENCOUNTER (OUTPATIENT)
Age: 51
Discharge: HOME | End: 2021-10-02
Payer: COMMERCIAL

## 2021-10-02 ENCOUNTER — HOSPITAL ENCOUNTER (OUTPATIENT)
Dept: ULTRASOUND IMAGING | Age: 51
Discharge: HOME | End: 2021-10-04
Payer: COMMERCIAL

## 2021-10-02 DIAGNOSIS — R53.82 CHRONIC FATIGUE: ICD-10-CM

## 2021-10-02 DIAGNOSIS — R10.2 SUPRAPUBIC PAIN: ICD-10-CM

## 2021-10-02 DIAGNOSIS — M32.9 LUPUS (HCC): ICD-10-CM

## 2021-10-02 LAB
ABSOLUTE EOS #: 0.26 K/UL (ref 0–0.4)
ABSOLUTE IMMATURE GRANULOCYTE: ABNORMAL K/UL (ref 0–0.3)
ABSOLUTE LYMPH #: 2.62 K/UL (ref 1–4.8)
ABSOLUTE MONO #: 0.51 K/UL (ref 0.1–1.3)
ALBUMIN SERPL-MCNC: 4.3 G/DL (ref 3.5–5.2)
ALBUMIN/GLOBULIN RATIO: ABNORMAL (ref 1–2.5)
ALP BLD-CCNC: 103 U/L (ref 35–104)
ALT SERPL-CCNC: 13 U/L (ref 5–33)
ANION GAP SERPL CALCULATED.3IONS-SCNC: 14 MMOL/L (ref 9–17)
AST SERPL-CCNC: 15 U/L
BASOPHILS # BLD: 1 % (ref 0–2)
BASOPHILS ABSOLUTE: 0.06 K/UL (ref 0–0.2)
BILIRUB SERPL-MCNC: 0.21 MG/DL (ref 0.3–1.2)
BUN BLDV-MCNC: 8 MG/DL (ref 6–20)
BUN/CREAT BLD: ABNORMAL (ref 9–20)
C-REACTIVE PROTEIN: <3 MG/L (ref 0–5)
CALCIUM SERPL-MCNC: 9.2 MG/DL (ref 8.6–10.4)
CHLORIDE BLD-SCNC: 101 MMOL/L (ref 98–107)
CO2: 22 MMOL/L (ref 20–31)
COMPLEMENT C3: 133 MG/DL (ref 90–180)
COMPLEMENT C4: 34 MG/DL (ref 10–40)
CREAT SERPL-MCNC: 0.57 MG/DL (ref 0.5–0.9)
DIFFERENTIAL TYPE: ABNORMAL
EOSINOPHILS RELATIVE PERCENT: 4 % (ref 0–4)
FOLATE: >20 NG/ML
GFR AFRICAN AMERICAN: >60 ML/MIN
GFR NON-AFRICAN AMERICAN: >60 ML/MIN
GFR SERPL CREATININE-BSD FRML MDRD: ABNORMAL ML/MIN/{1.73_M2}
GFR SERPL CREATININE-BSD FRML MDRD: ABNORMAL ML/MIN/{1.73_M2}
GLUCOSE BLD-MCNC: 144 MG/DL (ref 70–99)
HCT VFR BLD CALC: 41.3 % (ref 36–46)
HEMOGLOBIN: 13.7 G/DL (ref 12–16)
IMMATURE GRANULOCYTES: ABNORMAL %
LYMPHOCYTES # BLD: 41 % (ref 24–44)
MCH RBC QN AUTO: 28.8 PG (ref 26–34)
MCHC RBC AUTO-ENTMCNC: 33.2 G/DL (ref 31–37)
MCV RBC AUTO: 86.7 FL (ref 80–100)
MONOCYTES # BLD: 8 % (ref 1–7)
MORPHOLOGY: ABNORMAL
MORPHOLOGY: ABNORMAL
NRBC AUTOMATED: ABNORMAL PER 100 WBC
PDW BLD-RTO: 17.5 % (ref 11.5–14.9)
PLATELET # BLD: 166 K/UL (ref 150–450)
PLATELET ESTIMATE: ABNORMAL
PMV BLD AUTO: 9.5 FL (ref 6–12)
POTASSIUM SERPL-SCNC: 3.8 MMOL/L (ref 3.7–5.3)
RBC # BLD: 4.77 M/UL (ref 4–5.2)
RBC # BLD: ABNORMAL 10*6/UL
SEDIMENTATION RATE, ERYTHROCYTE: 14 MM (ref 0–20)
SEG NEUTROPHILS: 46 % (ref 36–66)
SEGMENTED NEUTROPHILS ABSOLUTE COUNT: 2.95 K/UL (ref 1.3–9.1)
SODIUM BLD-SCNC: 137 MMOL/L (ref 135–144)
TOTAL PROTEIN: 7.3 G/DL (ref 6.4–8.3)
TSH SERPL DL<=0.05 MIU/L-ACNC: 3.22 MIU/L (ref 0.3–5)
VITAMIN B-12: 553 PG/ML (ref 232–1245)
VITAMIN D 25-HYDROXY: 24.5 NG/ML (ref 30–100)
WBC # BLD: 6.4 K/UL (ref 3.5–11)
WBC # BLD: ABNORMAL 10*3/UL

## 2021-10-02 PROCEDURE — 85652 RBC SED RATE AUTOMATED: CPT

## 2021-10-02 PROCEDURE — 82746 ASSAY OF FOLIC ACID SERUM: CPT

## 2021-10-02 PROCEDURE — 84443 ASSAY THYROID STIM HORMONE: CPT

## 2021-10-02 PROCEDURE — 85025 COMPLETE CBC W/AUTO DIFF WBC: CPT

## 2021-10-02 PROCEDURE — 86160 COMPLEMENT ANTIGEN: CPT

## 2021-10-02 PROCEDURE — 86225 DNA ANTIBODY NATIVE: CPT

## 2021-10-02 PROCEDURE — 36415 COLL VENOUS BLD VENIPUNCTURE: CPT

## 2021-10-02 PROCEDURE — 86140 C-REACTIVE PROTEIN: CPT

## 2021-10-02 PROCEDURE — 82306 VITAMIN D 25 HYDROXY: CPT

## 2021-10-02 PROCEDURE — 76775 US EXAM ABDO BACK WALL LIM: CPT

## 2021-10-02 PROCEDURE — 80053 COMPREHEN METABOLIC PANEL: CPT

## 2021-10-02 PROCEDURE — 82607 VITAMIN B-12: CPT

## 2021-10-04 DIAGNOSIS — E55.9 VITAMIN D DEFICIENCY: Primary | ICD-10-CM

## 2021-10-04 LAB — ANTI DNA DOUBLE STRANDED: 1.1 IU/ML

## 2021-10-04 RX ORDER — ERGOCALCIFEROL 1.25 MG/1
50000 CAPSULE ORAL WEEKLY
Qty: 4 CAPSULE | Refills: 0 | Status: SHIPPED | OUTPATIENT
Start: 2021-10-04 | End: 2021-10-26

## 2021-10-08 ASSESSMENT — ENCOUNTER SYMPTOMS
ABDOMINAL PAIN: 1
COUGH: 0
COLOR CHANGE: 0
TROUBLE SWALLOWING: 0
CONSTIPATION: 0
DIARRHEA: 0
WHEEZING: 0
VOMITING: 0
CHEST TIGHTNESS: 0
SORE THROAT: 0
BACK PAIN: 1
SHORTNESS OF BREATH: 0
RHINORRHEA: 0
NAUSEA: 0

## 2021-10-22 DIAGNOSIS — I25.118 CORONARY ARTERY DISEASE OF NATIVE HEART WITH STABLE ANGINA PECTORIS, UNSPECIFIED VESSEL OR LESION TYPE (HCC): ICD-10-CM

## 2021-10-22 DIAGNOSIS — R94.39 ABNORMAL STRESS TEST: ICD-10-CM

## 2021-10-22 RX ORDER — ATORVASTATIN CALCIUM 40 MG/1
TABLET, FILM COATED ORAL
Qty: 30 TABLET | Refills: 3 | Status: SHIPPED | OUTPATIENT
Start: 2021-10-22 | End: 2022-09-14 | Stop reason: SDUPTHER

## 2021-10-24 DIAGNOSIS — E55.9 VITAMIN D DEFICIENCY: ICD-10-CM

## 2021-10-26 RX ORDER — ERGOCALCIFEROL 1.25 MG/1
CAPSULE ORAL
Qty: 4 CAPSULE | Refills: 0 | Status: SHIPPED | OUTPATIENT
Start: 2021-10-26 | End: 2021-11-16

## 2021-11-02 ENCOUNTER — TELEMEDICINE (OUTPATIENT)
Dept: INTERNAL MEDICINE CLINIC | Age: 51
End: 2021-11-02
Payer: COMMERCIAL

## 2021-11-02 DIAGNOSIS — J01.90 ACUTE NON-RECURRENT SINUSITIS, UNSPECIFIED LOCATION: ICD-10-CM

## 2021-11-02 DIAGNOSIS — J06.9 UPPER RESPIRATORY INFECTION WITH COUGH AND CONGESTION: Primary | ICD-10-CM

## 2021-11-02 PROCEDURE — 99213 OFFICE O/P EST LOW 20 MIN: CPT | Performed by: INTERNAL MEDICINE

## 2021-11-02 RX ORDER — DOXYCYCLINE HYCLATE 100 MG
100 TABLET ORAL 2 TIMES DAILY
Qty: 14 TABLET | Refills: 0 | Status: SHIPPED | OUTPATIENT
Start: 2021-11-02 | End: 2021-11-09

## 2021-11-02 NOTE — PROGRESS NOTES
Visit Information    Have you changed or started any medications since your last visit including any over-the-counter medicines, vitamins, or herbal medicines? no   Are you having any side effects from any of your medications? -  no  Have you stopped taking any of your medications? Is so, why? -  no    Have you seen any other physician or provider since your last visit? No  Have you had any other diagnostic tests since your last visit? No  Have you been seen in the emergency room and/or had an admission to a hospital since we last saw you? No  Have you had your routine dental cleaning in the past 6 months? yes -     Have you activated your [employer] account? If not, what are your barriers?  Yes     Patient Care Team:  Gabrielle Chaudhary MD as PCP - General (Internal Medicine)  Gabrielle Chaudhary MD as PCP - [facility]  Charleen Acevedo DO as Consulting Physician (Obstetrics & Gynecology)    Medical History Review  Past Medical, Family, and Social History reviewed and does contribute to the patient presenting condition    Health Maintenance   Topic Date Due    Pneumococcal 0-64 years Vaccine (1 of 2 - PPSV23) Never done    COVID-19 Vaccine (1) Never done    DTaP/Tdap/Td vaccine (1 - Tdap) Never done    Diabetes screen  10/18/2021    Lipid screen  10/26/2021    Flu vaccine (1) 10/01/2022 (Originally 9/1/2021)    TSH testing  10/02/2022    Cervical cancer screen  11/07/2022    Colon cancer screen colonoscopy  03/12/2031    Hepatitis C screen  Completed    HIV screen  Completed    Hepatitis A vaccine  Aged Out    Hepatitis B vaccine  Aged Out    Hib vaccine  Aged Out    Meningococcal (ACWY) vaccine  Aged Out     SUBJECTIVE:  Jessica Lemus is a 53 y.o. female patient who  comes for complaints of   Chief Complaint   Patient presents with    Sinusitis    Otalgia     ears feel plugged        sinusitis  Duration-2-3days  Severity-moderte  Context-daughter diagnosed with strep  Associated signs and symptoms-  Ear pain, right ear pain, sore throat, some cough, pot  Nasal drainage  Has some chest heaviness, some shortness of breath  Home /80, pulse 77, pulse ox 97 at home  No recent travel, no long haul journey, no period of immobilization  Modifying factors- on humira for RA    Pt is not vaccinated for covid. Discussed, advised, she needs it more as she is on humira        REVIEW OF SYSTEMS (except Subjective (HPI))  GENERAL: No fevers, reports generally unwell  RESPIRATORY: Positive for cough mild shortness of breath denies wheezing  CARDIOVASCULAR: Some chest pressure, no palpitation GI: no nausea, vomiting, or diarrhea  NEURO: No history of headaches    Past Medical History:   Diagnosis Date    Adrenal insufficiency ([facility] [address]     Asthma     Bleeding after intercourse     History of    Bloody diarrhea     Cancer ([facility] [address]     CERVICAL    Chest pain     Delta storage pool disease [facility])     Diverticulosis of colon     Environmental allergies     Family history of breast cancer     MGM in her 45s    Fibromyalgia     GERD (gastroesophageal reflux disease)     H/O thyroid cyst     mild hypothyroidism.     Headache     History of cervical dysplasia 2000    had cone    History of conization of cervix 2000    dysplastic cells    Hyperlipidemia     Hypothyroidism, unspecified 3/18/2016    Lupus ([facility] [address]     Osteoarthritis     Positive cardiac stress test     Rheumatoid arthritis (HCC)     Sleep apnea     tests were negative but snores badly    SOB (shortness of breath)     Vision abnormalities     glasses/ far sighted       SOCIAL HISTORY:  Social History     Socioeconomic History    Marital status:      Spouse name: Not on file    Number of children: Not on file    Years of education: Not on file    Highest education level: Not on file   Occupational History    Not on file   Tobacco Use    Smoking status: Never Smoker    Smokeless tobacco: Never Used    Tobacco comment: only smoked for 4 months as teenager only   Vaping Use    Vaping Use: Never used   Substance and Sexual Activity    Alcohol use: Yes     Alcohol/week: 0.0 standard drinks     Comment: recovering alcoholic, quit heavy drinking 2000, takes 1-2 every yearly since 2014     Drug use: No    Sexual activity: Yes     Partners: Male     Birth control/protection: Other-see comments     Comment:  had vasectomy   Other Topics Concern    Not on file   Social History Narrative    Not on file     Social Determinants of Health     Financial Resource Strain:     Difficulty of Paying Living Expenses:    Food Insecurity: No Food Insecurity    Worried About Running Out of Food in the Last Year: Never true    Gurjit of Food in the Last Year: Never true   Transportation Needs: No Transportation Needs    Lack of Transportation (Medical): No    Lack of Transportation (Non-Medical):  No   Physical Activity:     Days of Exercise per Week:     Minutes of Exercise per Session:    Stress:     Feeling of Stress :    Social Connections:     Frequency of Communication with Friends and Family:     Frequency of Social Gatherings with Friends and Family:     Attends Judaism Services:     Active Member of Clubs or Organizations:     Attends Club or Organization Meetings:     Marital Status:    Intimate Partner Violence:     Fear of Current or Ex-Partner:     Emotionally Abused:     Physically Abused:     Sexually Abused:            CURRENT MEDICATIONS:  Current Outpatient Medications   Medication Sig Dispense Refill    vitamin D (ERGOCALCIFEROL) 1.25 MG (18995 UT) CAPS capsule take 1 capsule by mouth every week 4 capsule 0    atorvastatin (LIPITOR) 40 MG tablet take 1 tablet by mouth once daily 30 tablet 3    HUMIRA PEN 40 MG/0.4ML PNKT       QUEtiapine (SEROQUEL) 25 MG tablet       triamcinolone (ARISTOCORT) 0.5 % ointment apply A THIN LAYER topically to affected area twice a day      levothyroxine (SYNTHROID) 50 MCG tablet TAKE 1 TABLET DAILY 90 tablet 3    traMADol (ULTRAM) 50 MG tablet take 1 tablet by mouth twice a day if needed      citalopram (CELEXA) 10 MG tablet take 1 tablet by mouth once daily with 20 MG TABLET 30 tablet 3    citalopram (CELEXA) 20 MG tablet Take 1 tablet by mouth daily With the celexa 10mg tab to make 30mg dialy 30 tablet 3    nystatin (MYCOSTATIN) 625335 UNIT/GM powder Apply 3 times daily. 60 g 3    LORazepam (ATIVAN) 1 MG tablet take 1/2 to 1 tablet by mouth three times a day if needed for anxiety      nitroGLYCERIN (NITROSTAT) 0.3 MG SL tablet Place 1 tablet under the tongue every 5 minutes as needed for Chest pain up to max of 3 total doses. If no relief after 1 dose, call 911. 30 tablet 3    tiZANidine (ZANAFLEX) 4 MG tablet Take 1 tablet by mouth nightly 30 tablet 1    ferrous sulfate (IRON 325) 325 (65 Fe) MG tablet Take 1 tablet by mouth daily (with breakfast) 90 tablet 1    clobetasol (TEMOVATE) 0.05 % cream apply to rash twice a day for up to 2 weeks ON and 1 week off      omeprazole (PRILOSEC) 40 MG delayed release capsule Take 40 mg by mouth nightly       B Complex Vitamins (VITAMIN B COMPLEX PO) Take by mouth daily      pregabalin (LYRICA) 75 MG capsule take 1 capsule by mouth twice a day  0    Cetirizine HCl (ZYRTEC PO) Take 10 mg by mouth daily        No current facility-administered medications for this visit. OBJECTIVE:  There were no vitals filed for this visit. There is no height or weight on file to calculate BMI. Physical examination not done since this was a video visit. Patient appears well  Normal color,  not short of breath during conversation. Not in any pain or distress   No significant exanthematous lesions or discoloration noted on facial skin   Mentation and cognition normal  No facial asymmetry  Alert, oriented to time, place and person. ASSESSMENT AND PLAN (MEDICAL DECISION MAKING):   Lorelei was seen today for sinusitis and otalgia.     Diagnoses and all orders for this visit:    Upper respiratory infection with cough and congestion  -     COVID-19; Future    Acute non-recurrent sinusitis, unspecified location  -     doxycycline hyclate (VIBRA-TABS) 100 MG tablet; Take 1 tablet by mouth 2 times daily for 7 days           Follow up in: As needed    Michael Bolanos is a 53 y.o. female being evaluated by a Virtual Visit (video visit) encounter to address concerns as mentioned above. A caregiver was present when appropriate. Due to this being a TeleHealth encounter (During RZXLL-23 public health emergency), evaluation of the following organ systems was limited: Vitals/Constitutional/EENT/Resp/CV/GI//MS/Neuro/Skin/Heme-Lymph-Imm. Pursuant to the emergency declaration under the [address] [place] [address], [address] [place] [address] authority and the [employer] Act, this Virtual Visit was conducted with patient's (and/or legal guardian's) consent, to reduce the patient's risk of exposure to COVID-19 and provide necessary medical care. The patient (and/or legal guardian) has also been advised to contact this office for worsening conditions or problems, and seek emergency medical treatment and/or call 911 if deemed necessary. Patient identification was verified at the start of the visit: Yes    Total time spent for this encounter: Not billed by time    Services were provided through a video synchronous discussion virtually to substitute for in-person clinic visit. Patient and provider were located at their individual homes. --Ghada Arizmendi MD on 11/2/2021 at 12:05 PM    An electronic signature was used to authenticate this note.     Ghada Arizmendi MD

## 2021-11-16 DIAGNOSIS — E55.9 VITAMIN D DEFICIENCY: ICD-10-CM

## 2021-11-16 RX ORDER — ERGOCALCIFEROL 1.25 MG/1
CAPSULE ORAL
Qty: 4 CAPSULE | Refills: 0 | Status: SHIPPED | OUTPATIENT
Start: 2021-11-16 | End: 2021-12-14

## 2021-12-14 DIAGNOSIS — E55.9 VITAMIN D DEFICIENCY: ICD-10-CM

## 2021-12-14 RX ORDER — ERGOCALCIFEROL 1.25 MG/1
CAPSULE ORAL
Qty: 4 CAPSULE | Refills: 0 | Status: SHIPPED | OUTPATIENT
Start: 2021-12-14 | End: 2022-01-12

## 2021-12-24 DIAGNOSIS — D50.0 IRON DEFICIENCY ANEMIA DUE TO CHRONIC BLOOD LOSS: ICD-10-CM

## 2021-12-28 RX ORDER — FERROUS SULFATE 325(65) MG
TABLET ORAL
Qty: 90 TABLET | Refills: 1 | Status: SHIPPED | OUTPATIENT
Start: 2021-12-28 | End: 2022-06-06

## 2022-01-12 DIAGNOSIS — E55.9 VITAMIN D DEFICIENCY: ICD-10-CM

## 2022-01-12 RX ORDER — ERGOCALCIFEROL 1.25 MG/1
CAPSULE ORAL
Qty: 4 CAPSULE | Refills: 0 | Status: SHIPPED | OUTPATIENT
Start: 2022-01-12

## 2022-01-18 ENCOUNTER — HOSPITAL ENCOUNTER (EMERGENCY)
Age: 52
Discharge: HOME | End: 2022-01-18
Attending: EMERGENCY MEDICINE
Payer: COMMERCIAL

## 2022-01-18 VITALS
OXYGEN SATURATION: 97 % | RESPIRATION RATE: 18 BRPM | SYSTOLIC BLOOD PRESSURE: 140 MMHG | TEMPERATURE: 96.3 F | WEIGHT: 209 LBS | DIASTOLIC BLOOD PRESSURE: 80 MMHG | HEIGHT: 64 IN | HEART RATE: 89 BPM | BODY MASS INDEX: 35.68 KG/M2

## 2022-01-18 DIAGNOSIS — R21 RASH AND OTHER NONSPECIFIC SKIN ERUPTION: Primary | ICD-10-CM

## 2022-01-18 LAB
DIRECT EXAM: NORMAL
Lab: NORMAL
SPECIMEN DESCRIPTION: NORMAL

## 2022-01-18 PROCEDURE — 6360000002 HC RX W HCPCS: Performed by: PHYSICIAN ASSISTANT

## 2022-01-18 PROCEDURE — 99283 EMERGENCY DEPT VISIT LOW MDM: CPT

## 2022-01-18 PROCEDURE — 96372 THER/PROPH/DIAG INJ SC/IM: CPT

## 2022-01-18 PROCEDURE — 87880 STREP A ASSAY W/OPTIC: CPT

## 2022-01-18 RX ORDER — DEXAMETHASONE SODIUM PHOSPHATE 10 MG/ML
10 INJECTION, SOLUTION INTRAMUSCULAR; INTRAVENOUS ONCE
Status: COMPLETED | OUTPATIENT
Start: 2022-01-18 | End: 2022-01-18

## 2022-01-18 RX ADMIN — DEXAMETHASONE SODIUM PHOSPHATE 10 MG: 10 INJECTION INTRAMUSCULAR; INTRAVENOUS at 16:31

## 2022-01-18 NOTE — ED NOTES
Mode of arrival (squad #, walk in, police, etc) : walk in        Chief complaint(s): itching        Arrival Note (brief scenario, treatment PTA, etc). : Pt with itching to elbows and knees since yesterday. Pt reports \"hives\" Pt states she has an allergy to tree nuts. Pt states this occurred at home. Pt reports feeling like her throat is tightening up intermittent. Pt is in no acute distress, maintaining airway, tolerating secretions and [employer]. C= \"Have you ever felt that you should Cut down on your drinking? \"  No  A= \"Have people Annoyed you by criticizing your drinking? \"  No  G= \"Have you ever felt bad or Guilty about your drinking? \"  No  E= \"Have you ever had a drink as an Eye-opener first thing in the morning to steady your nerves or to help a hangover? \"  No      Deferred []      Reason for deferring: N/A    *If yes to two or more: probable alcohol abuse. Kimberley Scott RN  01/18/22 2245

## 2022-01-18 NOTE — ED PROVIDER NOTES
[address]  eMERGENCY dEPARTMENT eNCOUnter      Pt Name: Marly Mayer  MRN: [id number]  [place] 1970  Date of evaluation: 1/18/2022  Provider: Seth Head PA-C    CHIEF COMPLAINT       Chief Complaint   Patient presents with    Urticaria    Cough           HISTORY OF PRESENT ILLNESS  (Location/Symptom, Timing/Onset, Context/Setting, Quality, Duration, Modifying Factors, Severity.)   Lorelei Bernal is a 49 y.o. female who presents to the emergency department c/o rash to body. States it began last night. Reports rash is very itchy. States \"everything is itching. Even my eyelashes. \"  Explains that her body feels warm and states her throat feels like it is intermittently swelling. Denies any trouble breathing or swallowing. Denies any abd pain, cp, sob, n/v/d/c. Denies any fevers. Denies any new exposures. No other complaints. Nursing Notes were reviewed. REVIEW OF SYSTEMS    (2-9 systems for level 4, 10 or more for level 5)     Review of Systems   C/o rash  Denies trouble breathing  Denies cp  Denies fevers. Except as noted above the remainder of the review of systems was reviewed and negative. 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     Headache     History of cervical dysplasia 2000    had cone    History of conization of cervix 2000    dysplastic cells    Hyperlipidemia     Hypothyroidism, unspecified 3/18/2016    Lupus ([address]     Osteoarthritis     Positive cardiac stress test     Rheumatoid arthritis (HCC)     Sleep apnea tests were negative but snores badly    SOB (shortness of breath)     Vision abnormalities     glasses/ far sighted     None otherwise stated in nurses notes    SURGICAL HISTORY       Past Surgical History:   Procedure Laterality Date    BONE CYST EXCISION Right     WRIST    CARDIAC CATHETERIZATION  2021    CARPAL TUNNEL RELEASE Right 10-6-15    CARPAL TUNNEL RELEASE Left 11/3/15    CERVIX BIOPSY       SECTION, LOW TRANSVERSE      COLONOSCOPY      COLONOSCOPY  2009    diverticulosis, no other gross lesions    COLONOSCOPY  2017    10 yr recall, small hemorrhoids, diverticulosis    COLONOSCOPY N/A 3/12/2021    COLONOSCOPY POLYPECTOMY SNARE/COLD BIOPSY performed by Victorino Mason MD at [place] [address] N/A 2017    HYSTEROSCOPY AND D& C, myosure performed by Reid Asher MD at [facility], COLON, DIAGNOSTIC      UGI    FRACTURE SURGERY Left     THUMB   [address]    conization   [address] [facility]      with mesh, umbilical    LIPOMA RESECTION Right     RING FINGER    OTHER SURGICAL HISTORY      VOCAL CORD SURG    VT COLON CA SCRN NOT  [address] N/A 2017    COLONOSCOPY performed by Guerita Morrison MD at [facility] EGD TRANSORAL BIOPSY SINGLE/MULTIPLE N/A 2017    EGD BIOPSY performed by [facility] at [facility] 2016    Right thyroid lobectomy and isthmusectomy. Continuous monitoring of the recurrent laryngeal nerve using nerve integrity monitor. Frozen section.     TONSILLECTOMY      UPPER GASTROINTESTINAL ENDOSCOPY  2008    with BRAVO, gastric polyp-fundic gland polyp    UPPER GASTROINTESTINAL ENDOSCOPY  2017    multiple small gastric polyps-fundic gland polyps    UPPER GASTROINTESTINAL ENDOSCOPY N/A 3/3/2021    EGD BIOPSY & SAVARY DILATION performed by Marlin Burger MD at [facility] GASTROINTESTINAL ENDOSCOPY  3/12/2021    EGD DILATION SAVORY performed by Victorino Mason MD at [facility]     None otherwise stated in nurses notes    CURRENT MEDICATIONS       Previous Medications    ATORVASTATIN (LIPITOR) 40 MG TABLET    take 1 tablet by mouth once daily    B COMPLEX VITAMINS (VITAMIN B COMPLEX PO)    Take by mouth daily    CETIRIZINE HCL (ZYRTEC PO)    Take 10 mg by mouth daily     CITALOPRAM (CELEXA) 10 MG TABLET    take 1 tablet by mouth once daily with 20 MG TABLET    CITALOPRAM (CELEXA) 20 MG TABLET    Take 1 tablet by mouth daily With the celexa 10mg tab to make 30mg dialy    CLOBETASOL (TEMOVATE) 0.05 % CREAM    apply to rash twice a day for up to 2 weeks ON and 1 week off    FEROSUL 325 (65 FE) MG TABLET    take 1 tablet by mouth once daily with breakfast    HUMIRA PEN 40 MG/0.4ML PNKT        LEVOTHYROXINE (SYNTHROID) 50 MCG TABLET    TAKE 1 TABLET DAILY    LORAZEPAM (ATIVAN) 1 MG TABLET    take 1/2 to 1 tablet by mouth three times a day if needed for anxiety    NITROGLYCERIN (NITROSTAT) 0.3 MG SL TABLET    Place 1 tablet under the tongue every 5 minutes as needed for Chest pain up to max of 3 total doses. If no relief after 1 dose, call 911. NYSTATIN (MYCOSTATIN) 690838 UNIT/GM POWDER    Apply 3 times daily.     OMEPRAZOLE (PRILOSEC) 40 MG DELAYED RELEASE CAPSULE    Take 40 mg by mouth nightly     PREGABALIN (LYRICA) 75 MG CAPSULE    take 1 capsule by mouth twice a day    QUETIAPINE (SEROQUEL) 25 MG TABLET        TIZANIDINE (ZANAFLEX) 4 MG TABLET    Take 1 tablet by mouth nightly    TRAMADOL (ULTRAM) 50 MG TABLET    take 1 tablet by mouth twice a day if needed    TRIAMCINOLONE (ARISTOCORT) 0.5 % OINTMENT    apply A THIN LAYER topically to affected area twice a day    VITAMIN D (ERGOCALCIFEROL) 1.25 MG (63746 UT) CAPS CAPSULE    take 1 capsule by mouth every week       ALLERGIES     Other, Azithromycin, Ibuprofen, Pecan nut (diagnostic), [place] [macadamia nut oil], Ceclor [cefaclor], and Penicillins    FAMILY HISTORY           Problem Relation Age of Onset    Alcohol Abuse Father     Other Father         murder    Diabetes Mother     Other Mother         thyroid    High Blood Pressure Mother     Lupus Sister     Drug Abuse Brother     Asthma Brother     Breast Cancer Maternal Grandmother         45s    Heart Surgery Maternal Grandmother     Heart Failure Maternal Grandmother     Hypertension Maternal Grandfather     Stroke Maternal Grandfather     Heart Attack Maternal Grandfather     Alcohol Abuse Maternal Grandfather     Diabetes Maternal Grandfather     Cancer Paternal Grandmother         lymphnoid    Heart Failure Paternal Grandfather     Heart Surgery Paternal Grandfather         x2     Family Status   Relation Name Status    Father      Mother  Alive    Sister  Alive    Brother  Alive    MGM      MGF      PGM      PGF        None otherwise stated in nurses notes    SOCIAL HISTORY      reports that she has never smoked. She has never used smokeless tobacco. She reports current alcohol use. She reports that she does not use drugs. lives at home with others     PHYSICAL EXAM    (up to 7 for level 4, 8 or more for level 5)     ED Triage Vitals [22 1440]   BP Temp Temp Source Pulse Resp SpO2 Height Weight   (!) 140/80 96.3 °F (35.7 °C) Temporal 89 18 97 % 5' 4\" (1.626 m) 209 lb (94.8 kg)       Physical Exam   Nursing note and vitals reviewed. Constitutional: Oriented to person, place, and time and well-developed, well-nourished. Head: Normocephalic and atraumatic. Ear: External ears normal.   Nose: Nose normal and midline. Eyes: Conjunctivae and EOM are normal. Pupils are equal, round, and reactive to light. Neck: Normal range of motion.   Throat: Posterior pharynx is without erythema or exudates, airway is patent, no swelling  Cardiovascular: Normal rate, regular rhythm, normal heart sounds and intact distal pulses. Pulmonary/Chest: Effort normal and breath sounds normal. No respiratory distress. No wheezes. No rales. No chest tenderness. Musculoskeletal: Normal range of motion. Neurological: Alert and oriented to person, place, and time. GCS score is 15. Skin: erythematous rash over knees, arms, chest.  Rash blanches. No petechiae, purpura, vesicles. No skin sloughing. No mucous membrane involvement. Excoriations noted. Psychiatric: Mood, memory, affect and judgment normal.           DIAGNOSTIC RESULTS     EKG: All EKG's are interpreted by the Emergency Department Physician who either signs or Co-signs this chart in the absence of a cardiologist.        RADIOLOGY:   All plain film, CT, MRI, and formal ultrasound images (except ED bedside ultrasound) are read by the radiologist and the images and interpretations are directly viewed by the emergency physician. No orders to display       No results found. LABS:  Labs Reviewed   STREP SCREEN GROUP A THROAT       All other labs were within normal range or not returned as of this dictation. EMERGENCY DEPARTMENT COURSE and DIFFERENTIAL DIAGNOSIS/MDM:   Vitals:    Vitals:    01/18/22 1440   BP: (!) 140/80   Pulse: 89   Resp: 18   Temp: 96.3 °F (35.7 °C)   TempSrc: Temporal   SpO2: 97%   Weight: 209 lb (94.8 kg)   Height: 5' 4\" (1.626 m)       Rash since last night. Very pruritic. Pt is scratching rash constantly while in room. No mucous membrane involvement. No concerning features. Suspect dermatitis. Pt is concerned for strep. Will get swab. Strep is negative. Pt given decadron shot in ED. Lori Rodriguez for [employer] home. Low concern for vasculitis, SJS, infection. Recommend continuing benadryl, hydrocortisone cream.   Strict return precautions discussed with patient. She is agreeable.        Patient instructed to return to the emergency room if symptoms worsen, return, or any other concern right away which is agreed by the patient    ED MEDS:  Orders Placed This Encounter   Medications    dexamethasone (PF) (DECADRON) injection 10 mg         CONSULTS:  None    PROCEDURES:  None      FINAL IMPRESSION      1.  Rash and other nonspecific skin eruption          DISPOSITION/PLAN   DISPOSITION Decision To Discharge    PATIENT REFERRED TO:  MD Don Ortega [facility] [id number]  [facility] [address] [phone]          DISCHARGE MEDICATIONS:  New Prescriptions    No medications on file       (Please note that portions of this note were completed with a voice recognition program.  Efforts were made to edit the dictations but occasionally words are mis-transcribed.)    Nikunj Joyce. Stanislaw Vargas PA-C  01/18/22 [id number]

## 2022-01-19 ENCOUNTER — TELEMEDICINE (OUTPATIENT)
Dept: INTERNAL MEDICINE CLINIC | Age: 52
End: 2022-01-19
Payer: COMMERCIAL

## 2022-01-19 DIAGNOSIS — R21 RASH: Primary | ICD-10-CM

## 2022-01-19 DIAGNOSIS — H92.03 OTALGIA OF BOTH EARS: ICD-10-CM

## 2022-01-19 DIAGNOSIS — Z91.018 HISTORY OF ALLERGY TO NUTS: ICD-10-CM

## 2022-01-19 PROCEDURE — 99213 OFFICE O/P EST LOW 20 MIN: CPT | Performed by: NURSE PRACTITIONER

## 2022-01-19 RX ORDER — DOXYCYCLINE HYCLATE 100 MG
100 TABLET ORAL 2 TIMES DAILY
Qty: 14 TABLET | Refills: 0 | Status: SHIPPED | OUTPATIENT
Start: 2022-01-19 | End: 2022-01-26

## 2022-01-19 ASSESSMENT — ENCOUNTER SYMPTOMS
WHEEZING: 1
CONSTIPATION: 0
SORE THROAT: 1
DIARRHEA: 0
TROUBLE SWALLOWING: 0
SHORTNESS OF BREATH: 1
ABDOMINAL PAIN: 0
COLOR CHANGE: 0
COUGH: 1
RHINORRHEA: 0
CHEST TIGHTNESS: 1
NAUSEA: 0
VOMITING: 0

## 2022-01-19 NOTE — PROGRESS NOTES
Visit Information    Have you changed or started any medications since your last visit including any over-the-counter medicines, vitamins, or herbal medicines? no   Have you stopped taking any of your medications? Is so, why? -  no  Are you having any side effects from any of your medications? - no    Have you seen any other physician or provider since your last visit?  no   Have you had any other diagnostic tests since your last visit?  no   Have you been seen in the emergency room and/or had an admission in a hospital since we last saw you?  yes - 1/18/22   Have you had your routine dental cleaning in the past 6 months?  no     Do you have an active MyChart account? If no, what is the barrier?   Yes    Patient Care Team:  My Chao MD as PCP - General (Internal Medicine)  My Chao MD as PCP - [facility] Provider  Jerry Blakely DO as Consulting Physician (Obstetrics & Gynecology)    Medical History Review  Past Medical, Family, and Social History reviewed and does contribute to the patient presenting condition    Health Maintenance   Topic Date Due    COVID-19 Vaccine (1) Never done    Pneumococcal 0-64 years Vaccine (1 of 2 - PPSV23) Never done    DTaP/Tdap/Td vaccine (1 - Tdap) Never done    Diabetes screen  10/18/2021    Lipid screen  10/26/2021    Shingles Vaccine (1 of 2) Never done    Flu vaccine (1) 10/01/2022 (Originally 9/1/2021)    Depression Monitoring  06/01/2022    TSH testing  10/02/2022    Cervical cancer screen  11/07/2022    Breast cancer screen  12/24/2022    Colon cancer screen colonoscopy  03/12/2026    Hepatitis C screen  Completed    HIV screen  Completed    Hepatitis A vaccine  Aged Out    Hepatitis B vaccine  Aged Out    Hib vaccine  Aged Out    Meningococcal (ACWY) vaccine  Aged Out         [address] [address]  Dept: [phone]  Dept Fax: [phone]    Virtual Visit Progress Note  Date of patient's visit: 2022  Patient's Name:  Félix Camilo YOB: 1970            Patient Care Team:  Iliana Nicholson MD as PCP - General (Internal Medicine)  Iliana Nicholson MD as PCP - [facility]  Lesley Kaur DO as Consulting Physician (Obstetrics & Gynecology)    REASON FOR VISIT:  Same day visit    HISTORY OF PRESENT ILLNESS:      Chief Complaint   Patient presents with    Urticaria     pos for covid 1/3/22 - daughter pos for flu a 22, steriod shot yesterday at ED       History was obtained from the patient. Félix Camilo is a 49 y.o. female here today virtually for ED follow up. Patient presented to ED for urticaria following consumption of PB ice cream. Patient has h/o multiple allergies including tree nuts. Patient reports rash and pruritis has since resolved with use of antihistamine. Patient also presents to our office for sinus congestion, pressure and b/l otalgia.        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[address]    Environmental allergies    H/O thyroid cyst    History of cervical dysplasia    History of conization of cervix     History of low transverse  section    Vision abnormalities    Family history of breast cancer    Osteoarthritis    VASECTOMY in Male Partner    History of endometrial ablation    Pelvic pain in female    Hypothyroidism    Abnormal uterine bleeding    Diverticulosis of colon    Rectal bleeding    Constipation    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    Persistent depressive disorder    Generalized anxiety disorder with panic attacks    Other forms of systemic lupus erythematosus (HCC)    Panic attacks    Stroke-like symptoms    Numbness    GI bleed    Diarrhea    Migraine    Abdominal pain    Dysphagia    Microcytic anemia    Melena       MEDICATIONS:      Current Outpatient Medications   Medication Sig Dispense Refill    vitamin D (ERGOCALCIFEROL) 1.25 MG (28220 UT) CAPS capsule take 1 capsule by mouth every week 4 capsule 0    FEROSUL 325 (65 Fe) MG tablet take 1 tablet by mouth once daily with breakfast 90 tablet 1    triamcinolone (ARISTOCORT) 0.5 % ointment apply A THIN LAYER topically to affected area twice a day      levothyroxine (SYNTHROID) 50 MCG tablet TAKE 1 TABLET DAILY 90 tablet 3    traMADol (ULTRAM) 50 MG tablet take 1 tablet by mouth twice a day if needed      citalopram (CELEXA) 10 MG tablet take 1 tablet by mouth once daily with 20 MG TABLET 30 tablet 3    citalopram (CELEXA) 20 MG tablet Take 1 tablet by mouth daily With the celexa 10mg tab to make 30mg dialy 30 tablet 3    nystatin (MYCOSTATIN) 982438 UNIT/GM powder Apply 3 times daily. 60 g 3    LORazepam (ATIVAN) 1 MG tablet take 1/2 to 1 tablet by mouth three times a day if needed for anxiety      nitroGLYCERIN (NITROSTAT) 0.3 MG SL tablet Place 1 tablet under the tongue every 5 minutes as needed for Chest pain up to max of 3 total doses.  If no relief after 1 dose, call 911. 30 tablet 3    tiZANidine (ZANAFLEX) 4 MG tablet Take 1 tablet by mouth nightly 30 tablet 1    clobetasol (TEMOVATE) 0.05 % cream apply to rash twice a day for up to 2 weeks ON and 1 week off      omeprazole (PRILOSEC) 40 MG delayed release capsule Take 40 mg by mouth nightly       B Complex Vitamins (VITAMIN B COMPLEX PO) Take by mouth daily      pregabalin (LYRICA) 75 MG capsule take 1 capsule by mouth twice a day  0    Cetirizine HCl (ZYRTEC PO) Take 10 mg by mouth daily       atorvastatin (LIPITOR) 40 MG tablet take 1 tablet by mouth once daily (Patient not taking: Reported on 1/19/2022) 30 tablet 3    HUMIRA PEN 40 MG/0.4ML PNKT  (Patient not taking: Reported on 1/19/2022)       No current facility-administered medications for this visit. ALLERGIES:      Allergies   Allergen Reactions    Other      Perch - twice had violent vomiting, diarrhea,severe dizziness and nausea    Azithromycin      Palpitations.  Ibuprofen Hives    Pecan Nut (Diagnostic)     [place] [Macadamia Nut Oil]     Ceclor [Cefaclor] Hives and Rash    Penicillins Hives and Rash       SOCIAL HISTORY   Reviewed and no change from previous record. Lorelei  reports that she has never smoked. She has never used smokeless tobacco.    FAMILY HISTORY:    Reviewed and No change from previous visit    REVIEW OF SYSTEMS:    Review of Systems   Constitutional: Negative for chills, diaphoresis, fatigue, fever and unexpected weight change. HENT: Positive for ear pain (b/l otalgia), postnasal drip and sore throat. Negative for congestion, rhinorrhea, sneezing and trouble swallowing. Eyes: Negative for visual disturbance. Respiratory: Positive for cough (nonproductive), chest tightness, shortness of breath (intermittent) and wheezing (at night). Cardiovascular: Positive for palpitations (r/t steroid). Negative for chest pain. Gastrointestinal: Negative for abdominal pain, constipation, diarrhea, nausea and vomiting. Genitourinary: Positive for frequency (following steroid). Negative for difficulty urinating, dysuria and flank pain. Musculoskeletal: Negative for arthralgias and myalgias. Skin: Negative for color change and rash. Neurological: Negative for dizziness, tremors, syncope, weakness and numbness. Psychiatric/Behavioral: Negative for confusion and hallucinations. PHYSICAL EXAM:    There were no vitals filed for this visit. Exam was limited due to virtual encounter.     General - alert, well appearing, non toxic, in no distress  Skin - normal coloration and turgor, no rash  Eyes - sclera appear clear, no conjunctival injection, no discharge  Ears - no pain with manipulation of tragus or auricle, no drainage, no mastoid tenderness, hearing normal   Nose - normal and patent, no erythema, discharge  Mouth - mucous membranes are moist  Neck - supple, no masses appreciated  Chest - respirations are unlabored, no tachypnea or signs or respiratory distress  Abdomen - soft, nontender, nondistended  Neurological - alert, oriented x 3, normal speech  Extremities - no pedal edema    ASSESSMENT AND PLAN:      1. Rash  - resolved following antihistamine use  - Willam Tariq MD, Allergy & Immunology, [facility]    2. History of allergy to nuts  Brian Fair MD, Allergy & Immunology, [facility]    3. Otalgia of both ears  - treat for possible otitis media due to URI symptoms  - doxycycline hyclate (VIBRA-TABS) 100 MG tablet; Take 1 tablet by mouth 2 times daily for 7 days  Dispense: 14 tablet; Refill: 0      FOLLOW UP AND INSTRUCTIONS:   No follow-ups on file. Discussed use, benefit, and side effects of prescribed medications. All patient questions answered. Patient voiced understanding. Patient given educational materials - see patient instructions    Patient being evaluated by a Virtual Visit (video visit) encounter to address concerns as mentioned above. A caregiver was present when appropriate. Due to this being a TeleHealth encounter (During [address] public [facility] emergency), evaluation of the following organ systems was limited: Vitals/Constitutional/EENT/Resp/CV/GI//MS/Neuro/Skin/Heme-Lymph-Imm.   Pursuant to the emergency declaration under the [facility], 1135 waiver authority and the [employer] Act, this Virtual Visit was conducted with patient's (and/or legal guardian's) consent, to reduce the patient's risk of exposure to COVID-19 and provide necessary medical care. The patient (and/or legal guardian) has also been advised to contact this office for worsening conditions or problems, and seek emergency medical treatment and/or call 911 if deemed necessary. Services were provided through a video synchronous discussion virtually to substitute for in-person clinic visit. Patient and provider were located at their individual homes. STEPHIE Sanabria - CNP   JASMIN. [facility]  2/1/2022, 2:09 PM    Please note that this chart wasgenerated using voice recognition Dragon dictation software. Although every effort was made to ensure the accuracy of this automated transcription, some errors in transcription may have occurred.

## 2022-02-07 NOTE — ED PROVIDER NOTES
[address]  Emergency Department  Faculty Attestation     Pt Name: Joo Telles  MRN: [id number]  [place] 1970  Date of evaluation: 2/7/22    I was personally available for consultation by the MLP in the Emergency Department for chart review, as well as discussion about the assessment, treatment plan and disposition. Joo Telles is a 49 y.o. female who presents with Urticaria and Cough      Vitals:   Vitals:    01/18/22 1440   BP: (!) 140/80   Pulse: 89   Resp: 18   Temp: 96.3 °F (35.7 °C)   TempSrc: Temporal   SpO2: 97%   Weight: 209 lb (94.8 kg)   Height: 5' 4\" (1.626 m)       Impression:   1.  Rash and other nonspecific skin eruption        Donell Espana MD  Attending Emergency  Physician    (Please note that portions of this note were completed with a voice recognition program.  Efforts were made to edit the dictations but occasionally words are mis-transcribed.)        Pilar Lawrence MD  02/07/22 8610

## 2022-04-18 ENCOUNTER — APPOINTMENT (OUTPATIENT)
Dept: GENERAL RADIOLOGY | Age: 52
End: 2022-04-18
Payer: COMMERCIAL

## 2022-04-18 ENCOUNTER — HOSPITAL ENCOUNTER (EMERGENCY)
Age: 52
Discharge: HOME | End: 2022-04-18
Attending: EMERGENCY MEDICINE
Payer: COMMERCIAL

## 2022-04-18 VITALS
HEIGHT: 64 IN | HEART RATE: 69 BPM | SYSTOLIC BLOOD PRESSURE: 113 MMHG | OXYGEN SATURATION: 96 % | BODY MASS INDEX: 35.85 KG/M2 | RESPIRATION RATE: 14 BRPM | DIASTOLIC BLOOD PRESSURE: 70 MMHG | WEIGHT: 210 LBS

## 2022-04-18 DIAGNOSIS — R53.83 OTHER FATIGUE: ICD-10-CM

## 2022-04-18 DIAGNOSIS — R07.9 CHEST PAIN, UNSPECIFIED TYPE: Primary | ICD-10-CM

## 2022-04-18 LAB
ABSOLUTE EOS #: 0.3 K/UL (ref 0–0.4)
ABSOLUTE LYMPH #: 2.6 K/UL (ref 1–4.8)
ABSOLUTE MONO #: 0.6 K/UL (ref 0.1–1.3)
ALBUMIN SERPL-MCNC: 3.9 G/DL (ref 3.5–5.2)
ALP BLD-CCNC: 71 U/L (ref 35–104)
ALT SERPL-CCNC: 10 U/L (ref 5–33)
ANION GAP SERPL CALCULATED.3IONS-SCNC: 11 MMOL/L (ref 9–17)
AST SERPL-CCNC: 15 U/L
BASOPHILS # BLD: 1 % (ref 0–2)
BASOPHILS ABSOLUTE: 0.1 K/UL (ref 0–0.2)
BILIRUB SERPL-MCNC: 0.36 MG/DL (ref 0.3–1.2)
BUN BLDV-MCNC: 9 MG/DL (ref 6–20)
CALCIUM SERPL-MCNC: 9.2 MG/DL (ref 8.6–10.4)
CHLORIDE BLD-SCNC: 102 MMOL/L (ref 98–107)
CO2: 23 MMOL/L (ref 20–31)
CORTISOL COLLECTION INFO: ABNORMAL
CORTISOL: 21.8 UG/DL (ref 2.7–18.4)
CREAT SERPL-MCNC: 0.54 MG/DL (ref 0.5–0.9)
EOSINOPHILS RELATIVE PERCENT: 4 % (ref 0–4)
GFR AFRICAN AMERICAN: >60 ML/MIN
GFR NON-AFRICAN AMERICAN: >60 ML/MIN
GFR SERPL CREATININE-BSD FRML MDRD: ABNORMAL ML/MIN/{1.73_M2}
GLUCOSE BLD-MCNC: 109 MG/DL (ref 70–99)
HCT VFR BLD CALC: 37.4 % (ref 36–46)
HEMOGLOBIN: 13 G/DL (ref 12–16)
LYMPHOCYTES # BLD: 35 % (ref 24–44)
MAGNESIUM: 1.8 MG/DL (ref 1.6–2.6)
MCH RBC QN AUTO: 31.2 PG (ref 26–34)
MCHC RBC AUTO-ENTMCNC: 34.8 G/DL (ref 31–37)
MCV RBC AUTO: 89.5 FL (ref 80–100)
MONOCYTES # BLD: 8 % (ref 1–7)
PDW BLD-RTO: 13 % (ref 11.5–14.9)
PLATELET # BLD: 164 K/UL (ref 150–450)
PMV BLD AUTO: 9.7 FL (ref 6–12)
POTASSIUM SERPL-SCNC: 3.7 MMOL/L (ref 3.7–5.3)
RBC # BLD: 4.17 M/UL (ref 4–5.2)
SARS-COV-2, RAPID: NOT DETECTED
SEG NEUTROPHILS: 52 % (ref 36–66)
SEGMENTED NEUTROPHILS ABSOLUTE COUNT: 3.8 K/UL (ref 1.3–9.1)
SODIUM BLD-SCNC: 136 MMOL/L (ref 135–144)
SPECIMEN DESCRIPTION: NORMAL
TOTAL PROTEIN: 6.7 G/DL (ref 6.4–8.3)
TROPONIN, HIGH SENSITIVITY: <6 NG/L (ref 0–14)
TROPONIN, HIGH SENSITIVITY: <6 NG/L (ref 0–14)
TSH SERPL DL<=0.05 MIU/L-ACNC: 2.55 UIU/ML (ref 0.3–5)
WBC # BLD: 7.2 K/UL (ref 3.5–11)

## 2022-04-18 PROCEDURE — 84484 ASSAY OF TROPONIN QUANT: CPT

## 2022-04-18 PROCEDURE — 87635 SARS-COV-2 COVID-19 AMP PRB: CPT

## 2022-04-18 PROCEDURE — 36415 COLL VENOUS BLD VENIPUNCTURE: CPT

## 2022-04-18 PROCEDURE — 71045 X-RAY EXAM CHEST 1 VIEW: CPT

## 2022-04-18 PROCEDURE — 85025 COMPLETE CBC W/AUTO DIFF WBC: CPT

## 2022-04-18 PROCEDURE — 93005 ELECTROCARDIOGRAM TRACING: CPT | Performed by: EMERGENCY MEDICINE

## 2022-04-18 PROCEDURE — 84443 ASSAY THYROID STIM HORMONE: CPT

## 2022-04-18 PROCEDURE — 83735 ASSAY OF MAGNESIUM: CPT

## 2022-04-18 PROCEDURE — 80053 COMPREHEN METABOLIC PANEL: CPT

## 2022-04-18 PROCEDURE — 99285 EMERGENCY DEPT VISIT HI MDM: CPT

## 2022-04-18 PROCEDURE — 82533 TOTAL CORTISOL: CPT

## 2022-04-18 ASSESSMENT — PAIN - FUNCTIONAL ASSESSMENT: PAIN_FUNCTIONAL_ASSESSMENT: 0-10

## 2022-04-18 ASSESSMENT — ENCOUNTER SYMPTOMS
COUGH: 0
SHORTNESS OF BREATH: 0

## 2022-04-18 ASSESSMENT — PAIN SCALES - GENERAL: PAINLEVEL_OUTOF10: 7

## 2022-04-18 ASSESSMENT — PAIN DESCRIPTION - LOCATION: LOCATION: CHEST

## 2022-04-18 ASSESSMENT — PAIN DESCRIPTION - PAIN TYPE: TYPE: ACUTE PAIN

## 2022-04-18 NOTE — ED PROVIDER NOTES
Advise follow-up with PCP. She is agreeable with plan will be discharged home today    Disposition     DISPOSITION:    DISPOSITION Decision To Discharge 04/18/2022 06:29:44 PM      CLINICAL IMPRESSION:  1. Chest pain, unspecified type    2.  Other fatigue        PATIENT REFERRED TO:  MD Don Mo[address]uma [address] [facility]  [phone]    Schedule an appointment as soon as possible for a visit in 1 day        DISCHARGE MEDICATIONS:  Discharge Medication List as of 4/18/2022  6:29 PM              DO Rishi Ward DO  04/18/22 1955

## 2022-04-18 NOTE — ED PROVIDER NOTES
Todd    Pt Name: Марина Marie  MRN: [id number]  [place] 1970  Date of evaluation: 4/18/22  CHIEF COMPLAINT       Chief Complaint   Patient presents with    Chest Pain     HISTORY OF PRESENT ILLNESS     Chest Pain  Pain location:  Substernal area  Pain quality: pressure    Pain radiates to:  Does not radiate  Pain severity:  Moderate  Onset quality:  Sudden  Duration:  1 hour  Timing:  Constant  Progression:  Partially resolved  Chronicity:  New  Context comment:  Was at work in kitchen cleaning up  Relieved by:  Nothing  Worsened by:  Nothing  Ineffective treatments:  None tried  Associated symptoms: fatigue    Associated symptoms: no cough, no fever, no palpitations and no shortness of breath      Has had low cortisol levels before when she felt this way  Had heart cath last year for chest pains  No recent travel  Not on hormone therapy  Doesn't have periods anymore      REVIEW OF SYSTEMS     Review of Systems   Constitutional: Positive for fatigue. Negative for fever. HENT: Negative for congestion. Respiratory: Negative for cough and shortness of breath. Cardiovascular: Positive for chest pain. Negative for palpitations and leg swelling. All other systems reviewed and are negative. 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     Headache     History of cervical dysplasia 2000    had cone    History of conization of cervix 2000    dysplastic cells    Hyperlipidemia     Hypothyroidism, unspecified 3/18/2016    Lupus ([facility]     Osteoarthritis     Positive cardiac stress test     Rheumatoid arthritis (HCC)     Sleep apnea     tests were negative but snores badly    SOB (shortness of breath)     Vision abnormalities     glasses/ far sighted     Past Problem List  Patient Active Problem List   Diagnosis Code    Dermatitis, contact L25.9    Anxiety and depression F41.9, F32. 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    Environmental allergies Z91.09    H/O thyroid cyst Z86.39    History of cervical dysplasia Z87.410    History of conization of cervix 2000 Z98.890    History of low transverse  section Z98.891    Vision abnormalities H53.9    Family history of breast cancer Z80.3    Osteoarthritis M19.90    VASECTOMY in Male Partner Z28.80    History of endometrial ablation Z98.890    Pelvic pain in female R10.2    Hypothyroidism E03.9    Abnormal uterine bleeding N93.9    Diverticulosis of colon K57.30    Rectal bleeding K62.5    Constipation K59.00    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    Persistent depressive disorder F34.1    Generalized anxiety disorder with panic attacks F41.1, F41.0    Other forms of systemic lupus erythematosus (HCC) M32.8    Panic attacks F41.0    Stroke-like symptoms R29.90    Numbness R20.0    GI bleed K92.2    Diarrhea R19.7    Migraine G43.909    Abdominal pain R10.9    Dysphagia R13.10    Microcytic anemia D50.9    Melena K92.1     SURGICAL HISTORY       Past Surgical History:   Procedure Laterality Date  BONE CYST EXCISION Right     WRIST    CARDIAC CATHETERIZATION  2021    CARPAL TUNNEL RELEASE Right 10-6-15    CARPAL TUNNEL RELEASE Left 11/3/15    CERVIX BIOPSY       SECTION, LOW TRANSVERSE      COLONOSCOPY      COLONOSCOPY  2009    diverticulosis, no other gross lesions    COLONOSCOPY  2017    10 yr recall, small hemorrhoids, diverticulosis    COLONOSCOPY N/A 3/12/2021    COLONOSCOPY POLYPECTOMY SNARE/COLD BIOPSY performed by Rachelle Apple MD at [place] [address] N/A 2017    HYSTEROSCOPY AND D& C, myosure performed by Clover Rushing MD at [facility] 1, COLON, DIAGNOSTIC      UGI    FRACTURE SURGERY Left     THUMB    GYNECOLOGIC CRYOSURGERY      conization   [address] [facility]      with mesh, umbilical    LIPOMA RESECTION Right     RING FINGER    OTHER SURGICAL HISTORY      VOCAL CORD SURG    DC COLON CA SCRN NOT  [address] [place] [address] IND N/A 2017    COLONOSCOPY performed by Nathaniel Lelsie MD at [address] EGD TRANSORAL BIOPSY SINGLE/MULTIPLE N/A 2017    EGD BIOPSY performed by Nathaniel Leslie MD at [facility] 2016    Right thyroid lobectomy and isthmusectomy. Continuous monitoring of the recurrent laryngeal nerve using nerve integrity monitor. Frozen section.     TONSILLECTOMY      UPPER GASTROINTESTINAL ENDOSCOPY  2008    with BRAVO, gastric polyp-fundic gland polyp    UPPER GASTROINTESTINAL ENDOSCOPY  2017    multiple small gastric polyps-fundic gland polyps    UPPER GASTROINTESTINAL ENDOSCOPY N/A 3/3/2021    EGD BIOPSY & SAVARY DILATION performed by Oswald Temple MD at [address]  3/12/2021    EGD DILATION SAVORY performed by Rachelle Apple MD at [facility]     CURRENT MEDICATIONS       Previous Medications    ATORVASTATIN (LIPITOR) 40 MG TABLET    take 1 tablet by mouth once daily    B COMPLEX VITAMINS (VITAMIN B COMPLEX PO)    Take by mouth daily    CETIRIZINE HCL (ZYRTEC PO)    Take 10 mg by mouth daily     CITALOPRAM (CELEXA) 10 MG TABLET    take 1 tablet by mouth once daily with 20 MG TABLET    CITALOPRAM (CELEXA) 20 MG TABLET    Take 1 tablet by mouth daily With the celexa 10mg tab to make 30mg dialy    CLOBETASOL (TEMOVATE) 0.05 % CREAM    apply to rash twice a day for up to 2 weeks ON and 1 week off    FEROSUL 325 (65 FE) MG TABLET    take 1 tablet by mouth once daily with breakfast    HUMIRA PEN 40 MG/0.4ML PNKT        LEVOTHYROXINE (SYNTHROID) 50 MCG TABLET    TAKE 1 TABLET DAILY    LORAZEPAM (ATIVAN) 1 MG TABLET    take 1/2 to 1 tablet by mouth three times a day if needed for anxiety    NITROGLYCERIN (NITROSTAT) 0.3 MG SL TABLET    Place 1 tablet under the tongue every 5 minutes as needed for Chest pain up to max of 3 total doses. If no relief after 1 dose, call 911. NYSTATIN (MYCOSTATIN) 293021 UNIT/GM POWDER    Apply 3 times daily. OMEPRAZOLE (PRILOSEC) 40 MG DELAYED RELEASE CAPSULE    Take 40 mg by mouth nightly     PREGABALIN (LYRICA) 75 MG CAPSULE    take 1 capsule by mouth twice a day    TIZANIDINE (ZANAFLEX) 4 MG TABLET    Take 1 tablet by mouth nightly    TRAMADOL (ULTRAM) 50 MG TABLET    take 1 tablet by mouth twice a day if needed    TRIAMCINOLONE (ARISTOCORT) 0.5 % OINTMENT    apply A THIN LAYER topically to affected area twice a day    VITAMIN D (ERGOCALCIFEROL) 1.25 MG (51524 UT) CAPS CAPSULE    take 1 capsule by mouth every week     ALLERGIES     is allergic to other, azithromycin, ibuprofen, pecan nut (diagnostic), walnut [macadamia nut oil], ceclor [cefaclor], and penicillins. FAMILY HISTORY     She indicated that her mother is alive. She indicated that her father is . She indicated that her sister is alive. She indicated that her brother is alive. She indicated that her maternal grandmother is .  She indicated that her maternal grandfather is . She indicated that her paternal grandmother is . She indicated that her paternal grandfather is . SOCIAL HISTORY       Social History     Tobacco Use    Smoking status: Never Smoker    Smokeless tobacco: Never Used    Tobacco comment: only smoked for 4 months as teenager only   Vaping Use    Vaping Use: Never used   Substance Use Topics    Alcohol use: Yes     Alcohol/week: 0.0 standard drinks     Comment: recovering alcoholic, quit heavy drinking , takes 1-2 every yearly since      Drug use: No     PHYSICAL EXAM     INITIAL VITALS: /67   Pulse 72   Resp 10   Ht 5' 4\" (1.626 m)   Wt 210 lb (95.3 kg)   SpO2 94%   BMI 36.05 kg/m²    Physical Exam  Constitutional:       General: She is not in acute distress. Appearance: Normal appearance. She is well-developed. She is not diaphoretic. HENT:      Head: Normocephalic and atraumatic. Right Ear: External ear normal.      Left Ear: External ear normal.      Nose: Nose normal. No congestion. Mouth/Throat:      Mouth: Mucous membranes are moist.      Pharynx: Oropharynx is clear. Eyes:      General:         Right eye: No discharge. Left eye: No discharge. Conjunctiva/sclera: Conjunctivae normal.      Pupils: Pupils are equal, round, and reactive to light. Neck:      Trachea: No tracheal deviation. Cardiovascular:      Rate and Rhythm: Normal rate and regular rhythm. Pulses: Normal pulses. Heart sounds: Normal heart sounds. Comments: Radial and pt 2+ BL  Pulmonary:      Effort: Pulmonary effort is normal. No respiratory distress. Breath sounds: Normal breath sounds. No stridor. No wheezing or rales. Abdominal:      Palpations: Abdomen is soft. Tenderness: There is no abdominal tenderness. There is no guarding or rebound. Musculoskeletal:         General: No tenderness or deformity. Normal range of motion.       Cervical back: Normal range of motion and neck supple. Skin:     General: Skin is warm and dry. Capillary Refill: Capillary refill takes less than 2 seconds. Findings: No erythema or rash. Neurological:      General: No focal deficit present. Mental Status: She is alert and oriented to person, place, and time. Coordination: Coordination normal.   Psychiatric:         Mood and Affect: Mood normal.         Behavior: Behavior normal.         Thought Content: Thought content normal.         Judgment: Judgment normal.         MEDICAL DECISION MAKING:       ED Course as of 04/18/22 1752   Mon Apr 18, 2022   1329 Heart cath June 2021:     Cardiac Arteries and Lesion Findings     LMCA: Normal 0% stenosis.     LAD: Normal 0% stenosis.     LCx: Normal 0% stenosis.     RCA: Normal 0% stenosis. [WM]   1506 Patient comfortable, reassessment, no distress [WM]      ED Course User Index  [WM] Thong Rutherford MD       5:52 PM EDT  Cortisol level pending, patient signed out to Dr Vinay Fuentes   EKG:All EKG's are interpreted by the Emergency Department Physician who either signs or Co-signs this chart in the absence of a cardiologist.  Normal ekg      RADIOLOGY:All plain film, CT, MRI, and formal ultrasound images (except ED bedside ultrasound) are read by the radiologist, see reports below, unless otherwisenoted in MDM or here. XR CHEST PORTABLE   Final Result      1. No acute cardiopulmonary abnormality. LABS: All lab results were reviewed by myself, and all abnormals are listed below.   Labs Reviewed   CBC WITH AUTO DIFFERENTIAL - Abnormal; Notable for the following components:       Result Value    Monocytes 8 (*)     All other components within normal limits   COMPREHENSIVE METABOLIC PANEL - Abnormal; Notable for the following components:    Glucose 109 (*)     All other components within normal limits   COVID-19, RAPID   TROPONIN   TSH WITH REFLEX   MAGNESIUM   TROPONIN   CORTISOL TOTAL       EMERGENCY DEPARTMENTCOURSE:         Vitals:    Vitals:    04/18/22 1312 04/18/22 1330   BP: (!) 95/59 103/67   Pulse: 79 72   Resp: 16 10   SpO2: 96% 94%   Weight: 210 lb (95.3 kg)    Height: 5' 4\" (1.626 m)          FINAL IMPRESSION      1. Chest pain, unspecified type    2. Other fatigue          DISPOSITION/PLAN   DISPOSITION Discharge - Pending Orders Complete 04/18/2022 03:46:34 PM      PATIENT REFERRED TO:  Yeimy Diaz MD  [address] [place] [phone]    Schedule an appointment as soon as possible for a visit in 1 day      DISCHARGE MEDICATIONS:  New Prescriptions    No medications on file     The care is provided during an unprecedented national emergency due to the novel coronavirus, COVID 19.   MD Genaro Sheth MD  04/18/22 0633

## 2022-04-18 NOTE — Clinical Note
Aura Nyhan was seen and treated in our emergency department on 4/18/2022. She may return to work on 04/21/2022. If you have any questions or concerns, please don't hesitate to call.       Xiomara Odell, DO

## 2022-04-20 LAB
EKG ATRIAL RATE: 74 BPM
EKG P AXIS: 61 DEGREES
EKG P-R INTERVAL: 162 MS
EKG Q-T INTERVAL: 392 MS
EKG QRS DURATION: 68 MS
EKG QTC CALCULATION (BAZETT): 435 MS
EKG R AXIS: 55 DEGREES
EKG T AXIS: 54 DEGREES
EKG VENTRICULAR RATE: 74 BPM

## 2022-04-20 PROCEDURE — 93010 ELECTROCARDIOGRAM REPORT: CPT | Performed by: INTERNAL MEDICINE

## 2022-06-05 DIAGNOSIS — D50.0 IRON DEFICIENCY ANEMIA DUE TO CHRONIC BLOOD LOSS: ICD-10-CM

## 2022-06-06 RX ORDER — FERROUS SULFATE 325(65) MG
TABLET ORAL
Qty: 90 TABLET | Refills: 1 | Status: SHIPPED | OUTPATIENT
Start: 2022-06-06

## 2022-06-17 ENCOUNTER — TELEPHONE (OUTPATIENT)
Dept: INTERNAL MEDICINE CLINIC | Age: 52
End: 2022-06-17

## 2022-06-17 NOTE — TELEPHONE ENCOUNTER
Patient called in through [address] to report pain and pressure in her hand when she picks something up it hurts very bad in the thumb area when she writes or grabs something she cannot take the pain. No redness or bruising in area.     Scheduled apt with james 6/20

## 2022-06-20 ENCOUNTER — OFFICE VISIT (OUTPATIENT)
Dept: INTERNAL MEDICINE CLINIC | Age: 52
End: 2022-06-20
Payer: COMMERCIAL

## 2022-06-20 VITALS
SYSTOLIC BLOOD PRESSURE: 128 MMHG | HEIGHT: 64 IN | HEART RATE: 79 BPM | BODY MASS INDEX: 35.34 KG/M2 | WEIGHT: 207 LBS | OXYGEN SATURATION: 97 % | DIASTOLIC BLOOD PRESSURE: 76 MMHG

## 2022-06-20 DIAGNOSIS — M65.4 DE QUERVAIN'S DISEASE (TENOSYNOVITIS): Primary | ICD-10-CM

## 2022-06-20 DIAGNOSIS — J45.20 MILD INTERMITTENT ASTHMA WITHOUT COMPLICATION: ICD-10-CM

## 2022-06-20 DIAGNOSIS — E89.0 POSTOPERATIVE HYPOTHYROIDISM: Chronic | ICD-10-CM

## 2022-06-20 PROCEDURE — 20552 NJX 1/MLT TRIGGER POINT 1/2: CPT | Performed by: INTERNAL MEDICINE

## 2022-06-20 PROCEDURE — 99213 OFFICE O/P EST LOW 20 MIN: CPT | Performed by: INTERNAL MEDICINE

## 2022-06-20 RX ORDER — METHYLPREDNISOLONE ACETATE 40 MG/ML
40 INJECTION, SUSPENSION INTRA-ARTICULAR; INTRALESIONAL; INTRAMUSCULAR; SOFT TISSUE ONCE
Qty: 1 ML | Refills: 0
Start: 2022-06-20 | End: 2022-06-20

## 2022-06-20 ASSESSMENT — PATIENT HEALTH QUESTIONNAIRE - PHQ9
SUM OF ALL RESPONSES TO PHQ QUESTIONS 1-9: 0
4. FEELING TIRED OR HAVING LITTLE ENERGY: 0
SUM OF ALL RESPONSES TO PHQ QUESTIONS 1-9: 0
7. TROUBLE CONCENTRATING ON THINGS, SUCH AS READING THE NEWSPAPER OR WATCHING TELEVISION: 0
5. POOR APPETITE OR OVEREATING: 0
6. FEELING BAD ABOUT YOURSELF - OR THAT YOU ARE A FAILURE OR HAVE LET YOURSELF OR YOUR FAMILY DOWN: 0
1. LITTLE INTEREST OR PLEASURE IN DOING THINGS: 0
SUM OF ALL RESPONSES TO PHQ QUESTIONS 1-9: 0
3. TROUBLE FALLING OR STAYING ASLEEP: 0
SUM OF ALL RESPONSES TO PHQ9 QUESTIONS 1 & 2: 0
9. THOUGHTS THAT YOU WOULD BE BETTER OFF DEAD, OR OF HURTING YOURSELF: 0
10. IF YOU CHECKED OFF ANY PROBLEMS, HOW DIFFICULT HAVE THESE PROBLEMS MADE IT FOR YOU TO DO YOUR WORK, TAKE CARE OF THINGS AT HOME, OR GET ALONG WITH OTHER PEOPLE: 0
SUM OF ALL RESPONSES TO PHQ QUESTIONS 1-9: 0
8. MOVING OR SPEAKING SO SLOWLY THAT OTHER PEOPLE COULD HAVE NOTICED. OR THE OPPOSITE, BEING SO FIGETY OR RESTLESS THAT YOU HAVE BEEN MOVING AROUND A LOT MORE THAN USUAL: 0
2. FEELING DOWN, DEPRESSED OR HOPELESS: 0

## 2022-06-20 NOTE — PROGRESS NOTES
Visit Information    Have you changed or started any medications since your last visit including any over-the-counter medicines, vitamins, or herbal medicines? no   Are you having any side effects from any of your medications? -  no  Have you stopped taking any of your medications? Is so, why? -  no    Have you seen any other physician or provider since your last visit? Yes - Records Obtained  Have you had any other diagnostic t    [address] [address]  Dept: [phone]  Dept Fax: [phone]    Vicky Champagne is a 49 y.o. female who presents today for her medical conditions/complaintsas noted below. Vicky Champagne is c/o of   Chief Complaint   Patient presents with    Hand Pain     thumb and hand pain          HPI:     HPI    Hemoglobin A1C (%)   Date Value   10/18/2018 5.4   02/22/2017 5.3   11/10/2016 5.2             ( goal A1Cis < 7)   No results found for: LABMICR  LDL Cholesterol (mg/dL)   Date Value   10/26/2020 138 (H)   07/08/2020 164 (H)   02/21/2014 120 (H)       (goal LDL is <100)   AST (U/L)   Date Value   04/18/2022 15     ALT (U/L)   Date Value   04/18/2022 10     BUN (mg/dL)   Date Value   04/18/2022 9     BP Readings from Last 3 Encounters:   06/20/22 128/76   04/18/22 113/70   01/18/22 (!) 140/80          (goal 120/80)    Past Medical History:   Diagnosis Date    Adrenal insufficiency (HCC)     Asthma     Bleeding after intercourse     History of    Bloody diarrhea     Cancer ([facility] [address]     CERVICAL    Chest pain     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     Headache     History of cervical dysplasia 2000    had cone    History of conization of cervix 2000    dysplastic cells    Hyperlipidemia     Hypothyroidism, unspecified 3/18/2016    Lupus ([facility]     Osteoarthritis     Positive cardiac stress test     Rheumatoid arthritis ([facility] [facility]     Sleep apnea     tests were negative but snores badly    SOB (shortness of breath)     Vision abnormalities     glasses/ far sighted      Past Surgical History:   Procedure Laterality Date    BONE CYST EXCISION Right     WRIST    CARDIAC CATHETERIZATION  2021    CARPAL TUNNEL RELEASE Right 10-6-15    CARPAL TUNNEL RELEASE Left 11/3/15    CERVIX BIOPSY       SECTION, LOW TRANSVERSE      COLONOSCOPY      COLONOSCOPY  2009    diverticulosis, no other gross lesions    COLONOSCOPY  2017    10 yr recall, small hemorrhoids, diverticulosis    COLONOSCOPY N/A 3/12/2021    COLONOSCOPY POLYPECTOMY SNARE/COLD BIOPSY performed by Emerson Collier MD at [place] [address] N/A 2017    HYSTEROSCOPY AND D& C, myosure performed by Joe White MD at [facility] 1, COLON, DIAGNOSTIC      UGI    FRACTURE SURGERY Left     THUMB    GYNECOLOGIC CRYOSURGERY      conization   [address] [facility]      with mesh, umbilical    LIPOMA RESECTION Right     RING FINGER    OTHER SURGICAL HISTORY      VOCAL CORD SURG    [place] COLON CA SCRN NOT  [address] IND N/A 2017    COLONOSCOPY performed by Josefina Henriquez MD at [address] [place] [address] EGD TRANSORAL BIOPSY SINGLE/MULTIPLE N/A 2017    EGD BIOPSY performed by Josefina [facility]quez MD at [facility] Right 2016    Right thyroid lobectomy and isthmusectomy. Continuous monitoring of the recurrent laryngeal nerve using nerve integrity monitor. Frozen section.     TONSILLECTOMY      UPPER GASTROINTESTINAL ENDOSCOPY  2008    with BRAVO, gastric polyp-fundic gland polyp    UPPER GASTROINTESTINAL ENDOSCOPY  2017    multiple small gastric polyps-fundic gland polyps    UPPER GASTROINTESTINAL ENDOSCOPY N/A 3/3/2021    EGD BIOPSY & Alejandro Hawkinsess performed by Danny Hartman MD at [facility]  3/12/2021    EGD DILATION SAVORY performed by Melissa Hsieh MD at [facility]       Family History   Problem Relation Age of Onset    Alcohol Abuse Father     Other Father         murder    Diabetes Mother     Other Mother         thyroid    High Blood Pressure Mother     Lupus Sister     Drug Abuse Brother     Asthma Brother     Breast Cancer Maternal Grandmother         45s    Heart Surgery Maternal Grandmother     Heart Failure Maternal Grandmother     Hypertension Maternal Grandfather     Stroke Maternal Grandfather     Heart Attack Maternal Grandfather     Alcohol Abuse Maternal Grandfather     Diabetes Maternal Grandfather     Cancer Paternal Grandmother         lymphnoid    Heart Failure Paternal Grandfather     Heart Surgery Paternal Grandfather         x2       Social History     Tobacco Use    Smoking status: Never Smoker    Smokeless tobacco: Never Used    Tobacco comment: only smoked for 4 months as teenager only   Substance Use Topics    Alcohol use:  Yes     Alcohol/week: 0.0 standard drinks     Comment: recovering alcoholic, quit heavy drinking 2000, takes 1-2 every yearly since 2014       Current Outpatient Medications   Medication Sig Dispense Refill    Thumb Spica MISC 1 each by Does not apply route continuous 1 each 0    methylPREDNISolone acetate (DEPO-MEDROL) 40 MG/ML injection Inject 1 mL into the muscle once for 1 dose 1 mL 0    FEROSUL 325 (65 Fe) MG tablet take 1 tablet by mouth once daily with breakfast 90 tablet 1    vitamin D (ERGOCALCIFEROL) 1.25 MG (85868 UT) CAPS capsule take 1 capsule by mouth every week 4 capsule 0    atorvastatin (LIPITOR) 40 MG tablet take 1 tablet by mouth once daily 30 tablet 3    HUMIRA PEN 40 MG/0.4ML PNKT       triamcinolone (ARISTOCORT) 0.5 % ointment apply A THIN LAYER topically to affected area twice a day      levothyroxine (SYNTHROID) 50 MCG tablet TAKE 1 TABLET DAILY 90 tablet 3    traMADol (ULTRAM) 50 MG tablet take 1 tablet by mouth twice a day if needed      citalopram (CELEXA) 10 MG tablet take 1 tablet by mouth once daily with 20 MG TABLET 30 tablet 3    citalopram (CELEXA) 20 MG tablet Take 1 tablet by mouth daily With the celexa 10mg tab to make 30mg dialy 30 tablet 3    nystatin (MYCOSTATIN) 719518 UNIT/GM powder Apply 3 times daily. 60 g 3    LORazepam (ATIVAN) 1 MG tablet take 1/2 to 1 tablet by mouth three times a day if needed for anxiety      nitroGLYCERIN (NITROSTAT) 0.3 MG SL tablet Place 1 tablet under the tongue every 5 minutes as needed for Chest pain up to max of 3 total doses. If no relief after 1 dose, call 911. 30 tablet 3    tiZANidine (ZANAFLEX) 4 MG tablet Take 1 tablet by mouth nightly 30 tablet 1    clobetasol (TEMOVATE) 0.05 % cream apply to rash twice a day for up to 2 weeks ON and 1 week off      omeprazole (PRILOSEC) 40 MG delayed release capsule Take 40 mg by mouth nightly       B Complex Vitamins (VITAMIN B COMPLEX PO) Take by mouth daily      pregabalin (LYRICA) 75 MG capsule take 1 capsule by mouth twice a day  0    Cetirizine HCl (ZYRTEC PO) Take 10 mg by mouth daily        No current facility-administered medications for this visit. Allergies   Allergen Reactions    Other      Perch - twice had violent vomiting, diarrhea,severe dizziness and nausea    Azithromycin      Palpitations.      Ibuprofen Hives    Pecan Nut (Diagnostic)     Linwood [Macadamia Nut Oil]     Ceclor [Cefaclor] Hives and Rash    Penicillins Hives and Rash       Health Maintenance   Topic Date Due    COVID-19 Vaccine (1) Never done    Pneumococcal 0-64 years Vaccine (1 - PCV) Never done    DTaP/Tdap/Td vaccine (1 - Tdap) Never done    Diabetes screen  10/18/2021    Lipids  10/26/2021    Shingles vaccine (1 of 2) Never done    Depression Monitoring  06/01/2022    Flu vaccine (Season Ended) 10/01/2022

## 2022-08-29 DIAGNOSIS — E89.0 POSTOPERATIVE HYPOTHYROIDISM: ICD-10-CM

## 2022-08-29 RX ORDER — LEVOTHYROXINE SODIUM 0.05 MG/1
TABLET ORAL
Qty: 90 TABLET | Refills: 3 | Status: SHIPPED | OUTPATIENT
Start: 2022-08-29

## 2022-09-03 ENCOUNTER — APPOINTMENT (OUTPATIENT)
Dept: CT IMAGING | Age: 52
End: 2022-09-03
Payer: COMMERCIAL

## 2022-09-03 ENCOUNTER — HOSPITAL ENCOUNTER (EMERGENCY)
Age: 52
Discharge: HOME | End: 2022-09-03
Attending: EMERGENCY MEDICINE
Payer: COMMERCIAL

## 2022-09-03 VITALS
RESPIRATION RATE: 18 BRPM | SYSTOLIC BLOOD PRESSURE: 150 MMHG | DIASTOLIC BLOOD PRESSURE: 88 MMHG | WEIGHT: 205 LBS | TEMPERATURE: 98.1 F | HEART RATE: 92 BPM | BODY MASS INDEX: 35 KG/M2 | HEIGHT: 64 IN | OXYGEN SATURATION: 98 %

## 2022-09-03 DIAGNOSIS — K57.32 DIVERTICULITIS OF COLON: Primary | ICD-10-CM

## 2022-09-03 LAB
ABSOLUTE EOS #: 0.2 K/UL (ref 0–0.4)
ABSOLUTE LYMPH #: 3.1 K/UL (ref 1–4.8)
ABSOLUTE MONO #: 1 K/UL (ref 0.1–1.3)
ALBUMIN SERPL-MCNC: 4.3 G/DL (ref 3.5–5.2)
ALP BLD-CCNC: 86 U/L (ref 35–104)
ALT SERPL-CCNC: 12 U/L (ref 5–33)
ANION GAP SERPL CALCULATED.3IONS-SCNC: 11 MMOL/L (ref 9–17)
AST SERPL-CCNC: 14 U/L
BACTERIA: NORMAL
BASOPHILS # BLD: 1 % (ref 0–2)
BASOPHILS ABSOLUTE: 0.1 K/UL (ref 0–0.2)
BILIRUB SERPL-MCNC: 0.4 MG/DL (ref 0.3–1.2)
BILIRUBIN URINE: NEGATIVE
BUN BLDV-MCNC: 14 MG/DL (ref 6–20)
CALCIUM SERPL-MCNC: 9.5 MG/DL (ref 8.6–10.4)
CASTS UA: NORMAL /LPF
CHLORIDE BLD-SCNC: 101 MMOL/L (ref 98–107)
CO2: 25 MMOL/L (ref 20–31)
COLOR: YELLOW
CREAT SERPL-MCNC: 0.62 MG/DL (ref 0.5–0.9)
EOSINOPHILS RELATIVE PERCENT: 2 % (ref 0–4)
EPITHELIAL CELLS UA: NORMAL /HPF
GFR AFRICAN AMERICAN: >60 ML/MIN
GFR NON-AFRICAN AMERICAN: >60 ML/MIN
GFR SERPL CREATININE-BSD FRML MDRD: ABNORMAL ML/MIN/{1.73_M2}
GLUCOSE BLD-MCNC: 121 MG/DL (ref 70–99)
GLUCOSE URINE: NEGATIVE
HCG QUALITATIVE: NEGATIVE
HCT VFR BLD CALC: 39.1 % (ref 36–46)
HEMOGLOBIN: 13.1 G/DL (ref 12–16)
KETONES, URINE: NEGATIVE
LEUKOCYTE ESTERASE, URINE: ABNORMAL
LIPASE: 69 U/L (ref 13–60)
LYMPHOCYTES # BLD: 32 % (ref 24–44)
MAGNESIUM: 2 MG/DL (ref 1.6–2.6)
MCH RBC QN AUTO: 30.6 PG (ref 26–34)
MCHC RBC AUTO-ENTMCNC: 33.6 G/DL (ref 31–37)
MCV RBC AUTO: 91.3 FL (ref 80–100)
MONOCYTES # BLD: 10 % (ref 1–7)
NITRITE, URINE: NEGATIVE
PDW BLD-RTO: 13.5 % (ref 11.5–14.9)
PH UA: 6 (ref 5–8)
PLATELET # BLD: 197 K/UL (ref 150–450)
PMV BLD AUTO: 9.4 FL (ref 6–12)
POTASSIUM SERPL-SCNC: 3.7 MMOL/L (ref 3.7–5.3)
PROTEIN UA: NEGATIVE
RBC # BLD: 4.29 M/UL (ref 4–5.2)
RBC UA: NORMAL /HPF
SEG NEUTROPHILS: 55 % (ref 36–66)
SEGMENTED NEUTROPHILS ABSOLUTE COUNT: 5.1 K/UL (ref 1.3–9.1)
SODIUM BLD-SCNC: 137 MMOL/L (ref 135–144)
SPECIFIC GRAVITY UA: 1.01 (ref 1–1.03)
TOTAL PROTEIN: 7.2 G/DL (ref 6.4–8.3)
TURBIDITY: CLEAR
URINE HGB: NEGATIVE
UROBILINOGEN, URINE: NORMAL
WBC # BLD: 9.4 K/UL (ref 3.5–11)
WBC UA: NORMAL /HPF

## 2022-09-03 PROCEDURE — A4216 STERILE WATER/SALINE, 10 ML: HCPCS | Performed by: EMERGENCY MEDICINE

## 2022-09-03 PROCEDURE — 85025 COMPLETE CBC W/AUTO DIFF WBC: CPT

## 2022-09-03 PROCEDURE — 96375 TX/PRO/DX INJ NEW DRUG ADDON: CPT

## 2022-09-03 PROCEDURE — 83690 ASSAY OF LIPASE: CPT

## 2022-09-03 PROCEDURE — 81001 URINALYSIS AUTO W/SCOPE: CPT

## 2022-09-03 PROCEDURE — 6370000000 HC RX 637 (ALT 250 FOR IP): Performed by: EMERGENCY MEDICINE

## 2022-09-03 PROCEDURE — 83735 ASSAY OF MAGNESIUM: CPT

## 2022-09-03 PROCEDURE — 2580000003 HC RX 258: Performed by: EMERGENCY MEDICINE

## 2022-09-03 PROCEDURE — 6360000004 HC RX CONTRAST MEDICATION: Performed by: EMERGENCY MEDICINE

## 2022-09-03 PROCEDURE — 74177 CT ABD & PELVIS W/CONTRAST: CPT

## 2022-09-03 PROCEDURE — 36415 COLL VENOUS BLD VENIPUNCTURE: CPT

## 2022-09-03 PROCEDURE — 96374 THER/PROPH/DIAG INJ IV PUSH: CPT

## 2022-09-03 PROCEDURE — 2500000003 HC RX 250 WO HCPCS: Performed by: EMERGENCY MEDICINE

## 2022-09-03 PROCEDURE — 6360000002 HC RX W HCPCS: Performed by: EMERGENCY MEDICINE

## 2022-09-03 PROCEDURE — 99285 EMERGENCY DEPT VISIT HI MDM: CPT

## 2022-09-03 PROCEDURE — 84703 CHORIONIC GONADOTROPIN ASSAY: CPT

## 2022-09-03 PROCEDURE — 80053 COMPREHEN METABOLIC PANEL: CPT

## 2022-09-03 RX ORDER — POLYETHYLENE GLYCOL 3350 17 G/17G
17 POWDER, FOR SOLUTION ORAL DAILY PRN
Qty: 527 G | Refills: 0 | Status: SHIPPED | OUTPATIENT
Start: 2022-09-03 | End: 2022-10-03

## 2022-09-03 RX ORDER — 0.9 % SODIUM CHLORIDE 0.9 %
100 INTRAVENOUS SOLUTION INTRAVENOUS ONCE
Status: COMPLETED | OUTPATIENT
Start: 2022-09-03 | End: 2022-09-03

## 2022-09-03 RX ORDER — CIPROFLOXACIN 500 MG/1
500 TABLET, FILM COATED ORAL 2 TIMES DAILY
Qty: 20 TABLET | Refills: 0 | Status: SHIPPED | OUTPATIENT
Start: 2022-09-03 | End: 2022-09-13

## 2022-09-03 RX ORDER — METRONIDAZOLE 500 MG/1
500 TABLET ORAL ONCE
Status: COMPLETED | OUTPATIENT
Start: 2022-09-03 | End: 2022-09-03

## 2022-09-03 RX ORDER — CIPROFLOXACIN 500 MG/1
500 TABLET, FILM COATED ORAL ONCE
Status: COMPLETED | OUTPATIENT
Start: 2022-09-03 | End: 2022-09-03

## 2022-09-03 RX ORDER — SODIUM CHLORIDE 0.9 % (FLUSH) 0.9 %
10 SYRINGE (ML) INJECTION AS NEEDED
Status: DISCONTINUED | OUTPATIENT
Start: 2022-09-03 | End: 2022-09-04 | Stop reason: HOSPADM

## 2022-09-03 RX ORDER — 0.9 % SODIUM CHLORIDE 0.9 %
1000 INTRAVENOUS SOLUTION INTRAVENOUS ONCE
Status: COMPLETED | OUTPATIENT
Start: 2022-09-03 | End: 2022-09-03

## 2022-09-03 RX ORDER — METRONIDAZOLE 500 MG/1
500 TABLET ORAL 3 TIMES DAILY
Qty: 21 TABLET | Refills: 0 | Status: SHIPPED | OUTPATIENT
Start: 2022-09-03 | End: 2022-09-10

## 2022-09-03 RX ORDER — ONDANSETRON 2 MG/ML
8 INJECTION INTRAMUSCULAR; INTRAVENOUS ONCE
Status: COMPLETED | OUTPATIENT
Start: 2022-09-03 | End: 2022-09-03

## 2022-09-03 RX ADMIN — HYDROMORPHONE HYDROCHLORIDE 1 MG: 1 INJECTION, SOLUTION INTRAMUSCULAR; INTRAVENOUS; SUBCUTANEOUS at 20:59

## 2022-09-03 RX ADMIN — FAMOTIDINE 20 MG: 10 INJECTION, SOLUTION INTRAVENOUS at 20:49

## 2022-09-03 RX ADMIN — ONDANSETRON 8 MG: 2 INJECTION INTRAMUSCULAR; INTRAVENOUS at 20:48

## 2022-09-03 RX ADMIN — METRONIDAZOLE 500 MG: 500 TABLET ORAL at 23:09

## 2022-09-03 RX ADMIN — IOPAMIDOL 75 ML: 755 INJECTION, SOLUTION INTRAVENOUS at 21:23

## 2022-09-03 RX ADMIN — SODIUM CHLORIDE, PRESERVATIVE FREE 10 ML: 5 INJECTION INTRAVENOUS at 21:23

## 2022-09-03 RX ADMIN — SODIUM CHLORIDE 100 ML: 9 INJECTION, SOLUTION INTRAVENOUS at 21:23

## 2022-09-03 RX ADMIN — CIPROFLOXACIN 500 MG: 500 TABLET, FILM COATED ORAL at 23:09

## 2022-09-03 RX ADMIN — SODIUM CHLORIDE 1000 ML: 9 INJECTION, SOLUTION INTRAVENOUS at 20:47

## 2022-09-03 ASSESSMENT — ENCOUNTER SYMPTOMS
CONSTIPATION: 0
NAUSEA: 1
ABDOMINAL PAIN: 1
DIARRHEA: 0
BACK PAIN: 1
VOMITING: 0

## 2022-09-04 ASSESSMENT — ENCOUNTER SYMPTOMS
DIARRHEA: 0
NAUSEA: 1
VOMITING: 0
CONSTIPATION: 0
BACK PAIN: 1
ABDOMINAL PAIN: 1

## 2022-09-04 NOTE — ED NOTES
Mode of arrival (squad #, walk in, police, etc) : walk in         Chief complaint(s): abdominal pain         Arrival Note (brief scenario, treatment PTA, etc). : pt reports abdominal pain x3 days across lower abdomen with nausea. Reports pressure with urination and pain into lower back radiating down legs. Pt reports pain is constant with intermittent periods of stabbing pain. Denies constipation, reports loose stools x2 weeks. No fevers or chills. Hx of diverticulosis. C= \"Have you ever felt that you should Cut down on your drinking? \"  No  A= \"Have people Annoyed you by criticizing your drinking? \"  No  G= \"Have you ever felt bad or Guilty about your drinking? \"  No  E= \"Have you ever had a drink as an Eye-opener first thing in the morning to steady your nerves or to help a hangover? \"  No      Deferred []      Reason for deferring: N/A      *If yes to two or more: probable alcohol abuse. Errol Carroll RN  09/03/22 2003

## 2022-09-04 NOTE — ED PROVIDER NOTES
EMERGENCY DEPARTMENT ENCOUNTER    Pt Name: Cassie Espinosa  MRN: [id number]  [place] 1970  Date of evaluation: 9/3/22  CHIEF COMPLAINT       Chief Complaint   Patient presents with    Abdominal Pain     HISTORY OF PRESENT ILLNESS     Abdominal Pain  Pain location:  Suprapubic, RLQ and LLQ  Pain quality: aching    Pain radiation: back. Pain severity:  Severe  Onset quality:  Gradual  Duration:  3 days  Timing:  Constant  Progression:  Worsening  Chronicity:  New  Relieved by:  Nothing  Worsened by:  Nothing  Ineffective treatments:  None tried  Associated symptoms: dysuria and nausea    Associated symptoms: no chest pain, no chills, no constipation, no diarrhea, no fever, no vaginal bleeding, no vaginal discharge and no vomiting    Went to urgent care, they sent her here  Had GB surgery years ago at [place] [address]     Review of Systems   Constitutional:  Negative for chills and fever. Cardiovascular:  Negative for chest pain. Gastrointestinal:  Positive for abdominal pain and nausea. Negative for constipation, diarrhea and vomiting. Genitourinary:  Positive for dysuria and urgency. Negative for vaginal bleeding and vaginal discharge. Musculoskeletal:  Positive for back pain. All other systems reviewed and are negative. 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     Headache     History of cervical dysplasia 2000    had cone    History of conization of cervix 2000    dysplastic cells    Hyperlipidemia     Hypothyroidism, unspecified 03/18/2016    Lupus ([facility] [address] Osteoarthritis     Positive cardiac stress test     [facility]     Sleep apnea     tests were negative but snores badly    SOB (shortness of breath)     Vision abnormalities     glasses/ far sighted     Past Problem List  Patient Active Problem List   Diagnosis Code    Dermatitis, contact L25.9    Anxiety and depression F41.9, F32. 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    Environmental allergies Z91.09    H/O thyroid cyst Z86.39    History of cervical dysplasia Z87.410    History of conization of cervix 2000 Z98.890    History of low transverse  section Z98.891    Vision abnormalities H53.9    Family history of breast cancer Z80.3    Osteoarthritis M19.90    VASECTOMY in Male Partner Z30.09    History of endometrial ablation Z98.890    Pelvic pain in female R10.2    Hypothyroidism E03.9    Abnormal uterine bleeding N93.9    Diverticulosis of colon K57.30    Rectal bleeding K62.5    Constipation K59.00    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    Persistent depressive disorder F34.1    Generalized anxiety disorder with panic attacks F41.1, F41.0    Other forms of systemic lupus erythematosus (HCC) M32.8    Panic attacks F41.0    Stroke-like symptoms R29.90    Numbness R20.0    GI bleed K92.2    Diarrhea R19.7    Migraine G43.909    Abdominal pain R10.9    Dysphagia R13.10    Microcytic anemia D50.9    Melena K92.1    De Quervain's disease (tenosynovitis) M65.4     SURGICAL HISTORY       Past Surgical History:   Procedure Laterality Date BONE CYST EXCISION Right     WRIST    CARDIAC CATHETERIZATION  2021    CARPAL TUNNEL RELEASE Right 10-6-15    CARPAL TUNNEL RELEASE Left 11/3/15    CERVIX BIOPSY       SECTION, LOW TRANSVERSE      COLONOSCOPY      COLONOSCOPY  2009    diverticulosis, no other gross lesions    COLONOSCOPY  2017    10 yr recall, small hemorrhoids, diverticulosis    COLONOSCOPY N/A 3/12/2021    COLONOSCOPY POLYPECTOMY SNARE/COLD BIOPSY performed by Do Melgar MD at [address] [place] [address] N/A 2017    HYSTEROSCOPY AND D& C, myosure performed by Jabier Nieto MD at [address] [facility], COLON, DIAGNOSTIC      UGI    FRACTURE SURGERY Left     [address]    conization    [address]      with mesh, umbilical    LIPOMA RESECTION Right     RING FINGER    OTHER SURGICAL HISTORY      VOCAL CORD SURG    AR COLON CA SCRN NOT  [address] IND N/A 2017    COLONOSCOPY performed by Elia Gilmore MD at [facility] EGD TRANSORAL BIOPSY SINGLE/MULTIPLE N/A 2017    EGD BIOPSY performed by Elia Gilmore MD at [place] [address] Right 2016    Right thyroid lobectomy and isthmusectomy. Continuous monitoring of the recurrent laryngeal nerve using nerve integrity monitor. Frozen section.     TONSILLECTOMY      UPPER GASTROINTESTINAL ENDOSCOPY  2008    with BRAVO, gastric polyp-fundic gland polyp    UPPER GASTROINTESTINAL ENDOSCOPY  2017    multiple small gastric polyps-fundic gland polyps    UPPER GASTROINTESTINAL ENDOSCOPY N/A 3/3/2021    EGD BIOPSY & SAVARY DILATION performed by Juanjose Osman MD at [address] [place] [address]  3/12/2021    EGD DILATION SAVORY performed by Do Melgar MD at [place] [address]       Discharge Medication List as of 9/3/2022 11:22 PM        CONTINUE these medications which have NOT CHANGED    Details   levothyroxine (SYNTHROID) 50 MCG tablet TAKE 1 TABLET DAILY, Disp-90 tablet, R-3Normal      Thumb Spica MISC CONTINUOUS Starting Mon 6/20/2022, Disp-1 each, R-0, Print      FEROSUL 325 (65 Fe) MG tablet take 1 tablet by mouth once daily with breakfast, Disp-90 tablet, R-1Normal      vitamin D (ERGOCALCIFEROL) 1.25 MG (81048 UT) CAPS capsule take 1 capsule by mouth every week, Disp-4 capsule, R-0Normal      atorvastatin (LIPITOR) 40 MG tablet take 1 tablet by mouth once daily, Disp-30 tablet, R-3Normal      HUMIRA PEN 40 MG/0.4ML PNKT DAWHistorical Med      triamcinolone (ARISTOCORT) 0.5 % ointment apply A THIN LAYER topically to affected area twice a day, Historical Med      traMADol (ULTRAM) 50 MG tablet take 1 tablet by mouth twice a day if neededHistorical Med      !! citalopram (CELEXA) 10 MG tablet take 1 tablet by mouth once daily with 20 MG TABLET, Disp-30 tablet, R-3Normal      !! citalopram (CELEXA) 20 MG tablet Take 1 tablet by mouth daily With the celexa 10mg tab to make 30mg dialy, Disp-30 tablet, R-3Normal      nystatin (MYCOSTATIN) 206212 UNIT/GM powder Apply 3 times daily. , Disp-60 g, R-3, Normal      LORazepam (ATIVAN) 1 MG tablet take 1/2 to 1 tablet by mouth three times a day if needed for anxietyHistorical Med      nitroGLYCERIN (NITROSTAT) 0.3 MG SL tablet Place 1 tablet under the tongue every 5 minutes as needed for Chest pain up to max of 3 total doses.  If no relief after 1 dose, call 911., Disp-30 tablet, R-3Normal      clobetasol (TEMOVATE) 0.05 % cream apply to rash twice a day for up to 2 weeks ON and 1 week off, Historical Med      omeprazole (PRILOSEC) 40 MG delayed release capsule Take 40 mg by mouth nightly Historical Med      B Complex Vitamins (VITAMIN B COMPLEX PO) Take by mouth dailyHistorical Med      pregabalin (LYRICA) 75 MG capsule take 1 capsule by mouth twice a day, R-0Historical Med      Cetirizine HCl (ZYRTEC PO) Take 10 mg by mouth daily Historical Med       !! - Potential duplicate medications found. Please discuss with provider. ALLERGIES     is allergic to other, azithromycin, ibuprofen, pecan nut (diagnostic), walnut [macadamia nut oil], ceclor [cefaclor], and penicillins. FAMILY HISTORY     She indicated that her mother is alive. She indicated that her father is . She indicated that her sister is alive. She indicated that her brother is alive. She indicated that her maternal grandmother is . She indicated that her maternal grandfather is . She indicated that her paternal grandmother is . She indicated that her paternal grandfather is . SOCIAL HISTORY       Social History     Tobacco Use    Smoking status: Never    Smokeless tobacco: Never    Tobacco comments:     only smoked for 4 months as teenager only   Vaping Use    Vaping Use: Never used   Substance Use Topics    Alcohol use: Yes     Alcohol/week: 0.0 standard drinks     Comment: recovering alcoholic, quit heavy drinking , takes 1-2 every yearly since      Drug use: No     PHYSICAL EXAM     INITIAL VITALS: BP (!) 150/88   Pulse 92   Temp 98.1 °F (36.7 °C) (Oral)   Resp 18   Ht 5' 4\" (1.626 m)   Wt 205 lb (93 kg)   SpO2 98%   BMI 35.19 kg/m²    Physical Exam  Vitals reviewed. Constitutional:       General: She is not in acute distress. Appearance: Normal appearance. She is well-developed. She is not diaphoretic. HENT:      Head: Normocephalic and atraumatic. Right Ear: External ear normal.      Left Ear: External ear normal.      Nose: Nose normal. No congestion. Mouth/Throat:      Mouth: Mucous membranes are moist.      Pharynx: Oropharynx is clear. Eyes:      General:         Right eye: No discharge. Left eye: No discharge. Conjunctiva/sclera: Conjunctivae normal.      Pupils: Pupils are equal, round, and reactive to light. Neck:      Trachea: No tracheal deviation.    Cardiovascular:      Rate and Rhythm: Normal rate and regular rhythm. Pulses: Normal pulses. Heart sounds: Normal heart sounds. Pulmonary:      Effort: Pulmonary effort is normal. No respiratory distress. Breath sounds: Normal breath sounds. No stridor. No wheezing or rales. Abdominal:      Palpations: Abdomen is soft. Tenderness: There is abdominal tenderness. There is no guarding or rebound. Comments: RLQ and LLQ and suprapubic tenderness   Musculoskeletal:         General: No tenderness or deformity. Normal range of motion. Cervical back: Normal range of motion and neck supple. Skin:     General: Skin is warm and dry. Capillary Refill: Capillary refill takes less than 2 seconds. Findings: No erythema or rash. Neurological:      General: No focal deficit present. Mental Status: She is alert and oriented to person, place, and time. Coordination: Coordination normal.   Psychiatric:         Mood and Affect: Mood normal.         Behavior: Behavior normal.         Thought Content: Thought content normal.         Judgment: Judgment normal.       MEDICAL DECISION MAKING:   Labs and ct imaging reviewed  Cipro and flagyl based on her allergies  She will stop zanaflex  Discussed with patient anticipatory guidance, discharge instructions, follow up PCP 24 hours  No perf  Do not suspect sepsis         Procedures    DIAGNOSTIC RESULTS     RADIOLOGY:All plain film, CT, MRI, and formal ultrasound images (except ED bedside ultrasound) are read by the radiologist, see reports below, unless otherwisenoted in MDM or here. CT ABDOMEN PELVIS W IV CONTRAST Additional Contrast? None   Final Result   Acute uncomplicated sigmoid diverticulitis. LABS: All lab results were reviewed by myself, and all abnormals are listed below.   Labs Reviewed   CBC WITH AUTO DIFFERENTIAL - Abnormal; Notable for the following components:       Result Value    Monocytes 10 (*)     All other components within normal limits   COMPREHENSIVE METABOLIC PANEL - Abnormal; Notable for the following components:    Glucose 121 (*)     All other components within normal limits   LIPASE - Abnormal; Notable for the following components:    Lipase 69 (*)     All other components within normal limits   URINALYSIS WITH REFLEX TO CULTURE - Abnormal; Notable for the following components:    Leukocyte Esterase, Urine MOD (*)     All other components within normal limits   HCG, SERUM, QUALITATIVE   MAGNESIUM   MICROSCOPIC URINALYSIS       EMERGENCY DEPARTMENTCOURSE:         Vitals:    Vitals:    09/03/22 1959   BP: (!) 150/88   Pulse: 92   Resp: 18   Temp: 98.1 °F (36.7 °C)   TempSrc: Oral   SpO2: 98%   Weight: 205 lb (93 kg)   Height: 5' 4\" (1.626 m)       The patient was given the following medications while in the emergency department:  Orders Placed This Encounter   Medications    0.9 % sodium chloride bolus    ondansetron (ZOFRAN) injection 8 mg    HYDROmorphone (DILAUDID) injection 1 mg    famotidine (PEPCID) 20 mg in sodium chloride (PF) 10 mL injection    0.9 % sodium chloride bolus    iopamidol (ISOVUE-370) 76 % injection 75 mL    DISCONTD: sodium chloride flush 0.9 % injection 10 mL    metroNIDAZOLE (FLAGYL) tablet 500 mg     Order Specific Question:   Antimicrobial Indications     Answer:   Intra-Abdominal Infection    ciprofloxacin (CIPRO) tablet 500 mg     Order Specific Question:   Antimicrobial Indications     Answer:   Intra-Abdominal Infection    polyethylene glycol (MIRALAX) 17 g packet     Sig: Take 17 g by mouth daily as needed for Constipation     Dispense:  527 g     Refill:  0    ciprofloxacin (CIPRO) 500 MG tablet     Sig: Take 1 tablet by mouth 2 times daily for 10 days     Dispense:  20 tablet     Refill:  0    metroNIDAZOLE (FLAGYL) 500 MG tablet     Sig: Take 1 tablet by mouth 3 times daily for 7 days     Dispense:  21 tablet     Refill:  0     FINAL IMPRESSION      1.  Diverticulitis of colon          DISPOSITION/PLAN   DISPOSITION Decision To Discharge 09/03/2022 11:06:31 PM      PATIENT REFERRED TO:  Rehana Perez MD  [facility] [address] [facility]  [phone]    Schedule an appointment as soon as possible for a visit in 2 days    DISCHARGE MEDICATIONS:  Discharge Medication List as of 9/3/2022 11:22 PM        START taking these medications    Details   polyethylene glycol (MIRALAX) 17 g packet Take 17 g by mouth daily as needed for Constipation, Disp-527 g, R-0Normal      ciprofloxacin (CIPRO) 500 MG tablet Take 1 tablet by mouth 2 times daily for 10 days, Disp-20 tablet, R-0Normal      metroNIDAZOLE (FLAGYL) 500 MG tablet Take 1 tablet by mouth 3 times daily for 7 days, Disp-21 tablet, R-0Normal           The care is provided during an unprecedented national emergency due to the novel coronavirus, COVID 19.   MD Franny Ronquillo MD  09/04/22 8484 Vandana Meade MD  09/04/22 8868

## 2022-09-14 ENCOUNTER — OFFICE VISIT (OUTPATIENT)
Dept: INTERNAL MEDICINE CLINIC | Age: 52
End: 2022-09-14
Payer: COMMERCIAL

## 2022-09-14 ENCOUNTER — HOSPITAL ENCOUNTER (OUTPATIENT)
Age: 52
Setting detail: SPECIMEN
Discharge: HOME | End: 2022-09-14

## 2022-09-14 VITALS
HEART RATE: 88 BPM | WEIGHT: 207.6 LBS | SYSTOLIC BLOOD PRESSURE: 128 MMHG | BODY MASS INDEX: 35.44 KG/M2 | DIASTOLIC BLOOD PRESSURE: 88 MMHG | OXYGEN SATURATION: 97 % | HEIGHT: 64 IN

## 2022-09-14 DIAGNOSIS — Z13.1 ENCOUNTER FOR SCREENING FOR DIABETES MELLITUS: ICD-10-CM

## 2022-09-14 DIAGNOSIS — Z13.220 SCREENING FOR HYPERLIPIDEMIA: ICD-10-CM

## 2022-09-14 DIAGNOSIS — R19.5 DARK STOOLS: ICD-10-CM

## 2022-09-14 DIAGNOSIS — R30.0 DYSURIA: ICD-10-CM

## 2022-09-14 DIAGNOSIS — R94.39 ABNORMAL STRESS TEST: ICD-10-CM

## 2022-09-14 DIAGNOSIS — M46.1 BILATERAL SACROILIITIS (HCC): Primary | ICD-10-CM

## 2022-09-14 DIAGNOSIS — I25.118 CORONARY ARTERY DISEASE OF NATIVE HEART WITH STABLE ANGINA PECTORIS, UNSPECIFIED VESSEL OR LESION TYPE (HCC): ICD-10-CM

## 2022-09-14 DIAGNOSIS — M32.8 OTHER FORMS OF SYSTEMIC LUPUS ERYTHEMATOSUS, UNSPECIFIED ORGAN INVOLVEMENT STATUS (HCC): ICD-10-CM

## 2022-09-14 PROCEDURE — 99214 OFFICE O/P EST MOD 30 MIN: CPT | Performed by: INTERNAL MEDICINE

## 2022-09-14 RX ORDER — LIDOCAINE 4 G/G
1 PATCH TOPICAL DAILY
Qty: 30 PATCH | Refills: 0 | Status: SHIPPED | OUTPATIENT
Start: 2022-09-14 | End: 2022-10-14

## 2022-09-14 RX ORDER — ATORVASTATIN CALCIUM 40 MG/1
TABLET, FILM COATED ORAL
Qty: 30 TABLET | Refills: 3 | Status: SHIPPED | OUTPATIENT
Start: 2022-09-14

## 2022-09-14 RX ORDER — TIZANIDINE 4 MG/1
1 TABLET ORAL NIGHTLY
COMMUNITY
Start: 2022-09-13

## 2022-09-14 SDOH — ECONOMIC STABILITY: FOOD INSECURITY: WITHIN THE PAST 12 MONTHS, YOU WORRIED THAT YOUR FOOD WOULD RUN OUT BEFORE YOU GOT MONEY TO BUY MORE.: NEVER TRUE

## 2022-09-14 SDOH — ECONOMIC STABILITY: FOOD INSECURITY: WITHIN THE PAST 12 MONTHS, THE FOOD YOU BOUGHT JUST DIDN'T LAST AND YOU DIDN'T HAVE MONEY TO GET MORE.: NEVER TRUE

## 2022-09-14 ASSESSMENT — SOCIAL DETERMINANTS OF HEALTH (SDOH): HOW HARD IS IT FOR YOU TO PAY FOR THE VERY BASICS LIKE FOOD, HOUSING, MEDICAL CARE, AND HEATING?: NOT HARD AT ALL

## 2022-09-14 NOTE — PROGRESS NOTES
Visit Information    Have you changed or started any medications since your last visit including any over-the-counter medicines, vitamins, or herbal medicines? yes - from hospital only   Are you having any side effects from any of your medications? -  no  Have you stopped taking any of your medications? Is so, why? -  no    Have you seen any other physician or provider since your last visit? Yes - Records Obtained  Have you had any other diagnostic tests since your last visit? Yes - Records Obtained  Have you been seen in the emergency room and/or had an admission to a hospital since we last saw you? Yes - Records Obtained  Have you had your routine dental cleaning in the past 6 months? no    Have you activated your [employer] account? If not, what are your barriers?  Yes     Patient Care Team:  Lauren Penaloza MD as PCP - General (Internal Medicine)  Lauren Penaloza MD as PCP [facility]  Tootie Godinez DO as Consulting Physician (Obstetrics & Gynecology)    Medical History Review  Past Medical, Family, and Social History reviewed and does contribute to the patient presenting condition    Health Maintenance   Topic Date Due    COVID-19 Vaccine (1) Never done    Pneumococcal 0-64 years Vaccine (1 - PCV) Never done    DTaP/Tdap/Td vaccine (1 - Tdap) Never done    Diabetes screen  10/18/2021    Lipids  10/26/2021    Shingles vaccine (1 of 2) Never done    Flu vaccine (1) 09/01/2022    Cervical cancer screen  11/07/2022    Breast cancer screen  12/24/2022    Depression Monitoring  06/20/2023    Colorectal Cancer Screen  03/12/2026    Hepatitis C screen  Completed    HIV screen  Completed    Hepatitis A vaccine  Aged Out    Hepatitis B vaccine  Aged Out    Hib vaccine  Aged Out    Meningococcal (ACWY) vaccine  Aged Out     SUBJECTIVE:  Vikki Singletary is a 49 y.o. female patient who  comes for complaints of   Chief Complaint   Patient presents with    Diverticulitis     Given antibiotics, miralax    Other Discolored urine    Leg Pain     Pins and needles tingling bilateral- sharper pain in left leg       ER followup  Diagnosed with diverticulitis 9/3/22  Completed courese of abx  Abdo pain pain is better  On miralax. Has some black stool   Colonoscopy - march 2021- to be repeated in 5yr    Dysuria  Pressure when passing urine  Dark urine  4 days  No fevers/chills    B/l hip pain  H/o right hip bursitis  Ha sh/o rheumatoid arthritis  Pt on humira for RA- but was held since she was on abx  Also needs to resume antispasmodic    Left shoulder and neck swellijng sensation  Today  Some pain, no itching   No swelling or lumps on exam today      REVIEW OF SYSTEMS (except Subjective (HPI))  GENERAL: No fevers / chills  RESPIRATORY: Negative for cough, wheezing or shortness of breath  CARDIOVASCULAR: Negative for chest pain or palpitations. 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)     Diverticulitis     Diverticulosis of colon     Environmental allergies     Family history of breast cancer     MGM in her 45s    Fibromyalgia     GERD (gastroesophageal reflux disease)     H/O thyroid cyst     mild hypothyroidism.     Headache     History of cervical dysplasia 2000    had cone    History of conization of cervix 2000    dysplastic cells    Hyperlipidemia     Hypothyroidism, unspecified 03/18/2016    Lupus (HCC)     Osteoarthritis     Positive cardiac stress test     Rheumatoid arthritis (HCC)     Sleep apnea     tests were negative but snores badly    SOB (shortness of breath)     Vision abnormalities     glasses/ far sighted       SOCIAL HISTORY:  Social History     Socioeconomic History    Marital status:      Spouse name: Not on file    Number of children: Not on file    Years of education: Not on file    Highest education level: Not on file   Occupational History    Not on file   Tobacco Use    Smoking status: Never    Smokeless tobacco: Never    Tobacco comments:     only smoked for 4 months as teenager only   Vaping Use    Vaping Use: Never used   Substance and Sexual Activity    Alcohol use:  Yes     Alcohol/week: 0.0 standard drinks     Comment: recovering alcoholic, quit heavy drinking 2000, takes 1-2 every yearly since 2014     Drug use: No    Sexual activity: Yes     Partners: Male     Birth control/protection: Other-see comments     Comment:  had vasectomy   Other Topics Concern    Not on file   Social History Narrative    Not on file     Social Determinants of Health     Financial Resource Strain: Low Risk     Difficulty of Paying Living Expenses: Not hard at all   Food Insecurity: No Food Insecurity    Worried About Running Out of Food in the Last Year: Never true    Ran Out of Food in the Last Year: Never true   Transportation Needs: Not on file   Physical Activity: Not on file   Stress: Not on file   Social Connections: Not on file   Intimate Partner Violence: Not on file   Housing Stability: Not on file           CURRENT MEDICATIONS:  Current Outpatient Medications   Medication Sig Dispense Refill    polyethylene glycol (MIRALAX) 17 g packet Take 17 g by mouth daily as needed for Constipation 527 g 0    levothyroxine (SYNTHROID) 50 MCG tablet TAKE 1 TABLET DAILY 90 tablet 3    Thumb Spica MISC 1 each by Does not apply route continuous 1 each 0    vitamin D (ERGOCALCIFEROL) 1.25 MG (33170 UT) CAPS capsule take 1 capsule by mouth every week 4 capsule 0    HUMIRA PEN 40 MG/0.4ML PNKT       triamcinolone (ARISTOCORT) 0.5 % ointment apply A THIN LAYER topically to affected area twice a day      traMADol (ULTRAM) 50 MG tablet take 1 tablet by mouth twice a day if needed      citalopram (CELEXA) 10 MG tablet take 1 tablet by mouth once daily with 20 MG TABLET 30 tablet 3    citalopram (CELEXA) 20 MG tablet Take 1 tablet by mouth daily With the celexa 10mg tab to make 30mg dialy 30 tablet 3    nystatin (MYCOSTATIN) 818209 UNIT/GM powder Apply 3 times daily. 60 g 3    LORazepam (ATIVAN) 1 MG tablet take 1/2 to 1 tablet by mouth three times a day if needed for anxiety      nitroGLYCERIN (NITROSTAT) 0.3 MG SL tablet Place 1 tablet under the tongue every 5 minutes as needed for Chest pain up to max of 3 total doses. If no relief after 1 dose, call 911. 30 tablet 3    clobetasol (TEMOVATE) 0.05 % cream apply to rash twice a day for up to 2 weeks ON and 1 week off      omeprazole (PRILOSEC) 40 MG delayed release capsule Take 40 mg by mouth nightly       B Complex Vitamins (VITAMIN B COMPLEX PO) Take by mouth daily      pregabalin (LYRICA) 75 MG capsule take 1 capsule by mouth twice a day  0    Cetirizine HCl (ZYRTEC PO) Take 10 mg by mouth daily       tiZANidine (ZANAFLEX) 4 MG tablet Take 1 tablet by mouth at bedtime (Patient not taking: Reported on 9/14/2022)      FEROSUL 325 (65 Fe) MG tablet take 1 tablet by mouth once daily with breakfast (Patient not taking: Reported on 9/14/2022) 90 tablet 1    atorvastatin (LIPITOR) 40 MG tablet take 1 tablet by mouth once daily (Patient not taking: Reported on 9/14/2022) 30 tablet 3     No current facility-administered medications for this visit. OBJECTIVE:  Vitals:    09/14/22 1455   BP: 128/88   Pulse: 88   SpO2: 97%     Body mass index is 35.63 kg/m². Focal exam:  B/l sacroiliac jt tenderness    Left neck muscle spasm   General exam (except above):  General appearance - well appearing, alert, in no acute distress  Head - Atraumatic, normocephalic  Eyes - EOMI, no jaundice or pallor  Lungs - Lungs clear to auscultation. No wheezing, rhonchi, rales  Heart - RRR without murmur, gallop, or rubs. No ectopy  Abdomen - Abdomen soft, non-tender.  Bowel sounds normal. No masses, organomegaly  Extremities -No significant edema, or skin discoloration. Good capillary refill. Neuro - Pt Alert, awake and oriented x 3. No gross focal neurological deficits    ASSESSMENT AND PLAN (MEDICAL DECISION MAKING):     Lorelei was seen today for diverticulitis, other and leg pain. Diagnoses and all orders for this visit:    Bilateral sacroiliitis (HCC)  -     lidocaine 4 % external patch; Place 1 patch onto the skin daily    Other forms of systemic lupus erythematosus, unspecified organ involvement status [facility]    Screening for hyperlipidemia  -     Lipid Panel; Future    Encounter for screening for diabetes mellitus  -     Glucose, Fasting; Future    Abnormal stress test  -     atorvastatin (LIPITOR) 40 MG tablet; take 1 tablet by mouth once daily    Coronary artery disease of native heart with stable angina pectoris, unspecified vessel or lesion type (HCC)  -     atorvastatin (LIPITOR) 40 MG tablet; take 1 tablet by mouth once daily    Dysuria  -     Urinalysis with Reflex to Culture;  Future    Dark stools  -     Blood Occult Stool #1; Future         Follow up in: with results       Darek Mcbride MD

## 2022-09-15 ENCOUNTER — HOSPITAL ENCOUNTER (OUTPATIENT)
Age: 52
Setting detail: SPECIMEN
Discharge: HOME | End: 2022-09-15

## 2022-09-15 DIAGNOSIS — Z13.220 SCREENING FOR HYPERLIPIDEMIA: ICD-10-CM

## 2022-09-15 DIAGNOSIS — R19.5 DARK STOOLS: ICD-10-CM

## 2022-09-15 DIAGNOSIS — Z13.1 ENCOUNTER FOR SCREENING FOR DIABETES MELLITUS: ICD-10-CM

## 2022-09-15 LAB
DATE, STOOL #1: NORMAL
HEMOCCULT SP1 STL QL: NEGATIVE
TIME, STOOL #1: NORMAL

## 2022-09-30 ENCOUNTER — TELEPHONE (OUTPATIENT)
Dept: INTERNAL MEDICINE CLINIC | Age: 52
End: 2022-09-30

## 2022-12-15 DIAGNOSIS — D50.0 IRON DEFICIENCY ANEMIA DUE TO CHRONIC BLOOD LOSS: ICD-10-CM

## 2022-12-15 NOTE — TELEPHONE ENCOUNTER
Laura Arevalo is calling to request a refill on the following medication(s):    Last Visit Date (If Applicable):  6/01/6836    Next Visit Date:    Visit date not found    Medication Request:  Requested Prescriptions     Pending Prescriptions Disp Refills    FEROSUL 325 (65 Fe) MG tablet [Pharmacy Med Name: FEROSUL 325 MG TABLET] 90 tablet 1     Sig: take 1 tablet by mouth once daily with breakfast

## 2022-12-22 RX ORDER — FERROUS SULFATE 325(65) MG
TABLET ORAL
Qty: 90 TABLET | Refills: 1 | Status: SHIPPED | OUTPATIENT
Start: 2022-12-22

## 2023-01-17 ENCOUNTER — HOSPITAL ENCOUNTER (OUTPATIENT)
Age: 53
Setting detail: SPECIMEN
Discharge: HOME | End: 2023-01-17

## 2023-01-17 ENCOUNTER — OFFICE VISIT (OUTPATIENT)
Dept: INTERNAL MEDICINE CLINIC | Age: 53
End: 2023-01-17
Payer: COMMERCIAL

## 2023-01-17 VITALS
HEART RATE: 96 BPM | DIASTOLIC BLOOD PRESSURE: 86 MMHG | HEIGHT: 64 IN | OXYGEN SATURATION: 97 % | WEIGHT: 207 LBS | BODY MASS INDEX: 35.34 KG/M2 | SYSTOLIC BLOOD PRESSURE: 138 MMHG

## 2023-01-17 DIAGNOSIS — E27.40 ISOLATED CORTICOTROPIN DEFICIENCY (HCC): ICD-10-CM

## 2023-01-17 DIAGNOSIS — Z13.1 ENCOUNTER FOR SCREENING FOR DIABETES MELLITUS: ICD-10-CM

## 2023-01-17 DIAGNOSIS — Z13.220 LIPID SCREENING: ICD-10-CM

## 2023-01-17 DIAGNOSIS — M54.50 ACUTE MIDLINE LOW BACK PAIN, UNSPECIFIED WHETHER SCIATICA PRESENT: ICD-10-CM

## 2023-01-17 DIAGNOSIS — Z13.1 DIABETES MELLITUS SCREENING: ICD-10-CM

## 2023-01-17 DIAGNOSIS — R29.898 LEG WEAKNESS, BILATERAL: ICD-10-CM

## 2023-01-17 DIAGNOSIS — J06.9 VIRAL UPPER RESPIRATORY TRACT INFECTION: Primary | ICD-10-CM

## 2023-01-17 PROCEDURE — 99214 OFFICE O/P EST MOD 30 MIN: CPT | Performed by: INTERNAL MEDICINE

## 2023-01-17 RX ORDER — VALACYCLOVIR HYDROCHLORIDE 1 G/1
TABLET, FILM COATED ORAL
COMMUNITY
Start: 2022-07-26

## 2023-01-17 RX ORDER — PREDNISONE 20 MG/1
TABLET ORAL
COMMUNITY
Start: 2022-12-24

## 2023-01-17 RX ORDER — DOXYCYCLINE HYCLATE 100 MG
100 TABLET ORAL 2 TIMES DAILY
Qty: 14 TABLET | Refills: 0 | Status: SHIPPED | OUTPATIENT
Start: 2023-01-17 | End: 2023-01-24

## 2023-01-17 RX ORDER — ASPIRIN 81 MG/1
1 TABLET ORAL DAILY
COMMUNITY

## 2023-01-17 RX ORDER — NYSTATIN AND TRIAMCINOLONE ACETONIDE 100000; 1 [USP'U]/G; MG/G
OINTMENT TOPICAL
COMMUNITY
End: 2023-01-17

## 2023-01-17 RX ORDER — METRONIDAZOLE 7.5 MG/G
GEL TOPICAL
COMMUNITY
End: 2023-01-17

## 2023-01-17 RX ORDER — LORAZEPAM 0.5 MG/1
TABLET ORAL
COMMUNITY
Start: 2023-01-05

## 2023-01-17 RX ORDER — AMOXICILLIN AND CLAVULANATE POTASSIUM 875; 125 MG/1; MG/1
1 TABLET, FILM COATED ORAL 2 TIMES DAILY
Qty: 14 TABLET | Refills: 0 | Status: SHIPPED | OUTPATIENT
Start: 2023-01-17 | End: 2023-01-17

## 2023-01-17 ASSESSMENT — PATIENT HEALTH QUESTIONNAIRE - PHQ9
2. FEELING DOWN, DEPRESSED OR HOPELESS: 0
SUM OF ALL RESPONSES TO PHQ QUESTIONS 1-9: 0
6. FEELING BAD ABOUT YOURSELF - OR THAT YOU ARE A FAILURE OR HAVE LET YOURSELF OR YOUR FAMILY DOWN: 0
1. LITTLE INTEREST OR PLEASURE IN DOING THINGS: 0
10. IF YOU CHECKED OFF ANY PROBLEMS, HOW DIFFICULT HAVE THESE PROBLEMS MADE IT FOR YOU TO DO YOUR WORK, TAKE CARE OF THINGS AT HOME, OR GET ALONG WITH OTHER PEOPLE: 0
SUM OF ALL RESPONSES TO PHQ9 QUESTIONS 1 & 2: 0
3. TROUBLE FALLING OR STAYING ASLEEP: 0
9. THOUGHTS THAT YOU WOULD BE BETTER OFF DEAD, OR OF HURTING YOURSELF: 0
SUM OF ALL RESPONSES TO PHQ QUESTIONS 1-9: 0
8. MOVING OR SPEAKING SO SLOWLY THAT OTHER PEOPLE COULD HAVE NOTICED. OR THE OPPOSITE, BEING SO FIGETY OR RESTLESS THAT YOU HAVE BEEN MOVING AROUND A LOT MORE THAN USUAL: 0
5. POOR APPETITE OR OVEREATING: 0
7. TROUBLE CONCENTRATING ON THINGS, SUCH AS READING THE NEWSPAPER OR WATCHING TELEVISION: 0
4. FEELING TIRED OR HAVING LITTLE ENERGY: 0
SUM OF ALL RESPONSES TO PHQ QUESTIONS 1-9: 0
SUM OF ALL RESPONSES TO PHQ QUESTIONS 1-9: 0

## 2023-01-17 NOTE — PROGRESS NOTES
Visit Information    Have you changed or started any medications since your last visit including any over-the-counter medicines, vitamins, or herbal medicines? no   Are you having any side effects from any of your medications? -  no  Have you stopped taking any of your medications? Is so, why? -  no    Have you seen any other physician or provider since your last visit? No  Have you had any other diagnostic tests since your last visit? No  Have you been seen in the emergency room and/or had an admission to a hospital since we last saw you? No  Have you had your routine dental cleaning in the past 6 months? no    Have you activated your [employer] account? If not, what are your barriers?  Yes     Patient Care Team:  Stephani Langston MD as PCP - General (Internal Medicine)  Stephani Langston MD as PCP [facility]  Joe Gupta DO as Consulting Physician (Obstetrics & Gynecology)    Medical History Review  Past Medical, Family, and Social History reviewed and does contribute to the patient presenting condition    Health Maintenance   Topic Date Due    COVID-19 Vaccine (1) Never done    Pneumococcal 0-64 years Vaccine (1 - PCV) Never done    DTaP/Tdap/Td vaccine (1 - Tdap) Never done    Diabetes screen  10/18/2021    Lipids  10/26/2021    Shingles vaccine (1 of 2) Never done    Flu vaccine (1) 08/01/2022    Cervical cancer screen  11/07/2022    Breast cancer screen  12/24/2022    Depression Monitoring  06/20/2023    Colorectal Cancer Screen  03/12/2026    Hepatitis C screen  Completed    HIV screen  Completed    Hepatitis A vaccine  Aged Out    Hib vaccine  Aged Out    Meningococcal (ACWY) vaccine  Aged Out     SUBJECTIVE:  Марина Marie is a 47 y.o. female patient who  comes for complaints of   Chief Complaint   Patient presents with    URI    Cough     Barking cough  Since yesterday  Dry cough  No fever  Burning sensation in chest on deep breathing  Myalgia    On huimra for RA     Also leg weakness and fall  1-2ties a month for last few mth  Pain innlower back, which goes upward with leg weakness  Pt has L5S1 problems known  Req neuro referral    REVIEW OF SYSTEMS (except Subjective (HPI))  GENERAL: No fevers / chills  RESPIRATORY: pod for cough,Negative for  wheezing or shortness of breath  CARDIOVASCULAR: Negative for chest pain or palpitations. 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 Headache     History of cervical dysplasia 2000    had cone    History of conization of cervix 2000    dysplastic cells    Hyperlipidemia     Hypothyroidism, unspecified 03/18/2016    Lupus ([facility])     Osteoarthritis     Positive cardiac stress test     Rheumatoid arthritis ([facility])     Sleep apnea     tests were negative but snores badly    SOB (shortness of breath)     Vision abnormalities     glasses/ far sighted       SOCIAL HISTORY:  Social History     Socioeconomic History    Marital status:      Spouse name: Not on file    Number of children: Not on file    Years of education: Not on file    Highest education level: Not on file   Occupational History    Not on file   Tobacco Use    Smoking status: Never    Smokeless tobacco: Never    Tobacco comments:     only smoked for 4 months as teenager only   Vaping Use    Vaping Use: Never used   Substance and Sexual Activity    Alcohol use: Yes     Alcohol/week: 0.0 standard drinks     Comment: recovering alcoholic, quit heavy drinking 2000, takes 1-2 every yearly since 2014     Drug use:  No Sexual activity: Yes     Partners: Male     Birth control/protection: Other-see comments     Comment:  had vasectomy   Other Topics Concern    Not on file   Social History Narrative    Not on file     Social Determinants of Health     Financial Resource Strain: Low Risk     Difficulty of Paying Living Expenses: Not hard at all   Food Insecurity: No Food Insecurity    Worried About Running Out of Food in the Last Year: Never true    Ran Out of Food in the Last Year: Never true   Transportation Needs: Not on file   Physical Activity: Not on file   Stress: Not on file   Social Connections: Not on file   Intimate Partner Violence: Not on file   Housing Stability: Not on file           CURRENT MEDICATIONS:  Current Outpatient Medications   Medication Sig Dispense Refill    valACYclovir (VALTREX) 1 g tablet Take 1 tablet every 12 hours by oral route for 5 days. predniSONE (DELTASONE) 20 MG tablet take 1 tablet by mouth every morning and 1 tablet by mouth BEFORE BEDTIME for 5 days      LORazepam (ATIVAN) 0.5 MG tablet take 1 tablet by mouth once daily if needed      aspirin 81 MG EC tablet Take 1 tablet by mouth daily      FEROSUL 325 (65 Fe) MG tablet take 1 tablet by mouth once daily with breakfast 90 tablet 1    atorvastatin (LIPITOR) 40 MG tablet take 1 tablet by mouth once daily 30 tablet 3    levothyroxine (SYNTHROID) 50 MCG tablet TAKE 1 TABLET DAILY 90 tablet 3    Thumb Spica MISC 1 each by Does not apply route continuous 1 each 0    vitamin D (ERGOCALCIFEROL) 1.25 MG (16655 UT) CAPS capsule take 1 capsule by mouth every week 4 capsule 0    HUMIRA PEN 40 MG/0.4ML PNKT       citalopram (CELEXA) 10 MG tablet take 1 tablet by mouth once daily with 20 MG TABLET 30 tablet 3    citalopram (CELEXA) 20 MG tablet Take 1 tablet by mouth daily With the celexa 10mg tab to make 30mg dialy 30 tablet 3    nystatin (MYCOSTATIN) 316523 UNIT/GM powder Apply 3 times daily.  60 g 3    nitroGLYCERIN (NITROSTAT) 0.3 MG SL tablet Place 1 tablet under the tongue every 5 minutes as needed for Chest pain up to max of 3 total doses. If no relief after 1 dose, call 911. 30 tablet 3    omeprazole (PRILOSEC) 40 MG delayed release capsule Take 40 mg by mouth nightly       B Complex Vitamins (VITAMIN B COMPLEX PO) Take by mouth daily      pregabalin (LYRICA) 75 MG capsule take 1 capsule by mouth twice a day  0    Cetirizine HCl (ZYRTEC PO) Take 10 mg by mouth daily       nystatin-triamcinolone (MYCOLOG) 247691-8.1 UNIT/GM-% ointment APPLY TO THE RED RASH IN THE FOLDS OF SKIN BID FOR UP TO 2 WEEKS OR UNTIL IMPROVED      tiZANidine (ZANAFLEX) 4 MG tablet Take 1 tablet by mouth at bedtime (Patient not taking: No sig reported)      traMADol (ULTRAM) 50 MG tablet take 1 tablet by mouth twice a day if needed (Patient not taking: Reported on 1/17/2023)       No current facility-administered medications for this visit. OBJECTIVE:  Vitals:    01/17/23 1542   BP: 138/86   Pulse: 96   SpO2: 97%     Body mass index is 35.53 kg/m². Focal exam:  Throat - normal  No cervical LN  No sinus tenderness  Chest clear    General exam (except above):  General appearance - well appearing, alert, in no acute distress  Head - Atraumatic, normocephalic  Eyes - EOMI, no jaundice or pallor  Lungs - Lungs clear to auscultation. No wheezing, rhonchi, rales  Heart - RRR without murmur, gallop, or rubs. No ectopy  Abdomen - Abdomen soft, non-tender. Bowel sounds normal. No masses, organomegaly  Extremities -No significant edema, or skin discoloration. Good capillary refill. Neuro - Pt Alert, awake and oriented x 3. No gross focal neurological deficits    ASSESSMENT AND PLAN (MEDICAL DECISION MAKING):     Lorelei was seen today for uri and cough. Diagnoses and all orders for this visit:    Viral upper respiratory tract infection  -     COVID-19; Future  -     Discontinue: amoxicillin-clavulanate (AUGMENTIN) 875-125 MG per tablet;  Take 1 tablet by mouth 2 times daily for 7 days  -     doxycycline hyclate (VIBRA-TABS) 100 MG tablet; Take 1 tablet by mouth 2 times daily for 7 days    Encounter for screening for diabetes mellitus  -     Glucose, Fasting; Future    Isolated corticotropin deficiency (HCC)    Diabetes mellitus screening  -     Glucose, Fasting; Future    Lipid screening  -     Lipid, Fasting; Future    Acute midline low back pain, unspecified whether sciatica present  -     Ngoc Villalobos MD, Neurology, [address]    Leg weakness, bilateral  -     Ngoc Villalobos MD, Neurology, [address]  -     MRI LUMBAR SPINE [address];  Future       Follow up in: 2wk      Bo Porter MD

## 2023-01-18 ENCOUNTER — TELEPHONE (OUTPATIENT)
Dept: INTERNAL MEDICINE CLINIC | Age: 53
End: 2023-01-18

## 2023-01-18 DIAGNOSIS — J06.9 VIRAL UPPER RESPIRATORY TRACT INFECTION: ICD-10-CM

## 2023-01-18 DIAGNOSIS — R29.898 LEG WEAKNESS, BILATERAL: Primary | ICD-10-CM

## 2023-01-18 RX ORDER — LORAZEPAM 1 MG/1
2 TABLET ORAL ONCE
Qty: 2 TABLET | Refills: 0 | Status: SHIPPED | OUTPATIENT
Start: 2023-01-18 | End: 2023-01-18

## 2023-01-18 NOTE — TELEPHONE ENCOUNTER
Patient is calling stating that the ativan 0.05 that she has is not going to be enough for the MRI with her claustrophobia. She states that she needs at least 2mg or something different. She does not have enough of her own to be able to take for the MRI. Her MRI is scheduled for next Wednesday.      RA [place]

## 2023-01-19 LAB
SARS-COV-2: ABNORMAL
SARS-COV-2: DETECTED
SOURCE: ABNORMAL

## 2023-01-20 ENCOUNTER — TELEPHONE (OUTPATIENT)
Dept: INTERNAL MEDICINE CLINIC | Age: 53
End: 2023-01-20

## 2023-01-20 DIAGNOSIS — U07.1 COVID: Primary | ICD-10-CM

## 2023-01-20 NOTE — TELEPHONE ENCOUNTER
Patient is awaiting her test results for Covid. The test was done on the 17th. She had a  fever of 102.4  but it did break las night. She did a home test and it was positive. She is wondering what she is to do now. Is there any medication that will help her. Her Sx started Monday night with a cough.      RA [place]

## 2023-01-21 NOTE — RESULT ENCOUNTER NOTE
Covid Positive- discused with patient  She is doing better, no fever since yesterday  Has been 5 days since symp started

## 2023-02-08 DIAGNOSIS — R94.39 ABNORMAL STRESS TEST: ICD-10-CM

## 2023-02-08 DIAGNOSIS — I25.118 CORONARY ARTERY DISEASE OF NATIVE HEART WITH STABLE ANGINA PECTORIS, UNSPECIFIED VESSEL OR LESION TYPE (HCC): ICD-10-CM

## 2023-02-09 RX ORDER — ATORVASTATIN CALCIUM 40 MG/1
TABLET, FILM COATED ORAL
Qty: 30 TABLET | Refills: 3 | Status: SHIPPED | OUTPATIENT
Start: 2023-02-09

## 2023-02-20 ENCOUNTER — HOSPITAL ENCOUNTER (OUTPATIENT)
Dept: MRI IMAGING | Age: 53
Discharge: HOME | End: 2023-02-22
Payer: COMMERCIAL

## 2023-02-20 ENCOUNTER — HOSPITAL ENCOUNTER (OUTPATIENT)
Age: 53
Discharge: HOME | End: 2023-02-20
Payer: COMMERCIAL

## 2023-02-20 DIAGNOSIS — Z13.1 ENCOUNTER FOR SCREENING FOR DIABETES MELLITUS: ICD-10-CM

## 2023-02-20 DIAGNOSIS — Z13.220 LIPID SCREENING: ICD-10-CM

## 2023-02-20 DIAGNOSIS — R29.898 LEG WEAKNESS, BILATERAL: ICD-10-CM

## 2023-02-20 DIAGNOSIS — M51.26 PROTRUSION OF LUMBAR INTERVERTEBRAL DISC: Primary | ICD-10-CM

## 2023-02-20 DIAGNOSIS — Z13.1 DIABETES MELLITUS SCREENING: ICD-10-CM

## 2023-02-20 LAB
CHOLEST SERPL-MCNC: 162 MG/DL
CHOLESTEROL/HDL RATIO: 4.4
GLUCOSE SERPL-MCNC: 109 MG/DL (ref 70–99)
HDLC SERPL-MCNC: 37 MG/DL
LDLC SERPL CALC-MCNC: 85 MG/DL (ref 0–130)
TRIGL SERPL-MCNC: 201 MG/DL

## 2023-02-20 PROCEDURE — 36415 COLL VENOUS BLD VENIPUNCTURE: CPT

## 2023-02-20 PROCEDURE — 72148 MRI LUMBAR SPINE W/O DYE: CPT

## 2023-02-20 PROCEDURE — 82947 ASSAY GLUCOSE BLOOD QUANT: CPT

## 2023-02-20 PROCEDURE — 80061 LIPID PANEL: CPT

## 2023-02-22 ENCOUNTER — TELEPHONE (OUTPATIENT)
Dept: INTERNAL MEDICINE CLINIC | Age: 53
End: 2023-02-22

## 2023-02-22 DIAGNOSIS — R29.898 LEG WEAKNESS, BILATERAL: Primary | ICD-10-CM

## 2023-02-22 NOTE — TELEPHONE ENCOUNTER
Patient called stating that that she had been referred to Ortho Dr Minerva Whyte office but rather be referred to Dr Mir [facility] neurosurgeon , patient states that she has seen him in past in 2011 .      Please advise

## 2023-02-27 ENCOUNTER — OFFICE VISIT (OUTPATIENT)
Dept: INTERNAL MEDICINE CLINIC | Age: 53
End: 2023-02-27
Payer: COMMERCIAL

## 2023-02-27 VITALS
OXYGEN SATURATION: 98 % | BODY MASS INDEX: 34.31 KG/M2 | HEART RATE: 104 BPM | DIASTOLIC BLOOD PRESSURE: 76 MMHG | WEIGHT: 201 LBS | HEIGHT: 64 IN | SYSTOLIC BLOOD PRESSURE: 122 MMHG

## 2023-02-27 DIAGNOSIS — B02.9 HERPES ZOSTER WITHOUT COMPLICATION: Primary | ICD-10-CM

## 2023-02-27 DIAGNOSIS — M32.8 OTHER FORMS OF SYSTEMIC LUPUS ERYTHEMATOSUS, UNSPECIFIED ORGAN INVOLVEMENT STATUS (HCC): ICD-10-CM

## 2023-02-27 DIAGNOSIS — D69.1 DELTA STORAGE POOL DISEASE (HCC): ICD-10-CM

## 2023-02-27 DIAGNOSIS — Z12.31 ENCOUNTER FOR SCREENING MAMMOGRAM FOR BREAST CANCER: ICD-10-CM

## 2023-02-27 DIAGNOSIS — E27.40 ISOLATED CORTICOTROPIN DEFICIENCY (HCC): ICD-10-CM

## 2023-02-27 PROCEDURE — 99213 OFFICE O/P EST LOW 20 MIN: CPT | Performed by: INTERNAL MEDICINE

## 2023-02-27 RX ORDER — TIZANIDINE 2 MG/1
TABLET ORAL
COMMUNITY
Start: 2023-02-24

## 2023-02-27 RX ORDER — VALACYCLOVIR HYDROCHLORIDE 1 G/1
1000 TABLET, FILM COATED ORAL 3 TIMES DAILY
Qty: 30 TABLET | Refills: 0 | Status: SHIPPED | OUTPATIENT
Start: 2023-02-27

## 2023-02-27 SDOH — ECONOMIC STABILITY: FOOD INSECURITY: WITHIN THE PAST 12 MONTHS, THE FOOD YOU BOUGHT JUST DIDN'T LAST AND YOU DIDN'T HAVE MONEY TO GET MORE.: NEVER TRUE

## 2023-02-27 SDOH — ECONOMIC STABILITY: FOOD INSECURITY: WITHIN THE PAST 12 MONTHS, YOU WORRIED THAT YOUR FOOD WOULD RUN OUT BEFORE YOU GOT MONEY TO BUY MORE.: NEVER TRUE

## 2023-02-27 SDOH — ECONOMIC STABILITY: HOUSING INSECURITY
IN THE LAST 12 MONTHS, WAS THERE A TIME WHEN YOU DID NOT HAVE A STEADY PLACE TO SLEEP OR SLEPT IN A SHELTER (INCLUDING NOW)?: NO

## 2023-02-27 SDOH — ECONOMIC STABILITY: INCOME INSECURITY: HOW HARD IS IT FOR YOU TO PAY FOR THE VERY BASICS LIKE FOOD, HOUSING, MEDICAL CARE, AND HEATING?: NOT HARD AT ALL

## 2023-02-27 NOTE — LETTER
[facility]  [address] [address]  Phone: [phone]  Fax: [phone]    Margareth Galeana MD        February 27, 2023     Patient: Nitin Long   YOB: 1970   Date of Visit: 2/27/2023       To Whom It May Concern: It is my medical opinion that Zoltan Aguilar should remain out of work until 03/06/2023. If you have any questions or concerns, please don't hesitate to call.     Sincerely,        Margareth Galeana MD

## 2023-02-27 NOTE — PROGRESS NOTES
Visit Information    Have you changed or started any medications since your last visit including any over-the-counter medicines, vitamins, or herbal medicines? no   Are you having any side effects from any of your medications? -  no  Have you stopped taking any of your medications? Is so, why? -  no    Have you seen any other physician or provider since your last visit? No  Have you had any other diagnostic tests since your last visit? No  Have you been seen in the emergency room and/or had an admission to a hospital since we last saw you? No  Have you had your routine dental cleaning in the past 6 months? no    Have you activated your [employer] account? If not, what are your barriers?  Yes     Patient Care Team:  Jessica Villegas MD as PCP - General (Internal Medicine)  Jessica Villegas MD as PCP - Empaneled Provider  Nona Burnett DO as Consulting Physician (Obstetrics & Gynecology)    Medical History Review  Past Medical, Family, and Social History reviewed and does contribute to the patient presenting condition    Health Maintenance   Topic Date Due    COVID-19 Vaccine (1) Never done    Pneumococcal 0-64 years Vaccine (1 - PCV) Never done    DTaP/Tdap/Td vaccine (1 - Tdap) Never done    Shingles vaccine (1 of 2) Never done    Flu vaccine (1) 08/01/2022    Cervical cancer screen  11/07/2022    Breast cancer screen  12/24/2022    Depression Monitoring  01/17/2024    Lipids  02/20/2024    Diabetes screen  02/20/2026    Colorectal Cancer Screen  03/12/2026    Hepatitis C screen  Completed    HIV screen  Completed    Hepatitis A vaccine  Aged Out    Hib vaccine  Aged Out    Meningococcal (ACWY) vaccine  Aged Out     SUBJECTIVE:  Jeremias Benton is a 47 y.o. female patient who  comes for complaints of   Chief Complaint   Patient presents with    Herpes Zoster     Left sided        Shingles   Left sided  Pain preceded the spots  Has few red spots left sideof sternum also some on left of spine  New ones in last couple days  Pain- has been taking lyirical intermittently for fibro- ok to take      Pt has RA, on huimira  Has not taken since 2mth  Still suspect reginaldo e immunesppresion    REVIEW OF SYSTEMS (except Subjective (HPI))  GENERAL: No fevers / chills  RESPIRATORY: Negative for cough, wheezing or shortness of breath  CARDIOVASCULAR: Negative for chest pain or palpitations. 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 Headache     History of cervical dysplasia 2000    had cone    History of conization of cervix 2000    dysplastic cells    Hyperlipidemia     Hypothyroidism, unspecified 03/18/2016    Lupus (HCC)     Osteoarthritis     Positive cardiac stress test     Rheumatoid arthritis (HCC)     Sleep apnea     tests were negative but snores badly    SOB (shortness of breath)     Vision abnormalities     glasses/ far sighted       SOCIAL HISTORY:  Social History     Socioeconomic History    Marital status:      Spouse name: Not on file    Number of children: Not on file    Years of education: Not on file    Highest education level: Not on file   Occupational History    Not on file   Tobacco Use    Smoking status: Never    Smokeless tobacco: Never    Tobacco comments:     only smoked for 4 months as teenager only   Vaping Use    Vaping Use: Never used   Substance and Sexual Activity    Alcohol use:  Yes     Alcohol/week: 0.0 standard drinks     Comment: recovering alcoholic, quit heavy drinking 2000, takes 1-2 every yearly since 2014 Drug use: No    Sexual activity: Yes     Partners: Male     Birth control/protection: Other-see comments     Comment:  had vasectomy   Other Topics Concern    Not on file   Social History Narrative    Not on file     Social Determinants of Health     Financial Resource Strain: Low Risk     Difficulty of Paying Living Expenses: Not hard at all   Food Insecurity: No Food Insecurity    Worried About Running Out of Food in the Last Year: Never true    [address] in the Last Year: Never true   Transportation Needs: Unknown    Lack of Transportation (Medical): Not on file    Lack of Transportation (Non-Medical): No   Physical Activity: Not on file   Stress: Not on file   Social Connections: Not on file   Intimate Partner Violence: Not on file   Housing Stability: Unknown    Unable to Pay for Housing in the Last Year: Not on file    Number of Places Lived in the Last Year: Not on file    Unstable Housing in the Last Year: No           CURRENT MEDICATIONS:  Current Outpatient Medications   Medication Sig Dispense Refill    atorvastatin (LIPITOR) 40 MG tablet take 1 tablet by mouth once daily 30 tablet 3    ondansetron (ZOFRAN) 4 MG tablet Take 1 tablet by mouth 3 times daily as needed for Nausea or Vomiting 15 tablet 0    valACYclovir (VALTREX) 1 g tablet Take 1 tablet every 12 hours by oral route for 5 days.       predniSONE (DELTASONE) 20 MG tablet take 1 tablet by mouth every morning and 1 tablet by mouth BEFORE BEDTIME for 5 days      LORazepam (ATIVAN) 0.5 MG tablet take 1 tablet by mouth once daily if needed      aspirin 81 MG EC tablet Take 1 tablet by mouth daily      FEROSUL 325 (65 Fe) MG tablet take 1 tablet by mouth once daily with breakfast 90 tablet 1    levothyroxine (SYNTHROID) 50 MCG tablet TAKE 1 TABLET DAILY 90 tablet 3    Thumb Spica MISC 1 each by Does not apply route continuous 1 each 0    vitamin D (ERGOCALCIFEROL) 1.25 MG (70958 UT) CAPS capsule take 1 capsule by mouth every week 4 capsule 0    HUMIRA PEN 40 MG/0.4ML PNKT       citalopram (CELEXA) 10 MG tablet take 1 tablet by mouth once daily with 20 MG TABLET 30 tablet 3    citalopram (CELEXA) 20 MG tablet Take 1 tablet by mouth daily With the celexa 10mg tab to make 30mg dialy 30 tablet 3    nystatin (MYCOSTATIN) 604603 UNIT/GM powder Apply 3 times daily. 60 g 3    nitroGLYCERIN (NITROSTAT) 0.3 MG SL tablet Place 1 tablet under the tongue every 5 minutes as needed for Chest pain up to max of 3 total doses. If no relief after 1 dose, call 911. 30 tablet 3    omeprazole (PRILOSEC) 40 MG delayed release capsule Take 40 mg by mouth nightly       B Complex Vitamins (VITAMIN B COMPLEX PO) Take by mouth daily      pregabalin (LYRICA) 75 MG capsule take 1 capsule by mouth twice a day  0    Cetirizine HCl (ZYRTEC PO) Take 10 mg by mouth daily       tiZANidine (ZANAFLEX) 2 MG tablet take 1 tablet by mouth twice a day if needed for 14 days       No current facility-administered medications for this visit. OBJECTIVE:  Vitals:    02/27/23 1050   BP: 122/76   Pulse: (!) 104   SpO2: 98%     Body mass index is 34.5 kg/m². Focal exam:   Red spots left T6/& dermatomal distribution posteriorly and anteriorly    General exam (except above):  General appearance - well appearing, alert, in no acute distress  Head - Atraumatic, normocephalic  Eyes - EOMI, no jaundice or pallor  Lungs - Lungs clear to auscultation. No wheezing, rhonchi, rales  Heart - RRR without murmur, gallop, or rubs. No ectopy  Abdomen - Abdomen soft, non-tender. Bowel sounds normal. No masses, organomegaly  Extremities -No significant edema, or skin discoloration. Good capillary refill. Neuro - Pt Alert, awake and oriented x 3. No gross focal neurological deficits    ASSESSMENT AND PLAN (MEDICAL DECISION MAKING):   Lorelei was seen today for herpes zoster. 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      Follow up in: nick Rodriguez MD

## 2023-03-06 ENCOUNTER — TELEPHONE (OUTPATIENT)
Dept: INTERNAL MEDICINE CLINIC | Age: 53
End: 2023-03-06

## 2023-03-06 RX ORDER — NAPROXEN 500 MG/1
500 TABLET ORAL 2 TIMES DAILY WITH MEALS
Qty: 180 TABLET | Refills: 1 | Status: SHIPPED | OUTPATIENT
Start: 2023-03-06

## 2023-03-06 NOTE — TELEPHONE ENCOUNTER
Patient's shingles on the back are not scabbed over yet and she is still in a lot of pain. She wants to know if she can return to work today or if she needs an extension. Her employer wanted her to check with you.     Please advise

## 2023-03-06 NOTE — TELEPHONE ENCOUNTER
Patient called back stating that she has had naproxen in the past and it seems to help her, requesting prescription to help with the pain.

## 2023-03-14 ENCOUNTER — TELEPHONE (OUTPATIENT)
Dept: INTERNAL MEDICINE CLINIC | Age: 53
End: 2023-03-14

## 2023-04-17 ENCOUNTER — HOSPITAL ENCOUNTER (OUTPATIENT)
Dept: PHYSICAL THERAPY | Age: 53
Setting detail: THERAPIES SERIES
Discharge: HOME | End: 2023-04-17
Payer: COMMERCIAL

## 2023-04-17 NOTE — FLOWSHEET NOTE
[] [facility]) - [facility] & Therapy  [address]  [place]: [phone]   F: [phone]     Physical Therapy Cancel/No Show note    Date: 2023  Patient: Candace Lemus  : 1970  MRN: [id number]    Visit Count:   Cancels/No Shows to date:     For today's appointment patient:    [x]  Cancelled    [] Rescheduled appointment    [] No-show     Reason given by patient:    []  Patient ill    [x]  Conflicting appointment    [] No transportation      [] Conflict with work    [] No reason given    [] Weather related    [] COVID-19    [] Other:      Comments:        [x] Next appointment was confirmed    Electronically signed by: Luis Fernando PTA

## 2023-04-19 ENCOUNTER — HOSPITAL ENCOUNTER (OUTPATIENT)
Dept: PHYSICAL THERAPY | Age: 53
Setting detail: THERAPIES SERIES
Discharge: HOME | End: 2023-04-19
Payer: COMMERCIAL

## 2023-04-19 PROCEDURE — 97113 AQUATIC THERAPY/EXERCISES: CPT

## 2023-04-19 NOTE — FLOWSHEET NOTE
[x] [facility] ([facility]) - [facility] Therapy  [address]  [place]: [phone]   F: [phone]    Physical Therapy Daily Treatment Note      Date:  2023  Patient Name:  Izabel Almazan    :  1970  MRN: [id number]  Physician: Tanvir Mario MD                                 Insurance: [employer] ([id number] combined with OT and speech, [employer]OMN)  Medical Diagnosis: M51.9 Lumbar disc disease, M54.2 Neck pain                          Rehab Codes: M54.59 Low back pain, M54.2 Cervicalgia  Onset Date:                                  Next 's appt: 23  Visit# / total visits:   Cancels/No Shows: 1/0    Subjective:  Patient reports pain can shoot up her spine from lower back to neck and when this occurs her LE's tend to give out. States she has frequent falls due to LE weakness and often tripping over her dog. Denies falls since initial evaluation. States she tends to over do activity and pays for the pain later. LE symptoms always seem the worst when she is lying in bed- deep, constant, throb/ache down to toes. Notes shooting pain happens frequently when she gets up in the middle of the night. Pain:  [x] Yes  [] No Location: Cervical Spine into shoulders; Lower back with constant ache down B LEs to feet/toes  Pain Rating: (0-10 scale) 6/10  Pain altered Tx:  [x] No  [] Yes  Action:    Comments: Initial aquatic visit- patient oriented to pool area, educated on the benefits of aquatic therapy and encouraged to avoid increasing pain.      Objective:  Modalities:   Precautions: NONE  Exercises:    [address] Exercise Log    Date of Eval: 23                                Primary PT: Fabrizio Godfrey, TORIE    Things to Focus On (goals): LE/UE strengthening        Date 23       Visit # 2/12       Walk F/L/R 2 Laps w/UE ROM       Marching 10x       LE Exercise        Squats 10x3\"       Step-Ups F/L        Heel-toe raises 10x       SLR F/L/R 10x

## 2023-04-24 ENCOUNTER — HOSPITAL ENCOUNTER (OUTPATIENT)
Dept: PHYSICAL THERAPY | Age: 53
Setting detail: THERAPIES SERIES
Discharge: HOME | End: 2023-04-24
Payer: COMMERCIAL

## 2023-04-24 PROCEDURE — 97113 AQUATIC THERAPY/EXERCISES: CPT

## 2023-04-24 NOTE — FLOWSHEET NOTE
Wall Push-Ups                Kickboard Ex. Add     3-Way ROM Stretch        Iso Abd. Push-pull        Paddling                Breast-stroke  2 Laps      Rope pull                UE Stretches        Corner stretch        Post. Capsule stretch                LE Stretches        Hamstring  2x20\"      Achilles        Quad                C-Spine Stretches        Upper trap        Chin tucks        Cerv ROM                Deep H2O 1 Noodle Small Noodle      Cycling  1'      Beckie Spar        Cross-country        [place] 3' 4'              Pain Rating 6 7; 2          Specific Instructions for next treatment: Add marching lap and kick board activity to challenge balance and trunk stability    Assessment: [x] Progressing toward goals. Emphasis throughout program on upright posture, core activation and avoidance of increasing pain; Encouraged proper technique and controlled motions. Added breast sroke laps, UE Horiz ABD/ADD and ABD/ADD in chest deep water for improved scapular and core stability. Initiated hamstring stretches to assist with LE flexibility and spinal mobility. Also progressed with deep water cycling followed by unloading for spinal decompression in deep water with smaller noodle this date- better tolerance than larger noodle. Patient denies increased pain post session     [] Other:    [x] Patient will benefit from skilled physical therapy services to decrease pain, increase ROM, Gross strength, and overall functional ability to decrease irritability of chronic pain. STG: (to be met in 6 treatments)  ? Pain:5/10 low back pain when walking to improve ability to walk further distances  ? ROM: Lumbar extension to 50% or better to improve back mobility and reduce radicular symptoms  ? Strength:hip flexion to 4+/5 or better to improve ability to ambulate up and down stairs.    ? Function: Mod Oswestry 56% impairment or better to improve ability to stand for longer periods of time without increase in

## 2023-04-26 ENCOUNTER — HOSPITAL ENCOUNTER (OUTPATIENT)
Dept: PHYSICAL THERAPY | Age: 53
Setting detail: THERAPIES SERIES
Discharge: HOME | End: 2023-04-26
Payer: COMMERCIAL

## 2023-04-26 PROCEDURE — 97113 AQUATIC THERAPY/EXERCISES: CPT

## 2023-04-26 NOTE — FLOWSHEET NOTE
exercises   Neck Deep     Retro Shoulder Shrugs 10x 10x 10x     Retractions 10x 10x 10x     Horiz abd/add  10x 10x     Alt flex/Ext   10x     Abd/add  10x 10x     PNF        Bicep Curls 10x 10x 10x     IR/ER (wipers) 10x 10x 10x     Wall Push-Ups                Kickboard Ex. 3-Way ROM Stretch        Iso Abd. Push-pull        Paddling                Breast-stroke  2 Laps NT     Rope pull                UE Stretches        Corner stretch        Post. Capsule stretch                LE Stretches        Hamstring  2x20\" 2x20\"     Achilles        Quad                C-Spine Stretches        Upper trap        Chin tucks        Cerv ROM                Deep H2O 1 Noodle Small Noodle Small Noodle     Cycling  1' 1'     Jacks   1'     Cross-country        Hang 3' 4' 3'             Pain Rating 6 7; 2 10         Specific Instructions for next treatment: Add marching lap and kick board activity to challenge balance and trunk stability    Assessment: [] Progressing toward goals. [x] Other: Limited by pain this date with neck, upper back and left shoulder. Patient deferred UE ROM with warm up laps and breast stroke laps due to increased pain. Encouraged upright posture with proper technique during all exercise to improve core and dorsal stability. Patient unsure if smaller noodle with deep water unloading was bothersome but states she still wants to trial it along with UE format. Moved UE format to neck deep water to decreased stress on joints/cervical spine this date. Progressed with deep water hip abd/add due to fair tolerance with deep water unloading. Patient denies increased pain post session while in the water but states when she gets out it will be worse    [x] Patient will benefit from skilled physical therapy services to decrease pain, increase ROM, Gross strength, and overall functional ability to decrease irritability of chronic pain. STG: (to be met in 6 treatments)  ?  Pain:5/10 low back pain

## 2023-05-01 ENCOUNTER — HOSPITAL ENCOUNTER (OUTPATIENT)
Dept: PHYSICAL THERAPY | Age: 53
Setting detail: THERAPIES SERIES
Discharge: HOME | End: 2023-05-01
Payer: COMMERCIAL

## 2023-05-01 PROCEDURE — 97113 AQUATIC THERAPY/EXERCISES: CPT

## 2023-05-01 NOTE — FLOWSHEET NOTE
[x] [facility] ([facility]) - [facility] & Therapy  [address]  [place]: [phone]   F: [phone]    Physical Therapy Daily Treatment Note      Date:  2023  Patient Name:  Lyn Jurado    :  1970  MRN: [id number]  Physician: Luis Daniel Sanchez MD                                 Insurance: Jerry Friday ([phone] combined with OT and speech, BOMN)  Medical Diagnosis: M51.9 Lumbar disc disease, M54.2 Neck pain                          Rehab Codes: M54.59 Low back pain, M54.2 Cervicalgia  Onset Date:                                  Next 's appt: 23  Visit# / total visits:   Cancels/No Shows: 1/0    Subjective: Improved tolerance after last visit- denies increased pain or nausea. States her pain is very minimal today; lower back has been doing well but states the pain can increase out of no where. Reports her RA in her feet affects her balance    Pain:  [x] Yes  [] No Location/Pain Rating: (0-10 scale) : Cervical Spine into shoulders 4/10;  Lower back with constant ache down B LEs to feet/toes  0/10   Pain altered Tx:  [x] No  [] Yes  Action:    Comments: Emphasis on avoidance of increasing symptoms    Objective:  Modalities:   Precautions: NONE  Exercises:    [facility] Exercise Log    Date of Eval: 23                                Primary PT: Padmini Service, PT    Things to Focus On (goals): LE/UE strengthening        Date 23     Visit # 2/12 3/12 4/12 5/12     Walk F/L/R 2 Laps w/UE ROM 2 Laps w/ UE ROM 2 Laps NO UE ROM 2 Laps NO UE ROM     Marching 10x 10x 10x 2 Laps     LE Exercise         Squats 10x3\" 10x3\" 10x5\" 10x5\"     Step-Ups F/L     Add    Heel-toe raises 10x 10x 10x 10x     SLR F/L/R 10x 10x 10x 10x     Lunges         Knee flex/ext    10x              UE exercises   Neck Deep Neck Deep     Retro Shoulder Shrugs 10x 10x 10x 10x     Retractions 10x 10x 10x 10x     Horiz abd/add  10x 10x 10x     Alt

## 2023-05-03 ENCOUNTER — HOSPITAL ENCOUNTER (OUTPATIENT)
Dept: PHYSICAL THERAPY | Age: 53
Setting detail: THERAPIES SERIES
Discharge: HOME | End: 2023-05-03
Payer: COMMERCIAL

## 2023-05-03 PROCEDURE — 97110 THERAPEUTIC EXERCISES: CPT

## 2023-05-03 PROCEDURE — 97113 AQUATIC THERAPY/EXERCISES: CPT

## 2023-05-03 NOTE — PROGRESS NOTES
[x] [facility]) - [facility] & Therapy  [address]  [place]: [phone]   F: [phone]    Physical Therapy Progress Note    Date: 5/3/2023      Patient: Sommer Johnson  : 1970  MRN: [id number]    Physician: Nelson Szymanski MD                                 Insurance: [facility] ([phone] combined with OT and speech, [facility])  Medical Diagnosis: M51.9 Lumbar disc disease, M54.2 Neck pain                          Rehab Codes: M54.59 Low back pain, M54.2 Cervicalgia  Onset Date:                                  Next 's appt: 23  Visit# / total visits:                     Cancels/No Shows:   Date of initial visit: 23                      Subjective:  Pain:  [x] Yes  [] No  Location: Cervical spine into shoulders, Low back Pain Rating: (0-10 scale) 4/10, 0/10  Pain altered Tx:  [x] No  [] Yes  Action:  Comments:    Objective:  Objective: p = pain, L = Lacks                 ROM  ° A/P STRENGTH   ROM     Left Right Left Right Cervical     Shoulder Flex 130p 130 4/5 4+/5 Flexion 45   Abduction 123 145 4/5 4+/5 Extension 40   ER T2 T2 5/5 5/5 Rotation L65 R 62   IR T10 T10 5/5 5/5 Side bending L30 R30             Lumbar               Flexion 90% p upon rising   Hip Flexion      4+/5 4+/5 Extension 75%   Ext     4+/5 4+/5 Rotation L100 R 100   Abd     4+/5 4+/5 Sidebend L 100 R100   Add     5/5 5/5 -------------------- --------- --------   Knee Flex     5/5 5/5         Ext      5/5 5/5         Ankle DF     5/5 5/5         PF     5/5 5/5           Functional Tests:  Mod Oswestry:=28% 46% impairment  NDI:=42% impairment    Assessment:  Patient with low back pain and neck pain is snf through plan of care including aquatic therapy. She demonstrates a decrease in pain and increase in function with her low back. Her left side of her neck continues to bother her which causes radiating pain down into the hand which is now her main complaint.  She has had

## 2023-05-08 ENCOUNTER — HOSPITAL ENCOUNTER (OUTPATIENT)
Dept: PHYSICAL THERAPY | Age: 53
Setting detail: THERAPIES SERIES
Discharge: HOME | End: 2023-05-08
Payer: COMMERCIAL

## 2023-05-08 NOTE — FLOWSHEET NOTE
[x] [facility]) - [facility] Therapy  [address]  [place]: [phone]   F: [phone]     Physical Therapy Cancel/No Show note    Date: 2023  Patient: Giuliano Arredondo  : 1970  MRN: [id number]    Visit Count:   Cancels/No Shows to date:     For today's appointment patient:    [x]  Cancelled    [] Rescheduled appointment    [] No-show     Reason given by patient:    [x]  Patient ill- bathroom issues    []  Conflicting appointment    [] No transportation      [] Conflict with work    [] No reason given    [] Weather related    [] COVID-19    [] Other:      Comments:        [x] Next appointment was confirmed    Electronically signed by: Marco Hauser PTA

## 2023-05-10 ENCOUNTER — HOSPITAL ENCOUNTER (OUTPATIENT)
Dept: PHYSICAL THERAPY | Age: 53
Setting detail: THERAPIES SERIES
Discharge: HOME | End: 2023-05-10
Payer: COMMERCIAL

## 2023-05-10 PROCEDURE — 97113 AQUATIC THERAPY/EXERCISES: CPT

## 2023-05-10 NOTE — FLOWSHEET NOTE
[x] [facility] [facility] - [facility] Therapy  [address]  [place]: [phone]   F: [phone]    Physical Therapy Daily Treatment Note      Date:  5/10/2023  Patient Name:  Bj Mendoza    :  1970  MRN: [id number]  Physician: Porter Watts MD                                 Insurance: [facility] ([phone] combined with OT and speech, BOMN)  Medical Diagnosis: M51.9 Lumbar disc disease, M54.2 Neck pain                          Rehab Codes: M54.59 Low back pain, M54.2 Cervicalgia  Onset Date:                                  Next 's appt: 23  Visit# / total visits:   Cancels/No Shows: 2/0    Subjective: Missed last visit due to issues at work- has been very stressed and emotional. Noted increased back pain Monday evening after missing therapy. States her neck and shoulders are pretty good today- some soreness in left lower back and buttock- however B feet are very painful due to a lot of running around. Pain:  [x] Yes  [] No Location/Pain Rating: (0-10 scale) : Cervical Spine into shoulders 2-3/10;  Left Lower back into buttock 5/10; B Feet 10/10  Pain altered Tx:  [x] No  [] Yes  Action:    Comments: Emphasis on avoidance of increasing symptoms    Objective:  Modalities:   Precautions: NONE  Exercises:    [address] [facility] Exercise Log    Date of Eval: 23                                Primary PT: Jose Angel Parker PT    Things to Focus On (goals): LE/UE strengthening        Date 4/26/23 5/1/23 5/3/23 5/10/23    Visit #     Walk F/L/R 2 Laps NO UE ROM 2 Laps NO UE ROM 2 Laps NO UE ROM 2 Laps NO UE ROM    Marching 10x 2 Laps 2 Laps 2 Laps    LE Exercise        Squats 10x5\" 10x5\" 10x5\" 10x5\"    Step-Ups F/L    Low 5x each    Heel-toe raises 10x 10x 10x 10x    SLR F/L/R 10x 10x 10x 10x    Lunges        Knee flex/ext  10x 10x 10x            UE exercises Neck Deep Neck Deep Neck Deep Neck Deep    Retro Shoulder Shrugs 10x

## 2023-05-16 ENCOUNTER — HOSPITAL ENCOUNTER (OUTPATIENT)
Dept: PHYSICAL THERAPY | Age: 53
Setting detail: THERAPIES SERIES
Discharge: HOME | End: 2023-05-16
Payer: COMMERCIAL

## 2023-05-16 NOTE — FLOWSHEET NOTE
[] [facility]) - [facility] Therapy  [address]  [place]: [phone]   F: [phone]     Physical Therapy Cancel/No Show note    Date: 2023  Patient: Demetrice Argueta  : 1970  MRN: [id number]    Visit Count:   Cancels/No Shows to date: 3/0    For today's appointment patient:    [x]  Cancelled    [] Rescheduled appointment    [] No-show     Reason given by patient:    []  Patient ill    []  Conflicting appointment    [] No transportation      [] Conflict with work    [] No reason given    [] Weather related    [] COVID-19    [x] Other:   Patient reports some skin break down on her abdomen so wishes to hold off pool this date.  Will call if she is unable to attend next visit   Comments:        [x] Next appointment was confirmed    Electronically signed by: Natalie Charles PTA

## 2023-05-18 ENCOUNTER — HOSPITAL ENCOUNTER (OUTPATIENT)
Dept: PHYSICAL THERAPY | Age: 53
Setting detail: THERAPIES SERIES
Discharge: HOME | End: 2023-05-18
Payer: COMMERCIAL

## 2023-05-18 NOTE — FLOWSHEET NOTE
[] [facility]) - [facility] Therapy  [address]  [place]: [phone]   F: [phone]     Physical Therapy Cancel/No Show note    Date: 2023  Patient: Nivia Gatica  : 1970  MRN: [id number]    Visit Count:   Cancels/No Shows to date: 0    For today's appointment patient:    [x]  Cancelled    [] Rescheduled appointment    [] No-show     Reason given by patient:    []  Patient ill    []  Conflicting appointment    [] No transportation      [] Conflict with work    [] No reason given    [] Weather related    [] COVID-19    [x] Other:   Patient reports some skin break down on her abdomen so wishes to hold off pool this date.     Comments:        [x] Next appointment was confirmed    Electronically signed by: Pearl Madrid PTA

## 2023-05-23 ENCOUNTER — HOSPITAL ENCOUNTER (OUTPATIENT)
Dept: PHYSICAL THERAPY | Age: 53
Setting detail: THERAPIES SERIES
Discharge: HOME | End: 2023-05-23
Payer: COMMERCIAL

## 2023-05-23 PROCEDURE — 97113 AQUATIC THERAPY/EXERCISES: CPT

## 2023-05-23 NOTE — FLOWSHEET NOTE
[x] [facility]) - [facility] Therapy  [address]  [place]: [phone]   F: [phone]    Physical Therapy Daily Treatment Note      Date:  2023  Patient Name:  Jeimy Sherman    :  1970  MRN: [id number]  Physician: Wally Mancia MD                                 Insurance: Tristan Sosa ([phone] combined with OT and speech, BOMN)  Medical Diagnosis: M51.9 Lumbar disc disease, M54.2 Neck pain                          Rehab Codes: M54.59 Low back pain, M54.2 Cervicalgia  Onset Date:                                  Next 's appt: TBD  Visit# / total visits:   Cancels/No Shows: 4/0    Subjective: Neck and shoulder felt better after last visit; missed last week due to skin irritation. States her left plantar fascitis is flared up-not sure if due to her RA or heel spur. States her back is not feeling too bad today but neck is very sore. Patient reports she missed her appt with her surgeon and has to R/S. Reports in the future she would like some home exercise she can start to work on at the gym. Pain:  [x] Yes  [] No Location/Pain Rating: (0-10 scale) : Cervical Spine into shoulders 5/10;  Left Lower back into buttock 1-2/10; L heel up to knee cap 8-9/10  Pain altered Tx:  [x] No  [] Yes  Action:    Comments: Emphasis on avoidance of increasing symptoms    Objective:  Modalities:   Precautions: NONE  Exercises:    [address] [facility] Exercise Log    Date of Eval: 23                                Primary PT: Kiki Hernandez, PT    Things to Focus On (goals): LE/UE strengthening        Date 5/10/23 5/23/23     Visit #      Walk F/L/R 2 Laps NO UE ROM 2 Laps NO UE ROM     Marching 2 Laps 2 Laps     LE Exercise       Squats 10x5\" 10x5\"     Step-Ups F/L Low 5x each Low 5x each     Heel-toe raises 10x 10x     SLR F/L/R 10x 10x     Lunges       Knee flex/ext 10x 10x            UE exercises Neck Deep Neck Deep     Retro Shoulder Shrugs

## 2023-05-25 ENCOUNTER — HOSPITAL ENCOUNTER (OUTPATIENT)
Dept: PHYSICAL THERAPY | Age: 53
Setting detail: THERAPIES SERIES
Discharge: HOME | End: 2023-05-25
Payer: COMMERCIAL

## 2023-05-25 PROCEDURE — 97113 AQUATIC THERAPY/EXERCISES: CPT

## 2023-05-25 NOTE — FLOWSHEET NOTE
[x] [facility]) - [facility] Therapy  [address]  [place]: [phone]   F: [phone]    Physical Therapy Daily Treatment Note      Date:  2023  Patient Name:  Sommer Johnson    :  1970  MRN: [id number]  Physician: Nelson Szymanski MD                                 Insurance: [address] ([phone] combined with OT and speech, BOMN)  Medical Diagnosis: M51.9 Lumbar disc disease, M54.2 Neck pain                          Rehab Codes: M54.59 Low back pain, M54.2 Cervicalgia  Onset Date:                                  Next 's appt: TBD  Visit# / total visits:   Cancels/No Shows: 4/0    Subjective: Felt some relief after last visit with her neck, back or foot- felt her foot was better after aquatics. States her l foot is terrible today- plans to see her RA doctor soon due to inflammatory levels. States her lower back is feeling good today but continues with neck and shoulder pain. Denies trying to use ice bottle to roll her L foot on since last visit. Saw a podiatrist in the past with a bad response. Feels her TMJ is contributing to her neck pain. Pain:  [x] Yes  [] No Location/Pain Rating: (0-10 scale) : Cervical Spine into shoulders 4/10;  Left Lower back into buttock 1-2/10; L heel up posterior lower leg(calf to posterior knee) 7/10  Pain altered Tx:  [x] No  [] Yes  Action:    Comments: Emphasis on avoidance of increasing symptoms    Objective:  Modalities:   Precautions: NONE  Exercises:    [address] [place] [facility] Exercise Log    Date of Eval: 23                                Primary PT: Darrell Ortega, PT    Things to Focus On (goals): LE/UE strengthening        Date 5/10/23 5/23/23 5/25/23    Visit #     Walk F/L/R 2 Laps NO UE ROM 2 Laps NO UE ROM 2 Laps NO UE ROM    Marching 2 Laps 2 Laps 2 Laps    LE Exercise       Squats 10x5\" 10x5\" 10x5\"    Step-Ups F/L Low 5x each Low 5x each Low 10x    Heel-toe raises 10x 10x 10x

## 2023-05-30 ENCOUNTER — HOSPITAL ENCOUNTER (OUTPATIENT)
Dept: PHYSICAL THERAPY | Age: 53
Setting detail: THERAPIES SERIES
Discharge: HOME | End: 2023-05-30
Payer: COMMERCIAL

## 2023-05-30 ENCOUNTER — APPOINTMENT (OUTPATIENT)
Dept: PHYSICAL THERAPY | Age: 53
End: 2023-05-30
Payer: COMMERCIAL

## 2023-05-30 NOTE — FLOWSHEET NOTE
[] [facility]) - [facility] Therapy  [address]  [place]: [phone]   F: [phone]     Physical Therapy Cancel/No Show note    Date: 2023  Patient: Osmin Edgar  : 1970  MRN: [id number]    Visit Count:   Cancels/No Shows to date:     For today's appointment patient:    [x]  Cancelled    [] Rescheduled appointment    [] No-show     Reason given by patient:    [x]  Patient ill    []  Conflicting appointment    [] No transportation      [] Conflict with work    [] No reason given    [] Weather related    [] COVID-19    [] Other:      Comments:        [] Next appointment was confirmed    Electronically signed by: Rosa Gilliam PTA

## 2023-06-01 ENCOUNTER — HOSPITAL ENCOUNTER (OUTPATIENT)
Dept: PHYSICAL THERAPY | Age: 53
Setting detail: THERAPIES SERIES
Discharge: HOME | End: 2023-06-01

## 2023-06-01 NOTE — FLOWSHEET NOTE
[] [address]  [place]: [phone]   F: [phone]     Physical Therapy Cancel/No Show note    Date: 2023  Patient: Avelino Guerrero  : 1970  MRN: [id number]    Visit Count:   Cancels/No Shows to date:     For today's appointment patient:    [x]  Cancelled    [] Rescheduled appointment    [] No-show     Reason given by patient:    [x]  Patient ill    []  Conflicting appointment    [] No transportation      [] Conflict with work    [] No reason given    [] Weather related    [] COVID-19    [] Other:      Comments:        [] Next appointment was confirmed    Electronically signed by: Arsenio Adams PTA

## 2023-06-06 ENCOUNTER — HOSPITAL ENCOUNTER (OUTPATIENT)
Dept: PHYSICAL THERAPY | Age: 53
Setting detail: THERAPIES SERIES
Discharge: HOME | End: 2023-06-06

## 2023-06-06 NOTE — FLOWSHEET NOTE
[] [facility]) - [facility] Therapy  [address]  [place]: [phone]   F: [phone]     Physical Therapy Cancel/No Show note    Date: 2023  Patient: Christina Rucker  : 1970  MRN: [id number]    Visit Count:   Cancels/No Shows to date:     For today's appointment patient:    [x]  Cancelled    [] Rescheduled appointment    [] No-show     Reason given by patient:    []  Patient ill    []  Conflicting appointment    [] No transportation      [] Conflict with work    [] No reason given    [] Weather related    [] [id number]    [x] Other: Continues with skin irritation and defers pool at this time- cancelled  all future visits. Will call back to R/S- informed she can also complete land therapy.     Comments:        [] Next appointment was confirmed    Electronically signed by: Franco Walker PTA

## 2023-08-24 DIAGNOSIS — E89.0 POSTOPERATIVE HYPOTHYROIDISM: ICD-10-CM

## 2023-08-24 RX ORDER — LEVOTHYROXINE SODIUM 0.05 MG/1
TABLET ORAL
Qty: 90 TABLET | Refills: 3 | Status: SHIPPED | OUTPATIENT
Start: 2023-08-24

## 2023-10-02 ENCOUNTER — OFFICE VISIT (OUTPATIENT)
Dept: FAMILY MEDICINE CLINIC | Age: 53
End: 2023-10-02
Payer: COMMERCIAL

## 2023-10-02 VITALS
DIASTOLIC BLOOD PRESSURE: 70 MMHG | TEMPERATURE: 98.1 F | HEART RATE: 85 BPM | SYSTOLIC BLOOD PRESSURE: 114 MMHG | OXYGEN SATURATION: 98 %

## 2023-10-02 DIAGNOSIS — H61.21 IMPACTED CERUMEN OF RIGHT EAR: Primary | ICD-10-CM

## 2023-10-02 PROCEDURE — 69209 REMOVE IMPACTED EAR WAX UNI: CPT

## 2023-10-02 PROCEDURE — 99213 OFFICE O/P EST LOW 20 MIN: CPT

## 2023-10-02 ASSESSMENT — ENCOUNTER SYMPTOMS
ABDOMINAL PAIN: 0
RHINORRHEA: 0
EYE ITCHING: 0
SORE THROAT: 0
VOMITING: 0
EYE PAIN: 0
DIARRHEA: 0
COUGH: 0

## 2023-10-02 NOTE — PROGRESS NOTES
[address] [facility]  [address] [facility] [address] [address]  Dept: [phone]  Dept Fax: [phone]    Kayleen Amaya is a 47 y.o. female who presents to the urgent care today for her medical conditions/complaints as notedbelow. Kayleen Amaya is c/o of Ear Fullness (Onset last night was using a q-tip after shower now feels like ear is plugged )      HPI:     Patient presents to the walk in clinic for evaluation of Right ear fullness. She reports using a q-tip after the shower and has been having right ear fullness since. Ear Fullness   This is a new problem. The current episode started yesterday. The problem occurs constantly. The problem has been gradually worsening. There has been no fever. Associated symptoms include headaches and hearing loss. Pertinent negatives include no abdominal pain, coughing, diarrhea, ear discharge, neck pain, rash, rhinorrhea, sore throat or vomiting. She has tried nothing for the symptoms. There is no history of a chronic ear infection, hearing loss or a tympanostomy tube. Past Medical History:   Diagnosis Date    Adrenal insufficiency (HCC)     Asthma     Bleeding after intercourse     History of    Bloody diarrhea     Cancer (HCC)     CERVICAL    Chest pain     [employer] disease [facility])     Diverticulitis     Diverticulosis of colon     Environmental allergies     Family history of breast cancer     MGM in her 45s    Fibromyalgia     GERD (gastroesophageal reflux disease)     H/O thyroid cyst     mild hypothyroidism.     Headache     History of cervical dysplasia 2000    had cone    History of conization of cervix 2000    dysplastic cells    Hyperlipidemia     Hypothyroidism, unspecified 03/18/2016    Lupus (HCC)     Osteoarthritis     Positive cardiac stress test     Rheumatoid arthritis (HCC)     Sleep apnea     tests were negative but snores badly    SOB (shortness of breath)

## 2023-10-11 DIAGNOSIS — I25.118 CORONARY ARTERY DISEASE OF NATIVE HEART WITH STABLE ANGINA PECTORIS, UNSPECIFIED VESSEL OR LESION TYPE (HCC): ICD-10-CM

## 2023-10-11 DIAGNOSIS — R94.39 ABNORMAL STRESS TEST: ICD-10-CM

## 2023-10-11 RX ORDER — ATORVASTATIN CALCIUM 40 MG/1
TABLET, FILM COATED ORAL
Qty: 30 TABLET | Refills: 3 | Status: SHIPPED | OUTPATIENT
Start: 2023-10-11

## 2023-11-03 ENCOUNTER — CLINICAL DOCUMENTATION (OUTPATIENT)
Dept: PHYSICAL THERAPY | Age: 53
End: 2023-11-03

## 2023-11-11 ENCOUNTER — HOSPITAL ENCOUNTER (EMERGENCY)
Age: 53
Discharge: HOME | End: 2023-11-11
Attending: EMERGENCY MEDICINE
Payer: COMMERCIAL

## 2023-11-11 VITALS
HEIGHT: 64 IN | TEMPERATURE: 97.7 F | DIASTOLIC BLOOD PRESSURE: 85 MMHG | BODY MASS INDEX: 35.85 KG/M2 | WEIGHT: 210 LBS | SYSTOLIC BLOOD PRESSURE: 144 MMHG | HEART RATE: 82 BPM | RESPIRATION RATE: 18 BRPM | OXYGEN SATURATION: 95 %

## 2023-11-11 DIAGNOSIS — R59.0 CERVICAL LYMPHADENOPATHY: Primary | ICD-10-CM

## 2023-11-11 DIAGNOSIS — J06.9 ACUTE UPPER RESPIRATORY INFECTION: ICD-10-CM

## 2023-11-11 LAB
ANION GAP SERPL CALCULATED.3IONS-SCNC: 10 MMOL/L (ref 9–17)
BASOPHILS # BLD: 0 K/UL (ref 0–0.2)
BASOPHILS NFR BLD: 1 % (ref 0–2)
BUN SERPL-MCNC: 10 MG/DL (ref 6–20)
CALCIUM SERPL-MCNC: 8.8 MG/DL (ref 8.6–10.4)
CHLORIDE SERPL-SCNC: 105 MMOL/L (ref 98–107)
CO2 SERPL-SCNC: 24 MMOL/L (ref 20–31)
CREAT SERPL-MCNC: 0.5 MG/DL (ref 0.5–0.9)
EOSINOPHIL # BLD: 0.3 K/UL (ref 0–0.4)
EOSINOPHILS RELATIVE PERCENT: 3 % (ref 0–4)
ERYTHROCYTE [DISTWIDTH] IN BLOOD BY AUTOMATED COUNT: 13.3 % (ref 11.5–14.9)
FLUAV RNA RESP QL NAA+PROBE: NOT DETECTED
FLUBV RNA RESP QL NAA+PROBE: NOT DETECTED
GFR SERPL CREATININE-BSD FRML MDRD: >60 ML/MIN/1.73M2
GLUCOSE SERPL-MCNC: 108 MG/DL (ref 70–99)
HCT VFR BLD AUTO: 43.3 % (ref 36–46)
HGB BLD-MCNC: 14.2 G/DL (ref 12–16)
LYMPHOCYTES NFR BLD: 2.7 K/UL (ref 1–4.8)
LYMPHOCYTES RELATIVE PERCENT: 29 % (ref 24–44)
MCH RBC QN AUTO: 31.7 PG (ref 26–34)
MCHC RBC AUTO-ENTMCNC: 32.8 G/DL (ref 31–37)
MCV RBC AUTO: 96.5 FL (ref 80–100)
MONOCYTES NFR BLD: 0.8 K/UL (ref 0.1–1.3)
MONOCYTES NFR BLD: 8 % (ref 1–7)
NEUTROPHILS NFR BLD: 59 % (ref 36–66)
NEUTS SEG NFR BLD: 5.6 K/UL (ref 1.3–9.1)
PLATELET # BLD AUTO: 174 K/UL (ref 150–450)
PMV BLD AUTO: 10.4 FL (ref 6–12)
POTASSIUM SERPL-SCNC: 4.3 MMOL/L (ref 3.7–5.3)
RBC # BLD AUTO: 4.49 M/UL (ref 4–5.2)
SARS-COV-2 RNA RESP QL NAA+PROBE: NOT DETECTED
SODIUM SERPL-SCNC: 139 MMOL/L (ref 135–144)
SOURCE: NORMAL
SPECIMEN DESCRIPTION: NORMAL
SPECIMEN SOURCE: NORMAL
STREP A, MOLECULAR: NEGATIVE
WBC OTHER # BLD: 9.5 K/UL (ref 3.5–11)

## 2023-11-11 PROCEDURE — 87636 SARSCOV2 & INF A&B AMP PRB: CPT

## 2023-11-11 PROCEDURE — 6370000000 HC RX 637 (ALT 250 FOR IP)

## 2023-11-11 PROCEDURE — 2580000003 HC RX 258

## 2023-11-11 PROCEDURE — 36415 COLL VENOUS BLD VENIPUNCTURE: CPT

## 2023-11-11 PROCEDURE — 85025 COMPLETE CBC W/AUTO DIFF WBC: CPT

## 2023-11-11 PROCEDURE — 87651 STREP A DNA AMP PROBE: CPT

## 2023-11-11 PROCEDURE — 99284 EMERGENCY DEPT VISIT MOD MDM: CPT

## 2023-11-11 PROCEDURE — 80048 BASIC METABOLIC PNL TOTAL CA: CPT

## 2023-11-11 RX ORDER — 0.9 % SODIUM CHLORIDE 0.9 %
1000 INTRAVENOUS SOLUTION INTRAVENOUS ONCE
Status: COMPLETED | OUTPATIENT
Start: 2023-11-11 | End: 2023-11-11

## 2023-11-11 RX ORDER — NAPROXEN 500 MG/1
500 TABLET ORAL 2 TIMES DAILY WITH MEALS
Qty: 180 TABLET | Refills: 1 | Status: SHIPPED | OUTPATIENT
Start: 2023-11-11

## 2023-11-11 RX ORDER — NAPROXEN 250 MG/1
500 TABLET ORAL ONCE
Status: COMPLETED | OUTPATIENT
Start: 2023-11-11 | End: 2023-11-11

## 2023-11-11 RX ADMIN — NAPROXEN 500 MG: 250 TABLET ORAL at 16:26

## 2023-11-11 RX ADMIN — SODIUM CHLORIDE 1000 ML: 9 INJECTION, SOLUTION INTRAVENOUS at 16:34

## 2023-11-11 ASSESSMENT — PAIN SCALES - GENERAL: PAINLEVEL_OUTOF10: 6

## 2023-11-11 ASSESSMENT — PAIN - FUNCTIONAL ASSESSMENT: PAIN_FUNCTIONAL_ASSESSMENT: 0-10

## 2023-11-11 NOTE — ED PROVIDER NOTES
No cranial nerve deficit. Sensory: No sensory deficit. Motor: No weakness. DDX/DIAGNOSTIC RESULTS / EMERGENCY DEPARTMENT COURSE / MDM     Medical Decision Making  46 y/o female with hx hypothyroid, asthma, RA on humera who presents with left neck pain and swelling, left facial fullness sensation. Also notes lightheadedness and malaise over the past week. Patient's vitals are WNL upon arrival. On exam she is alert, awake, in no acute distress. Tender anterior left cervical LA. Oropharynx patent without significant erythema or exudates. No rhinorrhea or congestion. Left ear with serous effusion behind TM. Heart regular rate rhythm. Lungs CTAB. Abdomen is soft and nontender. Neuro exam is nonfocal. DDX: covid, flu, strep throat, URI, reactive LA. Will plan for viral swabs, basic labs, sx control    Amount and/or Complexity of Data Reviewed  Labs: ordered. Decision-making details documented in ED Course. Discussion of management or test interpretation with external provider(s): Patient's lab work unremarkable, without leukocytosis or anemia. Patient negative for covid/flu/strep. Patient with likely URI with lymphadenopathy. Concern for more insidious infection due to patient's humera use however her vitals and labwork are fairly benign. No airway comprosmise. Patient instructed to followup outpatient as soon as possible. Given return cautions. Risk  Prescription drug management. EMERGENCY DEPARTMENT COURSE:      ED Course as of 11/12/23 1351   Sat Nov 11, 2023   1717 SARS-CoV-2 RNA, RT PCR: Not Detected [TD]   1718 INFLUENZA A: Not Detected [TD]   1718 INFLUENZA B: Not Detected [TD]   1718 Strep A, Molecular: NEGATIVE [TD]   1718 WBC: 9.5 [TD]   1718 Hemoglobin Quant: 14.2 [TD]   1807 We discussed patient's lab work including lack of leukocytosis or anemia.   Electrolytes were within normal.  We discussed that this is likely a upper respiratory infection that will take a couple days to

## 2023-11-11 NOTE — DISCHARGE INSTRUCTIONS
You were seen for evaluation of neck pain, sore throat and facial numbness pain. This is likely due to an upper respiratory tract infection. Your lab work in the emergency department was negative for COVID, flu, strep throat. You will be given naproxen that you can take to help with the symptoms. Return to the emergency department for any worsening pain, swelling, inability to swallow, shortness of breath, chest pain, fevers, chills, other new or concerning symptoms.   Otherwise you to follow-up outpatient with your primary care doctor soon as possible

## 2023-11-11 NOTE — ED TRIAGE NOTES
Mode of arrival (squad #, walk in, police, etc) : walk in        Chief complaint(s): neck pain, pharyngitis, facial swelling        Arrival Note (brief scenario, treatment PTA, etc). : pt reports she was around her grandson last weekend who was flu B positive ans had strep. Last night pt noticed a lump in the left side of her neck and now the left side of her face is swollen. Pt states her tongue and throat are a little sore as well. Pt has partial thyroidectomy. Not on ACE inhibitors. C= \"Have you ever felt that you should Cut down on your drinking? \"  No  A= \"Have people Annoyed you by criticizing your drinking? \"  No  G= \"Have you ever felt bad or Guilty about your drinking? \"  No  E= \"Have you ever had a drink as an Eye-opener first thing in the morning to steady your nerves or to help a hangover? \"  No      Deferred []      Reason for deferring: N/A    *If yes to two or more: probable alcohol abuse. *

## 2023-11-12 ASSESSMENT — ENCOUNTER SYMPTOMS
CHOKING: 0
SHORTNESS OF BREATH: 0
NAUSEA: 0
TROUBLE SWALLOWING: 0
ABDOMINAL PAIN: 0
SORE THROAT: 1

## 2023-11-17 ENCOUNTER — OFFICE VISIT (OUTPATIENT)
Dept: INTERNAL MEDICINE CLINIC | Age: 53
End: 2023-11-17
Payer: COMMERCIAL

## 2023-11-17 VITALS
BODY MASS INDEX: 35 KG/M2 | HEART RATE: 71 BPM | WEIGHT: 205 LBS | HEIGHT: 64 IN | OXYGEN SATURATION: 97 % | DIASTOLIC BLOOD PRESSURE: 80 MMHG | SYSTOLIC BLOOD PRESSURE: 120 MMHG

## 2023-11-17 DIAGNOSIS — E55.9 VITAMIN D DEFICIENCY: ICD-10-CM

## 2023-11-17 DIAGNOSIS — R73.9 HYPERGLYCEMIA: ICD-10-CM

## 2023-11-17 DIAGNOSIS — J02.9 ACUTE PHARYNGITIS, UNSPECIFIED ETIOLOGY: Primary | ICD-10-CM

## 2023-11-17 PROCEDURE — 99213 OFFICE O/P EST LOW 20 MIN: CPT | Performed by: INTERNAL MEDICINE

## 2023-11-17 RX ORDER — DOXYCYCLINE HYCLATE 100 MG
100 TABLET ORAL 2 TIMES DAILY
Qty: 14 TABLET | Refills: 0 | Status: SHIPPED | OUTPATIENT
Start: 2023-11-17 | End: 2023-11-24

## 2023-11-17 RX ORDER — TIZANIDINE 4 MG/1
TABLET ORAL
COMMUNITY
Start: 2023-11-14

## 2023-11-17 NOTE — PROGRESS NOTES
Visit Information    Have you changed or started any medications since your last visit including any over-the-counter medicines, vitamins, or herbal medicines? no   Are you having any side effects from any of your medications? -  no  Have you stopped taking any of your medications? Is so, why? -  no    Have you seen any other physician or provider since your last visit? Yes - Records Obtained  Have you had any other diagnostic tests since your last visit? Yes - Records Obtained  Have you been seen in the emergency room and/or had an admission to a hospital since we last saw you? Yes - Records Obtained  Have you had your routine dental cleaning in the past 6 months? no    Have you activated your [employer] account? If not, what are your barriers?  Yes     Patient Care Team:  Romel Majano MD as PCP - General (Internal Medicine)  Romel Majano MD as PCP - Empaneled Provider  Viola Bhardwaj DO as Consulting Physician (Obstetrics & Gynecology)    Medical History Review  Past Medical, Family, and Social History reviewed and does contribute to the patient presenting condition    Health Maintenance   Topic Date Due    Hepatitis B vaccine (1 of 3 - 3-dose series) Never done    COVID-19 Vaccine (1) Never done    Pneumococcal 0-64 years Vaccine (1 - PCV) Never done    DTaP/Tdap/Td vaccine (1 - Tdap) Never done    Shingles vaccine (1 of 2) Never done    Cervical cancer screen  11/07/2022    Flu vaccine (1) 08/01/2023    Depression Monitoring  01/17/2024    Lipids  02/20/2024    Breast cancer screen  09/20/2025    Diabetes screen  02/20/2026    Colorectal Cancer Screen  03/12/2026    Hepatitis C screen  Completed    HIV screen  Completed    Hepatitis A vaccine  Aged Out    Hib vaccine  Aged Out    Meningococcal (ACWY) vaccine  Aged Out

## 2023-11-30 ENCOUNTER — TELEPHONE (OUTPATIENT)
Dept: INTERNAL MEDICINE CLINIC | Age: 53
End: 2023-11-30

## 2023-11-30 NOTE — TELEPHONE ENCOUNTER
Patient was seen in the office 11/17/23 for URI. She completed Doxycylince Rx and is now feeling even worse. Symptoms include chest congestion, cough, sore throat, headache, ear pain, sinus congestion and pain. Afebrile, O2 is 95. Patient would like to know if additional medication can be prescribed. Please advise.

## 2023-12-10 DIAGNOSIS — D50.0 IRON DEFICIENCY ANEMIA DUE TO CHRONIC BLOOD LOSS: ICD-10-CM

## 2023-12-11 RX ORDER — FERROUS SULFATE 325(65) MG
TABLET ORAL
Qty: 90 TABLET | Refills: 1 | Status: SHIPPED | OUTPATIENT
Start: 2023-12-11

## 2024-02-06 DIAGNOSIS — I25.118 CORONARY ARTERY DISEASE OF NATIVE HEART WITH STABLE ANGINA PECTORIS, UNSPECIFIED VESSEL OR LESION TYPE (HCC): ICD-10-CM

## 2024-02-06 DIAGNOSIS — R94.39 ABNORMAL STRESS TEST: ICD-10-CM

## 2024-02-06 RX ORDER — ATORVASTATIN CALCIUM 40 MG/1
TABLET, FILM COATED ORAL
Qty: 90 TABLET | Refills: 3 | Status: SHIPPED | OUTPATIENT
Start: 2024-02-06

## 2024-04-01 DIAGNOSIS — F41.9 ANXIETY AND DEPRESSION: Chronic | ICD-10-CM

## 2024-04-01 DIAGNOSIS — I25.118 CORONARY ARTERY DISEASE OF NATIVE HEART WITH STABLE ANGINA PECTORIS, UNSPECIFIED VESSEL OR LESION TYPE (HCC): ICD-10-CM

## 2024-04-01 DIAGNOSIS — F32.A ANXIETY AND DEPRESSION: Chronic | ICD-10-CM

## 2024-04-01 DIAGNOSIS — R94.39 ABNORMAL STRESS TEST: ICD-10-CM

## 2024-04-02 RX ORDER — ATORVASTATIN CALCIUM 40 MG/1
40 TABLET, FILM COATED ORAL DAILY
Qty: 90 TABLET | Refills: 3 | Status: SHIPPED | OUTPATIENT
Start: 2024-04-02

## 2024-04-02 RX ORDER — CITALOPRAM 20 MG/1
20 TABLET ORAL DAILY
Qty: 90 TABLET | Refills: 3 | Status: SHIPPED | OUTPATIENT
Start: 2024-04-02

## 2024-04-02 RX ORDER — CITALOPRAM HYDROBROMIDE 10 MG/1
TABLET ORAL
Qty: 90 TABLET | Refills: 3 | Status: SHIPPED | OUTPATIENT
Start: 2024-04-02

## 2024-04-10 ENCOUNTER — TELEPHONE (OUTPATIENT)
Dept: INTERNAL MEDICINE CLINIC | Age: 54
End: 2024-04-10

## 2024-04-10 DIAGNOSIS — M51.36 LUMBAR DEGENERATIVE DISC DISEASE: Primary | ICD-10-CM

## 2024-04-10 DIAGNOSIS — K21.9 GASTROESOPHAGEAL REFLUX DISEASE WITHOUT ESOPHAGITIS: ICD-10-CM

## 2024-04-10 DIAGNOSIS — E89.0 POSTOPERATIVE HYPOTHYROIDISM: ICD-10-CM

## 2024-04-10 RX ORDER — LEVOTHYROXINE SODIUM 0.05 MG/1
50 TABLET ORAL DAILY
Qty: 90 TABLET | Refills: 0 | Status: SHIPPED | OUTPATIENT
Start: 2024-04-10

## 2024-04-10 RX ORDER — OMEPRAZOLE 40 MG/1
40 CAPSULE, DELAYED RELEASE ORAL
Qty: 90 CAPSULE | Refills: 0 | Status: SHIPPED | OUTPATIENT
Start: 2024-04-10

## 2024-04-10 RX ORDER — PREGABALIN 75 MG/1
75 CAPSULE ORAL 2 TIMES DAILY
Qty: 180 CAPSULE | Refills: 0 | Status: SHIPPED | OUTPATIENT
Start: 2024-04-10 | End: 2024-07-09

## 2024-04-10 RX ORDER — TIZANIDINE 4 MG/1
4 TABLET ORAL 2 TIMES DAILY
Qty: 180 TABLET | Refills: 0 | Status: SHIPPED | OUTPATIENT
Start: 2024-04-10

## 2024-04-10 NOTE — TELEPHONE ENCOUNTER
Patient called with informing that she has a painful nodule on left wrist , advised her to [facility] due to no availability in office to schedule at this time

## 2024-04-10 NOTE — TELEPHONE ENCOUNTER
Patient is requesting her medication be sent to [facility] for 90 day supply per insurance.

## 2024-04-22 ENCOUNTER — OFFICE VISIT (OUTPATIENT)
Dept: FAMILY MEDICINE CLINIC | Age: 54
End: 2024-04-22
Payer: COMMERCIAL

## 2024-04-22 VITALS
DIASTOLIC BLOOD PRESSURE: 83 MMHG | HEART RATE: 90 BPM | TEMPERATURE: 98.1 F | SYSTOLIC BLOOD PRESSURE: 134 MMHG | OXYGEN SATURATION: 97 %

## 2024-04-22 DIAGNOSIS — H65.91 MEE (MIDDLE EAR EFFUSION), RIGHT: ICD-10-CM

## 2024-04-22 DIAGNOSIS — H91.91 HEARING LOSS OF RIGHT EAR, UNSPECIFIED HEARING LOSS TYPE: ICD-10-CM

## 2024-04-22 DIAGNOSIS — R42 VERTIGO: Primary | ICD-10-CM

## 2024-04-22 PROCEDURE — 99213 OFFICE O/P EST LOW 20 MIN: CPT | Performed by: NURSE PRACTITIONER

## 2024-04-22 RX ORDER — ONDANSETRON 4 MG/1
4 TABLET, ORALLY DISINTEGRATING ORAL EVERY 6 HOURS PRN
Qty: 21 TABLET | Refills: 0 | Status: SHIPPED | OUTPATIENT
Start: 2024-04-22

## 2024-04-22 RX ORDER — MECLIZINE HYDROCHLORIDE 25 MG/1
25 TABLET ORAL 3 TIMES DAILY PRN
Qty: 15 TABLET | Refills: 0 | Status: SHIPPED | OUTPATIENT
Start: 2024-04-22 | End: 2024-05-02

## 2024-04-22 ASSESSMENT — ENCOUNTER SYMPTOMS
SHORTNESS OF BREATH: 0
SORE THROAT: 0
CHEST TIGHTNESS: 0
SWOLLEN GLANDS: 0
COUGH: 0
CHOKING: 0
WHEEZING: 0
ABDOMINAL PAIN: 0
VISUAL CHANGE: 0
CHANGE IN BOWEL HABIT: 0
VOMITING: 1
STRIDOR: 0
NAUSEA: 1

## 2024-04-22 NOTE — PROGRESS NOTES
Cardiovascular:      Rate and Rhythm: Normal rate.      Pulses: Normal pulses.   Pulmonary:      Effort: Pulmonary effort is normal.   Musculoskeletal:      Cervical back: Neck supple. No tenderness.      Comments: Gait steady   Lymphadenopathy:      Cervical: No cervical adenopathy.   Skin:     General: Skin is warm and dry.      Capillary Refill: Capillary refill takes less than 2 seconds.   Neurological:      General: No focal deficit present.      Mental Status: She is alert and oriented to person, place, and time.      Coordination: Coordination normal.      Gait: Gait normal.   Psychiatric:         Mood and Affect: Mood normal.       /83 (Site: Left Upper Arm, Position: Sitting, Cuff Size: Medium Adult)   Pulse 90   Temp 98.1 °F (36.7 °C)   SpO2 97%     Assessment:   Assessment & Plan    Diagnosis Orders   1. Vertigo  Jhon Hope MD, Otolaryngology, [place]    meclizine (ANTIVERT) 25 MG tablet    ondansetron (ZOFRAN-ODT) 4 MG disintegrating tablet      2. INES (middle ear effusion), right  Jhon Hope MD, Otolaryngology, [place]    meclizine (ANTIVERT) 25 MG tablet    ondansetron (ZOFRAN-ODT) 4 MG disintegrating tablet      3. Hearing loss of right ear, unspecified hearing loss type  Jhon Hope MD, Otolaryngology, [place]    meclizine (ANTIVERT) 25 MG tablet    ondansetron (ZOFRAN-ODT) 4 MG disintegrating tablet          Plan:    Exac of chronic intermittent vertigo, I suspect from uncontrolled allergies  Ran out of zyrtec for 3 days - back on  Runny nose, rt ear clogged - + effusion on exam  No evidence bacterial infection  I reviewed pt chart in epic - chronic issue - same sx as previous  Antivert rx  Zofran rx  Refer to ENT  AVS on vertigo exercises given  No driving  Reviewed over-the-counter treatments for symptom management.  Return worse  Pt verbalized agreement and understanding of POC    Return for make appt with Specialist - referral given.    Orders Placed This

## 2024-05-09 ENCOUNTER — OFFICE VISIT (OUTPATIENT)
Dept: INTERNAL MEDICINE CLINIC | Age: 54
End: 2024-05-09
Payer: COMMERCIAL

## 2024-05-09 VITALS
HEART RATE: 90 BPM | OXYGEN SATURATION: 98 % | WEIGHT: 201 LBS | TEMPERATURE: 98.8 F | BODY MASS INDEX: 34.5 KG/M2 | DIASTOLIC BLOOD PRESSURE: 88 MMHG | SYSTOLIC BLOOD PRESSURE: 132 MMHG

## 2024-05-09 DIAGNOSIS — H92.02 LEFT EAR PAIN: Primary | ICD-10-CM

## 2024-05-09 PROCEDURE — 99215 OFFICE O/P EST HI 40 MIN: CPT | Performed by: INTERNAL MEDICINE

## 2024-05-09 RX ORDER — DOXYCYCLINE HYCLATE 100 MG
100 TABLET ORAL 2 TIMES DAILY
Qty: 14 TABLET | Refills: 0 | Status: SHIPPED | OUTPATIENT
Start: 2024-05-09 | End: 2024-05-16

## 2024-05-09 RX ORDER — CIPROFLOXACIN AND DEXAMETHASONE 3; 1 MG/ML; MG/ML
4 SUSPENSION/ DROPS AURICULAR (OTIC) 2 TIMES DAILY
Qty: 7.5 ML | Refills: 0 | Status: SHIPPED | OUTPATIENT
Start: 2024-05-09 | End: 2024-05-16

## 2024-05-09 SDOH — ECONOMIC STABILITY: FOOD INSECURITY: WITHIN THE PAST 12 MONTHS, YOU WORRIED THAT YOUR FOOD WOULD RUN OUT BEFORE YOU GOT MONEY TO BUY MORE.: NEVER TRUE

## 2024-05-09 SDOH — ECONOMIC STABILITY: FOOD INSECURITY: WITHIN THE PAST 12 MONTHS, THE FOOD YOU BOUGHT JUST DIDN'T LAST AND YOU DIDN'T HAVE MONEY TO GET MORE.: NEVER TRUE

## 2024-05-09 SDOH — ECONOMIC STABILITY: INCOME INSECURITY: HOW HARD IS IT FOR YOU TO PAY FOR THE VERY BASICS LIKE FOOD, HOUSING, MEDICAL CARE, AND HEATING?: NOT HARD AT ALL

## 2024-05-09 ASSESSMENT — PATIENT HEALTH QUESTIONNAIRE - PHQ9
3. TROUBLE FALLING OR STAYING ASLEEP: NOT AT ALL
4. FEELING TIRED OR HAVING LITTLE ENERGY: NOT AT ALL
9. THOUGHTS THAT YOU WOULD BE BETTER OFF DEAD, OR OF HURTING YOURSELF: NOT AT ALL
SUM OF ALL RESPONSES TO PHQ9 QUESTIONS 1 & 2: 4
SUM OF ALL RESPONSES TO PHQ QUESTIONS 1-9: 7
2. FEELING DOWN, DEPRESSED OR HOPELESS: NEARLY EVERY DAY
1. LITTLE INTEREST OR PLEASURE IN DOING THINGS: SEVERAL DAYS
6. FEELING BAD ABOUT YOURSELF - OR THAT YOU ARE A FAILURE OR HAVE LET YOURSELF OR YOUR FAMILY DOWN: NOT AT ALL
SUM OF ALL RESPONSES TO PHQ QUESTIONS 1-9: 7
10. IF YOU CHECKED OFF ANY PROBLEMS, HOW DIFFICULT HAVE THESE PROBLEMS MADE IT FOR YOU TO DO YOUR WORK, TAKE CARE OF THINGS AT HOME, OR GET ALONG WITH OTHER PEOPLE: NOT DIFFICULT AT ALL
5. POOR APPETITE OR OVEREATING: NEARLY EVERY DAY
SUM OF ALL RESPONSES TO PHQ QUESTIONS 1-9: 7
SUM OF ALL RESPONSES TO PHQ QUESTIONS 1-9: 7
7. TROUBLE CONCENTRATING ON THINGS, SUCH AS READING THE NEWSPAPER OR WATCHING TELEVISION: NOT AT ALL
8. MOVING OR SPEAKING SO SLOWLY THAT OTHER PEOPLE COULD HAVE NOTICED. OR THE OPPOSITE, BEING SO FIGETY OR RESTLESS THAT YOU HAVE BEEN MOVING AROUND A LOT MORE THAN USUAL: NOT AT ALL

## 2024-05-09 NOTE — PROGRESS NOTES
[facility]  [address] [address]  Dept: [phone]  Dept Fax: [phone]    Office Progress/Follow Up Note  Date of patient's visit: 5/9/2024  Patient's Name:  Lorelei Bond YOB: 1970            Patient Care Team:  Ernestine Alejandro MD as PCP - General (Internal Medicine)  Ernestine Alejandro MD as PCP - Empaneled Provider  Segundo Salcido DO as Consulting Physician (Obstetrics & Gynecology)    REASON FOR VISIT: acute visit    HISTORY OF PRESENT ILLNESS:      Chief Complaint   Patient presents with    Otalgia     Started a week ago, right ear when lazy makes dizzy        History was obtained from the patient. Lorelei Bond is a 53 y.o. is here for ear ache.    Left ear ache x 7 days  No discharge  No changes with vision  Reports chills+, no fevers.  PND +  No cough  Has tried arthritis tylenol  Has chronic vertigo  No loss of hearing    REVIEW OF SYSTEMS:    General : as above  HEENT : as above  Respiratory : Negative for shortness of breath cough, congestion, wheezing  Cardiovascular: Negative for chest pain, palpitations, shortness of breath  GI: Negative for abdominal pain, nausea, vomiting, diarrhea, constipation  Genitourinary : negative for dysuria, urinary frequency, urinary urgency, hematuria  Neurological : Negative for headache, dizziness, gait change,   Psych : Negative for depression, anxiety  Skin: Negative rash and skin lesions  Musculoskeletal : Negative for joint pain, muscle pain    Patient Active Problem List   Diagnosis    Dermatitis, contact    Anxiety and depression    Bilateral carpal tunnel syndrome    Tenderness of left calf    Posterior left knee pain    Cervical polyp    Fatigue    Hypothyroidism    Bilateral low back pain without sciatica    Left foot pain    Lumbar degenerative disc disease    Arthralgia of left hip    Midline low back pain with sciatica    Asthma    GERD (gastroesophageal reflux disease)    Delta

## 2024-05-09 NOTE — PROGRESS NOTES
Visit Information    Have you changed or started any medications since your last visit including any over-the-counter medicines, vitamins, or herbal medicines? no   Are you having any side effects from any of your medications? -  no  Have you stopped taking any of your medications? Is so, why? -  no    Have you seen any other physician or provider since your last visit? Yes - Records Obtained  Have you had any other diagnostic tests since your last visit? Yes - Records Obtained  Have you been seen in the emergency room and/or had an admission to a hospital since we last saw you? Yes - Records Obtained  Have you had your routine dental cleaning in the past 6 months? no    Have you activated your [employer] account? If not, what are your barriers? Yes     Patient Care Team:  Ernestine Alejandro MD as PCP - General (Internal Medicine)  Ernestine Alejandro MD as PCP - Empaneled Provider  Segundo Salcido DO as Consulting Physician (Obstetrics & Gynecology)    Medical History Review  Past Medical, Family, and Social History reviewed and does contribute to the patient presenting condition    Health Maintenance   Topic Date Due    Hepatitis B vaccine (1 of 3 - 3-dose series) Never done    COVID-19 Vaccine (1) Never done    Pneumococcal 0-64 years Vaccine (1 of 2 - PCV) Never done    DTaP/Tdap/Td vaccine (1 - Tdap) Never done    Shingles vaccine (1 of 2) Never done    Cervical cancer screen  11/07/2022    Depression Monitoring  01/17/2024    Lipids  02/20/2024    Breast cancer screen  09/20/2025    Diabetes screen  02/20/2026    Colorectal Cancer Screen  03/12/2026    Flu vaccine  Completed    Hepatitis C screen  Completed    HIV screen  Completed    Hepatitis A vaccine  Aged Out    Hib vaccine  Aged Out    Polio vaccine  Aged Out    Meningococcal (ACWY) vaccine  Aged Out

## 2024-05-23 ENCOUNTER — OFFICE VISIT (OUTPATIENT)
Dept: INTERNAL MEDICINE CLINIC | Age: 54
End: 2024-05-23
Payer: COMMERCIAL

## 2024-05-23 VITALS
OXYGEN SATURATION: 98 % | SYSTOLIC BLOOD PRESSURE: 128 MMHG | WEIGHT: 202 LBS | DIASTOLIC BLOOD PRESSURE: 82 MMHG | BODY MASS INDEX: 34.49 KG/M2 | HEIGHT: 64 IN | HEART RATE: 98 BPM

## 2024-05-23 DIAGNOSIS — Z13.220 SCREENING FOR HYPERLIPIDEMIA: Primary | ICD-10-CM

## 2024-05-23 DIAGNOSIS — L05.91 CYST NEAR COCCYX: ICD-10-CM

## 2024-05-23 PROCEDURE — 99213 OFFICE O/P EST LOW 20 MIN: CPT | Performed by: INTERNAL MEDICINE

## 2024-05-23 RX ORDER — OLOPATADINE HYDROCHLORIDE AND MOMETASONE FUROATE 25; 665 UG/1; UG/1
2 SPRAY, METERED NASAL DAILY
COMMUNITY
Start: 2024-05-21

## 2024-05-23 SDOH — ECONOMIC STABILITY: INCOME INSECURITY: HOW HARD IS IT FOR YOU TO PAY FOR THE VERY BASICS LIKE FOOD, HOUSING, MEDICAL CARE, AND HEATING?: PATIENT DECLINED

## 2024-05-23 SDOH — ECONOMIC STABILITY: HOUSING INSECURITY
IN THE LAST 12 MONTHS, WAS THERE A TIME WHEN YOU DID NOT HAVE A STEADY PLACE TO SLEEP OR SLEPT IN A SHELTER (INCLUDING NOW)?: PATIENT DECLINED

## 2024-05-23 SDOH — ECONOMIC STABILITY: FOOD INSECURITY: WITHIN THE PAST 12 MONTHS, YOU WORRIED THAT YOUR FOOD WOULD RUN OUT BEFORE YOU GOT MONEY TO BUY MORE.: PATIENT DECLINED

## 2024-05-23 SDOH — ECONOMIC STABILITY: FOOD INSECURITY: WITHIN THE PAST 12 MONTHS, THE FOOD YOU BOUGHT JUST DIDN'T LAST AND YOU DIDN'T HAVE MONEY TO GET MORE.: PATIENT DECLINED

## 2024-05-23 ASSESSMENT — ENCOUNTER SYMPTOMS
CHOKING: 0
COUGH: 0
DIARRHEA: 0
ABDOMINAL PAIN: 0
SHORTNESS OF BREATH: 0
APNEA: 0
BACK PAIN: 0
COLOR CHANGE: 0
EYE PAIN: 0
CHEST TIGHTNESS: 0
EYE DISCHARGE: 0
EYE ITCHING: 0
EYE REDNESS: 0
ABDOMINAL DISTENTION: 0
CONSTIPATION: 0
BLOOD IN STOOL: 0

## 2024-05-23 ASSESSMENT — PATIENT HEALTH QUESTIONNAIRE - PHQ9
SUM OF ALL RESPONSES TO PHQ9 QUESTIONS 1 & 2: 6
1. LITTLE INTEREST OR PLEASURE IN DOING THINGS: NEARLY EVERY DAY
2. FEELING DOWN, DEPRESSED OR HOPELESS: NEARLY EVERY DAY
6. FEELING BAD ABOUT YOURSELF - OR THAT YOU ARE A FAILURE OR HAVE LET YOURSELF OR YOUR FAMILY DOWN: NOT AT ALL
4. FEELING TIRED OR HAVING LITTLE ENERGY: NEARLY EVERY DAY
5. POOR APPETITE OR OVEREATING: NOT AT ALL
SUM OF ALL RESPONSES TO PHQ QUESTIONS 1-9: 9
10. IF YOU CHECKED OFF ANY PROBLEMS, HOW DIFFICULT HAVE THESE PROBLEMS MADE IT FOR YOU TO DO YOUR WORK, TAKE CARE OF THINGS AT HOME, OR GET ALONG WITH OTHER PEOPLE: SOMEWHAT DIFFICULT
SUM OF ALL RESPONSES TO PHQ QUESTIONS 1-9: 9
3. TROUBLE FALLING OR STAYING ASLEEP: NOT AT ALL
7. TROUBLE CONCENTRATING ON THINGS, SUCH AS READING THE NEWSPAPER OR WATCHING TELEVISION: NOT AT ALL
9. THOUGHTS THAT YOU WOULD BE BETTER OFF DEAD, OR OF HURTING YOURSELF: NOT AT ALL
8. MOVING OR SPEAKING SO SLOWLY THAT OTHER PEOPLE COULD HAVE NOTICED. OR THE OPPOSITE, BEING SO FIGETY OR RESTLESS THAT YOU HAVE BEEN MOVING AROUND A LOT MORE THAN USUAL: NOT AT ALL

## 2024-05-23 NOTE — PROGRESS NOTES
medications? -  no  Have you stopped taking any of your medications? Is so, why? -  no    Have you seen any other physician or provider since your last visit? Yes - Records Obtained  Have you had any other diagnostic tests since your last visit? No  Have you been seen in the emergency room and/or had an admission to a hospital since we last saw you? No  Have you had your routine dental cleaning in the past 6 months? no    Have you activated your [employer] account? If not, what are your barriers? Yes     Patient Care Team:  Ernestine Alejandro MD as PCP - General (Internal Medicine)  Ernestine Alejandro MD as PCP - Empaneled Provider  Segundo Salcido DO as Consulting Physician (Obstetrics & Gynecology)    Medical History Review  Past Medical, Family, and Social History reviewed and does not contribute to the patient presenting condition    Health Maintenance   Topic Date Due    Hepatitis B vaccine (1 of 3 - 3-dose series) Never done    COVID-19 Vaccine (1) Never done    Pneumococcal 0-64 years Vaccine (1 of 2 - PCV) Never done    DTaP/Tdap/Td vaccine (1 - Tdap) Never done    Shingles vaccine (1 of 2) Never done    Cervical cancer screen  11/07/2022    Lipids  02/20/2024    Depression Monitoring  05/09/2025    Breast cancer screen  09/20/2025    Diabetes screen  02/20/2026    Colorectal Cancer Screen  03/12/2026    Flu vaccine  Completed    Hepatitis C screen  Completed    HIV screen  Completed    Hepatitis A vaccine  Aged Out    Hib vaccine  Aged Out    Polio vaccine  Aged Out    Meningococcal (ACWY) vaccine  Aged Out

## 2024-05-30 ENCOUNTER — OFFICE VISIT (OUTPATIENT)
Age: 54
End: 2024-05-30
Payer: COMMERCIAL

## 2024-05-30 VITALS
HEART RATE: 76 BPM | WEIGHT: 204 LBS | HEIGHT: 64 IN | DIASTOLIC BLOOD PRESSURE: 79 MMHG | BODY MASS INDEX: 34.83 KG/M2 | SYSTOLIC BLOOD PRESSURE: 113 MMHG

## 2024-05-30 DIAGNOSIS — L72.3 SEBACEOUS CYST: Primary | ICD-10-CM

## 2024-05-30 DIAGNOSIS — L91.8 SKIN TAG: ICD-10-CM

## 2024-05-30 DIAGNOSIS — R19.00 ABDOMINAL WALL BULGE: ICD-10-CM

## 2024-05-30 PROCEDURE — 99203 OFFICE O/P NEW LOW 30 MIN: CPT | Performed by: NURSE PRACTITIONER

## 2024-05-30 ASSESSMENT — ENCOUNTER SYMPTOMS
NAUSEA: 0
SHORTNESS OF BREATH: 0
VOMITING: 0
ROS SKIN COMMENTS: SEE HPI.
RHINORRHEA: 0
COUGH: 0
ABDOMINAL PAIN: 0

## 2024-05-30 NOTE — PROGRESS NOTES
and  adrenal glands are without acute findings.  Status post cholecystectomy.    GI/Bowel: No mechanical bowel obstruction.  Sigmoid predominant  diverticulosis with an inflamed diverticula at the distal sigmoid colon and  mild to moderate surrounding inflammatory change.  No extraluminal gas or  organized fluid collection.  Normal appendix.    Pelvis: The urinary bladder is partially distended without contour  abnormality.  Uterus is present.  No adnexal mass.    Peritoneum/Retroperitoneum: No lymphadenopathy.  Ascites or pneumoperitoneum.    Bones/Soft Tissues: No acute bony or soft tissue abnormality.    Impression  Acute uncomplicated sigmoid diverticulitis.      ASSESSMENT   53 y.o. female presents to discuss sebaceous cyst to lower back, skin tag the buttock, abdominal wall bulge  Sebaceous cyst noted to left lower back, no signs or symptoms of infection  Benign skin tag noted to left buttock  Patient with recent history abdominal wall bulge which is since resolved, no bulge noted on exam  Patient with major medical history which includes adrenal insufficiency, asthma, cervical cancer, delta storage pool disease (STABLE), diverticulitis, rheumatoid arthritis, sleep apnea.    PLAN  Will plan for sebaceous cyst excision and skin tag removal under local anesthesia as detailed below  Patient with no signs or symptoms of abdominal wall hernia currently, I did offer to order a CT scan for further evaluation which patient declined, advised her to contact the office if symptoms worsen or change at any time    SURGERY/PROCEDURE SCHEDULING  PROCEDURE NAME: 1.  Sebaceous cyst excision left lower back / 2.  Skin tag removal left buttock    PROCEDURE DIAGNOSIS: 1.  Sebaceous cyst left lower back / 2.  Skin tag left buttock    ANESTHESIA TYPE: Local    BLOOD THINNERS: None    CLEARANCE: None    HOSPITAL: [facility]    Pre-admission testing per protocol with [facility]. Procedure risk and

## 2024-06-01 PROBLEM — L72.3 SEBACEOUS CYST: Status: ACTIVE | Noted: 2024-06-01

## 2024-06-03 ENCOUNTER — TELEPHONE (OUTPATIENT)
Age: 54
End: 2024-06-03

## 2024-06-03 NOTE — TELEPHONE ENCOUNTER
Spoke with patient to schedule procedure. She is very anxious to have procedure done as soon as possible due to her discomfort.  Pre op instructions given and patient will refer to My Chart    EM/SC/6- at 3:00pm/Excision of Sebaceous Cyst Left Lower Back; Skin Tag Removal Left Buttock/[place]        [facility]   Carrington Aguirre MD, FACS  Sintia Sultana, APRN-CNP  [address], [address]  [place] [address]  P: [phone], F: [phone]      STOP ASPIRIN 7 DAYS PRIOR TO PROCEDURE    STOP ALL OTHER BLOOD THINNERS 5 DAYS PRIOR TO PROCEDURE    YOU MAY HAVE A LIGHT MEAL AND DRINK PLENTY OF FLUIDS TO HYDRATE UNTIL  7:00AM DAY OF SURGERY    WASH WITH ANTIBACTERIAL SOAP, NO PERFUMES OR OILS    YOU MUST HAVE AN ADULT EITHER DRIVE YOU OR RIDE IN A CAB WITH YOU            MY EXAM IS SCHEDULED FOR;      Pre-Admission Phone Call Assessment: 6/4/2024 at 11:00am    SURGERY DATE:  6/10/2024 TIME:  3:00pm ARRIVAL TIME:  1:00pm    LOCATION:  [facility]    Post Op appointment at [facility] with Sintia Sultana CNP:  7/1/2024 at 10:45am

## 2024-06-04 ENCOUNTER — TELEPHONE (OUTPATIENT)
Age: 54
End: 2024-06-04

## 2024-06-04 ENCOUNTER — HOSPITAL ENCOUNTER (OUTPATIENT)
Dept: PREADMISSION TESTING | Age: 54
Discharge: HOME | End: 2024-06-08

## 2024-06-04 VITALS — WEIGHT: 200 LBS | BODY MASS INDEX: 34.15 KG/M2 | HEIGHT: 64 IN

## 2024-06-04 NOTE — TELEPHONE ENCOUNTER
Pt is scheduled for a TRUNK/BACK LESION BIOPSY EXCISION LEFT LOWER BACK SEBACEOUS CYST on 06/10/2024.   The pt called to inform Dr. Aguirre that last night (06/03/2024), the pt went to [facility] because her back was hurting from the cyst. At the urgent care, they lanced the cyst and drained out yellow/ white puss. They prescribed the pt an antibiotic (the pt does not know the name of the antibiotic because she is currently at work). At this time, the area is still red, and there's still a bump.  Will the pt be okay to still have her surgery?

## 2024-06-04 NOTE — TELEPHONE ENCOUNTER
Please advise patient that we will plan for her surgery as scheduled.  If anything else changes in the meantime please let us know.  Make sure that she notifies Dr. Aguirre in preop with this update.  Also notify patient that if there are signs of infection on day of surgery most likely this area will need to be packed with daily dressing changes postoperatively.

## 2024-06-04 NOTE — PROGRESS NOTES
Pre-op Instructions For Out-Patient Surgery    Medication Instructions:  Please stop herbs and any supplements now (includes vitamins and minerals).    Please contact your surgeon and prescribing physician for pre-op instructions for any blood thinners.  To avoid 1 week prior to procedure  If you have inhalers/aerosol treatments at home, please use them the morning of your surgery and bring the inhalers with you to the hospital.    Please take the following medications the morning of your surgery with a sip of water:    levothyroxine    Surgery Instructions:  After midnight before surgery:  Do not eat or drink anything, including water, mints, gum, and hard candy.  You may brush your teeth without swallowing.  No smoking, chewing tobacco, or street drugs.  To have light breakfast and clear liquids per Dr Aguirre, reported by Lorelei  Please shower or bathe before surgery.  If you were given Surgical Scrub Chlorhexidine Gluconate Liquid (CHG), please shower the night before and the morning of your surgery following the detailed instructions you received during your pre-admission visit.     Please do not wear any cologne, lotion, powder, deodorant, jewelry, piercings, perfume, makeup, nail polish, hair accessories, or hair spray on the day of surgery.  Wear loose comfortable clothing.    Leave your valuables at home but bring a payment source for any after-surgery prescriptions you plan to fill at [facility].  Bring a storage case for any glasses/contacts.    An adult who is responsible for you MUST drive you home and should be with you for the first 24 hours after surgery.   LOCAL only, plans to drive self, has backup  available if needed  If having out-patient knee and foot surgeries, please arrange for planned crutches, walker, or wheelchair before arriving to the hospital.    The Day of Surgery:  Arrive at [facility] Surgery Entrance at the time directed by your

## 2024-06-06 ENCOUNTER — APPOINTMENT (OUTPATIENT)
Dept: GENERAL RADIOLOGY | Age: 54
End: 2024-06-06
Payer: COMMERCIAL

## 2024-06-06 ENCOUNTER — HOSPITAL ENCOUNTER (EMERGENCY)
Age: 54
Discharge: HOME | End: 2024-06-06
Attending: EMERGENCY MEDICINE
Payer: COMMERCIAL

## 2024-06-06 ENCOUNTER — APPOINTMENT (OUTPATIENT)
Dept: CT IMAGING | Age: 54
End: 2024-06-06
Payer: COMMERCIAL

## 2024-06-06 VITALS
TEMPERATURE: 98 F | SYSTOLIC BLOOD PRESSURE: 136 MMHG | DIASTOLIC BLOOD PRESSURE: 95 MMHG | RESPIRATION RATE: 18 BRPM | OXYGEN SATURATION: 95 % | HEART RATE: 75 BPM

## 2024-06-06 DIAGNOSIS — L72.3 SEBACEOUS CYST: Primary | ICD-10-CM

## 2024-06-06 DIAGNOSIS — I10 HYPERTENSION, UNSPECIFIED TYPE: ICD-10-CM

## 2024-06-06 LAB
ALBUMIN SERPL-MCNC: 4.5 G/DL (ref 3.5–5.2)
ALP SERPL-CCNC: 114 U/L (ref 35–104)
ALT SERPL-CCNC: 15 U/L (ref 5–33)
ANION GAP SERPL CALCULATED.3IONS-SCNC: 13 MMOL/L (ref 9–17)
AST SERPL-CCNC: 16 U/L
BASOPHILS # BLD: 0.1 K/UL (ref 0–0.2)
BASOPHILS NFR BLD: 1 % (ref 0–2)
BILIRUB SERPL-MCNC: 0.3 MG/DL (ref 0.3–1.2)
BUN SERPL-MCNC: 10 MG/DL (ref 6–20)
CALCIUM SERPL-MCNC: 9.6 MG/DL (ref 8.6–10.4)
CHLORIDE SERPL-SCNC: 103 MMOL/L (ref 98–107)
CO2 SERPL-SCNC: 24 MMOL/L (ref 20–31)
CREAT SERPL-MCNC: 0.7 MG/DL (ref 0.5–0.9)
EOSINOPHIL # BLD: 0.3 K/UL (ref 0–0.4)
EOSINOPHILS RELATIVE PERCENT: 3 % (ref 0–4)
ERYTHROCYTE [DISTWIDTH] IN BLOOD BY AUTOMATED COUNT: 13.1 % (ref 11.5–14.9)
GFR, ESTIMATED: >90 ML/MIN/1.73M2
GLUCOSE SERPL-MCNC: 139 MG/DL (ref 70–99)
HCT VFR BLD AUTO: 39.9 % (ref 36–46)
HGB BLD-MCNC: 13.6 G/DL (ref 12–16)
LIPASE SERPL-CCNC: 51 U/L (ref 13–60)
LYMPHOCYTES NFR BLD: 3 K/UL (ref 1–4.8)
LYMPHOCYTES RELATIVE PERCENT: 35 % (ref 24–44)
MAGNESIUM SERPL-MCNC: 1.8 MG/DL (ref 1.6–2.6)
MCH RBC QN AUTO: 31.2 PG (ref 26–34)
MCHC RBC AUTO-ENTMCNC: 34.2 G/DL (ref 31–37)
MCV RBC AUTO: 91.2 FL (ref 80–100)
MONOCYTES NFR BLD: 0.6 K/UL (ref 0.1–1.3)
MONOCYTES NFR BLD: 7 % (ref 1–7)
NEUTROPHILS NFR BLD: 54 % (ref 36–66)
NEUTS SEG NFR BLD: 4.8 K/UL (ref 1.3–9.1)
PLATELET # BLD AUTO: 194 K/UL (ref 150–450)
PMV BLD AUTO: 10.4 FL (ref 6–12)
POTASSIUM SERPL-SCNC: 3.4 MMOL/L (ref 3.7–5.3)
PROT SERPL-MCNC: 7.3 G/DL (ref 6.4–8.3)
RBC # BLD AUTO: 4.38 M/UL (ref 4–5.2)
SODIUM SERPL-SCNC: 140 MMOL/L (ref 135–144)
TROPONIN I SERPL HS-MCNC: <6 NG/L (ref 0–14)
WBC OTHER # BLD: 8.8 K/UL (ref 3.5–11)

## 2024-06-06 PROCEDURE — 99285 EMERGENCY DEPT VISIT HI MDM: CPT

## 2024-06-06 PROCEDURE — 71045 X-RAY EXAM CHEST 1 VIEW: CPT

## 2024-06-06 PROCEDURE — 70450 CT HEAD/BRAIN W/O DYE: CPT

## 2024-06-06 PROCEDURE — 83735 ASSAY OF MAGNESIUM: CPT

## 2024-06-06 PROCEDURE — 2580000003 HC RX 258: Performed by: PHYSICIAN ASSISTANT

## 2024-06-06 PROCEDURE — 93005 ELECTROCARDIOGRAM TRACING: CPT | Performed by: PHYSICIAN ASSISTANT

## 2024-06-06 PROCEDURE — 80053 COMPREHEN METABOLIC PANEL: CPT

## 2024-06-06 PROCEDURE — 84484 ASSAY OF TROPONIN QUANT: CPT

## 2024-06-06 PROCEDURE — 10060 I&D ABSCESS SIMPLE/SINGLE: CPT

## 2024-06-06 PROCEDURE — 2500000003 HC RX 250 WO HCPCS: Performed by: PHYSICIAN ASSISTANT

## 2024-06-06 PROCEDURE — 36415 COLL VENOUS BLD VENIPUNCTURE: CPT

## 2024-06-06 PROCEDURE — 83690 ASSAY OF LIPASE: CPT

## 2024-06-06 PROCEDURE — 85025 COMPLETE CBC W/AUTO DIFF WBC: CPT

## 2024-06-06 PROCEDURE — 6370000000 HC RX 637 (ALT 250 FOR IP): Performed by: PHYSICIAN ASSISTANT

## 2024-06-06 RX ORDER — POTASSIUM CHLORIDE 20 MEQ/1
40 TABLET, EXTENDED RELEASE ORAL ONCE
Status: COMPLETED | OUTPATIENT
Start: 2024-06-06 | End: 2024-06-06

## 2024-06-06 RX ORDER — PANTOPRAZOLE SODIUM 40 MG/1
40 TABLET, DELAYED RELEASE ORAL ONCE
Status: COMPLETED | OUTPATIENT
Start: 2024-06-06 | End: 2024-06-06

## 2024-06-06 RX ORDER — ACETAMINOPHEN 500 MG
1000 TABLET ORAL ONCE
Status: COMPLETED | OUTPATIENT
Start: 2024-06-06 | End: 2024-06-06

## 2024-06-06 RX ORDER — LIDOCAINE HYDROCHLORIDE 10 MG/ML
10 INJECTION, SOLUTION INFILTRATION; PERINEURAL ONCE
Status: COMPLETED | OUTPATIENT
Start: 2024-06-06 | End: 2024-06-06

## 2024-06-06 RX ORDER — POTASSIUM CHLORIDE 20 MEQ/1
40 TABLET, EXTENDED RELEASE ORAL ONCE
Status: DISCONTINUED | OUTPATIENT
Start: 2024-06-06 | End: 2024-06-06

## 2024-06-06 RX ORDER — ONDANSETRON 4 MG/1
4 TABLET, ORALLY DISINTEGRATING ORAL ONCE
Status: COMPLETED | OUTPATIENT
Start: 2024-06-06 | End: 2024-06-06

## 2024-06-06 RX ORDER — 0.9 % SODIUM CHLORIDE 0.9 %
1000 INTRAVENOUS SOLUTION INTRAVENOUS ONCE
Status: COMPLETED | OUTPATIENT
Start: 2024-06-06 | End: 2024-06-06

## 2024-06-06 RX ADMIN — PANTOPRAZOLE SODIUM 40 MG: 40 TABLET, DELAYED RELEASE ORAL at 18:37

## 2024-06-06 RX ADMIN — ONDANSETRON 4 MG: 4 TABLET, ORALLY DISINTEGRATING ORAL at 18:37

## 2024-06-06 RX ADMIN — ACETAMINOPHEN 1000 MG: 500 TABLET ORAL at 18:37

## 2024-06-06 RX ADMIN — POTASSIUM CHLORIDE 40 MEQ: 1500 TABLET, EXTENDED RELEASE ORAL at 19:28

## 2024-06-06 RX ADMIN — LIDOCAINE HYDROCHLORIDE 10 ML: 10 INJECTION, SOLUTION INFILTRATION; PERINEURAL at 20:30

## 2024-06-06 RX ADMIN — SODIUM CHLORIDE 1000 ML: 9 INJECTION, SOLUTION INTRAVENOUS at 18:44

## 2024-06-06 ASSESSMENT — LIFESTYLE VARIABLES
HOW MANY STANDARD DRINKS CONTAINING ALCOHOL DO YOU HAVE ON A TYPICAL DAY: PATIENT DOES NOT DRINK
HOW OFTEN DO YOU HAVE A DRINK CONTAINING ALCOHOL: NEVER

## 2024-06-06 ASSESSMENT — VISUAL ACUITY: OU: 1

## 2024-06-06 NOTE — TELEPHONE ENCOUNTER
Patient called and stated she has a lot of  light yellow drainage around the bandage.  Patient was vomiting and had pain up the back of her head.   Please advise.  Dr. Aguirre said to have the patient go to [place] emergency.. Pateint agreed.

## 2024-06-07 LAB
EKG ATRIAL RATE: 80 BPM
EKG P AXIS: 56 DEGREES
EKG P-R INTERVAL: 164 MS
EKG Q-T INTERVAL: 392 MS
EKG QRS DURATION: 68 MS
EKG QTC CALCULATION (BAZETT): 452 MS
EKG R AXIS: 31 DEGREES
EKG T AXIS: 35 DEGREES
EKG VENTRICULAR RATE: 80 BPM

## 2024-06-07 NOTE — DISCHARGE INSTRUCTIONS
Please follow up with the PCP and surgeon.  Return to the ED if you develop any worsening pain, redness, headaches, fever, chills, chest pain, shortness of breath, vomiting or any other concerning symptoms.

## 2024-06-07 NOTE — ED PROVIDER NOTES
[facility] [place]ENCY dEPARTMENT eNCOUnter   Independent Attestation     Pt Name: Lorelei Bond  MRN: [id number]  Birthdate 1970  Date of evaluation: 6/6/24       Lorelei Bond is a 53 y.o. female who presents with Cyst        Based on the medical record, the care appears appropriate. I was personally available for consultation in the Emergency Department.    Juventino Penny MD  Attending Emergency  Physician               Juventino Penny MD  06/06/24 1011    
PRESCRIPTIONS:  Discharge Medication List as of 6/6/2024  8:55 PM        PHYSICIAN CONSULTS ORDERED THIS ENCOUNTER:  None  FINAL IMPRESSION      1. Sebaceous cyst    2. Hypertension, unspecified type          DISPOSITION/PLAN   DISPOSITION Decision To Discharge 06/06/2024 08:43:51 PM      OUTPATIENT FOLLOW UP THE PATIENT:  Ernestine Alejandro MD  [address] [address]  [phone]          [facility]  [address] [address]  [phone]          SAVANNAH Yusuf Adrienne C, PA-C  06/06/24 9799

## 2024-06-07 NOTE — PRE-PROCEDURE INSTRUCTIONS
No answer, left message ?                             Unable to leave message ?    When were you told to arrive at hospital ?  1300    Do you have a  ?    Are you on any blood thinners ?      n               If yes when did you stop taking ?    Do you have your prep Rx filled and instruction ?      Nothing to eat the day before , only clear liquids.    Are you experiencing any covid symptoms ? n    Do you have any infections or rash we should be aware of ? Dr. Aguirre had pt go to ER due to bandage making skin red and inflamed, tests done and Dr. Aguirre okay to do surgery      Do you have the Hibiclens soap to use the night before and the morning of surgery ? picking up    Nothing to eat or drink after midnight, only a sip of water to take any medication instructed to take the night before.  Wear comfortable clothing, leave any valuables at home, remove any jewelry and body piercing . y

## 2024-06-10 ENCOUNTER — HOSPITAL ENCOUNTER (OUTPATIENT)
Age: 54
Setting detail: OUTPATIENT SURGERY
Discharge: HOME | End: 2024-06-10
Attending: SURGERY | Admitting: SURGERY
Payer: COMMERCIAL

## 2024-06-10 ENCOUNTER — ANESTHESIA EVENT (OUTPATIENT)
Dept: OPERATING ROOM | Age: 54
End: 2024-06-10
Payer: COMMERCIAL

## 2024-06-10 ENCOUNTER — ANESTHESIA (OUTPATIENT)
Dept: OPERATING ROOM | Age: 54
End: 2024-06-10
Payer: COMMERCIAL

## 2024-06-10 VITALS
OXYGEN SATURATION: 96 % | DIASTOLIC BLOOD PRESSURE: 93 MMHG | TEMPERATURE: 96.8 F | SYSTOLIC BLOOD PRESSURE: 117 MMHG | HEART RATE: 71 BPM | HEIGHT: 64 IN | WEIGHT: 200 LBS | BODY MASS INDEX: 34.15 KG/M2 | RESPIRATION RATE: 16 BRPM

## 2024-06-10 DIAGNOSIS — L72.3 SEBACEOUS CYST: ICD-10-CM

## 2024-06-10 DIAGNOSIS — M25.562 POSTERIOR LEFT KNEE PAIN: Primary | ICD-10-CM

## 2024-06-10 DIAGNOSIS — L91.8 SKIN TAG: ICD-10-CM

## 2024-06-10 PROCEDURE — 7100000030 HC ASPR PHASE II RECOVERY - FIRST 15 MIN: Performed by: SURGERY

## 2024-06-10 PROCEDURE — 7100000000 HC PACU RECOVERY - FIRST 15 MIN: Performed by: SURGERY

## 2024-06-10 PROCEDURE — 7100000011 HC PHASE II RECOVERY - ADDTL 15 MIN: Performed by: SURGERY

## 2024-06-10 PROCEDURE — 2580000003 HC RX 258: Performed by: ANESTHESIOLOGY

## 2024-06-10 PROCEDURE — 2500000003 HC RX 250 WO HCPCS: Performed by: NURSE ANESTHETIST, CERTIFIED REGISTERED

## 2024-06-10 PROCEDURE — 6360000002 HC RX W HCPCS: Performed by: NURSE ANESTHETIST, CERTIFIED REGISTERED

## 2024-06-10 PROCEDURE — 3600000002 HC SURGERY LEVEL 2 BASE: Performed by: SURGERY

## 2024-06-10 PROCEDURE — 3700000001 HC ADD 15 MINUTES (ANESTHESIA): Performed by: SURGERY

## 2024-06-10 PROCEDURE — 6360000002 HC RX W HCPCS: Performed by: SURGERY

## 2024-06-10 PROCEDURE — 7100000001 HC PACU RECOVERY - ADDTL 15 MIN: Performed by: SURGERY

## 2024-06-10 PROCEDURE — 11401 EXC TR-EXT B9+MARG 0.6-1 CM: CPT | Performed by: SURGERY

## 2024-06-10 PROCEDURE — 88304 TISSUE EXAM BY PATHOLOGIST: CPT

## 2024-06-10 PROCEDURE — 3600000012 HC SURGERY LEVEL 2 ADDTL 15MIN: Performed by: SURGERY

## 2024-06-10 PROCEDURE — 2709999900 HC NON-CHARGEABLE SUPPLY: Performed by: SURGERY

## 2024-06-10 PROCEDURE — 3700000000 HC ANESTHESIA ATTENDED CARE: Performed by: SURGERY

## 2024-06-10 PROCEDURE — 11403 EXC TR-EXT B9+MARG 2.1-3CM: CPT | Performed by: SURGERY

## 2024-06-10 PROCEDURE — 6370000000 HC RX 637 (ALT 250 FOR IP): Performed by: ANESTHESIOLOGY

## 2024-06-10 PROCEDURE — 7100000010 HC PHASE II RECOVERY - FIRST 15 MIN: Performed by: SURGERY

## 2024-06-10 PROCEDURE — 7100000031 HC ASPR PHASE II RECOVERY - ADDTL 15 MIN: Performed by: SURGERY

## 2024-06-10 RX ORDER — OXYCODONE HYDROCHLORIDE AND ACETAMINOPHEN 5; 325 MG/1; MG/1
1 TABLET ORAL EVERY 4 HOURS PRN
Status: DISCONTINUED | OUTPATIENT
Start: 2024-06-10 | End: 2024-06-10

## 2024-06-10 RX ORDER — LIDOCAINE HYDROCHLORIDE 20 MG/ML
INJECTION, SOLUTION EPIDURAL; INFILTRATION; INTRACAUDAL; PERINEURAL PRN
Status: DISCONTINUED | OUTPATIENT
Start: 2024-06-10 | End: 2024-06-10 | Stop reason: SDUPTHER

## 2024-06-10 RX ORDER — OXYCODONE HYDROCHLORIDE AND ACETAMINOPHEN 5; 325 MG/1; MG/1
1 TABLET ORAL EVERY 6 HOURS PRN
Qty: 28 TABLET | Refills: 0 | Status: SHIPPED | OUTPATIENT
Start: 2024-06-10 | End: 2024-06-17

## 2024-06-10 RX ORDER — DOXYCYCLINE HYCLATE 100 MG
100 TABLET ORAL 2 TIMES DAILY
Qty: 28 TABLET | Refills: 0 | Status: SHIPPED | OUTPATIENT
Start: 2024-06-10 | End: 2024-06-24

## 2024-06-10 RX ORDER — OXYCODONE HYDROCHLORIDE AND ACETAMINOPHEN 5; 325 MG/1; MG/1
1 TABLET ORAL EVERY 4 HOURS PRN
Status: COMPLETED | OUTPATIENT
Start: 2024-06-10 | End: 2024-06-10

## 2024-06-10 RX ORDER — SODIUM CHLORIDE, SODIUM LACTATE, POTASSIUM CHLORIDE, CALCIUM CHLORIDE 600; 310; 30; 20 MG/100ML; MG/100ML; MG/100ML; MG/100ML
INJECTION, SOLUTION INTRAVENOUS CONTINUOUS
Status: DISCONTINUED | OUTPATIENT
Start: 2024-06-10 | End: 2024-06-10 | Stop reason: HOSPADM

## 2024-06-10 RX ORDER — ONDANSETRON 4 MG/1
TABLET, FILM COATED ORAL
Qty: 20 TABLET | Refills: 0 | Status: SHIPPED | OUTPATIENT
Start: 2024-06-10

## 2024-06-10 RX ORDER — BUPIVACAINE HYDROCHLORIDE 5 MG/ML
INJECTION, SOLUTION EPIDURAL; INTRACAUDAL PRN
Status: DISCONTINUED | OUTPATIENT
Start: 2024-06-10 | End: 2024-06-10 | Stop reason: ALTCHOICE

## 2024-06-10 RX ORDER — MIDAZOLAM HYDROCHLORIDE 1 MG/ML
INJECTION INTRAMUSCULAR; INTRAVENOUS PRN
Status: DISCONTINUED | OUTPATIENT
Start: 2024-06-10 | End: 2024-06-10 | Stop reason: SDUPTHER

## 2024-06-10 RX ORDER — FENTANYL CITRATE 50 UG/ML
INJECTION, SOLUTION INTRAMUSCULAR; INTRAVENOUS PRN
Status: DISCONTINUED | OUTPATIENT
Start: 2024-06-10 | End: 2024-06-10 | Stop reason: SDUPTHER

## 2024-06-10 RX ADMIN — LIDOCAINE HYDROCHLORIDE 40 MG: 20 INJECTION, SOLUTION EPIDURAL; INFILTRATION; INTRACAUDAL; PERINEURAL at 15:31

## 2024-06-10 RX ADMIN — SODIUM CHLORIDE, POTASSIUM CHLORIDE, SODIUM LACTATE AND CALCIUM CHLORIDE: 600; 310; 30; 20 INJECTION, SOLUTION INTRAVENOUS at 15:18

## 2024-06-10 RX ADMIN — OXYCODONE HYDROCHLORIDE AND ACETAMINOPHEN 1 TABLET: 5; 325 TABLET ORAL at 16:56

## 2024-06-10 RX ADMIN — MIDAZOLAM 2 MG: 1 INJECTION INTRAMUSCULAR; INTRAVENOUS at 15:28

## 2024-06-10 RX ADMIN — FENTANYL CITRATE 100 MCG: 50 INJECTION, SOLUTION INTRAMUSCULAR; INTRAVENOUS at 15:31

## 2024-06-10 ASSESSMENT — PAIN DESCRIPTION - ORIENTATION: ORIENTATION: LEFT

## 2024-06-10 ASSESSMENT — PAIN - FUNCTIONAL ASSESSMENT
PAIN_FUNCTIONAL_ASSESSMENT: 0-10

## 2024-06-10 ASSESSMENT — PAIN DESCRIPTION - LOCATION
LOCATION: BACK;BUTTOCKS
LOCATION: BUTTOCKS

## 2024-06-10 ASSESSMENT — ENCOUNTER SYMPTOMS
NAUSEA: 0
ABDOMINAL PAIN: 0
BACK PAIN: 1
SHORTNESS OF BREATH: 0
SHORTNESS OF BREATH: 1
SINUS PRESSURE: 0
ROS SKIN COMMENTS: SEE HPI

## 2024-06-10 ASSESSMENT — PAIN DESCRIPTION - DESCRIPTORS: DESCRIPTORS: SORE

## 2024-06-10 ASSESSMENT — PAIN SCALES - GENERAL: PAINLEVEL_OUTOF10: 1

## 2024-06-10 ASSESSMENT — PAIN DESCRIPTION - PAIN TYPE: TYPE: SURGICAL PAIN

## 2024-06-10 NOTE — OP NOTE
[facility] Surgery   Carrington Aguirre MD, FACS  [address], APRN-CNP  [address], [address]  [place] [address]  P: [phone], F: [phone]      Preoperative diagnosis: Symptomatic epidermal cyst lower mid back.  Symptomatic skin tag left buttock     Postoperative diagnosis: Symptomatic epidermal cyst lower mid back 3 x 1.5 x 1.5 cm  Symptomatic skin tag left buttock 1 cm    Procedure: Excision symptomatic epidermal cyst lower mid back 3 x 1.5 x 1.5 cm  Excision skin tag left buttock 1 cm    Surgeon: Dr. Aguirre    First Assist: None    Anesthesia: MAC and local    Preparation: Hibiclens    EBL: Less than 10 mL    Specimen: Lower mid back cyst and skin tag left buttock    Procedure: Informed consent was obtained.  Preoperative antibiotic was given.  Sites were marked and confirmed.  Patient was taken to the operating room.  She was lying in a right lateral position.  Intravenous anesthesia was given.  Vital signs were monitored remained stable.  Timeout was done.  Local anesthetic was infiltrated at the marked site around the cyst on the lower mid back.  Incision was made using a #15 blade.  Dissection was carried out.  Approximately 3 x 1.5 x 1.5 cm epidermal cyst was excised.  Specimen was labeled and sent to pathology.  Wound was explored.  Hemostasis was confirmed.  Sponge needle instrument count was found to be correct.  Wound was approximated using absorbable sutures followed by simple interrupted nylon sutures on the skin.  Again hemostasis was confirmed.  Sponge needle instrument count was found to be correct.  Clean dressing was applied.  Local anesthetic was infiltrated underneath the skin tag on the left buttock.  Skin tag was excised measuring 1 cm using the Bovie cautery.  Specimen was submitted to pathology.  Antibiotic ointment was applied.  Clean dressing was applied.  Patient tolerated both procedures well and was transferred to the recovery room in a stable

## 2024-06-10 NOTE — ANESTHESIA PRE PROCEDURE
MD Jennifer   FEROSUL 325 (65 Fe) MG tablet take 1 tablet by mouth once daily with breakfast 12/11/23   Ernestine Alejandro MD   naproxen (NAPROSYN) 500 MG tablet Take 1 tablet by mouth 2 times daily (with meals)  Patient not taking: Reported on 6/10/2024 3/6/23   Olu Jordan MD   valACYclovir (VALTREX) 1 g tablet Take 1 tablet by mouth 3 times daily  Patient not taking: Reported on 11/17/2023 2/27/23   Ernestine Alejandro MD   LORazepam (ATIVAN) 0.5 MG tablet take 1 tablet by mouth once daily if needed 1/5/23   Jennifer Mendiola MD   aspirin 81 MG EC tablet Take 1 tablet by mouth daily  Patient not taking: Reported on 11/17/2023    Jennifer Mendiola MD   Thumb Spica MISC 1 each by Does not apply route continuous  Patient not taking: Reported on 12/19/2023 6/20/22   Olu Jordan MD   HUMIRA PEN 40 MG/0.4ML PNKT  9/28/21   Jennifer Mendiola MD   nystatin (MYCOSTATIN) 831223 UNIT/GM powder Apply 3 times daily. 6/30/21   Ernestine Alejandro MD   nitroGLYCERIN (NITROSTAT) 0.3 MG SL tablet Place 1 tablet under the tongue every 5 minutes as needed for Chest pain up to max of 3 total doses. If no relief after 1 dose, call 911.  Patient not taking: Reported on 4/22/2024 6/1/21   Ernestine Alejandro MD   B Complex Vitamins (VITAMIN B COMPLEX PO) Take by mouth daily    Jennifer Mendiola MD   Cetirizine HCl (ZYRTEC PO) Take 10 mg by mouth daily     Jennifer Mendiola MD       Current medications:    No current facility-administered medications for this encounter.       Allergies:    Allergies   Allergen Reactions   • Other      Perch - twice had violent vomiting, diarrhea,severe dizziness and nausea   • Azithromycin      Palpitations.    • Celecoxib Itching   • Ibuprofen Hives   • Methotrexate Itching   • Pecan Nut (Diagnostic)    • [place] [Macadamia Nut Oil]    • Ceclor [Cefaclor] Hives and Rash   • Penicillins Hives and Rash       Problem List:    Patient Active Problem List   Diagnosis Code   •

## 2024-06-10 NOTE — H&P
past.   Pt states that presently she is feeling better.  But her cyst area is  .   Patient  admits to some  redness, itchiness, swelling  and drainage from the lesion  site.      Patient denies any hx skin cancer.  Pt denies any hx of MRSA in the past.     SIGNIFICANT MEDICAL HISTORY Reviewed. Delta Storage pool disease- reports no problems, DESTIN, SOB.    Patient denies any chest pain/pressure. . Pt reports no SOB. No recent URI. Fever or chills.     Patient has been NPO since midnight. Pt has coffee and cracker .  No blood thinners in the past 7 days. ( naprosyn)    Patient denies any personal or family problems with anesthesia.     Patient took  levothyroxine , celexa and Lyrica today.       RECENT LABS, IMAGING AND TESTING     Lab Results   Component Value Date    WBC 8.8 06/06/2024    RBC 4.38 06/06/2024    HGB 13.6 06/06/2024    HCT 39.9 06/06/2024    MCV 91.2 06/06/2024    MCH 31.2 06/06/2024    MCHC 34.2 06/06/2024    RDW 13.1 06/06/2024     06/06/2024    MPV 10.4 06/06/2024        Lab Results   Component Value Date     06/06/2024    K 3.4 (L) 06/06/2024     06/06/2024    CO2 24 06/06/2024    BUN 10 06/06/2024    CREATININE 0.7 06/06/2024    GLUCOSE 139 (H) 06/06/2024    CALCIUM 9.6 06/06/2024    PROT 7.5 04/05/2012    BILITOT 0.3 06/06/2024    ALKPHOS 114 (H) 06/06/2024    AST 16 06/06/2024    ALT 15 06/06/2024         PAST MEDICAL HISTORY     Past Medical History:   Diagnosis Date    Adrenal insufficiency (HCC)     Asthma     Bleeding after intercourse     History of    Bloody diarrhea     Cancer (HCC)     CERVICAL    Chest pain     Delta storage pool disease (HCC)     Diverticulitis     Diverticulosis of colon     Environmental allergies     Family history of breast cancer     MGM in her 40s    Fibromyalgia     GERD (gastroesophageal reflux disease)     H/O thyroid cyst     mild hypothyroidism.    Headache     History of cervical dysplasia 2000    had cone    History of    Neurological:  Negative for dizziness.   Psychiatric/Behavioral:  Negative for sleep disturbance. The patient is nervous/anxious.    All other systems reviewed and are negative.        GENERAL PHYSICAL EXAM     Vitals:   Review vitals per RN flowsheet.                              Physical Exam  Constitutional:       General: She is not in acute distress.     Appearance: Normal appearance.   HENT:      Head: Normocephalic.      Right Ear: External ear normal.      Left Ear: External ear normal.      Nose: Nose normal.      Mouth/Throat:      Pharynx: No posterior oropharyngeal erythema.   Eyes:      General:         Right eye: No discharge.         Left eye: No discharge.   Cardiovascular:      Rate and Rhythm: Normal rate and regular rhythm.      Heart sounds: Normal heart sounds.   Pulmonary:      Breath sounds: Normal breath sounds.   Abdominal:      General: Bowel sounds are normal.      Tenderness: There is no abdominal tenderness. There is no guarding.   Musculoskeletal:         General: Normal range of motion.   Skin:     General: Skin is warm and dry.      Findings: Erythema and lesion present.      Comments: Pt has an erythemic outlined area to her lower back and circular lesion in center that is mildly healing, some crusting, no drainage noted   Neurological:      Mental Status: She is alert and oriented to person, place, and time.   Psychiatric:         Mood and Affect: Mood normal.        PROVISIONAL DIAGNOSES / SURGERY:        TRUNK/BACK LESION BIOPSY EXCISION LEFT LOWER BACK SEBACEOUS CYST, GLUTEAL EXCISION AND BIOPSY LEFT SKIN TAG OF BUTTOCK    Pre-Op Diagnosis Codes:     * Sebaceous cyst [L72.3]     * Skin tag [L91.8]     Patient Active Problem List    Diagnosis Date Noted    Pelvic pain in female 04/19/2016    History of cervical dysplasia     History of conization of cervix 2000     Cervical polyp 03/17/2016    De Quervain's disease (tenosynovitis) 06/20/2022    VASECTOMY in Male Partner

## 2024-06-10 NOTE — DISCHARGE INSTRUCTIONS
Dr. Aguirre Post-Op Orders for Outpatient    1.) Diet:    [x]  As tolerated  []  Special     2.) Activity:    [x]  No lifting more than five to ten pounds 2 weeks  [x]  May Shower tomorrow  [x]  No driving until off pain medications     3.) Dressing:    [x]  Remove top dressing in 48 hours   [x] wash the incision with soap and water twice daily    [x]  Remove and change dressing sooner if soaked    4.) Medication:    [x]  Take medication as prescribed   []  Resume blood thinners in  days    [x]  Resume home medications per AVS Summary    5.) Office visit: Follow up with Dr. Aguirre. Call to schedule an appointment if one has not been made.     6.) Call [phone] if you have any questions or concerns.    Electronically signed by Carrington Aguirre MD on 6/10/2024 at 4:17 PM    Sedation or General Anesthesia, Adult  Care After  Refer to this sheet in the next 24 hours. These instructions provide you with information on caring for yourself after your procedure. Your caregiver may also give you more specific instructions. Your treatment has been planned according to current medical practices, but problems sometimes occur. Call your caregiver if you have any problems or questions after your procedure.   HOME CARE INSTRUCTIONS   Do not participate in any activities that require you to be alert or coordinated. Do not:  Drive.  Swim.  Ride a bicycle.  Operate heavy machinery.  Cook.  Use power tools.  Climb ladders.  Work at heights.  Take a bath.  Do not drink alcohol.  Do not make any important decisions or sign legal documents.  Stay with an adult.  The first meal following your procedure should be light and small. Avoid solid foods if you feel sick to your stomach (nauseous) or if you throw up (vomit).  Drink enough fluids to keep your urine clear or pale yellow.  Only take your usual medicines or new medicines if your caregiver approves them.  Only take over-the-counter or prescription medicines for pain, discomfort,  or fever as directed by your caregiver.  Keep all follow-up appointments as directed by your caregiver.  SEEK IMMEDIATE MEDICAL CARE IF:   You are not feeling normal or behaving normally after 24 hours.  You have persistent nausea and vomiting.  You are unable to drink fluids or eat food.  You have difficulty urinating.  You have difficulty breathing or speaking.  You have blue or gray skin.  There is difficulty waking or you cannot be woken up.  You have heavy bleeding, redness, or a lot of swelling where the sedative or anesthesia entered your skin (intravenous site).  You have a rash.  MAKE SURE YOU:  Understand these instructions.  Will watch your condition.  Will get help right away if you are not doing well or get worse.  Document Released: 12/18/2006 Document Revised: 06/18/2013 Document Reviewed: 04/17/2013  [employer]® Patient Information ©2013 [employer], [facility] [facility].

## 2024-06-10 NOTE — ANESTHESIA POSTPROCEDURE EVALUATION
Department of Anesthesiology  Postprocedure Note    Patient: Lorelei Bond  MRN: [id number]  YOB: 1970  Date of evaluation: 6/10/2024    Procedure Summary       Date: 06/10/24 Room / Location: [address]    Anesthesia Start: 1527 Anesthesia Stop: 1617    Procedures:       TRUNK/BACK LESION BIOPSY EXCISION LEFT LOWER BACK SEBACEOUS CYST (Left: Back)      GLUTEAL EXCISION AND BIOPSY LEFT SKIN TAG OF BUTTOCK (Left: Buttocks) Diagnosis:       Sebaceous cyst      Skin tag      (Sebaceous cyst [L72.3])      (Skin tag [L91.8])    Surgeons: Carrington Aguirre MD Responsible Provider: Curtis Marie MD    Anesthesia Type: MAC ASA Status: 2            Anesthesia Type: MAC    Chivo Phase I: Chivo Score: 10    Chivo Phase II:      Anesthesia Post Evaluation    Comments: POST- ANESTHESIA EVALUATION       Pt Name: Lorelei Bond  MRN: [id number]  YOB: 1970  Date of evaluation: 6/10/2024  Time:  5:02 PM      /73   Pulse 64   Temp 97 °F (36.1 °C)   Resp 12   Ht 1.626 m (5' 4.02\")   Wt 90.7 kg (200 lb)   SpO2 95%   BMI 34.31 kg/m²      Consciousness Level  Awake  Cardiopulmonary Status  Stable  Pain Adequately Treated YES  Nausea / Vomiting  NO  Adequate Hydration  YES  Anesthesia Related Complications NONE      Electronically signed by Curtis Marie MD on 6/10/2024 at 5:02 PM           No notable events documented.

## 2024-06-13 LAB — SURGICAL PATHOLOGY REPORT: NORMAL

## 2024-06-18 DIAGNOSIS — K21.9 GASTROESOPHAGEAL REFLUX DISEASE WITHOUT ESOPHAGITIS: ICD-10-CM

## 2024-06-18 DIAGNOSIS — E89.0 POSTOPERATIVE HYPOTHYROIDISM: ICD-10-CM

## 2024-06-18 RX ORDER — LEVOTHYROXINE SODIUM 50 MCG
50 TABLET ORAL DAILY
Qty: 90 TABLET | Refills: 0 | Status: SHIPPED | OUTPATIENT
Start: 2024-06-18

## 2024-06-18 RX ORDER — TIZANIDINE 4 MG/1
4 TABLET ORAL NIGHTLY
Qty: 90 TABLET | Refills: 0 | Status: SHIPPED | OUTPATIENT
Start: 2024-06-18

## 2024-06-18 RX ORDER — OMEPRAZOLE 40 MG/1
40 CAPSULE, DELAYED RELEASE ORAL
Qty: 90 CAPSULE | Refills: 0 | Status: SHIPPED | OUTPATIENT
Start: 2024-06-18

## 2024-07-01 ENCOUNTER — OFFICE VISIT (OUTPATIENT)
Age: 54
End: 2024-07-01
Payer: COMMERCIAL

## 2024-07-01 VITALS
SYSTOLIC BLOOD PRESSURE: 115 MMHG | OXYGEN SATURATION: 94 % | BODY MASS INDEX: 35.34 KG/M2 | HEIGHT: 64 IN | RESPIRATION RATE: 16 BRPM | DIASTOLIC BLOOD PRESSURE: 72 MMHG | HEART RATE: 83 BPM | WEIGHT: 207 LBS

## 2024-07-01 DIAGNOSIS — Z98.890 STATUS POST SURGERY: ICD-10-CM

## 2024-07-01 DIAGNOSIS — L72.0 EPIDERMAL CYST: Primary | ICD-10-CM

## 2024-07-01 DIAGNOSIS — L91.8 SKIN TAG: ICD-10-CM

## 2024-07-01 PROCEDURE — 99213 OFFICE O/P EST LOW 20 MIN: CPT | Performed by: NURSE PRACTITIONER

## 2024-07-01 ASSESSMENT — ENCOUNTER SYMPTOMS
NAUSEA: 0
ROS SKIN COMMENTS: SEE HPI.
RHINORRHEA: 0
SHORTNESS OF BREATH: 0
ABDOMINAL PAIN: 0
COUGH: 0
VOMITING: 0

## 2024-07-01 NOTE — PROGRESS NOTES
[facility] General Surgery   Carrington Aguirre MD, FACS  [address], APRN-CNP  [address], [address]  [place] [address]  P: [phone], F: [phone]    General and Robotic Surgery  Post-Op Visit Note               PATIENT NAME: Lorelei Bond   :  1970   MRN: [id number]   PCP:  Ernestine Alejandro MD     TODAY'S DATE: 2024    Chief Complaint   Patient presents with    Post-Op Check     TRUNK/BACK LESION BIOPSY EXCISION LEFT LOWER BACK SEBACEOUS CYST  GLUTEAL EXCISION AND BIOPSY LEFT SKIN TAG OF BUTTOCK          HISTORY OF PRESENT ILLNESS: 53 y.o. female status post Excision symptomatic epidermal cyst lower mid back 3 x 1.5 x 1.5 cm / excision skin tag left buttock 1 cm 6-10-24.    Patient states that she had her  remove her sutures last Friday.  She states that they felt too tight.  She states that there is skin puckering at the incision.  Denies any known erythema.  States small amount of yellow drainage from the incision but has since resolved.  Still experiencing some pain.  States that skin tag excision to left buttock has healed.  Completed postop antibiotics.    PAST MEDICAL HISTORY     Past Medical History:   Diagnosis Date    Adrenal insufficiency (HCC)     Asthma     Bleeding after intercourse     History of    Bloody diarrhea     Cancer (HCC)     CERVICAL    Chest pain     Delta storage pool disease (HCC)     Diverticulitis     Diverticulosis of colon     Environmental allergies     Family history of breast cancer     MGM in her 40s    Fibromyalgia     GERD (gastroesophageal reflux disease)     H/O thyroid cyst     mild hypothyroidism.    Headache     History of cervical dysplasia     had cone    History of conization of cervix     dysplastic cells    Hyperlipidemia     Hypothyroidism, unspecified 2016    Lupus (HCC)     Osteoarthritis     Positive cardiac stress test     Rheumatoid arthritis (HCC)     Sleep apnea     tests were negative but

## 2024-07-24 DIAGNOSIS — M51.36 LUMBAR DEGENERATIVE DISC DISEASE: ICD-10-CM

## 2024-07-24 RX ORDER — PREGABALIN 75 MG/1
CAPSULE ORAL
Qty: 180 CAPSULE | Refills: 0 | OUTPATIENT
Start: 2024-07-24

## 2024-07-24 NOTE — TELEPHONE ENCOUNTER
Medication Requested: Anna    Last visit: 2024  Next visit: 2024  Last refill: 4/10/2024    Med contract on file:  [] yes   [x] no    Last urine drug screen: unknown   Consistent with medication(s):    [] yes   [] no    Last OARRS ran: unknown     Quantity of medication remainin    Who will be picking rx up: mail in pharmacy - [facility]    Pharmacy if escribed:

## 2024-07-25 RX ORDER — PREGABALIN 75 MG/1
75 CAPSULE ORAL 2 TIMES DAILY
Qty: 180 CAPSULE | Refills: 0 | Status: SHIPPED | OUTPATIENT
Start: 2024-07-25 | End: 2024-10-23

## 2024-08-01 ENCOUNTER — TELEPHONE (OUTPATIENT)
Dept: INTERNAL MEDICINE CLINIC | Age: 54
End: 2024-08-01

## 2024-08-07 ENCOUNTER — OFFICE VISIT (OUTPATIENT)
Dept: INTERNAL MEDICINE CLINIC | Age: 54
End: 2024-08-07
Payer: COMMERCIAL

## 2024-08-07 VITALS
HEIGHT: 64 IN | WEIGHT: 208.6 LBS | OXYGEN SATURATION: 95 % | BODY MASS INDEX: 35.61 KG/M2 | DIASTOLIC BLOOD PRESSURE: 82 MMHG | SYSTOLIC BLOOD PRESSURE: 124 MMHG | HEART RATE: 81 BPM

## 2024-08-07 DIAGNOSIS — M65.4 DE QUERVAIN'S DISEASE (TENOSYNOVITIS): ICD-10-CM

## 2024-08-07 DIAGNOSIS — M79.644 PAIN OF RIGHT THUMB: Primary | ICD-10-CM

## 2024-08-07 PROCEDURE — 99213 OFFICE O/P EST LOW 20 MIN: CPT | Performed by: INTERNAL MEDICINE

## 2024-08-07 RX ORDER — ZOSTER VACCINE RECOMBINANT, ADJUVANTED 50 MCG/0.5
0.5 KIT INTRAMUSCULAR SEE ADMIN INSTRUCTIONS
Qty: 0.5 ML | Refills: 0 | Status: SHIPPED | OUTPATIENT
Start: 2024-08-07 | End: 2025-02-03

## 2024-08-07 RX ORDER — PREDNISONE 20 MG/1
20 TABLET ORAL 2 TIMES DAILY
Qty: 14 TABLET | Refills: 0 | Status: SHIPPED | OUTPATIENT
Start: 2024-08-07 | End: 2024-08-14

## 2024-08-07 ASSESSMENT — PATIENT HEALTH QUESTIONNAIRE - PHQ9
1. LITTLE INTEREST OR PLEASURE IN DOING THINGS: NOT AT ALL
6. FEELING BAD ABOUT YOURSELF - OR THAT YOU ARE A FAILURE OR HAVE LET YOURSELF OR YOUR FAMILY DOWN: NOT AT ALL
SUM OF ALL RESPONSES TO PHQ QUESTIONS 1-9: 0
5. POOR APPETITE OR OVEREATING: NOT AT ALL
7. TROUBLE CONCENTRATING ON THINGS, SUCH AS READING THE NEWSPAPER OR WATCHING TELEVISION: NOT AT ALL
8. MOVING OR SPEAKING SO SLOWLY THAT OTHER PEOPLE COULD HAVE NOTICED. OR THE OPPOSITE, BEING SO FIGETY OR RESTLESS THAT YOU HAVE BEEN MOVING AROUND A LOT MORE THAN USUAL: NOT AT ALL
3. TROUBLE FALLING OR STAYING ASLEEP: NOT AT ALL
4. FEELING TIRED OR HAVING LITTLE ENERGY: NOT AT ALL
SUM OF ALL RESPONSES TO PHQ QUESTIONS 1-9: 0
SUM OF ALL RESPONSES TO PHQ QUESTIONS 1-9: 0
2. FEELING DOWN, DEPRESSED OR HOPELESS: NOT AT ALL
SUM OF ALL RESPONSES TO PHQ QUESTIONS 1-9: 0
SUM OF ALL RESPONSES TO PHQ9 QUESTIONS 1 & 2: 0
10. IF YOU CHECKED OFF ANY PROBLEMS, HOW DIFFICULT HAVE THESE PROBLEMS MADE IT FOR YOU TO DO YOUR WORK, TAKE CARE OF THINGS AT HOME, OR GET ALONG WITH OTHER PEOPLE: NOT DIFFICULT AT ALL
9. THOUGHTS THAT YOU WOULD BE BETTER OFF DEAD, OR OF HURTING YOURSELF: NOT AT ALL

## 2024-08-07 ASSESSMENT — ENCOUNTER SYMPTOMS
WHEEZING: 0
EYE DISCHARGE: 0
COLOR CHANGE: 0
EYE PAIN: 0
COUGH: 0
TROUBLE SWALLOWING: 0
SHORTNESS OF BREATH: 0
DIARRHEA: 0
BLOOD IN STOOL: 0
ABDOMINAL DISTENTION: 0

## 2024-08-07 NOTE — PROGRESS NOTES
Visit Information    Have you changed or started any medications since your last visit including any over-the-counter medicines, vitamins, or herbal medicines? no   Are you having any side effects from any of your medications? -  no  Have you stopped taking any of your medications? Is so, why? -  no    Have you seen any other physician or provider since your last visit? Yes - Records Obtained  Have you had any other diagnostic tests since your last visit? Yes - Records Obtained  Have you been seen in the emergency room and/or had an admission to a hospital since we last saw you? Yes - Records Obtained  Have you had your routine dental cleaning in the past 6 months? no    Have you activated your [employer] account? If not, what are your barriers? Yes     Patient Care Team:  Ernestine Alejandro MD as PCP - General (Internal Medicine)  Ernestine Alejandro MD as PCP - Empaneled Provider  Segundo Salcido DO as Consulting Physician (Obstetrics & Gynecology)    Medical History Review  Past Medical, Family, and Social History reviewed and does contribute to the patient presenting condition    Health Maintenance   Topic Date Due    Hepatitis B vaccine (1 of 3 - 3-dose series) Never done    COVID-19 Vaccine (1) Never done    Pneumococcal 0-64 years Vaccine (1 of 2 - PCV) Never done    DTaP/Tdap/Td vaccine (1 - Tdap) Never done    Shingles vaccine (1 of 2) Never done    Cervical cancer screen  11/07/2022    Lipids  02/20/2024    Flu vaccine (1) 08/01/2024    Depression Monitoring  05/23/2025    Breast cancer screen  09/20/2025    Diabetes screen  02/20/2026    Colorectal Cancer Screen  03/12/2026    Hepatitis C screen  Completed    HIV screen  Completed    Hepatitis A vaccine  Aged Out    Hib vaccine  Aged Out    Polio vaccine  Aged Out    Meningococcal (ACWY) vaccine  Aged Out     
normal.   Psychiatric:         Mood and Affect: Mood is not anxious. Affect is not angry.         Speech: Speech is not slurred.         Behavior: Behavior normal. Behavior is not aggressive.         Thought Content: Thought content does not include homicidal ideation.         Cognition and Memory: Memory is not impaired.       /82 (Site: Left Upper Arm, Position: Sitting, Cuff Size: Large Adult)   Pulse 81   Ht 1.626 m (5' 4\")   Wt 94.6 kg (208 lb 9.6 oz)   SpO2 95%   BMI 35.81 kg/m²     Assessment:       Diagnosis Orders   1. Pain of right thumb        2. De Quervain's disease (tenosynovitis)                  Plan:   Assessment & Plan   No follow-ups on file.    No orders of the defined types were placed in this encounter.    Orders Placed This Encounter   Medications    predniSONE (DELTASONE) 20 MG tablet     Sig: Take 1 tablet by mouth 2 times daily for 7 days     Dispense:  14 tablet     Refill:  0    zoster recombinant adjuvanted vaccine (SHINGRIX) 50 MCG/0.5ML SUSR injection     Sig: Inject 0.5 mLs into the muscle See Admin Instructions 1 dose now and repeat in 2-6 months     Dispense:  0.5 mL     Refill:  0    Thumb Spica MISC     Si each by Does not apply route continuous     Dispense:  1 each     Refill:  0       Patient given educational materials - see patient instructions.Discussed use, benefit, and side effects of prescribed medications.  All patientquestions answered. Pt voiced understanding. Reviewed health maintenance.  Instructedto continue current medications, diet and exercise.  Patient agreed with treatmentplan. Follow up as directed.       Please note that this chart was generated using voice recognition Dragon dictation software.  Although every effort was made to ensure the accuracy of this automated transcription, some errors in transcription may have occurred.     Electronically signed by Olu Jordan MD on 2024 at 12:08 PM

## 2024-08-21 DIAGNOSIS — F41.9 ANXIETY AND DEPRESSION: Chronic | ICD-10-CM

## 2024-08-21 DIAGNOSIS — F32.A ANXIETY AND DEPRESSION: Chronic | ICD-10-CM

## 2024-08-21 NOTE — TELEPHONE ENCOUNTER
Patient requesting Citalopram to [facility] [address]      Patient also requesting Lorazepam 0.5mg prn, states that her psychiatrist  informed her to contact PCP to rewrite from now on.  Patient last refill form our office was back on 1/17/23, informed her that I will relate the message back to her Physician but the rule would be to schedule an appointment  in office.

## 2024-08-22 RX ORDER — CITALOPRAM 20 MG/1
20 TABLET ORAL DAILY
Qty: 90 TABLET | Refills: 3 | Status: SHIPPED | OUTPATIENT
Start: 2024-08-22

## 2024-08-22 NOTE — TELEPHONE ENCOUNTER
Unfortunately I cannot do this prescription   If psychiatrist wants her to take this medication for mental health, they need to order it .    Ernestine Alejandro MD

## 2024-09-04 ENCOUNTER — OFFICE VISIT (OUTPATIENT)
Dept: FAMILY MEDICINE CLINIC | Age: 54
End: 2024-09-04
Payer: COMMERCIAL

## 2024-09-04 VITALS
BODY MASS INDEX: 35.41 KG/M2 | TEMPERATURE: 97.6 F | HEIGHT: 64 IN | DIASTOLIC BLOOD PRESSURE: 70 MMHG | WEIGHT: 207.4 LBS | OXYGEN SATURATION: 99 % | SYSTOLIC BLOOD PRESSURE: 110 MMHG | HEART RATE: 76 BPM

## 2024-09-04 DIAGNOSIS — H60.501 ACUTE OTITIS EXTERNA OF RIGHT EAR, UNSPECIFIED TYPE: Primary | ICD-10-CM

## 2024-09-04 DIAGNOSIS — H92.02 OTALGIA, LEFT: ICD-10-CM

## 2024-09-04 PROCEDURE — 99213 OFFICE O/P EST LOW 20 MIN: CPT | Performed by: NURSE PRACTITIONER

## 2024-09-04 RX ORDER — CIPROFLOXACIN AND DEXAMETHASONE 3; 1 MG/ML; MG/ML
4 SUSPENSION/ DROPS AURICULAR (OTIC) 2 TIMES DAILY
Qty: 1 EACH | Refills: 0 | Status: SHIPPED | OUTPATIENT
Start: 2024-09-04 | End: 2024-09-11

## 2024-09-04 ASSESSMENT — ENCOUNTER SYMPTOMS
RHINORRHEA: 0
COUGH: 0

## 2024-09-04 NOTE — PATIENT INSTRUCTIONS
Do not submerge head under water for at least 1 week and while symptoms last  No fluids in ear  You may shower, BUT first put Vaseline on cotton ball and set gently just inside ear so that water can not get in ear.  Return worse

## 2024-09-04 NOTE — PROGRESS NOTES
Visit Information    Have you changed or started any medications since your last visit including any over-the-counter medicines, vitamins, or herbal medicines? no   Have you stopped taking any of your medications? Is so, why? -  no  Are you having any side effects from any of your medications? - no    Have you seen any other physician or provider since your last visit?  no   Have you had any other diagnostic tests since your last visit?  no   Have you been seen in the emergency room and/or had an admission in a hospital since we last saw you?  no   Have you had your routine dental cleaning in the past 6 months?  no     Do you have an active MyChart account? If no, what is the barrier?  Yes    Patient Care Team:  Ernestine Alejandro MD as PCP - General (Internal Medicine)  Ernestine Alejandro MD as PCP - Empaneled Provider  Segundo Salcido DO as Consulting Physician (Obstetrics & Gynecology)    Medical History Review  Past Medical, Family, and Social History reviewed and does not contribute to the patient presenting condition    Health Maintenance   Topic Date Due    Pneumococcal 0-64 years Vaccine (1 of 2 - PCV) Never done    Hepatitis B vaccine (1 of 3 - 19+ 3-dose series) Never done    DTaP/Tdap/Td vaccine (1 - Tdap) Never done    Shingles vaccine (1 of 2) Never done    Cervical cancer screen  11/07/2022    Lipids  02/20/2024    Flu vaccine (1) 08/01/2024    COVID-19 Vaccine (1 - 2023-24 season) Never done    Depression Monitoring  08/07/2025    Breast cancer screen  09/20/2025    Diabetes screen  02/20/2026    Colorectal Cancer Screen  03/12/2026    Hepatitis C screen  Completed    HIV screen  Completed    Hepatitis A vaccine  Aged Out    Hib vaccine  Aged Out    Polio vaccine  Aged Out    Meningococcal (ACWY) vaccine  Aged Out

## 2024-09-04 NOTE — PROGRESS NOTES
[facility]YS[facility] WALK-IN FAMILY MEDICINE  [address]  [address]  [facility] [address]  Dept: [phone]  Dept Fax: [phone]    Lorelei Bond is a 53 y.o. female who presents to the urgent care today for her medical conditions/complaints as notedbelow.  Lorelei Bond is c/o of Irritable Bowel Syndrome and Otalgia (Patient has been having pain in both ears off and on for 2 weeks / right ear pain today )      HPI:     53 yr old female presents for rt ear pain today but regulo ear pain intermittently for 2 weeks. Prone to ear infections, recently had water in her rt ear cpl times, no ear drng, no change in hearing  Started after uri sx  Takes zyrtec year around    Otalgia   There is pain in both ears. This is a new problem. The current episode started 1 to 4 weeks ago. The problem has been waxing and waning. There has been no fever. Pertinent negatives include no coughing, ear discharge, hearing loss or rhinorrhea. She has tried acetaminophen (zyrtec) for the symptoms. The treatment provided no relief.       Past Medical History:   Diagnosis Date    Adrenal insufficiency (HCC)     Asthma     Bleeding after intercourse     History of    Bloody diarrhea     Cancer (HCC)     CERVICAL    Chest pain     Delta storage pool disease (HCC)     Diverticulitis     Diverticulosis of colon     Environmental allergies     Family history of breast cancer     MGM in her 40s    Fibromyalgia     GERD (gastroesophageal reflux disease)     H/O thyroid cyst     mild hypothyroidism.    Headache     History of cervical dysplasia 2000    had cone    History of conization of cervix 2000    dysplastic cells    Hyperlipidemia     Hypothyroidism, unspecified 03/18/2016    Lupus (HCC)     Osteoarthritis     Positive cardiac stress test     Rheumatoid arthritis (HCC)     Sleep apnea     tests were negative but snores badly    SOB (shortness of breath)     Vision abnormalities     glasses/

## 2024-09-24 ENCOUNTER — OFFICE VISIT (OUTPATIENT)
Dept: INTERNAL MEDICINE CLINIC | Age: 54
End: 2024-09-24
Payer: COMMERCIAL

## 2024-09-24 VITALS
BODY MASS INDEX: 35.51 KG/M2 | SYSTOLIC BLOOD PRESSURE: 136 MMHG | HEIGHT: 64 IN | OXYGEN SATURATION: 97 % | HEART RATE: 90 BPM | DIASTOLIC BLOOD PRESSURE: 84 MMHG | WEIGHT: 208 LBS

## 2024-09-24 DIAGNOSIS — D69.1 DELTA STORAGE POOL DISEASE (HCC): ICD-10-CM

## 2024-09-24 DIAGNOSIS — F41.9 ANXIETY AND DEPRESSION: Primary | ICD-10-CM

## 2024-09-24 DIAGNOSIS — F41.0 PANIC ATTACK: ICD-10-CM

## 2024-09-24 DIAGNOSIS — R00.0 TACHYCARDIA: ICD-10-CM

## 2024-09-24 DIAGNOSIS — F32.A ANXIETY AND DEPRESSION: Primary | ICD-10-CM

## 2024-09-24 DIAGNOSIS — J45.20 MILD INTERMITTENT ASTHMA, UNSPECIFIED WHETHER COMPLICATED: ICD-10-CM

## 2024-09-24 PROCEDURE — 99214 OFFICE O/P EST MOD 30 MIN: CPT | Performed by: INTERNAL MEDICINE

## 2024-09-24 PROCEDURE — 93000 ELECTROCARDIOGRAM COMPLETE: CPT | Performed by: INTERNAL MEDICINE

## 2024-09-24 RX ORDER — ALBUTEROL SULFATE 90 UG/1
2 INHALANT RESPIRATORY (INHALATION) EVERY 6 HOURS PRN
Qty: 18 G | Refills: 0 | Status: SHIPPED | OUTPATIENT
Start: 2024-09-24

## 2024-09-24 RX ORDER — LORAZEPAM 0.5 MG/1
0.5 TABLET ORAL DAILY PRN
Qty: 7 TABLET | Refills: 0 | Status: SHIPPED | OUTPATIENT
Start: 2024-09-24 | End: 2024-12-24

## 2024-09-25 DIAGNOSIS — E89.0 POSTOPERATIVE HYPOTHYROIDISM: ICD-10-CM

## 2024-09-25 DIAGNOSIS — K21.9 GASTROESOPHAGEAL REFLUX DISEASE WITHOUT ESOPHAGITIS: ICD-10-CM

## 2024-09-25 RX ORDER — OMEPRAZOLE 40 MG/1
40 CAPSULE, DELAYED RELEASE ORAL
Qty: 90 CAPSULE | Refills: 0 | Status: SHIPPED | OUTPATIENT
Start: 2024-09-25

## 2024-09-25 RX ORDER — LEVOTHYROXINE SODIUM 50 MCG
50 TABLET ORAL DAILY
Qty: 90 TABLET | Refills: 0 | Status: SHIPPED | OUTPATIENT
Start: 2024-09-25

## 2024-10-21 DIAGNOSIS — F41.9 ANXIETY AND DEPRESSION: Chronic | ICD-10-CM

## 2024-10-21 DIAGNOSIS — F32.A ANXIETY AND DEPRESSION: Chronic | ICD-10-CM

## 2024-10-21 RX ORDER — CITALOPRAM HYDROBROMIDE 10 MG/1
TABLET ORAL
Qty: 90 TABLET | Refills: 3 | Status: SHIPPED | OUTPATIENT
Start: 2024-10-21 | End: 2024-10-22 | Stop reason: SDUPTHER

## 2024-10-21 RX ORDER — CITALOPRAM HYDROBROMIDE 20 MG/1
20 TABLET ORAL DAILY
Qty: 90 TABLET | Refills: 3 | Status: SHIPPED | OUTPATIENT
Start: 2024-10-21 | End: 2024-10-22 | Stop reason: SDUPTHER

## 2024-10-21 NOTE — TELEPHONE ENCOUNTER
These were sent to the wrong pharmacy in Aug Patient never received them from [facility], Please send to her mail service.    Patient will be out of this medication tonight and will [facility].

## 2024-10-22 DIAGNOSIS — F41.9 ANXIETY AND DEPRESSION: Chronic | ICD-10-CM

## 2024-10-22 DIAGNOSIS — F32.A ANXIETY AND DEPRESSION: Chronic | ICD-10-CM

## 2024-10-22 RX ORDER — CITALOPRAM HYDROBROMIDE 10 MG/1
TABLET ORAL
Qty: 10 TABLET | Refills: 0 | Status: SHIPPED | OUTPATIENT
Start: 2024-10-22

## 2024-10-22 RX ORDER — CITALOPRAM HYDROBROMIDE 20 MG/1
20 TABLET ORAL DAILY
Qty: 10 TABLET | Refills: 0 | Status: SHIPPED | OUTPATIENT
Start: 2024-10-22

## 2024-10-22 NOTE — TELEPHONE ENCOUNTER
Patient requesting a 10 day supply to be sent to local [facility] pharmacy while awaiting for mail service scripts to be received since she is totally out of medication

## 2024-10-29 DIAGNOSIS — M51.369 LUMBAR DEGENERATIVE DISC DISEASE: ICD-10-CM

## 2024-10-29 RX ORDER — PREGABALIN 75 MG/1
75 CAPSULE ORAL 2 TIMES DAILY
Qty: 180 CAPSULE | Refills: 0 | Status: SHIPPED | OUTPATIENT
Start: 2024-10-29 | End: 2024-10-30 | Stop reason: SDUPTHER

## 2024-10-30 DIAGNOSIS — J45.20 MILD INTERMITTENT ASTHMA, UNSPECIFIED WHETHER COMPLICATED: ICD-10-CM

## 2024-10-30 DIAGNOSIS — M51.369 LUMBAR DEGENERATIVE DISC DISEASE: ICD-10-CM

## 2024-10-30 NOTE — TELEPHONE ENCOUNTER
This medication was sent to the wrong pharmacy on 9/24/24, patient needs this inhaler to take with her for her surgery on the 4th.    [facility]    Also patient needs a 2 week supply of pregabalin sent to [facility] because she will be out of medication before [facility] mail service will get it to her.    Please advise

## 2024-10-31 RX ORDER — ALBUTEROL SULFATE 90 UG/1
2 INHALANT RESPIRATORY (INHALATION) EVERY 6 HOURS PRN
Qty: 18 G | Refills: 0 | Status: SHIPPED | OUTPATIENT
Start: 2024-10-31

## 2024-10-31 RX ORDER — PREGABALIN 75 MG/1
75 CAPSULE ORAL 2 TIMES DAILY
Qty: 28 CAPSULE | Refills: 0 | Status: SHIPPED | OUTPATIENT
Start: 2024-10-31 | End: 2024-11-14

## 2024-11-03 DIAGNOSIS — F41.9 ANXIETY AND DEPRESSION: Chronic | ICD-10-CM

## 2024-11-03 DIAGNOSIS — F32.A ANXIETY AND DEPRESSION: Chronic | ICD-10-CM

## 2024-11-04 RX ORDER — CITALOPRAM HYDROBROMIDE 10 MG/1
TABLET ORAL
Qty: 10 TABLET | Refills: 0 | OUTPATIENT
Start: 2024-11-04

## 2024-11-04 RX ORDER — CITALOPRAM HYDROBROMIDE 20 MG/1
TABLET ORAL
Qty: 10 TABLET | Refills: 0 | OUTPATIENT
Start: 2024-11-04

## 2024-11-26 NOTE — THERAPY DISCHARGE
[x] [facility] @ [facility]  Rehabilitation Services  [address]  [place], [place] [address]  Phone [phone]  Fax [phone]    Physical Therapy Discharge Note    Date: 2024      Patient: Lorelei Bond  : 1970  MRN: [id number]    Physician: Laron Daily MD                                 Medical Diagnosis: M51.9 Lumbar disc disease, M54.2 Neck pain                          Rehab Codes: M54.59 Low back pain, M54.2 Cervicalgia  Onset Date:     Total visits attended:9  Cancels/No shows:7/0  Date of initial visit: 23                   [] Patient recovered from conditions. Treatment goals were met.  [] Patient received maximum benefit. No further therapy indicated at this time.  [] Patient demonstrated improvement from condition with  ** Of  ** Short term goals met.  []Patient demonstrated improvement from condition with **   Of **  Long term goals met.  [] Patient to continue exercise/home instructions independently.  [] Therapy interrupted due to:    [] Patient has 2 or more no shows/cancels, is discontinued per our policy.    [] Patient has completed prescribed number of treatment sessions.    [x] Other: Patient did not return after canceled appt on 23. Due to lapse in therapy. Patient is to be discharged at this time. If patient is to require more physical therapy services, they are to obtain new script from physician.                   If you have any questions or concerns regarding this patient's care, please contact us.   Thank you for your referral.      Electronically signed by: Shubham Thakkar PT

## 2024-12-04 ENCOUNTER — OFFICE VISIT (OUTPATIENT)
Dept: INTERNAL MEDICINE CLINIC | Age: 54
End: 2024-12-04
Payer: COMMERCIAL

## 2024-12-04 VITALS
WEIGHT: 208 LBS | OXYGEN SATURATION: 97 % | DIASTOLIC BLOOD PRESSURE: 84 MMHG | SYSTOLIC BLOOD PRESSURE: 122 MMHG | BODY MASS INDEX: 35.51 KG/M2 | HEIGHT: 64 IN | HEART RATE: 87 BPM

## 2024-12-04 DIAGNOSIS — F41.0 PANIC ATTACK: Primary | ICD-10-CM

## 2024-12-04 DIAGNOSIS — Z13.1 DIABETES MELLITUS SCREENING: ICD-10-CM

## 2024-12-04 DIAGNOSIS — E89.0 POSTOPERATIVE HYPOTHYROIDISM: Chronic | ICD-10-CM

## 2024-12-04 PROCEDURE — 99214 OFFICE O/P EST MOD 30 MIN: CPT | Performed by: INTERNAL MEDICINE

## 2024-12-04 RX ORDER — BUSPIRONE HYDROCHLORIDE 10 MG/1
10 TABLET ORAL 3 TIMES DAILY
Qty: 90 TABLET | Refills: 0 | Status: SHIPPED | OUTPATIENT
Start: 2024-12-04 | End: 2025-01-03

## 2024-12-04 NOTE — PROGRESS NOTES
\"Have you been to the ER, urgent care clinic since your last visit?  Hospitalized since your last visit?\"    NO    “Have you seen or consulted any other health care providers outside our system since your last visit?”    YES              SUBJECTIVE:  Lorelei Bond is a 54 y.o. female patient who  comes for complaints of   Chief Complaint   Patient presents with    Anxiety       Anxieyt  Worsenign of claustrophobia  Panic attacks  No recent triggers  On celexa 30,   Lyrica, tizanidine for fibromyalgia, arthritis   Last ativan prescription was 1yr ago- has 6pills left  Recent Rx- sept 7pills- not filled yet- ok to fill and take prn       Denies si/hi    Hypothyroid- pt reminded to get TSH checked  Was ordered last year, never done   Lab Results   Component Value Date    TSH 2.55 04/18/2022           REVIEW OF SYSTEMS (except Subjective (HPI))  GENERAL: No fevers / chills  RESPIRATORY: Negative for cough, wheezing or shortness of breath  CARDIOVASCULAR: Negative for chest pain or palpitations.  GI: no nausea, vomiting, or diarrhea  NEURO: No history of headaches    Past Medical History:   Diagnosis Date    Adrenal insufficiency (HCC)     Asthma     Bleeding after intercourse     History of    Bloody diarrhea     Cancer (HCC)     CERVICAL    Chest pain     Delta storage pool disease (HCC)     Diverticulitis     Diverticulosis of colon     Environmental allergies     Family history of breast cancer     MGM in her 40s    Fibromyalgia     GERD (gastroesophageal reflux disease)     H/O thyroid cyst     mild hypothyroidism.    Headache     History of cervical dysplasia 2000    had cone    History of conization of cervix 2000    dysplastic cells    Hyperlipidemia     Hypothyroidism, unspecified 03/18/2016    Lupus (HCC)     Osteoarthritis     Positive cardiac stress test     Rheumatoid arthritis (HCC)     Sleep apnea     tests were negative but snores badly    SOB (shortness of breath)     Vision abnormalities     glasses/

## 2024-12-05 ENCOUNTER — HOSPITAL ENCOUNTER (OUTPATIENT)
Age: 54
Setting detail: SPECIMEN
Discharge: HOME | End: 2024-12-05

## 2024-12-05 DIAGNOSIS — F41.0 PANIC ATTACK: ICD-10-CM

## 2024-12-05 DIAGNOSIS — E89.0 POSTOPERATIVE HYPOTHYROIDISM: Chronic | ICD-10-CM

## 2024-12-05 DIAGNOSIS — Z13.1 DIABETES MELLITUS SCREENING: ICD-10-CM

## 2024-12-05 LAB
EST. AVERAGE GLUCOSE BLD GHB EST-MCNC: 123 MG/DL
HBA1C MFR BLD: 5.9 % (ref 4–6)
TSH SERPL DL<=0.05 MIU/L-ACNC: 3.23 UIU/ML (ref 0.27–4.2)

## 2024-12-17 ENCOUNTER — TELEPHONE (OUTPATIENT)
Dept: INTERNAL MEDICINE CLINIC | Age: 54
End: 2024-12-17

## 2024-12-17 DIAGNOSIS — F41.0 PANIC ATTACK: ICD-10-CM

## 2024-12-18 RX ORDER — LORAZEPAM 0.5 MG/1
0.5 TABLET ORAL DAILY PRN
Qty: 7 TABLET | Refills: 0 | Status: SHIPPED | OUTPATIENT
Start: 2024-12-18 | End: 2024-12-19 | Stop reason: SDUPTHER

## 2024-12-18 NOTE — TELEPHONE ENCOUNTER
Please change duration to 30 days.  I am having trouble changing it in the EMR.  Ernestine Alejandro MD

## 2024-12-18 NOTE — TELEPHONE ENCOUNTER
[facility] called and would like to know if 7 tablets should last 91 days. Also insurance only allows for a 30 day supply for 2 tablets daily. Please advise.

## 2024-12-19 NOTE — TELEPHONE ENCOUNTER
Updated Rx pending, directions are the same: \"Take 1 tablet by mouth daily as needed for Anxiety\" Quantity updated to 30 tablets for 30 days.     Please review and sign if appropriate.

## 2024-12-20 RX ORDER — LORAZEPAM 0.5 MG/1
0.5 TABLET ORAL
Qty: 7 TABLET | Refills: 0 | Status: SHIPPED | OUTPATIENT
Start: 2024-12-20 | End: 2025-01-04

## 2024-12-24 DIAGNOSIS — E89.0 POSTOPERATIVE HYPOTHYROIDISM: ICD-10-CM

## 2024-12-24 DIAGNOSIS — K21.9 GASTROESOPHAGEAL REFLUX DISEASE WITHOUT ESOPHAGITIS: ICD-10-CM

## 2024-12-24 RX ORDER — LEVOTHYROXINE SODIUM 50 MCG
50 TABLET ORAL DAILY
Qty: 90 TABLET | Refills: 0 | Status: SHIPPED | OUTPATIENT
Start: 2024-12-24

## 2024-12-24 RX ORDER — OMEPRAZOLE 40 MG/1
40 CAPSULE, DELAYED RELEASE ORAL
Qty: 90 CAPSULE | Refills: 0 | Status: SHIPPED | OUTPATIENT
Start: 2024-12-24

## 2025-01-06 ENCOUNTER — OFFICE VISIT (OUTPATIENT)
Dept: FAMILY MEDICINE CLINIC | Age: 55
End: 2025-01-06
Payer: COMMERCIAL

## 2025-01-06 VITALS
TEMPERATURE: 97.7 F | SYSTOLIC BLOOD PRESSURE: 128 MMHG | DIASTOLIC BLOOD PRESSURE: 84 MMHG | HEART RATE: 90 BPM | OXYGEN SATURATION: 98 %

## 2025-01-06 DIAGNOSIS — J06.9 UPPER RESPIRATORY INFECTION WITH COUGH AND CONGESTION: Primary | ICD-10-CM

## 2025-01-06 PROCEDURE — 99213 OFFICE O/P EST LOW 20 MIN: CPT

## 2025-01-06 RX ORDER — DOXYCYCLINE HYCLATE 100 MG
100 TABLET ORAL 2 TIMES DAILY
Qty: 14 TABLET | Refills: 0 | Status: SHIPPED | OUTPATIENT
Start: 2025-01-06 | End: 2025-01-13

## 2025-01-06 ASSESSMENT — ENCOUNTER SYMPTOMS
DIARRHEA: 0
SWOLLEN GLANDS: 0
WHEEZING: 1
EYE ITCHING: 0
ABDOMINAL PAIN: 0
RHINORRHEA: 1
SORE THROAT: 1
EYE PAIN: 0
VOMITING: 0
NAUSEA: 0
EYE REDNESS: 0
SINUS PAIN: 0
COUGH: 1
CHEST TIGHTNESS: 1

## 2025-01-27 ENCOUNTER — OFFICE VISIT (OUTPATIENT)
Dept: FAMILY MEDICINE CLINIC | Age: 55
End: 2025-01-27
Payer: COMMERCIAL

## 2025-01-27 VITALS
HEART RATE: 96 BPM | DIASTOLIC BLOOD PRESSURE: 77 MMHG | SYSTOLIC BLOOD PRESSURE: 133 MMHG | TEMPERATURE: 97.4 F | OXYGEN SATURATION: 99 %

## 2025-01-27 DIAGNOSIS — J45.31 MILD PERSISTENT ASTHMA WITH EXACERBATION: ICD-10-CM

## 2025-01-27 DIAGNOSIS — J32.0 MAXILLARY SINUSITIS, CHRONIC: Primary | ICD-10-CM

## 2025-01-27 DIAGNOSIS — F41.9 ANXIETY AND DEPRESSION: Chronic | ICD-10-CM

## 2025-01-27 DIAGNOSIS — M51.369 LUMBAR DEGENERATIVE DISC DISEASE: ICD-10-CM

## 2025-01-27 DIAGNOSIS — F32.A ANXIETY AND DEPRESSION: Chronic | ICD-10-CM

## 2025-01-27 DIAGNOSIS — H65.193 ACUTE MEE (MIDDLE EAR EFFUSION), BILATERAL: ICD-10-CM

## 2025-01-27 PROCEDURE — 99213 OFFICE O/P EST LOW 20 MIN: CPT | Performed by: NURSE PRACTITIONER

## 2025-01-27 RX ORDER — CITALOPRAM HYDROBROMIDE 10 MG/1
TABLET ORAL
Qty: 90 TABLET | Refills: 3 | Status: SHIPPED | OUTPATIENT
Start: 2025-01-27

## 2025-01-27 RX ORDER — PREDNISONE 20 MG/1
20 TABLET ORAL 2 TIMES DAILY
Qty: 10 TABLET | Refills: 0 | Status: SHIPPED | OUTPATIENT
Start: 2025-01-27 | End: 2025-02-01

## 2025-01-27 RX ORDER — PREGABALIN 75 MG/1
75 CAPSULE ORAL 2 TIMES DAILY
Qty: 28 CAPSULE | Refills: 0 | Status: SHIPPED | OUTPATIENT
Start: 2025-01-27 | End: 2025-02-10

## 2025-01-27 RX ORDER — CITALOPRAM HYDROBROMIDE 20 MG/1
20 TABLET ORAL DAILY
Qty: 90 TABLET | Refills: 3 | Status: SHIPPED | OUTPATIENT
Start: 2025-01-27

## 2025-01-27 RX ORDER — CEFDINIR 300 MG/1
300 CAPSULE ORAL 2 TIMES DAILY
Qty: 20 CAPSULE | Refills: 0 | Status: SHIPPED | OUTPATIENT
Start: 2025-01-27 | End: 2025-02-06

## 2025-01-27 RX ORDER — FLUTICASONE PROPIONATE 50 MCG
2 SPRAY, SUSPENSION (ML) NASAL DAILY
Qty: 16 G | Refills: 0 | Status: SHIPPED | OUTPATIENT
Start: 2025-01-27

## 2025-01-27 ASSESSMENT — ENCOUNTER SYMPTOMS
DIARRHEA: 0
TROUBLE SWALLOWING: 0
VOICE CHANGE: 0
HOARSE VOICE: 0
NAUSEA: 0
VOMITING: 0
SWOLLEN GLANDS: 0
WHEEZING: 0
COUGH: 1
CHEST TIGHTNESS: 0
SORE THROAT: 1
SINUS PRESSURE: 1
SINUS PAIN: 1
SHORTNESS OF BREATH: 1
CHOKING: 0
STRIDOR: 0

## 2025-01-27 NOTE — TELEPHONE ENCOUNTER
Patient called in request for citalopram and pregabalin to [facility]..  Last office visit 12/04/2024  Future NTP Dr Mann 2/5/2024

## 2025-01-27 NOTE — PROGRESS NOTES
[facility] PHYSIC[facility]ANS [facility] WALK-IN FAMILY [facility]IC[facility]  [address]  [address]  [facility] [address]  Dept: [phone]  Dept Fax: [phone]    Lorelei Bond is a 54 y.o. female who presents to the urgent care today for her medical conditions/complaints as notedbelow.  Lorelei Bond is c/o of Headache (Onset in the last month dizzy,headache, sob, arm feel week.)      HPI:     54 yr old female presents for intermittent headaches, sinus sx, dizziness, pressure in cheeks  Seen here for uri sx, cough and congestion on 1/6. Prescribed doxycycline, sinus sx better but not sure ever completely resolved  Hx asthma, headaches  Last ct head 6/6/2024 and neg  Wilman maxillary sinus pressure  Feels like pnd causing cough and exac her asthma    Sinusitis  This is a new problem. The current episode started 1 to 4 weeks ago. The problem is unchanged. There has been no fever. The pain is mild. Associated symptoms include congestion, coughing (occasional dry np from pnd tickle), headaches, shortness of breath, sinus pressure and a sore throat. Pertinent negatives include no chills, diaphoresis, ear pain, hoarse voice, neck pain, sneezing or swollen glands. Past treatments include nothing. The treatment provided no relief.       Past Medical History:   Diagnosis Date    Adrenal insufficiency (HCC)     Asthma     Bleeding after intercourse     History of    Bloody diarrhea     Cancer (HCC)     CERVICAL    Chest pain     Delta storage pool disease (HCC)     Diverticulitis     Diverticulosis of colon     Environmental allergies     Family history of breast cancer     MGM in her 40s    Fibromyalgia     GERD (gastroesophageal reflux disease)     H/O thyroid cyst     mild hypothyroidism.    Headache     History of cervical dysplasia 2000    had cone    History of conization of cervix 2000    dysplastic cells    Hyperlipidemia     Hypothyroidism, unspecified 03/18/2016    Lupus     Osteoarthritis

## 2025-02-10 DIAGNOSIS — M51.369 LUMBAR DEGENERATIVE DISC DISEASE: ICD-10-CM

## 2025-02-10 RX ORDER — PREGABALIN 75 MG/1
75 CAPSULE ORAL 2 TIMES DAILY
Qty: 90 CAPSULE | Refills: 1 | Status: SHIPPED | OUTPATIENT
Start: 2025-02-10 | End: 2025-05-11

## 2025-02-10 NOTE — TELEPHONE ENCOUNTER
Last office visit with Dr. Alejandro on 12/4/24    No new to provider appt scheduled at this time.    Patient cancelled on 2/5/25

## 2025-03-06 ENCOUNTER — OFFICE VISIT (OUTPATIENT)
Dept: FAMILY MEDICINE CLINIC | Age: 55
End: 2025-03-06
Payer: COMMERCIAL

## 2025-03-06 ENCOUNTER — HOSPITAL ENCOUNTER (EMERGENCY)
Age: 55
Discharge: HOME | End: 2025-03-06
Attending: EMERGENCY MEDICINE
Payer: COMMERCIAL

## 2025-03-06 ENCOUNTER — TELEPHONE (OUTPATIENT)
Dept: INTERNAL MEDICINE CLINIC | Age: 55
End: 2025-03-06

## 2025-03-06 ENCOUNTER — APPOINTMENT (OUTPATIENT)
Dept: GENERAL RADIOLOGY | Age: 55
End: 2025-03-06
Payer: COMMERCIAL

## 2025-03-06 VITALS
SYSTOLIC BLOOD PRESSURE: 125 MMHG | OXYGEN SATURATION: 97 % | HEART RATE: 84 BPM | TEMPERATURE: 97.3 F | DIASTOLIC BLOOD PRESSURE: 82 MMHG

## 2025-03-06 VITALS
DIASTOLIC BLOOD PRESSURE: 71 MMHG | OXYGEN SATURATION: 98 % | SYSTOLIC BLOOD PRESSURE: 113 MMHG | WEIGHT: 208 LBS | HEIGHT: 64 IN | TEMPERATURE: 97.6 F | BODY MASS INDEX: 35.51 KG/M2 | HEART RATE: 76 BPM | RESPIRATION RATE: 15 BRPM

## 2025-03-06 DIAGNOSIS — K21.00 GASTROESOPHAGEAL REFLUX DISEASE WITH ESOPHAGITIS WITHOUT HEMORRHAGE: ICD-10-CM

## 2025-03-06 DIAGNOSIS — R07.9 CHEST PAIN, UNSPECIFIED TYPE: Primary | ICD-10-CM

## 2025-03-06 DIAGNOSIS — R11.0 NAUSEA: ICD-10-CM

## 2025-03-06 LAB
ANION GAP SERPL CALCULATED.3IONS-SCNC: 11 MMOL/L (ref 9–16)
BASOPHILS # BLD: 0.1 K/UL (ref 0–0.2)
BASOPHILS NFR BLD: 1 % (ref 0–2)
BUN SERPL-MCNC: 11 MG/DL (ref 6–20)
CALCIUM SERPL-MCNC: 9.8 MG/DL (ref 8.6–10.4)
CHLORIDE SERPL-SCNC: 103 MMOL/L (ref 98–107)
CO2 SERPL-SCNC: 25 MMOL/L (ref 20–31)
CREAT SERPL-MCNC: 0.7 MG/DL (ref 0.7–1.2)
D DIMER PPP FEU-MCNC: <0.27 UG/ML FEU (ref 0–0.59)
EOSINOPHIL # BLD: 0.2 K/UL (ref 0–0.4)
EOSINOPHILS RELATIVE PERCENT: 3 % (ref 0–4)
ERYTHROCYTE [DISTWIDTH] IN BLOOD BY AUTOMATED COUNT: 13.4 % (ref 11.5–14.9)
GFR, ESTIMATED: >90 ML/MIN/1.73M2
GLUCOSE SERPL-MCNC: 115 MG/DL (ref 74–99)
HCT VFR BLD AUTO: 41.9 % (ref 36–46)
HGB BLD-MCNC: 13.8 G/DL (ref 12–16)
LYMPHOCYTES NFR BLD: 2.7 K/UL (ref 1–4.8)
LYMPHOCYTES RELATIVE PERCENT: 36 % (ref 24–44)
MCH RBC QN AUTO: 29.7 PG (ref 26–34)
MCHC RBC AUTO-ENTMCNC: 32.8 G/DL (ref 31–37)
MCV RBC AUTO: 90.6 FL (ref 80–100)
MONOCYTES NFR BLD: 0.6 K/UL (ref 0.1–1.3)
MONOCYTES NFR BLD: 8 % (ref 1–7)
NEUTROPHILS NFR BLD: 52 % (ref 36–66)
NEUTS SEG NFR BLD: 3.9 K/UL (ref 1.3–9.1)
PLATELET # BLD AUTO: 199 K/UL (ref 150–450)
PMV BLD AUTO: 9.6 FL (ref 6–12)
POTASSIUM SERPL-SCNC: 3.9 MMOL/L (ref 3.7–5.3)
RBC # BLD AUTO: 4.63 M/UL (ref 4–5.2)
SODIUM SERPL-SCNC: 139 MMOL/L (ref 136–145)
TROPONIN I SERPL HS-MCNC: <6 NG/L (ref 0–14)
WBC OTHER # BLD: 7.4 K/UL (ref 3.5–11)

## 2025-03-06 PROCEDURE — 71046 X-RAY EXAM CHEST 2 VIEWS: CPT

## 2025-03-06 PROCEDURE — 36415 COLL VENOUS BLD VENIPUNCTURE: CPT

## 2025-03-06 PROCEDURE — 99285 EMERGENCY DEPT VISIT HI MDM: CPT

## 2025-03-06 PROCEDURE — 84484 ASSAY OF TROPONIN QUANT: CPT

## 2025-03-06 PROCEDURE — 85379 FIBRIN DEGRADATION QUANT: CPT

## 2025-03-06 PROCEDURE — 99213 OFFICE O/P EST LOW 20 MIN: CPT | Performed by: NURSE PRACTITIONER

## 2025-03-06 PROCEDURE — 6370000000 HC RX 637 (ALT 250 FOR IP): Performed by: EMERGENCY MEDICINE

## 2025-03-06 PROCEDURE — 85025 COMPLETE CBC W/AUTO DIFF WBC: CPT

## 2025-03-06 PROCEDURE — 80048 BASIC METABOLIC PNL TOTAL CA: CPT

## 2025-03-06 PROCEDURE — 6370000000 HC RX 637 (ALT 250 FOR IP)

## 2025-03-06 RX ORDER — ASPIRIN 81 MG/1
324 TABLET, CHEWABLE ORAL ONCE
Status: COMPLETED | OUTPATIENT
Start: 2025-03-06 | End: 2025-03-06

## 2025-03-06 RX ORDER — MORPHINE SULFATE 4 MG/ML
4 INJECTION, SOLUTION INTRAMUSCULAR; INTRAVENOUS ONCE
Status: DISCONTINUED | OUTPATIENT
Start: 2025-03-06 | End: 2025-03-06

## 2025-03-06 RX ORDER — MAGNESIUM HYDROXIDE/ALUMINUM HYDROXICE/SIMETHICONE 120; 1200; 1200 MG/30ML; MG/30ML; MG/30ML
30 SUSPENSION ORAL ONCE
Status: COMPLETED | OUTPATIENT
Start: 2025-03-06 | End: 2025-03-06

## 2025-03-06 RX ORDER — LIDOCAINE HYDROCHLORIDE 20 MG/ML
15 SOLUTION OROPHARYNGEAL ONCE
Status: COMPLETED | OUTPATIENT
Start: 2025-03-06 | End: 2025-03-06

## 2025-03-06 RX ORDER — PANTOPRAZOLE SODIUM 40 MG/1
40 TABLET, DELAYED RELEASE ORAL
Qty: 90 TABLET | Refills: 0 | Status: SHIPPED | OUTPATIENT
Start: 2025-03-07

## 2025-03-06 RX ADMIN — ALUMINUM HYDROXIDE, MAGNESIUM HYDROXIDE, AND SIMETHICONE 30 ML: 200; 200; 20 SUSPENSION ORAL at 18:08

## 2025-03-06 RX ADMIN — LIDOCAINE HYDROCHLORIDE 15 ML: 20 SOLUTION ORAL at 18:07

## 2025-03-06 RX ADMIN — ASPIRIN 324 MG: 81 TABLET, CHEWABLE ORAL at 17:03

## 2025-03-06 ASSESSMENT — ENCOUNTER SYMPTOMS
BACK PAIN: 0
STRIDOR: 0
SHORTNESS OF BREATH: 0
HEMOPTYSIS: 0
ORTHOPNEA: 1
ABDOMINAL PAIN: 0
CHOKING: 1
CHEST TIGHTNESS: 1
SPUTUM PRODUCTION: 0
WHEEZING: 0
VOMITING: 0
NAUSEA: 1
COUGH: 0

## 2025-03-06 ASSESSMENT — PAIN SCALES - GENERAL
PAINLEVEL_OUTOF10: 7
PAINLEVEL_OUTOF10: 7

## 2025-03-06 ASSESSMENT — PAIN DESCRIPTION - ORIENTATION: ORIENTATION: LEFT

## 2025-03-06 ASSESSMENT — PAIN DESCRIPTION - LOCATION
LOCATION: CHEST;THROAT
LOCATION: THROAT;CHEST

## 2025-03-06 ASSESSMENT — PAIN DESCRIPTION - DESCRIPTORS
DESCRIPTORS: ACHING
DESCRIPTORS: ACHING

## 2025-03-06 ASSESSMENT — LIFESTYLE VARIABLES
HOW OFTEN DO YOU HAVE A DRINK CONTAINING ALCOHOL: NEVER
HOW MANY STANDARD DRINKS CONTAINING ALCOHOL DO YOU HAVE ON A TYPICAL DAY: PATIENT DOES NOT DRINK

## 2025-03-06 NOTE — ED TRIAGE NOTES
Mode of arrival (squad #, walk in, police, etc) : walk in        Chief complaint(s): chest pain        Arrival Note (brief scenario, treatment PTA, etc).: Pt states she started to have chest pain last night that radiated into her neck and jaw from about 7pm-12am. She states when she woke up the pain returned. She tried OTC medication for indigestion with little to no relief. Pt states she does have anxiety but this feels different.         C= \"Have you ever felt that you should Cut down on your drinking?\"  No  A= \"Have people Annoyed you by criticizing your drinking?\"  No  G= \"Have you ever felt bad or Guilty about your drinking?\"  No  E= \"Have you ever had a drink as an Eye-opener first thing in the morning to steady your nerves or to help a hangover?\"  No      Deferred []      Reason for deferring: N/A    *If yes to two or more: probable alcohol abuse.*

## 2025-03-06 NOTE — TELEPHONE ENCOUNTER
Patient called in with complaints of nausea, chest pain that travels up to throat, worse at night.   Acid reducers only providing minimal relief, no change in diet or medication.  Feeling a little better today, asked that if symptoms worsen she should go to ER/UC to rule out any other cardiac complications. Voiced understanding

## 2025-03-06 NOTE — PROGRESS NOTES
the lungs every 6 hours as needed for Wheezing or Shortness of Breath 18 g 0    ondansetron (ZOFRAN) 4 MG tablet Take every six hours as needed 20 tablet 0    atorvastatin (LIPITOR) 40 MG tablet Take 1 tablet by mouth daily 90 tablet 3    Cholecalciferol (VITAMIN D3) 50 MCG (2000 UT) CAPS Take by mouth      nystatin (MYCOSTATIN) 053551 UNIT/GM powder Apply 3 times daily. 60 g 3    B Complex Vitamins (VITAMIN B COMPLEX PO) Take by mouth daily      Cetirizine HCl (ZYRTEC PO) Take 10 mg by mouth daily       Thumb Spica MISC 1 each by Does not apply route continuous (Patient not taking: Reported on 9/24/2024) 1 each 0    RYALTRIS 665-25 MCG/ACT SUSP 2 sprays by Each Nostril route daily (Patient not taking: Reported on 9/24/2024)      ondansetron (ZOFRAN-ODT) 4 MG disintegrating tablet Take 1 tablet by mouth every 6 hours as needed for Nausea or Vomiting (Patient not taking: Reported on 9/24/2024) 21 tablet 0    FEROSUL 325 (65 Fe) MG tablet take 1 tablet by mouth once daily with breakfast (Patient not taking: Reported on 3/6/2025) 90 tablet 1    naproxen (NAPROSYN) 500 MG tablet Take 1 tablet by mouth 2 times daily (with meals) 180 tablet 1    valACYclovir (VALTREX) 1 g tablet Take 1 tablet by mouth 3 times daily (Patient not taking: Reported on 9/24/2024) 30 tablet 0    aspirin 81 MG EC tablet Take 1 tablet by mouth daily (Patient not taking: Reported on 11/17/2023)      HUMIRA PEN 40 MG/0.4ML PNKT  (Patient not taking: Reported on 12/19/2023)      nitroGLYCERIN (NITROSTAT) 0.3 MG SL tablet Place 1 tablet under the tongue every 5 minutes as needed for Chest pain up to max of 3 total doses. If no relief after 1 dose, call 911. (Patient not taking: Reported on 1/6/2025) 30 tablet 3     No current facility-administered medications for this visit.     Allergies   Allergen Reactions    Other      Perch - twice had violent vomiting, diarrhea,severe dizziness and nausea    Azithromycin      Palpitations.     Celecoxib Itching

## 2025-03-06 NOTE — DISCHARGE INSTRUCTIONS
You have been evaluated in the emergency department for chest pain.  Your labs and EKG were normal.  X-ray showed no signs of pneumonia.  Recommend a daily antacid medication, this has been sent to your preferred pharmacy on file.  Follow-up with your primary care provider as needed.    Return to the Emergency Department immediately if you have worsening your symptoms including severe shortness of breath or chest pain.

## 2025-03-06 NOTE — ED PROVIDER NOTES
EMERGENCY DEPARTMENT ENCOUNTER   ATTENDING ATTESTATION     Pt Name: Lorelei Bond  MRN: [id number]  Birthdate 1970  Date of evaluation: 3/6/25       Lorelei Bond is a 54 y.o. female who presents with Chest Pain (Chest pain radiating into the neck and jaw. Started around 7pm till 12am then started back up today. )      MDM:   Recurrent chest pain rating to her shoulders.  It started after eating a roast with potatoes last night.  She had a normal heart cath 4 years ago, no coronaries seen at that time.  I doubt this is ACS.  Low probability of coronary disease.  Checking troponin EKG.  She is requesting something for esophagus.  She thinks it might be esophagus irritation.  She has had sensations of this before.  Giving Maalox viscous lidocaine.  Do not suspect a PE or aortic dissection    Cardiac Arteries and Lesion Findings June 2021     LMCA: Normal 0% stenosis.     LAD: Normal 0% stenosis.     LCx: Normal 0% stenosis.     RCA: Normal 0% stenosis.  Vitals:   Vitals:    03/06/25 1644   BP: (!) 142/83   Pulse: 81   Resp: 19   Temp: 97.6 °F (36.4 °C)   TempSrc: Oral   SpO2: 96%   Weight: 94.3 kg (208 lb)   Height: 1.626 m (5' 4\")         I personally saw and examined the patient. I have reviewed and agree with the resident's findings, including all diagnostic interpretations and treatment plan as written. I was present for the key portions of any procedures performed and the inclusive time noted for any critical care statement.    Richard Navarrete MD  Attending Emergency Physician            Richard Navarrete MD  03/06/25 [id number]

## 2025-03-06 NOTE — ED PROVIDER NOTES
[facility] EMERGENCY DEPARTMENT  Emergency Department Encounter  Emergency Medicine Resident     Pt Name:Lorelei Bond  MRN: [id number]  Birthdate 1970  Date of evaluation: 3/6/25  PCP:  Ernestine Alejandro MD  Note Started: 4:46 PM EST      CHIEF COMPLAINT       Chief Complaint   Patient presents with    Chest Pain     Chest pain radiating into the neck and jaw. Started around 7pm till 12am then started back up today.        HISTORY OF PRESENT ILLNESS  (Location/Symptom, Timing/Onset, Context/Setting, Quality, Duration, Modifying Factors, Severity.)      Lorelei Bond is a 54 y.o. female who presents with 1 day history of chest pain with associated shortness of breath.  Patient states last night at approximately 7 PM she been experiencing chest tightness with radiation into the left side of her neck and jaw.  There is associated shortness of breath during these episodes, she denies palpitations or diaphoresis.  She attempted relief of her symptoms with a PPI and baking soda with water.  She states the PPI made no changes to her pain, baking soda with water gave her temporary relief for a few minutes.  The pain lasted for approximately 5 hours until she was able to fall asleep.  Upon awakening this morning she began experiencing the pain again, has been mostly constant throughout the day and progressively worsening.  She was seen at urgent care and advised to present to the emergency department for further evaluation.  She denies prior history of PE/DVT, no calf tenderness or swelling, denies peripheral edema.  There is no associated cough.  She denies fever, chills, myalgias or night sweats.    PAST MEDICAL / SURGICAL / SOCIAL / FAMILY HISTORY      has a past medical history of Adrenal insufficiency, Asthma, Bleeding after intercourse, Bloody diarrhea, Cancer (HCC), Chest pain, Delta storage pool disease (HCC), Diverticulitis, Diverticulosis of colon, Environmental allergies, Family history of breast

## 2025-03-07 LAB
EKG ATRIAL RATE: 78 BPM
EKG P AXIS: 48 DEGREES
EKG P-R INTERVAL: 162 MS
EKG Q-T INTERVAL: 384 MS
EKG QRS DURATION: 72 MS
EKG QTC CALCULATION (BAZETT): 437 MS
EKG R AXIS: 18 DEGREES
EKG T AXIS: 38 DEGREES
EKG VENTRICULAR RATE: 78 BPM

## 2025-03-15 DIAGNOSIS — K21.9 GASTROESOPHAGEAL REFLUX DISEASE WITHOUT ESOPHAGITIS: ICD-10-CM

## 2025-03-15 DIAGNOSIS — R94.39 ABNORMAL STRESS TEST: ICD-10-CM

## 2025-03-15 DIAGNOSIS — I25.118 CORONARY ARTERY DISEASE OF NATIVE HEART WITH STABLE ANGINA PECTORIS, UNSPECIFIED VESSEL OR LESION TYPE: ICD-10-CM

## 2025-03-15 DIAGNOSIS — E89.0 POSTOPERATIVE HYPOTHYROIDISM: ICD-10-CM

## 2025-03-17 RX ORDER — OMEPRAZOLE 40 MG/1
40 CAPSULE, DELAYED RELEASE ORAL
Qty: 90 CAPSULE | Refills: 0 | Status: SHIPPED | OUTPATIENT
Start: 2025-03-17

## 2025-03-17 RX ORDER — LEVOTHYROXINE SODIUM 50 MCG
50 TABLET ORAL DAILY
Qty: 90 TABLET | Refills: 0 | Status: SHIPPED | OUTPATIENT
Start: 2025-03-17

## 2025-03-17 RX ORDER — ATORVASTATIN CALCIUM 40 MG/1
40 TABLET, FILM COATED ORAL DAILY
Qty: 90 TABLET | Refills: 3 | Status: SHIPPED | OUTPATIENT
Start: 2025-03-17

## 2025-03-30 NOTE — PROGRESS NOTES
[address] [address]  Dept: [phone]  Dept Fax: [phone]     Name: Riki Miller  : 1970           Chief Complaint   Patient presents with    URI     Was diagnoised in the hospital, still has some swollen lymph nodes. Has a lot of sinus pressure. Blurred Vision     Has been going off and on- seems to be more in the last couple of weeks. Also has dizziness with it. History of Present Illness:    HPI  Ac visit   Ac Pharyngitis        Past Medical History:    Past Medical History:   Diagnosis Date    Adrenal insufficiency ([address])     Asthma     Bleeding after intercourse     History of    Bloody diarrhea     Cancer ([address])     CERVICAL    Chest pain     Delta storage pool disease [facility])     Diverticulitis     Diverticulosis of colon     Environmental allergies     Family history of breast cancer     MGM in her 45s    Fibromyalgia     GERD (gastroesophageal reflux disease)     H/O thyroid cyst     mild hypothyroidism.     Headache     History of cervical dysplasia     had cone    History of conization of cervix     dysplastic cells    Hyperlipidemia     Hypothyroidism, unspecified 2016    Lupus (HCC)     Osteoarthritis     Positive cardiac stress test     Rheumatoid arthritis (HCC)     Sleep apnea     tests were negative but snores badly    SOB (shortness of breath)     Vision abnormalities     glasses/ far sighted      Reviewed all health maintenance requirements and ordered appropriate tests  Health Maintenance Due   Topic Date Due    Hepatitis B vaccine (1 of 3 - 3-dose series) Never done    COVID-19 Vaccine (1) Never done    Pneumococcal 0-64 years Vaccine (1 - PCV) Never done    DTaP/Tdap/Td vaccine (1 - Tdap) Never done    Shingles vaccine (1 of 2) Never done    Cervical cancer screen  2022       Past Surgical History:    Past Surgical History:   Procedure Laterality Date    BONE CYST EXCISION Right     WRIST No Decelerations

## 2025-04-16 ENCOUNTER — OFFICE VISIT (OUTPATIENT)
Dept: INTERNAL MEDICINE CLINIC | Age: 55
End: 2025-04-16
Payer: COMMERCIAL

## 2025-04-16 VITALS
BODY MASS INDEX: 35.85 KG/M2 | SYSTOLIC BLOOD PRESSURE: 130 MMHG | OXYGEN SATURATION: 98 % | HEART RATE: 91 BPM | DIASTOLIC BLOOD PRESSURE: 78 MMHG | HEIGHT: 64 IN | WEIGHT: 210 LBS

## 2025-04-16 DIAGNOSIS — F41.9 ANXIETY AND DEPRESSION: Chronic | ICD-10-CM

## 2025-04-16 DIAGNOSIS — F32.A ANXIETY AND DEPRESSION: Chronic | ICD-10-CM

## 2025-04-16 DIAGNOSIS — E89.0 POSTOPERATIVE HYPOTHYROIDISM: Chronic | ICD-10-CM

## 2025-04-16 DIAGNOSIS — G43.809 OTHER MIGRAINE WITHOUT STATUS MIGRAINOSUS, NOT INTRACTABLE: Primary | ICD-10-CM

## 2025-04-16 DIAGNOSIS — J32.0 MAXILLARY SINUSITIS, CHRONIC: ICD-10-CM

## 2025-04-16 DIAGNOSIS — H65.193 ACUTE MEE (MIDDLE EAR EFFUSION), BILATERAL: ICD-10-CM

## 2025-04-16 PROCEDURE — 99214 OFFICE O/P EST MOD 30 MIN: CPT | Performed by: INTERNAL MEDICINE

## 2025-04-16 RX ORDER — LEVOFLOXACIN 750 MG/1
750 TABLET, FILM COATED ORAL DAILY
Qty: 7 TABLET | Refills: 0 | Status: SHIPPED | OUTPATIENT
Start: 2025-04-16 | End: 2025-04-18 | Stop reason: SDUPTHER

## 2025-04-16 RX ORDER — HYDROXYZINE HYDROCHLORIDE 25 MG/1
1 TABLET, FILM COATED ORAL DAILY
COMMUNITY
Start: 2025-01-02

## 2025-04-16 RX ORDER — FLUTICASONE PROPIONATE 50 MCG
2 SPRAY, SUSPENSION (ML) NASAL DAILY
Qty: 16 G | Refills: 0 | Status: SHIPPED | OUTPATIENT
Start: 2025-04-16

## 2025-04-16 SDOH — ECONOMIC STABILITY: FOOD INSECURITY: WITHIN THE PAST 12 MONTHS, THE FOOD YOU BOUGHT JUST DIDN'T LAST AND YOU DIDN'T HAVE MONEY TO GET MORE.: PATIENT DECLINED

## 2025-04-16 SDOH — ECONOMIC STABILITY: FOOD INSECURITY: WITHIN THE PAST 12 MONTHS, YOU WORRIED THAT YOUR FOOD WOULD RUN OUT BEFORE YOU GOT MONEY TO BUY MORE.: PATIENT DECLINED

## 2025-04-16 ASSESSMENT — PATIENT HEALTH QUESTIONNAIRE - PHQ9
4. FEELING TIRED OR HAVING LITTLE ENERGY: NOT AT ALL
5. POOR APPETITE OR OVEREATING: NOT AT ALL
9. THOUGHTS THAT YOU WOULD BE BETTER OFF DEAD, OR OF HURTING YOURSELF: NOT AT ALL
SUM OF ALL RESPONSES TO PHQ QUESTIONS 1-9: 0
6. FEELING BAD ABOUT YOURSELF - OR THAT YOU ARE A FAILURE OR HAVE LET YOURSELF OR YOUR FAMILY DOWN: NOT AT ALL
3. TROUBLE FALLING OR STAYING ASLEEP: NOT AT ALL
SUM OF ALL RESPONSES TO PHQ QUESTIONS 1-9: 0
1. LITTLE INTEREST OR PLEASURE IN DOING THINGS: NOT AT ALL
2. FEELING DOWN, DEPRESSED OR HOPELESS: NOT AT ALL
8. MOVING OR SPEAKING SO SLOWLY THAT OTHER PEOPLE COULD HAVE NOTICED. OR THE OPPOSITE, BEING SO FIGETY OR RESTLESS THAT YOU HAVE BEEN MOVING AROUND A LOT MORE THAN USUAL: NOT AT ALL
7. TROUBLE CONCENTRATING ON THINGS, SUCH AS READING THE NEWSPAPER OR WATCHING TELEVISION: NOT AT ALL
10. IF YOU CHECKED OFF ANY PROBLEMS, HOW DIFFICULT HAVE THESE PROBLEMS MADE IT FOR YOU TO DO YOUR WORK, TAKE CARE OF THINGS AT HOME, OR GET ALONG WITH OTHER PEOPLE: NOT DIFFICULT AT ALL
SUM OF ALL RESPONSES TO PHQ QUESTIONS 1-9: 0
SUM OF ALL RESPONSES TO PHQ QUESTIONS 1-9: 0

## 2025-04-16 NOTE — PROGRESS NOTES
\"Have you been to the ER, urgent care clinic since your last visit?  Hospitalized since your last visit?\"    NO          Click Here for Release of Records Request      SUBJECTIVE:  Lorelei Bond is a 54 y.o. female patient who  comes for complaints of   Chief Complaint   Patient presents with    Established New Doctor    Sinus Problem     Started 1 week ago, headaches and congestion          Episode of feeling 1 week ago   Sinusitis   Greenish yellow phelm   No fever chills   Headache + 1 month   Severe sometime s  Migarine   + phonoophonia   And photophobia   Advised Imitrex , pt wants to hold unitl done with  abx     Snoring   Sleep study oin 2019   Not significant for carolyn         Heart cath and echo normal 2021  No palpitations/ tachycardia since above       Seeing ortho for hip pain   F.u rheumatology       BP Readings from Last 3 Encounters:   04/16/25 130/78   03/06/25 113/71   03/06/25 125/82     Healthy diet and regular exercise discussed   Wt Readings from Last 3 Encounters:   04/16/25 95.3 kg (210 lb)   03/06/25 94.3 kg (208 lb)   12/04/24 94.3 kg (208 lb)       Depression and anxiety  On celexa 30mg daily  Symp controlled  Stopped ativan   Pt on lyrica      Tsh last yr   Wnl     Labs reviewed       REVIEW OF SYSTEMS (except Subjective (HPI))  GENERAL: No fevers / chills  RESPIRATORY: Negative for cough, wheezing or shortness of breath  CARDIOVASCULAR: Negative for chest pain or palpitations.  GI: no nausea, vomiting, or diarrhea  NEURO: No history of headaches    Past Medical History:   Diagnosis Date    Adrenal insufficiency     Asthma     Bleeding after intercourse     History of    Bloody diarrhea     Cancer (HCC)     CERVICAL    Chest pain     Delta storage pool disease (HCC)     Diverticulitis     Diverticulosis of colon     Environmental allergies     Family history of breast cancer     MGM in her 40s    Fibromyalgia     GERD (gastroesophageal reflux disease)     H/O thyroid cyst     mild

## 2025-04-18 DIAGNOSIS — G43.809 OTHER MIGRAINE WITHOUT STATUS MIGRAINOSUS, NOT INTRACTABLE: ICD-10-CM

## 2025-04-18 RX ORDER — LEVOFLOXACIN 750 MG/1
750 TABLET, FILM COATED ORAL DAILY
Qty: 7 TABLET | Refills: 0 | Status: SHIPPED | OUTPATIENT
Start: 2025-04-18 | End: 2025-04-25

## 2025-05-19 DIAGNOSIS — E89.0 POSTOPERATIVE HYPOTHYROIDISM: ICD-10-CM

## 2025-05-19 RX ORDER — LEVOTHYROXINE SODIUM 50 UG/1
50 TABLET ORAL DAILY
Qty: 90 TABLET | Refills: 0 | Status: SHIPPED | OUTPATIENT
Start: 2025-05-19

## 2025-05-29 ENCOUNTER — OFFICE VISIT (OUTPATIENT)
Dept: FAMILY MEDICINE CLINIC | Age: 55
End: 2025-05-29
Payer: COMMERCIAL

## 2025-05-29 VITALS
OXYGEN SATURATION: 98 % | WEIGHT: 211 LBS | SYSTOLIC BLOOD PRESSURE: 128 MMHG | BODY MASS INDEX: 36.22 KG/M2 | DIASTOLIC BLOOD PRESSURE: 79 MMHG | TEMPERATURE: 98.7 F | HEART RATE: 82 BPM

## 2025-05-29 DIAGNOSIS — R09.89 SYMPTOMS OF UPPER RESPIRATORY INFECTION (URI): Primary | ICD-10-CM

## 2025-05-29 DIAGNOSIS — J30.1 SEASONAL ALLERGIC RHINITIS DUE TO POLLEN: ICD-10-CM

## 2025-05-29 LAB
INFLUENZA A ANTIBODY: NEGATIVE
INFLUENZA B ANTIBODY: NEGATIVE

## 2025-05-29 PROCEDURE — 87804 INFLUENZA ASSAY W/OPTIC: CPT | Performed by: NURSE PRACTITIONER

## 2025-05-29 PROCEDURE — 99213 OFFICE O/P EST LOW 20 MIN: CPT | Performed by: NURSE PRACTITIONER

## 2025-05-29 RX ORDER — GUAIFENESIN 600 MG/1
600 TABLET, EXTENDED RELEASE ORAL 2 TIMES DAILY
Qty: 30 TABLET | Refills: 0 | Status: SHIPPED | OUTPATIENT
Start: 2025-05-29 | End: 2025-06-13

## 2025-05-29 RX ORDER — PREDNISONE 20 MG/1
20 TABLET ORAL 2 TIMES DAILY
Qty: 10 TABLET | Refills: 0 | Status: SHIPPED | OUTPATIENT
Start: 2025-05-29 | End: 2025-06-03

## 2025-05-29 ASSESSMENT — ENCOUNTER SYMPTOMS
WHEEZING: 1
EYE ITCHING: 1
TROUBLE SWALLOWING: 0
COUGH: 1
RHINORRHEA: 1
SINUS PRESSURE: 0
CHEST TIGHTNESS: 1
STRIDOR: 0
EYE REDNESS: 0
SINUS PAIN: 0
CHOKING: 0
SHORTNESS OF BREATH: 1
SORE THROAT: 0
HEARTBURN: 0
HEMOPTYSIS: 0

## 2025-05-29 NOTE — PROGRESS NOTES
dysplastic cells    Hyperlipidemia     Hypothyroidism, unspecified 03/18/2016    Lupus     Osteoarthritis     Positive cardiac stress test     Rheumatoid arthritis (HCC)     Sleep apnea     tests were negative but snores badly    SOB (shortness of breath)     Vision abnormalities     glasses/ far sighted        Current Outpatient Medications   Medication Sig Dispense Refill    guaiFENesin (MUCINEX) 600 MG extended release tablet Take 1 tablet by mouth 2 times daily for 15 days 30 tablet 0    predniSONE (DELTASONE) 20 MG tablet Take 1 tablet by mouth 2 times daily for 5 days 10 tablet 0    levothyroxine (SYNTHROID) 50 MCG tablet Take 1 tablet by mouth daily 90 tablet 0    tiZANidine (ZANAFLEX) 4 MG tablet Take 1 tablet by mouth nightly at bedtime. 90 tablet 0    hydrOXYzine HCl (ATARAX) 25 MG tablet Take 1 tablet by mouth daily      atorvastatin (LIPITOR) 40 MG tablet TAKE 1 TABLET DAILY 90 tablet 3    pantoprazole (PROTONIX) 40 MG tablet Take 1 tablet by mouth every morning (before breakfast) 90 tablet 0    pregabalin (LYRICA) 75 MG capsule Take 1 capsule by mouth 2 times daily for 90 days. Max Daily Amount: 150 mg 90 capsule 1    citalopram (CELEXA) 10 MG tablet take 1 tablet by mouth once daily with 20 MG TABLET 90 tablet 3    citalopram (CELEXA) 20 MG tablet Take 1 tablet by mouth daily With the celexa 10mg tab to make 30mg dialy 90 tablet 3    albuterol sulfate HFA (PROVENTIL;VENTOLIN;PROAIR) 108 (90 Base) MCG/ACT inhaler Inhale 2 puffs into the lungs every 6 hours as needed for Wheezing or Shortness of Breath 18 g 0    ondansetron (ZOFRAN) 4 MG tablet Take every six hours as needed 20 tablet 0    Cholecalciferol (VITAMIN D3) 50 MCG (2000 UT) CAPS Take 1,000 Units by mouth      naproxen (NAPROSYN) 500 MG tablet Take 1 tablet by mouth 2 times daily (with meals) 180 tablet 1    B Complex Vitamins (VITAMIN B COMPLEX PO) Take by mouth daily      Cetirizine HCl (ZYRTEC PO) Take 10 mg by mouth daily

## 2025-06-04 DIAGNOSIS — K21.9 GASTROESOPHAGEAL REFLUX DISEASE WITHOUT ESOPHAGITIS: Primary | ICD-10-CM

## 2025-06-04 NOTE — TELEPHONE ENCOUNTER
Patient went to the  a 3/6/25. She states her throat was stretching and she felt like she had chest pain. She was advised to go to ER.  Patient did go to ER.    They took her off Omeprazole and put her on Pantoprazole.     She woke up last night feeling like her stomach blew up and her neck was stretching. She states it was worse than when she was seen for this in March.     Patient wants to know if Pantoprazole is a long term medication and should she still be taking it. Is there anything else she can do?    No available appts    Advised to go back to /ER.

## 2025-06-04 NOTE — TELEPHONE ENCOUNTER
Denied nausea, and vomiting. Confirmed she has been having abdominal pain, unspecified location.   Agreeable to see GI, referral pending.

## 2025-06-05 RX ORDER — PANTOPRAZOLE SODIUM 40 MG/1
40 TABLET, DELAYED RELEASE ORAL
Qty: 30 TABLET | Refills: 0 | Status: SHIPPED | OUTPATIENT
Start: 2025-06-05

## 2025-06-05 RX ORDER — PANTOPRAZOLE SODIUM 40 MG/1
40 TABLET, DELAYED RELEASE ORAL
Qty: 90 TABLET | Refills: 0 | Status: CANCELLED | OUTPATIENT
Start: 2025-06-05

## 2025-06-05 NOTE — TELEPHONE ENCOUNTER
Patient notified.   She is scheduled with GI.   Would like to know if a refill for Protonix can be sent to [facility] in the meantime.     Rx pending, please review and sign if appropriate.

## 2025-06-09 ENCOUNTER — OFFICE VISIT (OUTPATIENT)
Dept: GASTROENTEROLOGY | Age: 55
End: 2025-06-09
Payer: COMMERCIAL

## 2025-06-09 VITALS
SYSTOLIC BLOOD PRESSURE: 124 MMHG | BODY MASS INDEX: 35.87 KG/M2 | DIASTOLIC BLOOD PRESSURE: 74 MMHG | WEIGHT: 209 LBS | TEMPERATURE: 98.1 F

## 2025-06-09 DIAGNOSIS — R13.19 ESOPHAGEAL DYSPHAGIA: ICD-10-CM

## 2025-06-09 DIAGNOSIS — D12.6 TUBULAR ADENOMA OF COLON: Primary | ICD-10-CM

## 2025-06-09 DIAGNOSIS — K22.2 SCHATZKI'S RING: ICD-10-CM

## 2025-06-09 DIAGNOSIS — Z86.0100 HISTORY OF COLON POLYPS: ICD-10-CM

## 2025-06-09 PROCEDURE — 99204 OFFICE O/P NEW MOD 45 MIN: CPT | Performed by: INTERNAL MEDICINE

## 2025-06-09 ASSESSMENT — ENCOUNTER SYMPTOMS
DIARRHEA: 0
VOICE CHANGE: 0
WHEEZING: 0
NAUSEA: 0
ANAL BLEEDING: 0
RECTAL PAIN: 0
CONSTIPATION: 0
CHOKING: 0
ABDOMINAL PAIN: 1
TROUBLE SWALLOWING: 1
VOMITING: 0
COUGH: 0
SHORTNESS OF BREATH: 0
BLOOD IN STOOL: 0
SORE THROAT: 0
ABDOMINAL DISTENTION: 0

## 2025-06-09 NOTE — PROGRESS NOTES
Not on file     Social Drivers of Health     Financial Resource Strain: Patient Declined (5/23/2024)    Overall Financial Resource Strain (CARDIA)     Difficulty of Paying Living Expenses: Patient declined   Food Insecurity: Patient Declined (4/16/2025)    Hunger Vital Sign     Worried About Running Out of Food in the Last Year: Patient declined     Ran Out of Food in the Last Year: Patient declined   Transportation Needs: Patient Declined (4/16/2025)    PRAPARE - Transportation     Lack of Transportation (Medical): Patient declined     Lack of Transportation (Non-Medical): Patient declined   Physical Activity: Not on file   Stress: Not on file   Social Connections: Not on file   Intimate Partner Violence: Unknown (4/24/2025)    Received from [facility]    Humiliation, Afraid, Rape, and Kick questionnaire     Fear of Current or Ex-Partner: No     Emotionally Abused: Not on file     Physically Abused: Not on file     Sexually Abused: Not on file   Housing Stability: Patient Declined (4/16/2025)    Housing Stability Vital Sign     Unable to Pay for Housing in the Last Year: Patient declined     Number of Times Moved in the Last Year: 0     Homeless in the Last Year: Patient declined         REVIEW OF SYSTEMS:         Review of Systems   Constitutional:  Negative for appetite change, fatigue and unexpected weight change.   HENT:  Positive for trouble swallowing (has h/o vocal cord cyst removal). Negative for sore throat and voice change.    Respiratory:  Negative for cough, choking, shortness of breath and wheezing.    Cardiovascular:  Positive for chest pain (c/o stretching feeling). Negative for palpitations and leg swelling.   Gastrointestinal:  Positive for abdominal pain (c/o stretching feeling). Negative for abdominal distention, anal bleeding, blood in stool, constipation, diarrhea, nausea, rectal pain and vomiting.   Neurological:  Negative for dizziness, weakness, light-headedness, numbness and

## 2025-06-12 ENCOUNTER — PREP FOR PROCEDURE (OUTPATIENT)
Dept: GASTROENTEROLOGY | Age: 55
End: 2025-06-12

## 2025-06-12 DIAGNOSIS — R13.19 ESOPHAGEAL DYSPHAGIA: ICD-10-CM

## 2025-06-12 DIAGNOSIS — Z12.11 COLON CANCER SCREENING: Primary | ICD-10-CM

## 2025-06-12 RX ORDER — POLYETHYLENE GLYCOL 3350 17 G/17G
POWDER, FOR SOLUTION ORAL
Qty: 238 G | Refills: 0 | Status: SHIPPED | OUTPATIENT
Start: 2025-06-12

## 2025-06-12 RX ORDER — BISACODYL 5 MG
TABLET, DELAYED RELEASE (ENTERIC COATED) ORAL
Qty: 4 TABLET | Refills: 0 | Status: SHIPPED | OUTPATIENT
Start: 2025-06-12

## 2025-06-12 NOTE — TELEPHONE ENCOUNTER
Procedure scheduled/Dr CHARLOTTE Aragon  Procedure: colon egd   Dx:     Tubular adenoma of colon     2. Esophageal dysphagia    3. Schatzki's ring    4. History of colon polyps      Date: 12/8/25  Time: 8 a.m.  Hospital: [facility]   Bowel Prep instructions given: Miralax dulco  In office/via phone: office  Clearance needed: yes  Sending to PCP  GLP - 1: N/A    The patient was informed that any cancellations/reschedules for procedures will need to be done no later than one week prior.  If less than one week prior an office visit will be needed in order to discuss being rescheduled.  All no shows to procedures will need an office visit prior as well.  Per ESTELLA Aragon.

## 2025-07-07 RX ORDER — PANTOPRAZOLE SODIUM 40 MG/1
40 TABLET, DELAYED RELEASE ORAL
Qty: 30 TABLET | Refills: 0 | Status: SHIPPED | OUTPATIENT
Start: 2025-07-07

## 2025-07-07 NOTE — TELEPHONE ENCOUNTER
Last visit: 04/16/25  Last Med refill: 06/05/25  Does patient have enough medication for 72 hours: No:     Next Visit Date:  No future appointments.    Health Maintenance   Topic Date Due    Hepatitis B vaccine (1 of 3 - 19+ 3-dose series) Never done    DTaP/Tdap/Td vaccine (1 - Tdap) Never done    Pneumococcal 50+ years Vaccine (1 of 2 - PCV) Never done    Shingles vaccine (1 of 2) Never done    Cervical cancer screen  11/07/2022    Lipids  02/20/2024    COVID-19 Vaccine (1 - 2024-25 season) Never done    Flu vaccine (1) 08/01/2025    Breast cancer screen  09/20/2025    A1C test (Diabetic or Prediabetic)  12/05/2025    Colorectal Cancer Screen  03/12/2026    Depression Monitoring  04/16/2026    Hepatitis C screen  Completed    HIV screen  Completed    Hepatitis A vaccine  Aged Out    Hib vaccine  Aged Out    Polio vaccine  Aged Out    Meningococcal (ACWY) vaccine  Aged Out    Meningococcal B vaccine  Aged Out    Diabetes screen  Discontinued       Hemoglobin A1C (%)   Date Value   12/05/2024 5.9   10/18/2018 5.4   02/22/2017 5.3             ( goal A1C is < 7)   No components found for: \"LABMICR\"  No components found for: \"LDLCHOLESTEROL\", \"LDLCALC\"    (goal LDL is <100)   AST (U/L)   Date Value   06/06/2024 16     ALT (U/L)   Date Value   06/06/2024 15     BUN (mg/dL)   Date Value   03/06/2025 11     BP Readings from Last 3 Encounters:   06/09/25 124/74   05/29/25 128/79   04/16/25 130/78          (goal 120/80)    All Future Testing planned in [facility]  Lab Frequency Next Occurrence   Lipid, Fasting Once 04/16/2025   EGD Once 09/08/2025   COLONOSCOPY W/ OR W/O BIOPSY Once 09/09/2025               Patient Active Problem List:     Dermatitis, contact     Anxiety and depression     Bilateral carpal tunnel syndrome     Tenderness of left calf     Posterior left knee pain     Cervical polyp     Fatigue     Hypothyroidism     Bilateral low back pain without sciatica     Left foot pain     Lumbar degenerative disc

## 2025-07-28 ENCOUNTER — APPOINTMENT (OUTPATIENT)
Dept: GENERAL RADIOLOGY | Age: 55
End: 2025-07-28
Attending: EMERGENCY MEDICINE
Payer: COMMERCIAL

## 2025-07-28 ENCOUNTER — HOSPITAL ENCOUNTER (EMERGENCY)
Age: 55
Discharge: HOME | End: 2025-07-28
Attending: EMERGENCY MEDICINE
Payer: COMMERCIAL

## 2025-07-28 VITALS
TEMPERATURE: 97.4 F | WEIGHT: 210 LBS | OXYGEN SATURATION: 95 % | BODY MASS INDEX: 35.85 KG/M2 | HEART RATE: 70 BPM | HEIGHT: 64 IN | DIASTOLIC BLOOD PRESSURE: 65 MMHG | RESPIRATION RATE: 17 BRPM | SYSTOLIC BLOOD PRESSURE: 116 MMHG

## 2025-07-28 DIAGNOSIS — E87.6 HYPOKALEMIA: ICD-10-CM

## 2025-07-28 DIAGNOSIS — R07.9 CHEST PAIN, UNSPECIFIED TYPE: Primary | ICD-10-CM

## 2025-07-28 LAB
ALBUMIN SERPL-MCNC: 4.1 G/DL (ref 3.5–5.2)
ALP SERPL-CCNC: 93 U/L (ref 35–104)
ALT SERPL-CCNC: 11 U/L (ref 10–35)
ANION GAP SERPL CALCULATED.3IONS-SCNC: 15 MMOL/L (ref 9–16)
AST SERPL-CCNC: 17 U/L (ref 10–35)
BASOPHILS # BLD: 0.06 K/UL (ref 0–0.2)
BASOPHILS NFR BLD: 1 % (ref 0–2)
BILIRUB SERPL-MCNC: 0.5 MG/DL (ref 0–1.2)
BUN SERPL-MCNC: 8 MG/DL (ref 6–20)
CALCIUM SERPL-MCNC: 9.5 MG/DL (ref 8.6–10.4)
CHLORIDE SERPL-SCNC: 103 MMOL/L (ref 98–107)
CO2 SERPL-SCNC: 21 MMOL/L (ref 20–31)
CREAT SERPL-MCNC: 0.7 MG/DL (ref 0.7–1.2)
EOSINOPHIL # BLD: 0.18 K/UL (ref 0–0.44)
EOSINOPHILS RELATIVE PERCENT: 3 % (ref 0–4)
ERYTHROCYTE [DISTWIDTH] IN BLOOD BY AUTOMATED COUNT: 12.3 % (ref 11.5–14.9)
GFR, ESTIMATED: >90 ML/MIN/1.73M2
GLUCOSE SERPL-MCNC: 157 MG/DL (ref 74–99)
HCT VFR BLD AUTO: 39 % (ref 36–46)
HGB BLD-MCNC: 13 G/DL (ref 12–16)
IMM GRANULOCYTES # BLD AUTO: <0.03 K/UL (ref 0–0.3)
IMM GRANULOCYTES NFR BLD: 0 %
LYMPHOCYTES NFR BLD: 2.43 K/UL (ref 1.1–3.7)
LYMPHOCYTES RELATIVE PERCENT: 34 % (ref 24–44)
MCH RBC QN AUTO: 29.5 PG (ref 26–34)
MCHC RBC AUTO-ENTMCNC: 33.3 G/DL (ref 31–37)
MCV RBC AUTO: 88.4 FL (ref 80–100)
MONOCYTES NFR BLD: 0.46 K/UL (ref 0.1–1.2)
MONOCYTES NFR BLD: 7 % (ref 3–12)
NEUTROPHILS NFR BLD: 55 % (ref 36–66)
NEUTS SEG NFR BLD: 3.97 K/UL (ref 1.5–8.1)
NRBC BLD-RTO: 0 PER 100 WBC
PLATELET # BLD AUTO: 174 K/UL (ref 150–450)
PMV BLD AUTO: 11.4 FL (ref 8–13.5)
POTASSIUM SERPL-SCNC: 3 MMOL/L (ref 3.7–5.3)
PROT SERPL-MCNC: 6.9 G/DL (ref 6.6–8.7)
RBC # BLD AUTO: 4.41 M/UL (ref 3.95–5.11)
SODIUM SERPL-SCNC: 139 MMOL/L (ref 136–145)
TROPONIN I SERPL HS-MCNC: <6 NG/L (ref 0–14)
TROPONIN I SERPL HS-MCNC: <6 NG/L (ref 0–14)
TSH SERPL DL<=0.05 MIU/L-ACNC: 1.98 UIU/ML (ref 0.27–4.2)
WBC OTHER # BLD: 7.1 K/UL (ref 3.5–11)

## 2025-07-28 PROCEDURE — 93005 ELECTROCARDIOGRAM TRACING: CPT | Performed by: EMERGENCY MEDICINE

## 2025-07-28 PROCEDURE — 96365 THER/PROPH/DIAG IV INF INIT: CPT

## 2025-07-28 PROCEDURE — 6370000000 HC RX 637 (ALT 250 FOR IP): Performed by: EMERGENCY MEDICINE

## 2025-07-28 PROCEDURE — 85025 COMPLETE CBC W/AUTO DIFF WBC: CPT

## 2025-07-28 PROCEDURE — 80053 COMPREHEN METABOLIC PANEL: CPT

## 2025-07-28 PROCEDURE — 99285 EMERGENCY DEPT VISIT HI MDM: CPT

## 2025-07-28 PROCEDURE — 84484 ASSAY OF TROPONIN QUANT: CPT

## 2025-07-28 PROCEDURE — 36415 COLL VENOUS BLD VENIPUNCTURE: CPT

## 2025-07-28 PROCEDURE — 71045 X-RAY EXAM CHEST 1 VIEW: CPT

## 2025-07-28 PROCEDURE — 6360000002 HC RX W HCPCS: Performed by: EMERGENCY MEDICINE

## 2025-07-28 PROCEDURE — 84443 ASSAY THYROID STIM HORMONE: CPT

## 2025-07-28 RX ORDER — POTASSIUM CHLORIDE 7.45 MG/ML
10 INJECTION INTRAVENOUS ONCE
Status: COMPLETED | OUTPATIENT
Start: 2025-07-28 | End: 2025-07-28

## 2025-07-28 RX ORDER — POTASSIUM CHLORIDE 1500 MG/1
40 TABLET, EXTENDED RELEASE ORAL ONCE
Status: COMPLETED | OUTPATIENT
Start: 2025-07-28 | End: 2025-07-28

## 2025-07-28 RX ADMIN — POTASSIUM CHLORIDE 40 MEQ: 1500 TABLET, EXTENDED RELEASE ORAL at 16:32

## 2025-07-28 RX ADMIN — POTASSIUM CHLORIDE 10 MEQ: 7.46 INJECTION, SOLUTION INTRAVENOUS at 16:36

## 2025-07-28 ASSESSMENT — PAIN DESCRIPTION - LOCATION: LOCATION: CHEST;GENERALIZED

## 2025-07-28 ASSESSMENT — PAIN SCALES - GENERAL
PAINLEVEL_OUTOF10: 4
PAINLEVEL_OUTOF10: 4

## 2025-07-28 ASSESSMENT — PAIN - FUNCTIONAL ASSESSMENT
PAIN_FUNCTIONAL_ASSESSMENT: 0-10
PAIN_FUNCTIONAL_ASSESSMENT: 0-10

## 2025-07-28 NOTE — ED PROVIDER NOTES
[facility] EMERGENCY DEPARTMENT  EMERGENCY DEPARTMENT ENCOUNTER      Pt Name: Lorelei Bond  MRN: [id number]  Birthdate 1970  Date of evaluation: 7/28/25      CHIEF COMPLAINT       Chief Complaint   Patient presents with    Chest Pain     Pt c/o chest pain, SOB, hot flashes, both legs and arms feeling numb x a few hours. Pt reports some relief since 325 aspirin and 1 nitro given by EMS.     Shortness of Breath    Sweats     HISTORY OF PRESENT ILLNESS   HPI 54 y.o. female presents with c/o of the above symptoms.   Pt reports around 2pm, she started feeling \"heat through my body\", diffusely weak, felt like she was shaking and started having chest pain (a heaviness) all across her chest from shoulder to shoulder.  She went home, called her  asking her to come home, and her symptoms worsened so she called 911.  She thought her glucose might be low, so she ate some ice cream, she continued to feel bad so she called 911.  EMS gave aspirin and a dose of nitroglycerin. She feels numbness in bilateral legs and arms.  CP is now resolved.   .     REVIEW OF SYSTEMS       Review of Systems    PAST MEDICAL HISTORY     Past Medical History:   Diagnosis Date    Adrenal insufficiency     Asthma     Bleeding after intercourse     History of    Bloody diarrhea     Cancer (HCC)     CERVICAL    Chest pain     Delta storage pool disease (HCC)     Diverticulitis     Diverticulosis of colon     Environmental allergies     Family history of breast cancer     MGM in her 40s    Fibromyalgia     GERD (gastroesophageal reflux disease)     H/O thyroid cyst     mild hypothyroidism.    Headache     History of cervical dysplasia 2000    had cone    History of conization of cervix 2000    dysplastic cells    Hyperlipidemia     Hypothyroidism, unspecified 03/18/2016    Lupus     Osteoarthritis     Positive cardiac stress test     Rheumatoid arthritis (HCC)     Sleep apnea     tests were negative but snores badly    SOB (shortness of

## 2025-07-29 ENCOUNTER — TELEPHONE (OUTPATIENT)
Dept: INTERNAL MEDICINE CLINIC | Age: 55
End: 2025-07-29

## 2025-07-29 LAB
EKG ATRIAL RATE: 71 BPM
EKG P AXIS: 43 DEGREES
EKG P-R INTERVAL: 162 MS
EKG Q-T INTERVAL: 404 MS
EKG QRS DURATION: 76 MS
EKG QTC CALCULATION (BAZETT): 439 MS
EKG R AXIS: 14 DEGREES
EKG T AXIS: 33 DEGREES
EKG VENTRICULAR RATE: 71 BPM

## 2025-07-29 PROCEDURE — 93010 ELECTROCARDIOGRAM REPORT: CPT | Performed by: INTERNAL MEDICINE

## 2025-07-29 NOTE — TELEPHONE ENCOUNTER
Patient was seen in the ER yesterday and was told she has low Potassium. She is has been feeling really tired and short of breath. Patient would like to know what she should do for it.    Please advise

## 2025-07-29 NOTE — TELEPHONE ENCOUNTER
Patient declined to go back to the ED. Patient states she spent the whole day their yesterday and nothing really helped. I scheduled patient appt for patient this Thursday with Dr. Adrian

## 2025-07-31 ENCOUNTER — OFFICE VISIT (OUTPATIENT)
Dept: INTERNAL MEDICINE CLINIC | Age: 55
End: 2025-07-31
Payer: COMMERCIAL

## 2025-07-31 ENCOUNTER — HOSPITAL ENCOUNTER (OUTPATIENT)
Age: 55
Setting detail: SPECIMEN
Discharge: HOME | End: 2025-07-31

## 2025-07-31 VITALS
SYSTOLIC BLOOD PRESSURE: 100 MMHG | HEIGHT: 64 IN | HEART RATE: 98 BPM | DIASTOLIC BLOOD PRESSURE: 74 MMHG | BODY MASS INDEX: 35.3 KG/M2 | OXYGEN SATURATION: 97 % | WEIGHT: 206.8 LBS

## 2025-07-31 DIAGNOSIS — E87.6 HYPOKALEMIA: Primary | ICD-10-CM

## 2025-07-31 DIAGNOSIS — Z13.220 SCREENING FOR HYPERLIPIDEMIA: ICD-10-CM

## 2025-07-31 DIAGNOSIS — E87.6 HYPOKALEMIA: ICD-10-CM

## 2025-07-31 DIAGNOSIS — K21.9 GASTROESOPHAGEAL REFLUX DISEASE WITHOUT ESOPHAGITIS: ICD-10-CM

## 2025-07-31 LAB
ANION GAP SERPL CALCULATED.3IONS-SCNC: 14 MMOL/L (ref 9–16)
BUN SERPL-MCNC: 9 MG/DL (ref 6–20)
CALCIUM SERPL-MCNC: 9.9 MG/DL (ref 8.6–10.4)
CHLORIDE SERPL-SCNC: 103 MMOL/L (ref 98–107)
CHOLEST SERPL-MCNC: 160 MG/DL (ref 0–199)
CHOLESTEROL/HDL RATIO: 4.6
CO2 SERPL-SCNC: 23 MMOL/L (ref 20–31)
CREAT SERPL-MCNC: 0.7 MG/DL (ref 0.6–0.9)
GFR, ESTIMATED: >90 ML/MIN/1.73M2
GLUCOSE SERPL-MCNC: 114 MG/DL (ref 74–99)
HDLC SERPL-MCNC: 35 MG/DL
LDLC SERPL CALC-MCNC: 76 MG/DL (ref 0–100)
MAGNESIUM SERPL-MCNC: 2.1 MG/DL (ref 1.6–2.6)
POTASSIUM SERPL-SCNC: 4.2 MMOL/L (ref 3.7–5.3)
SODIUM SERPL-SCNC: 140 MMOL/L (ref 136–145)
TRIGL SERPL-MCNC: 245 MG/DL
VLDLC SERPL CALC-MCNC: 49 MG/DL (ref 1–30)

## 2025-07-31 PROCEDURE — 99213 OFFICE O/P EST LOW 20 MIN: CPT | Performed by: STUDENT IN AN ORGANIZED HEALTH CARE EDUCATION/TRAINING PROGRAM

## 2025-07-31 ASSESSMENT — ENCOUNTER SYMPTOMS
NAUSEA: 0
SHORTNESS OF BREATH: 0
SORE THROAT: 0
WHEEZING: 0
RHINORRHEA: 0
BLOOD IN STOOL: 0
CHEST TIGHTNESS: 0
SINUS PAIN: 0
SINUS PRESSURE: 0
COUGH: 0
DIARRHEA: 0
CONSTIPATION: 0

## 2025-07-31 NOTE — PROGRESS NOTES
Post-Discharge Transitional Care Management Progress Note      Lorelei Bond   YOB: 1970    Date of Office Visit:  7/31/2025  Date of Hospital Admission: ***  Date of Hospital Discharge: ***    Care management risk score Rising risk (score 2-5) and Complex Care (Scores >=6): No Risk Score On File     Non face to face  following discharge, date last encounter closed (first attempt may have been earlier): *No documented post hospital discharge outreach found in the last 14 days *No documented post hospital discharge outreach found in the last 14 days    Call initiated 2 business days of discharge: *No response recorded in the last 14 days    ASSESSMENT/PLAN:   Screening for hyperlipidemia  -     Lipid Panel; Future  Hypokalemia  -     Basic Metabolic Panel; Future  -     Magnesium; Future  Gastroesophageal reflux disease without esophagitis  -     H. pylori antigen; Future  Hospital discharge follow-up  -     ID DISCHARGE MEDS RECONCILED W/ CURRENT OUTPATIENT MED LIST      Medical Decision Making: {TCMPN4:21023}  Return in about 6 months (around 1/31/2026).    {Time Documentation Optional:[id number]}     Subjective:   HPI:  Follow up of Hospital problems/diagnosis(es): ***    Inpatient course: Discharge summary reviewed- see chart.    Interval history/Current status: ***    Patient Active Problem List   Diagnosis   • Dermatitis, contact   • Anxiety and depression   • Bilateral carpal tunnel syndrome   • Tenderness of left calf   • Posterior left knee pain   • Cervical polyp   • Fatigue   • Hypothyroidism   • Bilateral low back pain without sciatica   • Left foot pain   • Lumbar degenerative disc disease   • Arthralgia of left hip   • Midline low back pain with sciatica   • Asthma   • GERD (gastroesophageal reflux disease)   • Delta storage pool disease (HCC)   • Environmental allergies   • H/O thyroid cyst   • History of cervical dysplasia   • History of conization of cervix 2000   • History of low

## 2025-07-31 NOTE — PROGRESS NOTES
[facility]  [address] [address]  Dept: [phone]    [facility]    Progress Note    Date of patient's visit: 7/31/2025    Patient's Name:  Lorelei Bond    YOB: 1970            Patient Care Team:  Cecy Mann MD as PCP - General (Internal Medicine)  Cecy Mann MD as PCP - Empaneled Provider  Segundo Salcido DO as Consulting Physician (Obstetrics & Gynecology)    REASON FOR VISIT:     Chief Complaint   Patient presents with    Follow-Up from [facility] ED - patient reports Fatigue, and has low potassium. Reports numbness in face, arms and legs. SOB all day long         HISTORY OF PRESENT ILLNESS:    History was obtained from the patient. Lorelei Bond is a 54 y.o. is here for     Hypokalemia, muscle cramping  Patient was recently seen in the emergency room.  Her potassium was 3.  She was given replacement  She has had some improvement  She drinks a lot of caffeine, coffee, tea, pop during the day.  Instructed to cut back and to drink at least 8 glasses of water daily    Bloating, change in stool  She does follow-up with GI.  She has an EGD and colonoscopy scheduled for December  Stool changes changed few days ago  Denied any melena, hematochezia, hematemesis        Past Medical History:   Diagnosis Date    Adrenal insufficiency     Asthma     Bleeding after intercourse     History of    Bloody diarrhea     Cancer (HCC)     CERVICAL    Chest pain     Delta storage pool disease (HCC)     Diverticulitis     Diverticulosis of colon     Environmental allergies     Family history of breast cancer     MGM in her 40s    Fibromyalgia     GERD (gastroesophageal reflux disease)     H/O thyroid cyst     mild hypothyroidism.    Headache     History of cervical dysplasia 2000    had cone    History of conization of cervix 2000    dysplastic cells    Hyperlipidemia     Hypothyroidism, unspecified 03/18/2016

## 2025-07-31 NOTE — PROGRESS NOTES
Have you been to the ER, urgent care clinic since your last visit?  Hospitalized since your last visit?   YES - When: approximately 3 days ago.  Where and Why: [facility].    Have you seen or consulted any other health care providers outside our system since your last visit?   NO     “Have you had a pap smear?”    No    Date of last Cervical Cancer screen (HPV or PAP): 11/7/2019

## 2025-08-01 LAB
MICROORGANISM/AGENT SPEC: NEGATIVE
SPECIMEN DESCRIPTION: NORMAL

## 2025-08-04 RX ORDER — PANTOPRAZOLE SODIUM 40 MG/1
40 TABLET, DELAYED RELEASE ORAL
Qty: 30 TABLET | Refills: 0 | Status: SHIPPED | OUTPATIENT
Start: 2025-08-04

## 2025-08-05 ENCOUNTER — HOSPITAL ENCOUNTER (OUTPATIENT)
Age: 55
Setting detail: SPECIMEN
Discharge: HOME | End: 2025-08-05

## 2025-08-05 ENCOUNTER — OFFICE VISIT (OUTPATIENT)
Dept: INTERNAL MEDICINE CLINIC | Age: 55
End: 2025-08-05
Payer: COMMERCIAL

## 2025-08-05 ENCOUNTER — TELEPHONE (OUTPATIENT)
Dept: GASTROENTEROLOGY | Age: 55
End: 2025-08-05

## 2025-08-05 VITALS
OXYGEN SATURATION: 97 % | WEIGHT: 206 LBS | DIASTOLIC BLOOD PRESSURE: 72 MMHG | BODY MASS INDEX: 35.17 KG/M2 | HEIGHT: 64 IN | SYSTOLIC BLOOD PRESSURE: 120 MMHG | HEART RATE: 79 BPM

## 2025-08-05 DIAGNOSIS — K92.89 GAS BLOAT SYNDROME: Primary | ICD-10-CM

## 2025-08-05 DIAGNOSIS — E55.9 VITAMIN D DEFICIENCY: ICD-10-CM

## 2025-08-05 LAB — 25(OH)D3 SERPL-MCNC: 25 NG/ML (ref 30–100)

## 2025-08-05 PROCEDURE — 99214 OFFICE O/P EST MOD 30 MIN: CPT

## 2025-08-05 RX ORDER — PSYLLIUM HUSK/CALCIUM CARB 1 G-60 MG
2 CAPSULE ORAL 2 TIMES DAILY
Qty: 360 CAPSULE | Refills: 1 | Status: SHIPPED | OUTPATIENT
Start: 2025-08-05 | End: 2025-08-05

## 2025-08-05 RX ORDER — PSYLLIUM HUSK/CALCIUM CARB 1 G-60 MG
2 CAPSULE ORAL 2 TIMES DAILY
Qty: 360 CAPSULE | Refills: 1 | Status: SHIPPED | OUTPATIENT
Start: 2025-08-05

## 2025-08-05 ASSESSMENT — ENCOUNTER SYMPTOMS
BACK PAIN: 0
BLOOD IN STOOL: 0
ABDOMINAL DISTENTION: 0
ANAL BLEEDING: 0
COUGH: 0
ALLERGIC/IMMUNOLOGIC NEGATIVE: 1
CONSTIPATION: 0
DIARRHEA: 0
ABDOMINAL PAIN: 1
SHORTNESS OF BREATH: 0
VOMITING: 0
NAUSEA: 0
WHEEZING: 0

## 2025-08-13 DIAGNOSIS — I25.118 CORONARY ARTERY DISEASE OF NATIVE HEART WITH STABLE ANGINA PECTORIS, UNSPECIFIED VESSEL OR LESION TYPE: ICD-10-CM

## 2025-08-13 DIAGNOSIS — R94.39 ABNORMAL STRESS TEST: ICD-10-CM

## 2025-08-13 RX ORDER — ATORVASTATIN CALCIUM 40 MG/1
80 TABLET, FILM COATED ORAL
Qty: 90 TABLET | Refills: 3 | Status: SHIPPED | OUTPATIENT
Start: 2025-08-13

## 2025-08-28 ENCOUNTER — TELEPHONE (OUTPATIENT)
Dept: INTERNAL MEDICINE CLINIC | Age: 55
End: 2025-08-28

## 2025-08-28 DIAGNOSIS — E87.6 HYPOKALEMIA: Primary | ICD-10-CM

## 2025-08-29 ENCOUNTER — HOSPITAL ENCOUNTER (OUTPATIENT)
Age: 55
Setting detail: SPECIMEN
Discharge: HOME | End: 2025-08-29

## 2025-08-29 DIAGNOSIS — E87.6 HYPOKALEMIA: ICD-10-CM

## 2025-08-29 LAB
ALBUMIN SERPL-MCNC: 4.5 G/DL (ref 3.5–5.2)
ALBUMIN/GLOB SERPL: 1.6 {RATIO} (ref 1–2.5)
ALP SERPL-CCNC: 89 U/L (ref 35–104)
ALT SERPL-CCNC: 14 U/L (ref 10–35)
ANION GAP SERPL CALCULATED.3IONS-SCNC: 11 MMOL/L (ref 9–16)
AST SERPL-CCNC: 18 U/L (ref 10–35)
BILIRUB SERPL-MCNC: 0.4 MG/DL (ref 0–1.2)
BUN SERPL-MCNC: 12 MG/DL (ref 6–20)
CALCIUM SERPL-MCNC: 10.1 MG/DL (ref 8.6–10.4)
CHLORIDE SERPL-SCNC: 102 MMOL/L (ref 98–107)
CO2 SERPL-SCNC: 27 MMOL/L (ref 20–31)
CREAT SERPL-MCNC: 0.7 MG/DL (ref 0.6–0.9)
GFR, ESTIMATED: >90 ML/MIN/1.73M2
GLUCOSE SERPL-MCNC: 101 MG/DL (ref 74–99)
POTASSIUM SERPL-SCNC: 4.4 MMOL/L (ref 3.7–5.3)
PROT SERPL-MCNC: 7.3 G/DL (ref 6.6–8.7)
SODIUM SERPL-SCNC: 140 MMOL/L (ref 136–145)

## 2025-09-02 RX ORDER — PANTOPRAZOLE SODIUM 40 MG/1
40 TABLET, DELAYED RELEASE ORAL
Qty: 30 TABLET | Refills: 0 | Status: SHIPPED | OUTPATIENT
Start: 2025-09-02

## 2025-09-03 DIAGNOSIS — M51.369 LUMBAR DEGENERATIVE DISC DISEASE: ICD-10-CM

## 2025-09-04 RX ORDER — PREGABALIN 75 MG/1
75 CAPSULE ORAL 2 TIMES DAILY
Qty: 90 CAPSULE | Refills: 1 | Status: SHIPPED | OUTPATIENT
Start: 2025-09-04 | End: 2025-12-03

## (undated) DEVICE — PAD,NON-ADHERENT,3X8,STERILE,LF,1/PK: Brand: MEDLINE

## (undated) DEVICE — GAUZE,SPONGE,4"X4",16PLY,XRAY,STRL,LF: Brand: MEDLINE

## (undated) DEVICE — SET ENDOSCP SEAL HYSTEROSCOPE RIG OUTFLO CHN DISP MYOSURE

## (undated) DEVICE — SOLUTION IRRIG 1000ML 0.9% SOD CHL USP POUR PLAS BTL

## (undated) DEVICE — SET FLD MGMT OUTFLO TB DISP FOR CTRL SYS AQUILEX

## (undated) DEVICE — FORCEPS BX L240CM WRK CHN 2.8MM STD CAP W/ NDL MIC MESH

## (undated) DEVICE — CANNULA NSL AD L2IN ETCO2 SAMP SFT CRUSH RESIST FEM AIRLFE

## (undated) DEVICE — SOLUTION IRRIG 1000ML STRL H2O USP PLAS POUR BTL

## (undated) DEVICE — JELLY,LUBE,STERILE,FLIP TOP,TUBE,2-OZ: Brand: MEDLINE

## (undated) DEVICE — KENDALL SCD EXPRESS SLEEVES, KNEE LENGTH, MEDIUM: Brand: KENDALL SCD

## (undated) DEVICE — GOWN,AURORA,NONREINFORCED,LARGE: Brand: MEDLINE

## (undated) DEVICE — DEFENDO AIR WATER SUCTION AND BIOPSY VALVE KIT FOR  OLYMPUS: Brand: DEFENDO AIR/WATER/SUCTION AND BIOPSY VALVE

## (undated) DEVICE — GLOVE ORANGE PI 7   MSG9070

## (undated) DEVICE — UNIVERSAL ANESTHESIA SET, MALE LUER LOCK ADAPTER

## (undated) DEVICE — SET FLD MGMT CTRL SYS INFLO TB AQUILEX

## (undated) DEVICE — BITEBLOCK 54FR W/ DENT RIM BLOX

## (undated) DEVICE — MEDI-VAC NON-CONDUCTIVE SUCTION TUBING 7MM X 6.1M (20 FT.) L: Brand: CARDINAL HEALTH

## (undated) DEVICE — PREP SOL PVP IODINE 4%  4 OZ/BTL

## (undated) DEVICE — DEVICE TISS REM DIA3MM L25.25IN ENDOSCP F/ IU POLYPS

## (undated) DEVICE — SVMMC GYN MIN PK

## (undated) DEVICE — SHEET DRAPE FULL 70X100

## (undated) DEVICE — GLOVE ORTHO 7 1/2   MSG9475

## (undated) DEVICE — GLOVE SURG SZ 65 THK91MIL LTX FREE SYN POLYISOPRENE

## (undated) DEVICE — GLOVE ORANGE PI 8 1/2   MSG9085

## (undated) DEVICE — MEDI-VAC SUCTION HANDLE REGULAR CAPACITY: Brand: CARDINAL HEALTH

## (undated) DEVICE — SAVARY GILLIARD WIRE GUIDE: Brand: SAVARY GILLIARD

## (undated) DEVICE — AIRLIFE™ NASAL OXYGEN CANNULA CURVED, FLARED TIP, WITH 7 FEET (2.1 M) CRUSH RESISTANT TUBING, OVER-THE-EAR STYLE: Brand: AIRLIFE™

## (undated) DEVICE — 1016 S-DRAPE IRRIG POUCH 10/BOX: Brand: STERI-DRAPE™

## (undated) DEVICE — ENDO KIT W/SYRINGE: Brand: MEDLINE INDUSTRIES, INC.

## (undated) DEVICE — SUTURE VICRYL + SZ 2-0 L27IN ABSRB UD CT-2 L26MM 1/2 CIR TAPR VCP269H

## (undated) DEVICE — ST. CHARLES BASIC SET UP: Brand: MEDLINE INDUSTRIES, INC.

## (undated) DEVICE — SOLUTION SURG PREP POV IOD 7.5% 4 OZ

## (undated) DEVICE — SHEET, T, LAPAROTOMY, STERILE: Brand: MEDLINE

## (undated) DEVICE — TRAP SPEC RETRV CLR PLAS POLYP IN LN SUCT QUIK CTCH

## (undated) DEVICE — SNARE ENDOSCP M L240CM LOOP W27MM SHTH DIA2.4MM OVL FLX

## (undated) DEVICE — GOWN STDXXL W/O TWL